# Patient Record
Sex: FEMALE | Race: WHITE | NOT HISPANIC OR LATINO | Employment: OTHER | URBAN - METROPOLITAN AREA
[De-identification: names, ages, dates, MRNs, and addresses within clinical notes are randomized per-mention and may not be internally consistent; named-entity substitution may affect disease eponyms.]

---

## 2017-03-22 ENCOUNTER — OFFICE VISIT (OUTPATIENT)
Dept: FAMILY MEDICINE | Facility: CLINIC | Age: 62
End: 2017-03-22
Payer: COMMERCIAL

## 2017-03-22 VITALS
WEIGHT: 180 LBS | OXYGEN SATURATION: 98 % | TEMPERATURE: 96.8 F | HEIGHT: 69 IN | BODY MASS INDEX: 26.66 KG/M2 | RESPIRATION RATE: 16 BRPM | DIASTOLIC BLOOD PRESSURE: 84 MMHG | SYSTOLIC BLOOD PRESSURE: 136 MMHG | HEART RATE: 64 BPM

## 2017-03-22 DIAGNOSIS — M77.11 LATERAL EPICONDYLITIS OF BOTH ELBOWS: Primary | ICD-10-CM

## 2017-03-22 DIAGNOSIS — R29.898 BILATERAL ARM WEAKNESS: ICD-10-CM

## 2017-03-22 DIAGNOSIS — M77.12 LATERAL EPICONDYLITIS OF BOTH ELBOWS: Primary | ICD-10-CM

## 2017-03-22 LAB
HGB BLD-MCNC: 15.1 G/DL (ref 11.7–15.7)
TSH SERPL DL<=0.005 MIU/L-ACNC: 1 MU/L (ref 0.4–4)
VIT B12 SERPL-MCNC: 932 PG/ML (ref 193–986)

## 2017-03-22 PROCEDURE — 82607 VITAMIN B-12: CPT | Performed by: FAMILY MEDICINE

## 2017-03-22 PROCEDURE — 99213 OFFICE O/P EST LOW 20 MIN: CPT | Performed by: FAMILY MEDICINE

## 2017-03-22 PROCEDURE — 84443 ASSAY THYROID STIM HORMONE: CPT | Performed by: FAMILY MEDICINE

## 2017-03-22 PROCEDURE — 36415 COLL VENOUS BLD VENIPUNCTURE: CPT | Performed by: FAMILY MEDICINE

## 2017-03-22 PROCEDURE — 85018 HEMOGLOBIN: CPT | Performed by: FAMILY MEDICINE

## 2017-03-22 NOTE — PROGRESS NOTES
SUBJECTIVE:                                                    Sofi Degroot is a 61 year old female who presents to clinic today for the following health issues:      Joint Pain     Onset: 10/2016    Description:   Location: left/right hands and arm   Character: weakness    Intensity: moderate    Progression of Symptoms: worse    Accompanying Signs & Symptoms:  Other symptoms: none   History:   Previous similar pain: no       Precipitating factors:   Trauma or overuse: no     Alleviating factors:  Improved by: nothing       Therapies Tried and outcome: nothing     Early October -   Notices that the coffee cup felt heavy.  Just lifting the coffee cup   Feels week over the distal arm -   Concerned about ALS  Right handed  No numb or tingly  Just weak - first in the lower arm but seems to be moving up to the top of the arm  Arm was very weak brushing snow off car -   Tried doing some little weights -     Very subtle symptoms starting in the left arm -     In November she broke her right finger - ring - scrubbing the floor after cat vomitted.    -------------------------------------    Problem list and histories reviewed & adjusted, as indicated.  Additional history: as documented    Patient Active Problem List   Diagnosis     Hypothyroidism     Hepatitis C virus infection without hepatic coma     CARDIOVASCULAR SCREENING; LDL GOAL LESS THAN 100     Hypertension goal BP (blood pressure) < 140/90     Advanced directives, counseling/discussion     Transient cerebral ischemia     PFO (patent foramen ovale)     Left-sided low back pain without sciatica     Cervical high risk HPV (human papillomavirus) test positive     Past Surgical History:   Procedure Laterality Date     C LIGATE FALLOPIAN TUBE  1986     SURGICAL HISTORY OF -  Right 2008    Right 1st MTPJ, bone spur resection       Social History   Substance Use Topics     Smoking status: Former Smoker     Smokeless tobacco: Never Used      Comment: quit 30 years  "ago     Alcohol use Yes      Comment: 4-5 Drinks Per Week     Family History   Problem Relation Age of Onset     HEART DISEASE Maternal Grandfather      Breast Cancer Maternal Grandmother      Dx'd age 70     Arthritis Father      Lipids Mother      On meds     CANCER Mother      BCC     HEART DISEASE Brother       of dilated of cardiomyopathy at age 54     Alcohol/Drug Sister      Alcohol/Drug Brother      Depression Sister            Reviewed and updated as needed this visit by clinical staff       Reviewed and updated as needed this visit by Provider         ROS:  Constitutional, HEENT, cardiovascular, pulmonary, gi and gu systems are negative, except as otherwise noted.    OBJECTIVE:                                                    /84  Pulse 64  Temp 96.8  F (36  C)  Resp 16  Ht 5' 9\" (1.753 m)  Wt 180 lb (81.6 kg)  LMP 10/27/2004  SpO2 98%  BMI 26.58 kg/m2  Body mass index is 26.58 kg/(m^2).  GENERAL: healthy, alert and no distress  MS: RUE exam shows normal strength and muscle mass, no deformities, no erythema, induration, or nodules, no evidence of joint effusion, ROM of all joints is normal and no evidence of joint instability and LUE exam shows normal strength and muscle mass, no deformities, no erythema, induration, or nodules, no evidence of joint effusion, ROM of all joints is normal and no evidence of joint instability    Diagnostic Test Results:  pending     ASSESSMENT/PLAN:                                                    1. Lateral epicondylitis of both elbows  Pt with right arm weakness and mild discomfort over the wrist extensors.  Discussed diff dx - she is concerned about ALS but feel like this is more likely MSK issue such as epicondylitis with some weakness.  Will get hand therapy started and see how she does.  Discussed if not improving or she is worsening would refer to neurology for evaulation and possible EMG  Will check labs to f/o b12 def, thyroid issue and " anemia  - OCCUPATIONAL THERAPY REFERRAL    2. Bilateral arm weakness     - OCCUPATIONAL THERAPY REFERRAL  - Vitamin B12  - TSH with free T4 reflex  - Hemoglobin    Pt will call or RTC if symptoms worsen or do not improve.     Noemy Olivera DO  [facility]

## 2017-03-22 NOTE — MR AVS SNAPSHOT
After Visit Summary   3/22/2017    Sofi Degroot    MRN: [id number]           Patient Information     Date Of Birth          1955        Visit Information        Provider Department      3/22/2017 9:30 AM Noemy Olivera, DO [facility]        Today's Diagnoses     Lateral epicondylitis of both elbows    -  1    Bilateral arm weakness           Follow-ups after your visit        Additional Services     OCCUPATIONAL THERAPY REFERRAL       *This therapy referral will be filtered to a centralized scheduling office at [facility] and the patient will receive a call to schedule an appointment at a [place] location most convenient for them. *     [facility] provides Occupational Therapy evaluation and treatment and many specialty services across the [place] system.  If requesting a specialty program, please choose from the list below.    If you have not heard from the scheduling office within 2 business days, please call [phone] for all locations, with the exception of [place], please call [phone].     Treatment: Evaluation & Treatment  Special Instructions/Modalities: b/l lower arm weakness  Special Programs: Occupational Rehabilitation -     Please be aware that coverage of these services is subject to the terms and limitations of your health insurance plan.  Call member services at your health plan with any benefit or coverage questions.      **Note to Provider:  If you are referring outside of [place] for the therapy appointment, please list the name of the location in the  special instructions  above, print the referral and give to the patient to schedule the appointment.                  Your next 10 appointments already scheduled     Mar 31, 2017 12:30 PM BROOKS DUNLAP Hand with Romana Toro, [facility] ( [facility])    [address]  [address] [address]  "  [phone]              Who to contact     If you have questions or need follow up information about today's clinic visit or your schedule please contact [facility] directly at [phone].  Normal or non-critical lab and imaging results will be communicated to you by MyChart, letter or phone within 4 business days after the clinic has received the results. If you do not hear from us within 7 days, please contact the clinic through MyChart or phone. If you have a critical or abnormal lab result, we will notify you by phone as soon as possible.  Submit refill requests through [employer] or call your pharmacy and they will forward the refill request to us. Please allow 3 business days for your refill to be completed.          Additional Information About Your Visit        [employer] Information     [employer] gives you secure access to your electronic health record. If you see a primary care provider, you can also send messages to your care team and make appointments. If you have questions, please call your primary care clinic.  If you do not have a primary care provider, please call [phone] and they will assist you.        Care EveryWhere ID     This is your Care EveryWhere ID. This could be used by other organizations to access your [place] medical records  [id number]        Your Vitals Were     Pulse Temperature Respirations Height Last Period Pulse Oximetry    64 96.8  F (36  C) 16 5' 9\" (1.753 m) 10/27/2004 98%    BMI (Body Mass Index)                   26.58 kg/m2            Blood Pressure from Last 3 Encounters:   03/22/17 136/84   11/25/16 128/78   11/22/16 128/78    Weight from Last 3 Encounters:   03/22/17 180 lb (81.6 kg)   11/25/16 180 lb (81.6 kg)   11/22/16 180 lb 12.8 oz (82 kg)              We Performed the Following     Hemoglobin     OCCUPATIONAL THERAPY REFERRAL     TSH with free T4 reflex     Vitamin B12        Primary Care Provider Office Phone # Fax #    Noemy Olivera, DO " [phone]       [facility] [address]  [address] [place] [address]        Thank you!     Thank you for choosing [facility]  for your care. Our goal is always to provide you with excellent care. Hearing back from our patients is one way we can continue to improve our services. Please take a few minutes to complete the written survey that you may receive in the mail after your visit with us. Thank you!             Your Updated Medication List - Protect others around you: Learn how to safely use, store and throw away your medicines at www.disposemymeds.org.          This list is accurate as of: 3/22/17 11:59 PM.  Always use your most recent med list.                   Brand Name Dispense Instructions for use    aspirin 325 MG tablet     180 tablet    Take 1 tablet (325 mg) by mouth daily       DAILY MULTIVITAMIN PO      1 tab daily       HYDROcodone-acetaminophen 5-325 MG per tablet    NORCO    20 tablet    Take 1-2 tablets by mouth every 8 hours as needed for moderate to severe pain       IBUPROFEN PO      Take 1 Dose by mouth as needed for moderate pain       levothyroxine 100 MCG tablet    SYNTHROID    90 tablet    Take 1 tablet (100 mcg) by mouth daily Needs appt. for further refills.       lisinopril 20 MG tablet    PRINIVIL/ZESTRIL    90 tablet    Take 1 tablet (20 mg) by mouth daily       metoprolol 50 MG 24 hr tablet    TOPROL-XL    135 tablet    Take 1.5 tablets (75 mg) by mouth daily       OMEGA-3 FISH OIL PO          sulfamethoxazole-trimethoprim 800-160 MG per tablet    BACTRIM DS/SEPTRA DS    20 tablet    Take 1 tablet by mouth 2 times daily       valACYclovir 500 MG tablet    VALTREX    12 tablet    TAKE ONE TABLET BY MOUTH TWICE A DAY FOR THREE DAYS. (REPEAT AS NEEDED)       VITAMIN D (CHOLECALCIFEROL) PO      Take by mouth daily

## 2017-03-24 NOTE — PROGRESS NOTES
Dear Sofi,   Your test results are all back -   -All of your labs are normal.  Let us know if you have any questions.  -Noemy Olivera, DO

## 2017-03-28 ENCOUNTER — VIRTUAL VISIT (OUTPATIENT)
Dept: FAMILY MEDICINE | Facility: OTHER | Age: 62
End: 2017-03-28

## 2017-03-28 NOTE — PROGRESS NOTES
Date:   Clinician: Lamont Degroot  Clinician NPI: [id number]  Patient: Sofi Degroot  Patient : 1955  Patient Address: [address] [place] [address]  Patient Phone: [phone]  Visit Protocol: UTI  Patient Summary:  Sofi is a 61 year old ( : 1955 ) female who initiated a Zip for a presumed bladder infection.     Her symptoms began 2 days ago and consist of urinary incontinence, urgency, dysuria, urinary frequency, and foul smelling urine.   Symptom Details   Urinary Frequency: Several times each hour    She denies nausea, loss of appetite, chills, fever, hesitation, vaginal discharge, abdominal pain, vomiting, recent antibiotic use, flank pain, and hematuria. Sofi has never had kidney stones. She has not been hospitalized, been a patient in a nursing home, or had a catheter in the past two weeks. She denies risk factors for sexually transmitted infections.   Sofi has had one (1) UTI in the past 12 months. Her most recent bladder infection was not within the last 4 weeks. Her current symptoms are similar to the previous UTI symptoms. She took an antibiotic for her last infection but does not remember which one.   Sofi does not get yeast infections when she takes antibiotics.   She states she is not pregnant and denies breastfeeding. She no longer menstruates.   She does NOT smoke or use smokeless tobacco.   MEDICATIONS:  Lisinopril, thyroid, metoprolol (Lopressor/Toprol), and aspirin  , ALLERGIES:  NKDA   Clinician Response:  Dear Sofi,  Based on the information you have provided, you likely have a bladder infection, also called an acute urinary tract infection (UTI).   To treat your infection, I am prescribing:   Nitrofurantoin (Macrobid). Swallow one (1) tablet twice a day for 5 days. Take the tablet with food. Continue taking the tablets even if you feel better before all the medication is gone. There is no refill with this prescription.   Some people develop  allergies to antibiotics. If you notice a new rash, significant swelling, or difficulty breathing, stop the medication immediately and go into a clinic for physical evaluation.   To help treat your current UTI and prevent future occurrences, remember to:     Drink 8-10, 8-ounce glasses of water daily.    Urinate after sexual intercourse.    Wipe front to back after using the bathroom.     Some women may develop a yeast infection as a side effect of taking antibiotics. If you notice symptoms of a yeast infection, Zipnosis can help treat that condition as well. Simply log in and complete another Zip, which will cover all of the necessary questions to determine the best treatment for you.   You should visit a clinic for a follow-up visit if your symptoms do not improve in 1-2 days or if you experience another urinary tract infection soon after completing this treatment.  If you become pregnant during this course of treatment, stop taking the medication and contact your primary care clinician.   Diagnosis: Acute Uncomplicated Bladder Infection  Diagnosis ICD: N39.0  Prescription: nitrofurantoin (Macrobid) 100mg oral tablet 10 tablets, 5 days supply. Take one tablet by mouth two times a day for 5 days. Refills: 0, Refill as needed: no, Allow substitutions: yes  Prescription Sent At: March 28 12:21:33, 2017  Pharmacy: [facility] - [phone] - [address] [place] [id number]

## 2017-03-31 ENCOUNTER — THERAPY VISIT (OUTPATIENT)
Dept: OCCUPATIONAL THERAPY | Facility: CLINIC | Age: 62
End: 2017-03-31
Payer: COMMERCIAL

## 2017-03-31 DIAGNOSIS — M77.12 LATERAL EPICONDYLITIS OF BOTH ELBOWS: ICD-10-CM

## 2017-03-31 DIAGNOSIS — M77.11 LATERAL EPICONDYLITIS OF BOTH ELBOWS: ICD-10-CM

## 2017-03-31 DIAGNOSIS — R29.898 WEAKNESS OF WRIST: ICD-10-CM

## 2017-03-31 DIAGNOSIS — R29.898 BILATERAL ARM WEAKNESS: Primary | ICD-10-CM

## 2017-03-31 PROCEDURE — 97140 MANUAL THERAPY 1/> REGIONS: CPT | Mod: GO | Performed by: OCCUPATIONAL THERAPIST

## 2017-03-31 PROCEDURE — 97166 OT EVAL MOD COMPLEX 45 MIN: CPT | Mod: GO | Performed by: OCCUPATIONAL THERAPIST

## 2017-03-31 NOTE — PROGRESS NOTES
Hand Therapy Initial Evaluation    Current Date:  3/31/2017    Diagnosis:  Bilateral lateral epicondylitis, bilateral arm weakness  DOO:  October 1, 2016    Referring MD:  Noemy Olivera,, DO    Next MD visit: PRN      Subjective:  Sofi Degroot is a 61 year old right hand dominant female.  Patient reports symptoms of weakness/loss of strength of the right elbow and wrist, forearm and hand  which occurred due to insidious onset around last October. Since onset symptoms are Gradually getting worse.  Special tests: none.  Previous treatment: some strength training ( it was really sore). General health as reported by patient is good.      Pertinent medical history includes: h/o fracture on L FA and ligament tears to L wrist, high blood pressure, thyroid problems, menopausal  Medical allergies:latex.  Surgical history: none.  Medication history: thyroid, high blood pressure.    Pt notes that she does have a tremor and it seems to be worse in the morning, not so much that she spills, but it is noticeable.    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 The does have a tremor in the R arm at times. Typing is fine. No issues with writing. Pt likes to read. She plays solitaire and bridge on her phone. She is involved in some water exercises. [facility] and has janie doing a class with some other women. She can pull the mitts and her hands through the water. Pt has 2 cats. She can scoop their litter okay. It is a but hard to lift the heavier cat with the R arm (10 lb). She does do some hand sewing and machine sewing now and then.  Occupational  Performance Deficits as reported by patient:care of pets, home establishment and management, meal preparation and cleanup, shopping and work        UEFI: see flowsheet    Objective:  Observation: Color/Temperature:     [x]    Normal  []    Abnormal    PAIN 3/31/2017     Location  right  elbow, wrist and hand     Description No pain, weakness is main complaint     Radiates no     Worse d/n      Frequency      Exacerbated by      Relieves      At rest 0-10/10 0/10     On use 0-10/10 0/10     At worst.0-10/10 0/10     Sleep disruption? no     Progression unchanged       Functional Activities: (Initial eval)  Pt demonstrates the following activities:   Tweezers use: pt reports mild problems with coordination  Reaching for mug: R wrist weakness noted (slight)  Lifting empty mug to mouth: WNL Lifting full mug to mouth: increased effort noted but pt is successful and no spillage, mild R wrist weakness noted.  Pt is able to open a small jar but notes she needs to modify a little.  Nail clipper use: Pt able to perform but notes some mild difficulty with this  Buttoning: Pt undoes 4 buttons then re buttons in 19 seconds (button board B hands)  Writing: patient able to write legibly with R hand with no enlargement or shrinking or wobbliness of letters  * Tremors:  Noted mildly to the R forearm and hand about three times, briefly    Functional Screen:  Comments on ability to do the following functional movements compared to the opposite side (Unable, 1/4 of the motion, 1/2 of the motion, 3/4 of the motion, WNL)  Date 4/1/2017 4/1/2017   Side R L   Reach behind head WNL WNL   Reach low back WNL WNL   Touch toes WNL WNL   Horizontal adduction to touch contralateral scapula WNL WNL   ER to touch homolateral scapula WNL WNL   IR to touch contralateral scapula WNL WNL       Cervical Screen:  Date: 4/1/2017    ROM: Active    Flexion WNL    Extension WNL    Lateral Flexion Right WNL    Lateral Flexion Left WNL    Rotation Right WNL   "  Rotation Left WNL    Compression :  NT    Spurling's Right: NT    Spurling's Left: NT    Traction:  NT    Cyriax Test: NT       Posture:  [x]       Normal   []      Forward Neck Posture  []      Rounded Forward Shoulders  []      Shoulder Height Difference  Comments: No findings of note     Strength:   (Measured in pounds)  Pain Report:  - none    + mild    ++ moderate    +++ severe    (handle at 2)  3/31/2017   Trials R L   1  2  3 56  50  52 64  52  60     Average: 53 57   Handle at 3 42 50   Handle at 1 44 45   Handle at 5 45 45     Lat Pinch  3/31/2017   Trials R L   1  2  3 16  16  16 19  15  17   Average: R 17     3 Pt Pinch  3/31/2017   Trials R L   1  2  3 13  14  12 14.5  13  13   Average: 13 13.5     Nine-Hole Peg Test   3/31/2017   Hand Time in seconds   Right 19 \"felt a little clumsy like I was spinning around the hole to get it in there\"   Left 19        Wrist and thumb ROM Special Test: + indicates mild pain, ++ indicates moderate pain, +++ indicates severe pain    DATE:  AROM AROM    R L   Wrist ext with RD WNL WNL   Wrist ext with UD WNL WNL   Wrist flexion with RD WNL WNL   Wrist flexion with RD WNL WNL   Radial deviation WNL WNL   Ulnar deviation WNL WNL   Pronation WNL WNL   Supination WNL WNL   Abductor pollicis longus  WNL WNL   Abductor Pollicis brevis  WNL WNL   Extensor pollicis longus  WNL WNL   Flexor pollicis longus  WNL WNL   Flexor pollicis brevis  WNL WNL     MMT Radial Nerve: (Scale of 0/5)   3/31/2017 3/31/2017    Right Left   Triceps 5 5   Brachialis 5 5   ECRL 4- 5   ECRB 4- 5   Supinator 4 5   EDC 4- 5   EDM 3 5   ECU 4- 5   APL 4 5   EPB 4 5   EIP 4- 5   EPL 4 5     Median Nerve MMT: (Scale 0/5)   3/31/2017 3/31/2017    Right Left   Pronator Teres 5 5   FCR 5 5   FDS 5 5   FDP I, II 5 5   FPL 5 5   APB 5 5   Opponens Pollicus 5 5   FPB 5 5   Lumricals I, II 5 5     Ulnar Nerve MMT: (Scale 0/5)   3/31/2017 3/31/2017    Right Left   FCU 5 5   FDP II, IV 5 5   ADM 5 5   ODM 5 5 "   FDM 5 5   Palmar Interossei 5 5   Dorsal Interossei 5 5   Lumbricals III, IV 5 5   Adductor Pollicis 5 5   FPB 5 5     Myotome Scan  + indicates pain or weakness present  Date: 4/1/2017 Right  Left     Weakness Pain Weakness Pain   Shoulder shrug (C2,3,4)       Shoulder abduction (C5)       Elbow flexion (C5-6)       Elbow Extension (C7)       Wrist Extension (C6 +      Wrist Flexion (C7)       Thumb Abduction (C8)       Finger Abd/Adduction (T1)         Scapula function: Pt demos 5/5 strength to serratus anterior bilaterally, further specific scapula function testing not completed    Edema:  none   Palpation:  []     Normal        [x]   Tender       [x]  Mild         []   Moderate []   Severe   Location: small trigger points deep in extensor wad on the R forearm     Sensation:  WNL throughout all nerve distributions; per patient report      Assessment:   Patient presents with symptoms of weakness to R wrist, thumb and finger extensors with onset of months ago. Pt has weakness in the muscles innervated by the radial nerve at the level of the supinator and question if there is some type of compression of the posterior interosseous nerve. Cervical screen negative and no parasthesias or pain noted. She does have mild coordination deficits noted that may be due to weakness, however pt notes some intension tremors as well at times.  Pt to benefit from hand therapy to increase strength and function to the R UE.    Patient's limitations or Problem List includes:  Weakness of the right wrist, hand and thumb which interferes with the patient's ability to perform Recreational Activities and Household Chores as compared to previous level of function.    Rehab Potential:  Excellent - Return to full activity, no limitations    Patient will benefit from skilled Occupational Therapy to increase overall strength,  strength, pinch strength, forearm strength, stability of wrist, stability of thumb and coordination to return to  previous activity level and resume normal daily tasks and to reach their rehab potential.    Barriers to Learning:  No barrier    Communication Issues:  Patient appears to be able to clearly communicate and understand verbal and written communication and follow directions correctly.    Assessment of Occupational Performance:  3-5 Performance Deficits  Identified Performance Deficits: care of pets, home establishment and management, meal preparation and cleanup, shopping and work      Clinical Decision Making (Complexity): Moderate complexity due to depth of evaluation for screening and ruling red flags in or out.  Chart Review, Brief history including review of medical and/or therapy records relating to the presenting problem and Detailed history review with patient      Treatment Explanation:  The following has been discussed with the patient:  RX ordered/plan of care  Anticipated outcomes  Possible risks and side effects    Treatment Plan:   Frequency:  1 X week, once daily  Duration:  for 2 months tapering to twice per month for 2 months     Therapeutic Exercise:  AROM, Reverse Blocking, Isotonics, Isometrics and Stabilization  Neuromuscular re-education:  Nerve Gliding, Kinesthetic Training, Proprioceptive Training, Posture and Kinesiotaping  Manual Techniques:  Coordination/Dexterity and Myofascial release  Self Care:  Self Care Tasks, Ergonomic Considerations and Work Tasks  Orthotic Fabrication: potential wrist and finger support orthosis for function or rest as needed    Discharge Plan:  Achieve all LTG.  Independent in home treatment program.  Reach maximal therapeutic benefit.    Home Exercise Program:  Try to mouse with L hand ( less tension to R lateral elbow)  Self massage  Epsom salt soaks / heat    Next Visit:  progress strenthening HEP, nerve glides possibly, release of radial nerve

## 2017-03-31 NOTE — MR AVS SNAPSHOT
After Visit Summary   3/31/2017    Sofi Degroot    MRN: [id number]           Patient Information     Date Of Birth          1955        Visit Information        Provider Department      3/31/2017 12:30 PM Romana Toro OT [facility] [place]        Today's Diagnoses     Bilateral arm weakness    -  1    Lateral epicondylitis of both elbows        Weakness of wrist           Follow-ups after your visit        Your next 10 appointments already scheduled     Apr 11, 2017  1:30 PM CDT   FABI Hand with Romana Toro OT   [facility] [place] ([facility])    [address] [place] [id number] [phone]            Apr 18, 2017  2:00 PM CDT   FABI Hand with Romana Toro OT   [facility] ([facility])    [address] [place] [id number] [phone]            Apr 25, 2017 12:30 PM CDT   FABI Hand with Romana Toro OT   [facility] ([facility])    [address] [place] [address] [id number] [phone]              Who to contact     If you have questions or need follow up information about today's clinic visit or your schedule please contact [facility] directly at [phone].  Normal or non-critical lab and imaging results will be communicated to you by MyChart, letter or phone within 4 business days after the clinic has received the results. If you do not hear from us within 7 days, please contact the clinic through MyChart or phone. If you have a critical or abnormal lab result, we will notify you by phone as soon as possible.  Submit refill requests through TriOviz or call your pharmacy and they will forward the refill request to us. Please allow 3 business days for your refill to be completed.          Additional Information About Your Visit        MyChart Information      [employer] Software gives you secure access to your electronic health record. If you see a primary care provider, you can also send messages to your care team and make appointments. If you have questions, please call your primary care clinic.  If you do not have a primary care provider, please call [phone] and they will assist you.        Care EveryWhere ID     This is your Care EveryWhere ID. This could be used by other organizations to access your [place] medical records  [id number]        Your Vitals Were     Last Period                   10/27/2004            Blood Pressure from Last 3 Encounters:   03/22/17 136/84   11/25/16 128/78   11/22/16 128/78    Weight from Last 3 Encounters:   03/22/17 81.6 kg (180 lb)   11/25/16 81.6 kg (180 lb)   11/22/16 82 kg (180 lb 12.8 oz)              We Performed the Following     HC OT EVAL, MODERATE COMPLEXITY     MANUAL THER TECH,1+REGIONS,EA 15 MIN        Primary Care Provider Office Phone # Fax #    Noemy TRENT Olivera [phone]       [facility] [address] [facility]  [address]  [facility] [address]        Thank you!     Thank you for choosing [facility]  for your care. Our goal is always to provide you with excellent care. Hearing back from our patients is one way we can continue to improve our services. Please take a few minutes to complete the written survey that you may receive in the mail after your visit with us. Thank you!             Your Updated Medication List - Protect others around you: Learn how to safely use, store and throw away your medicines at www.disposemymeds.org.          This list is accurate as of: 3/31/17 11:59 PM.  Always use your most recent med list.                   Brand Name Dispense Instructions for use    aspirin 325 MG tablet     180 tablet    Take 1 tablet (325 mg) by mouth daily       DAILY MULTIVITAMIN PO      1 tab daily       HYDROcodone-acetaminophen 5-325 MG per tablet    NORCO    20 tablet     Take 1-2 tablets by mouth every 8 hours as needed for moderate to severe pain       IBUPROFEN PO      Take 1 Dose by mouth as needed for moderate pain       levothyroxine 100 MCG tablet    SYNTHROID    90 tablet    Take 1 tablet (100 mcg) by mouth daily Needs appt. for further refills.       lisinopril 20 MG tablet    PRINIVIL/ZESTRIL    90 tablet    Take 1 tablet (20 mg) by mouth daily       metoprolol 50 MG 24 hr tablet    TOPROL-XL    135 tablet    Take 1.5 tablets (75 mg) by mouth daily       OMEGA-3 FISH OIL PO          sulfamethoxazole-trimethoprim 800-160 MG per tablet    BACTRIM DS/SEPTRA DS    20 tablet    Take 1 tablet by mouth 2 times daily       valACYclovir 500 MG tablet    VALTREX    12 tablet    TAKE ONE TABLET BY MOUTH TWICE A DAY FOR THREE DAYS. (REPEAT AS NEEDED)       VITAMIN D (CHOLECALCIFEROL) PO      Take by mouth daily

## 2017-04-01 PROBLEM — R29.898 WEAKNESS OF RIGHT UPPER EXTREMITY: Status: ACTIVE | Noted: 2017-04-01

## 2017-04-01 PROBLEM — R29.898 WRIST WEAKNESS: Status: ACTIVE | Noted: 2017-04-01

## 2017-04-01 PROBLEM — M77.11 LATERAL EPICONDYLITIS OF BOTH ELBOWS: Status: ACTIVE | Noted: 2017-04-01

## 2017-04-01 PROBLEM — R29.898 WEAKNESS OF WRIST: Status: ACTIVE | Noted: 2017-04-01

## 2017-04-01 PROBLEM — R29.898 BILATERAL ARM WEAKNESS: Status: ACTIVE | Noted: 2017-04-01

## 2017-04-01 PROBLEM — M77.12 LATERAL EPICONDYLITIS OF BOTH ELBOWS: Status: ACTIVE | Noted: 2017-04-01

## 2017-04-11 ENCOUNTER — THERAPY VISIT (OUTPATIENT)
Dept: OCCUPATIONAL THERAPY | Facility: CLINIC | Age: 62
End: 2017-04-11
Payer: COMMERCIAL

## 2017-04-11 DIAGNOSIS — R29.898 WEAKNESS OF WRIST: ICD-10-CM

## 2017-04-11 DIAGNOSIS — M77.12 LATERAL EPICONDYLITIS OF BOTH ELBOWS: ICD-10-CM

## 2017-04-11 DIAGNOSIS — R29.898 BILATERAL ARM WEAKNESS: ICD-10-CM

## 2017-04-11 DIAGNOSIS — M77.11 LATERAL EPICONDYLITIS OF BOTH ELBOWS: ICD-10-CM

## 2017-04-11 PROCEDURE — 97110 THERAPEUTIC EXERCISES: CPT | Mod: GO | Performed by: OCCUPATIONAL THERAPIST

## 2017-04-11 PROCEDURE — 97140 MANUAL THERAPY 1/> REGIONS: CPT | Mod: GO | Performed by: OCCUPATIONAL THERAPIST

## 2017-04-11 PROCEDURE — 97112 NEUROMUSCULAR REEDUCATION: CPT | Mod: GO | Performed by: OCCUPATIONAL THERAPIST

## 2017-04-11 NOTE — PROGRESS NOTES
Please refer to the daily flowsheet for treatment today, total treatment time and time spent performing 1:1 timed codes.

## 2017-04-11 NOTE — MR AVS SNAPSHOT
After Visit Summary   4/11/2017    Sofi Degroot    MRN: [id number]           Patient Information     Date Of Birth          1955        Visit Information        Provider Department      4/11/2017 1:30 PM Romana oTro OT [facility] [place]        Today's Diagnoses     Weakness of wrist        Bilateral arm weakness        Lateral epicondylitis of both elbows           Follow-ups after your visit        Your next 10 appointments already scheduled     Apr 18, 2017  2:00 PM CDT   FABI Hand with Romana Toro OT   [facility] ([facility])    [address] [place] [id number] [phone]            Apr 25, 2017 12:30 PM CDT   FABI Hand with Romana Toro OT   [facility] ([facility])    [address] [place] [phone]              Who to contact     If you have questions or need follow up information about today's clinic visit or your schedule please contact [facility] directly at [phone].  Normal or non-critical lab and imaging results will be communicated to you by Denatorhart, letter or phone within 4 business days after the clinic has received the results. If you do not hear from us within 7 days, please contact the clinic through Targazymet or phone. If you have a critical or abnormal lab result, we will notify you by phone as soon as possible.  Submit refill requests through CleanScapes or call your pharmacy and they will forward the refill request to us. Please allow 3 business days for your refill to be completed.          Additional Information About Your Visit        Denatorhart Information     CleanScapes gives you secure access to your electronic health record. If you see a primary care provider, you can also send messages to your care team and make appointments. If you have questions, please call your primary  [facility] clinic.  If you do not have a primary care provider, please call [phone] and they will assist you.        Care EveryWhere ID     This is your Care EveryWhere ID. This could be used by other organizations to access your [place] medical records  [id number]        Your Vitals Were     Last Period                   10/27/2004            Blood Pressure from Last 3 Encounters:   03/22/17 136/84   11/25/16 128/78   11/22/16 128/78    Weight from Last 3 Encounters:   03/22/17 81.6 kg (180 lb)   11/25/16 81.6 kg (180 lb)   11/22/16 82 kg (180 lb 12.8 oz)              We Performed the Following     MANUAL THER TECH,1+REGIONS,EA 15 MIN     NEUROMUSCULAR RE-EDUCATION     THERAPEUTIC EXERCISES        Primary Care Provider Office Phone # Fax #    Noemy Olivera [phone]       [facility] [address] [address]        Thank you!     Thank you for choosing [facility]  for your care. Our goal is always to provide you with excellent care. Hearing back from our patients is one way we can continue to improve our services. Please take a few minutes to complete the written survey that you may receive in the mail after your visit with us. Thank you!             Your Updated Medication List - Protect others around you: Learn how to safely use, store and throw away your medicines at www.disposemymeds.org.          This list is accurate as of: 4/11/17 11:18 PM.  Always use your most recent med list.                   Brand Name Dispense Instructions for use    aspirin 325 MG tablet     180 tablet    Take 1 tablet (325 mg) by mouth daily       DAILY MULTIVITAMIN PO      1 tab daily       HYDROcodone-acetaminophen 5-325 MG per tablet    NORCO    20 tablet    Take 1-2 tablets by mouth every 8 hours as needed for moderate to severe pain       IBUPROFEN PO      Take 1 Dose by mouth as needed for moderate pain       levothyroxine 100 MCG tablet     SYNTHROID    90 tablet    Take 1 tablet (100 mcg) by mouth daily Needs appt. for further refills.       lisinopril 20 MG tablet    PRINIVIL/ZESTRIL    90 tablet    Take 1 tablet (20 mg) by mouth daily       metoprolol 50 MG 24 hr tablet    TOPROL-XL    135 tablet    Take 1.5 tablets (75 mg) by mouth daily       OMEGA-3 FISH OIL PO          sulfamethoxazole-trimethoprim 800-160 MG per tablet    BACTRIM DS/SEPTRA DS    20 tablet    Take 1 tablet by mouth 2 times daily       valACYclovir 500 MG tablet    VALTREX    12 tablet    TAKE ONE TABLET BY MOUTH TWICE A DAY FOR THREE DAYS. (REPEAT AS NEEDED)       VITAMIN D (CHOLECALCIFEROL) PO      Take by mouth daily

## 2017-04-18 ENCOUNTER — THERAPY VISIT (OUTPATIENT)
Dept: OCCUPATIONAL THERAPY | Facility: CLINIC | Age: 62
End: 2017-04-18
Payer: COMMERCIAL

## 2017-04-18 DIAGNOSIS — R29.898 WEAKNESS OF WRIST: ICD-10-CM

## 2017-04-18 DIAGNOSIS — M77.12 LATERAL EPICONDYLITIS OF BOTH ELBOWS: ICD-10-CM

## 2017-04-18 DIAGNOSIS — R29.898 BILATERAL ARM WEAKNESS: ICD-10-CM

## 2017-04-18 DIAGNOSIS — M77.11 LATERAL EPICONDYLITIS OF BOTH ELBOWS: ICD-10-CM

## 2017-04-18 PROCEDURE — 97112 NEUROMUSCULAR REEDUCATION: CPT | Mod: GO | Performed by: OCCUPATIONAL THERAPIST

## 2017-04-18 PROCEDURE — 97535 SELF CARE MNGMENT TRAINING: CPT | Mod: GO | Performed by: OCCUPATIONAL THERAPIST

## 2017-04-18 NOTE — MR AVS SNAPSHOT
After Visit Summary   4/18/2017    Sofi Degroot    MRN: [id number]           Patient Information     Date Of Birth          1955        Visit Information        Provider Department      4/18/2017 2:00 PM Romana Toro OT [facility] [place]        Today's Diagnoses     Weakness of wrist        Bilateral arm weakness        Lateral epicondylitis of both elbows           Follow-ups after your visit        Your next 10 appointments already scheduled     May 02, 2017 12:30 PM CDT   FABI Hand with Romana Toro OT   [facility] [place] ( [facility]    [address] [place] [id number] [phone]              Who to contact     If you have questions or need follow up information about today's clinic visit or your schedule please contact [facility] directly at [phone].  Normal or non-critical lab and imaging results will be communicated to you by NitroSecurityhart, letter or phone within 4 business days after the clinic has received the results. If you do not hear from us within 7 days, please contact the clinic through NitroSecurityhart or phone. If you have a critical or abnormal lab result, we will notify you by phone as soon as possible.  Submit refill requests through [email or web address] or call your pharmacy and they will forward the refill request to us. Please allow 3 business days for your refill to be completed.          Additional Information About Your Visit        MyChart Information     [email or web address] gives you secure access to your electronic health record. If you see a primary care provider, you can also send messages to your care team and make appointments. If you have questions, please call your primary care clinic.  If you do not have a primary care provider, please call [phone] and they will assist you.        Care EveryWhere ID     This is your Care EveryWhere ID. This could be used by other organizations to  access your [place] medical records  [id number]        Your Vitals Were     Last Period                   10/27/2004            Blood Pressure from Last 3 Encounters:   03/22/17 136/84   11/25/16 128/78   11/22/16 128/78    Weight from Last 3 Encounters:   03/22/17 81.6 kg (180 lb)   11/25/16 81.6 kg (180 lb)   11/22/16 82 kg (180 lb 12.8 oz)              We Performed the Following     NEUROMUSCULAR RE-EDUCATION     SELF CARE MNGMENT TRAINING        Primary Care Provider Office Phone # Fax #    Noemy Olivera [phone]       [facility] [address] [facility]  [address]  [facility] [address]        Thank you!     Thank you for choosing [facility]  for your care. Our goal is always to provide you with excellent care. Hearing back from our patients is one way we can continue to improve our services. Please take a few minutes to complete the written survey that you may receive in the mail after your visit with us. Thank you!             Your Updated Medication List - Protect others around you: Learn how to safely use, store and throw away your medicines at www.disposemymeds.org.          This list is accurate as of: 4/18/17  8:01 PM.  Always use your most recent med list.                   Brand Name Dispense Instructions for use    aspirin 325 MG tablet     180 tablet    Take 1 tablet (325 mg) by mouth daily       DAILY MULTIVITAMIN PO      1 tab daily       HYDROcodone-acetaminophen 5-325 MG per tablet    NORCO    20 tablet    Take 1-2 tablets by mouth every 8 hours as needed for moderate to severe pain       IBUPROFEN PO      Take 1 Dose by mouth as needed for moderate pain       levothyroxine 100 MCG tablet    SYNTHROID    90 tablet    Take 1 tablet (100 mcg) by mouth daily Needs appt. for further refills.       lisinopril 20 MG tablet    PRINIVIL/ZESTRIL    90 tablet    Take 1 tablet (20 mg) by mouth daily       metoprolol 50 MG 24 hr tablet    TOPROL-XL    135  tablet    Take 1.5 tablets (75 mg) by mouth daily       OMEGA-3 FISH OIL PO          sulfamethoxazole-trimethoprim 800-160 MG per tablet    BACTRIM DS/SEPTRA DS    20 tablet    Take 1 tablet by mouth 2 times daily       valACYclovir 500 MG tablet    VALTREX    12 tablet    TAKE ONE TABLET BY MOUTH TWICE A DAY FOR THREE DAYS. (REPEAT AS NEEDED)       VITAMIN D (CHOLECALCIFEROL) PO      Take by mouth daily

## 2017-04-18 NOTE — PROGRESS NOTES
Hand Therapy SOAP note    Current Date:  4/18/2017    Diagnosis:  Bilateral lateral epicondylitis, bilateral arm weakness  DOO:  October 1, 2016    Referring MD:  Noemy Olivera,, DO    Next MD visit: PRSPIKE BARRYT: Pt has bruising noted to lateral upper arm this date, residual from last rx session. She notes she was very sore for a few days, and the bruise appeared. This is feeling better now.    Subjective:  Things are still the same since starting therapy and working on HEP thus far.    Objective:  Observation: Color/Temperature:     [x]    Normal  []    Abnormal    PAIN 3/31/2017 4/18/2017      Location  right  elbow, wrist and hand right  elbow, wrist and hand    Description No pain, weakness is main complaint Tremor, worse in the mornings    Radiates no     Worse d/n      Frequency      Exacerbated by      Relieves      At rest 0-10/10 0/10     On use 0-10/10 0/10     At worst.0-10/10 0/10     Sleep disruption? no     Progression unchanged       Functional Activities: (Initial eval)  Pt demonstrates the following activities:   Tweezers use: pt reports mild problems with coordination  Reaching for mug: R wrist weakness noted (slight)  Lifting empty mug to mouth: WNL Lifting full mug to mouth: increased effort noted but pt is successful and no spillage, mild R wrist weakness noted.  Pt is able to open a small jar but notes she needs to modify a little.  Nail clipper use: Pt able to perform but notes some mild difficulty with this  Buttoning: Pt undoes 4 buttons then re buttons in 19 seconds (button board B hands)  Writing: patient able to write legibly with R hand with no enlargement or shrinking or wobbliness of letters  * Tremors:  Noted mildly to the R forearm and hand about three times, briefly    Functional Activities: 4/18/2017  Pt demos lifting mug and very mild shaking noted to lateral forearm. This is eliminated with trial of simulated travel mug in an alternate motor pattern involving less of the  "UE.    Functional Screen:  Comments on ability to do the following functional movements compared to the opposite side (Unable, 1/4 of the motion, 1/2 of the motion, 3/4 of the motion, WNL)  Date 4/1/2017 4/1/2017   Side R L   Reach behind head WNL WNL   Reach low back WNL WNL   Touch toes WNL WNL   Horizontal adduction to touch contralateral scapula WNL WNL   ER to touch homolateral scapula WNL WNL   IR to touch contralateral scapula WNL WNL       Cervical Screen:  Date: 4/1/2017    ROM: Active    Flexion WNL    Extension WNL    Lateral Flexion Right WNL    Lateral Flexion Left WNL    Rotation Right WNL    Rotation Left WNL    Compression :  NT    Spurling's Right: NT    Spurling's Left: NT    Traction:  NT    Cyriax Test: NT       Posture:  [x]       Normal   []      Forward Neck Posture  []      Rounded Forward Shoulders  []      Shoulder Height Difference  Comments: No findings of note     Strength:   (Measured in pounds)  Pain Report:  - none    + mild    ++ moderate    +++ severe    (handle at 2)  3/31/2017   Trials R L   1  2  3 56  50  52 64  52  60     Average: 53 57   Handle at 3 42 50   Handle at 1 44 45   Handle at 5 45 45     Lat Pinch  3/31/2017   Trials R L   1  2  3 16  16  16 19  15  17   Average: R 17     3 Pt Pinch  3/31/2017   Trials R L   1  2  3 13  14  12 14.5  13  13   Average: 13 13.5     Nine-Hole Peg Test   3/31/2017   Hand Time in seconds   Right 19 \"felt a little clumsy like I was spinning around the hole to get it in there\"   Left 19        Wrist and thumb ROM Special Test: + indicates mild pain, ++ indicates moderate pain, +++ indicates severe pain    DATE:  AROM AROM    R L   Wrist ext with RD WNL WNL   Wrist ext with UD WNL WNL   Wrist flexion with RD WNL WNL   Wrist flexion with RD WNL WNL   Radial deviation WNL WNL   Ulnar deviation WNL WNL   Pronation WNL WNL   Supination WNL WNL   Abductor pollicis longus  WNL WNL   Abductor Pollicis brevis  WNL WNL   Extensor pollicis longus  " WNL WNL   Flexor pollicis longus  WNL WNL   Flexor pollicis brevis  WNL WNL     MMT Radial Nerve: (Scale of 0/5)   3/31/2017 3/31/2017    Right Left   Triceps 5 5   Brachialis 5 5   ECRL 4- 5   ECRB 4- 5   Supinator 4 5   EDC 4- 5   EDM 3 5   ECU 4- 5   APL 4 5   EPB 4 5   EIP 4- 5   EPL 4 5     Median Nerve MMT: (Scale 0/5)   3/31/2017 3/31/2017    Right Left   Pronator Teres 5 5   FCR 5 5   FDS 5 5   FDP I, II 5 5   FPL 5 5   APB 5 5   Opponens Pollicus 5 5   FPB 5 5   Lumricals I, II 5 5     Ulnar Nerve MMT: (Scale 0/5)   3/31/2017 3/31/2017    Right Left   FCU 5 5   FDP II, IV 5 5   ADM 5 5   ODM 5 5   FDM 5 5   Palmar Interossei 5 5   Dorsal Interossei 5 5   Lumbricals III, IV 5 5   Adductor Pollicis 5 5   FPB 5 5     Myotome Scan  + indicates pain or weakness present  Date: 4/1/2017 Right  Left     Weakness Pain Weakness Pain   Shoulder shrug (C2,3,4)       Shoulder abduction (C5)       Elbow flexion (C5-6)       Elbow Extension (C7)       Wrist Extension (C6 +      Wrist Flexion (C7)       Thumb Abduction (C8)       Finger Abd/Adduction (T1)         Scapula function: Pt demos 5/5 strength to serratus anterior bilaterally, further specific scapula function testing not completed    Wrist ROM Special Test: - indicates no pain + indicates mild pain, ++ indicates moderate pain, +++ indicates severe pain    DATE: 4/18/2017 Resisted strength   Wrist ext with RD - 5/5   Wrist ext with UD - 5/5   Wrist flexion with RD - 5/5   Wrist flexion with RD - 5/5   Radial deviation - 5/5   Ulnar deviation - 5/5   Pronation - 5/5   Supination - 5/5       Edema:  none   Palpation:  []     Normal        [x]   Tender       [x]  Mild         []   Moderate []   Severe   Location: small trigger points deep in extensor wad on the R forearm     Sensation:  WNL throughout all nerve distributions; per patient report      Assessment:   Patient presents with symptoms of weakness to R wrist, thumb and finger extensors with onset of months  ago. Pt has weakness in the muscles innervated by the radial nerve at the level of the supinator and question if there is some type of compression of the posterior interosseous nerve. However, functional limitations are limited to lifting coffee mug, which involves activation of multiple muscle groups against gravity, including the shoulder, as observed this date. Recommend holding on strengthening, as strength is quite good. Pt to alter motor pattern by drinking from a travel mug (mainly wrist and elbow motion) and determine effect on symptoms and function.       Treatment Plan:   Frequency:  1 X week, once daily  Duration:  for 2 months tapering to twice per month for 2 months     Therapeutic Exercise:  AROM, Reverse Blocking, Isotonics, Isometrics and Stabilization  Neuromuscular re-education:  Nerve Gliding, Kinesthetic Training, Proprioceptive Training, Posture and Kinesiotaping  Manual Techniques:  Coordination/Dexterity and Myofascial release  Self Care:  Self Care Tasks, Ergonomic Considerations and Work Tasks  Orthotic Fabrication: potential wrist and finger support orthosis for function or rest as needed    Discharge Plan:  Achieve all LTG.  Independent in home treatment program.  Reach maximal therapeutic benefit.    Home Exercise Program:  Try to mouse with L hand ( less tension to R lateral elbow)  Heat if needed / comfortable  Radial nerve glides     4/18/2017  Hold strengthening  Try a different type of mug for coffee - travel mug with no handle  (different motor pattern - avoid involving shoulder and FA pronation so much)  Radial nerve glides     Next Visit:  Follow up with symptoms to radial nerve  Follow up with activity modification  Return 2-3 weeks

## 2017-05-01 ENCOUNTER — MYC MEDICAL ADVICE (OUTPATIENT)
Dept: FAMILY MEDICINE | Facility: CLINIC | Age: 62
End: 2017-05-01

## 2017-05-01 DIAGNOSIS — R29.898 BILATERAL ARM WEAKNESS: Primary | ICD-10-CM

## 2017-05-01 NOTE — TELEPHONE ENCOUNTER
PN,  Please see below MyChart message  Pt saw you for this 3/22  Time for f/u OV?  Please advise.  Alisa KEITH RN

## 2017-05-30 ENCOUNTER — OFFICE VISIT (OUTPATIENT)
Dept: NEUROLOGY | Facility: CLINIC | Age: 62
End: 2017-05-30
Payer: COMMERCIAL

## 2017-05-30 VITALS
SYSTOLIC BLOOD PRESSURE: 124 MMHG | HEART RATE: 72 BPM | WEIGHT: 181.75 LBS | BODY MASS INDEX: 26.92 KG/M2 | HEIGHT: 69 IN | DIASTOLIC BLOOD PRESSURE: 82 MMHG | RESPIRATION RATE: 18 BRPM | OXYGEN SATURATION: 97 %

## 2017-05-30 DIAGNOSIS — R29.898 BILATERAL ARM WEAKNESS: Primary | ICD-10-CM

## 2017-05-30 PROCEDURE — 84165 PROTEIN E-PHORESIS SERUM: CPT | Performed by: PSYCHIATRY & NEUROLOGY

## 2017-05-30 PROCEDURE — 82175 ASSAY OF ARSENIC: CPT | Mod: 90 | Performed by: PSYCHIATRY & NEUROLOGY

## 2017-05-30 PROCEDURE — 00000402 ZZHCL STATISTIC TOTAL PROTEIN: Performed by: PSYCHIATRY & NEUROLOGY

## 2017-05-30 PROCEDURE — 86038 ANTINUCLEAR ANTIBODIES: CPT | Performed by: PSYCHIATRY & NEUROLOGY

## 2017-05-30 PROCEDURE — 86335 IMMUNFIX E-PHORSIS/URINE/CSF: CPT | Performed by: PSYCHIATRY & NEUROLOGY

## 2017-05-30 PROCEDURE — 86334 IMMUNOFIX E-PHORESIS SERUM: CPT | Performed by: PSYCHIATRY & NEUROLOGY

## 2017-05-30 PROCEDURE — 83516 IMMUNOASSAY NONANTIBODY: CPT | Mod: 90 | Performed by: PSYCHIATRY & NEUROLOGY

## 2017-05-30 PROCEDURE — 99000 SPECIMEN HANDLING OFFICE-LAB: CPT | Performed by: PSYCHIATRY & NEUROLOGY

## 2017-05-30 PROCEDURE — 36415 COLL VENOUS BLD VENIPUNCTURE: CPT | Performed by: PSYCHIATRY & NEUROLOGY

## 2017-05-30 PROCEDURE — 99205 OFFICE O/P NEW HI 60 MIN: CPT | Performed by: PSYCHIATRY & NEUROLOGY

## 2017-05-30 PROCEDURE — 82784 ASSAY IGA/IGD/IGG/IGM EACH: CPT | Performed by: PSYCHIATRY & NEUROLOGY

## 2017-05-30 PROCEDURE — 83655 ASSAY OF LEAD: CPT | Mod: 90 | Performed by: PSYCHIATRY & NEUROLOGY

## 2017-05-30 PROCEDURE — 83825 ASSAY OF MERCURY: CPT | Mod: 90 | Performed by: PSYCHIATRY & NEUROLOGY

## 2017-05-30 PROCEDURE — 84166 PROTEIN E-PHORESIS/URINE/CSF: CPT | Performed by: PSYCHIATRY & NEUROLOGY

## 2017-05-30 RX ORDER — LORAZEPAM 0.5 MG/1
0.5 TABLET ORAL
Qty: 1 TABLET | Refills: 0 | Status: SHIPPED | OUTPATIENT
Start: 2017-05-30 | End: 2017-10-11

## 2017-05-30 NOTE — NURSING NOTE
"COGNITIVE SCREEN  1) Repeat 3 items (Banana, Sunrise, Chair)    2) Clock draw: NORMAL  3) 3 item recall: Recalls 2 objects   Results: NORMAL clock, 1-2 items recalled: COGNITIVE IMPAIRMENT LESS LIKELY    Mini-CogTM Copyright S Deena. Licensed by the author for use in [facility]; reprinted with permission (ronak@[facility]). All rights reserved.        Chief Complaint   Patient presents with     Consult     bilateral arm weakness       Initial /82 (BP Location: Left arm, Patient Position: Chair, Cuff Size: Adult Regular)  Pulse 72  Resp 18  Ht 1.753 m (5' 9\")  Wt 82.4 kg (181 lb 12 oz)  LMP 10/27/2004  SpO2 97%  BMI 26.84 kg/m2 Estimated body mass index is 26.84 kg/(m^2) as calculated from the following:    Height as of this encounter: 1.753 m (5' 9\").    Weight as of this encounter: 82.4 kg (181 lb 12 oz).  Medication Reconciliation: complete     Teresa Schumacher CMA      "

## 2017-05-30 NOTE — MR AVS SNAPSHOT
After Visit Summary   5/30/2017    Sofi Degroot    MRN: [id number]           Patient Information     Date Of Birth          1955        Visit Information        Provider Department      5/30/2017 9:00 AM Juvenal Childs MD [facility]        Today's Diagnoses     Bilateral arm weakness    -  1      Care Instructions    AFTER VISIT SUMMARY (AVS):    At today's visit we discussed various diagnostic possibilities for your symptoms and the reasons for work-up, which includes:  Orders Placed This Encounter   Procedures     MR Cervical Spine w/o Contrast     Antinuclear antibody screen by EIA     GM1 antibody panel     Protein electrophoresis     Protein electrophoresis random urine     Blood metal panel     Protein Immunofixation Serum     Immunofix and Prot Elect R Ur ([facility])     EMG     No new medications were ordered.    Preventive Neurology: Encouraged to stay physically and mentally active with particular emphasis on daily mentally stimulating activities of your choice (such as crosswords, puzzles, sudoku, etc.), stretching exercises, walking, and healthy eating.    Additional recommendations after the work-up.    Next follow-up appointment is in next 4 weeks or earlier if needed.    Please do not hesitate to call me with any questions or concerns.    Thanks.            Follow-ups after your visit        Follow-up notes from your care team     Return in about 4 weeks (around 6/27/2017).      Future tests that were ordered for you today     Open Future Orders        Priority Expected Expires Ordered    MR Cervical Spine w/o Contrast Routine  5/30/2018 5/30/2017    EMG Routine  5/30/2018 5/30/2017            Who to contact     If you have questions or need follow up information about today's clinic visit or your schedule please contact [facility] directly at [phone].  Normal or non-critical lab and imaging results will be communicated to you  "by Bill.Forwardhart, letter or phone within 4 business days after the clinic has received the results. If you do not hear from us within 7 days, please contact the clinic through 123ContactForm or phone. If you have a critical or abnormal lab result, we will notify you by phone as soon as possible.  Submit refill requests through 123ContactForm or call your pharmacy and they will forward the refill request to us. Please allow 3 business days for your refill to be completed.          Additional Information About Your Visit        [facility] Information     123ContactForm gives you secure access to your electronic health record. If you see a primary care provider, you can also send messages to your care team and make appointments. If you have questions, please call your primary care clinic.  If you do not have a primary care provider, please call [phone] and they will assist you.        Care EveryWhere ID     This is your Care EveryWhere ID. This could be used by other organizations to access your [facility] medical records  [id number]        Your Vitals Were     Pulse Respirations Height Last Period Pulse Oximetry BMI (Body Mass Index)    72 18 1.753 m (5' 9\") 10/27/2004 97% 26.84 kg/m2       Blood Pressure from Last 3 Encounters:   05/30/17 124/82   03/22/17 136/84   11/25/16 128/78    Weight from Last 3 Encounters:   05/30/17 82.4 kg (181 lb 12 oz)   03/22/17 81.6 kg (180 lb)   11/25/16 81.6 kg (180 lb)              We Performed the Following     Antinuclear antibody screen by EIA     Blood metal panel     GM1 antibody panel     Immunofix and Prot Elect R Ur ([facility])     Protein electrophoresis random urine     Protein electrophoresis     Protein Immunofixation Serum          Today's Medication Changes          These changes are accurate as of: 5/30/17 10:06 AM.  If you have any questions, ask your nurse or doctor.               Start taking these medicines.        Dose/Directions    LORazepam 0.5 MG tablet   Commonly known as:  ATIVAN "   Used for:  Bilateral arm weakness   Started by:  Juvenal Childs MD        Dose:  0.5 mg   Take 1 tablet (0.5 mg) by mouth once as needed for other (when instructed by [facility] tech)   Quantity:  1 tablet   Refills:  0            Where to get your medicines      Some of these will need a paper prescription and others can be bought over the counter.  Ask your nurse if you have questions.     Bring a paper prescription for each of these medications     LORazepam 0.5 MG tablet                Primary Care Provider Office Phone # Fax #    Noemy Olivera [phone]       [facility] [address] [address]        Thank you!     Thank you for choosing [facility]  for your care. Our goal is always to provide you with excellent care. Hearing back from our patients is one way we can continue to improve our services. Please take a few minutes to complete the written survey that you may receive in the mail after your visit with us. Thank you!             Your Updated Medication List - Protect others around you: Learn how to safely use, store and throw away your medicines at www.disposemymeds.org.          This list is accurate as of: 5/30/17 10:06 AM.  Always use your most recent med list.                   Brand Name Dispense Instructions for use    aspirin 325 MG tablet     180 tablet    Take 1 tablet (325 mg) by mouth daily       DAILY MULTIVITAMIN PO      1 tab daily       HYDROcodone-acetaminophen 5-325 MG per tablet    NORCO    20 tablet    Take 1-2 tablets by mouth every 8 hours as needed for moderate to severe pain       IBUPROFEN PO      Take 1 Dose by mouth as needed for moderate pain       levothyroxine 100 MCG tablet    SYNTHROID    90 tablet    Take 1 tablet (100 mcg) by mouth daily Needs appt. for further refills.       lisinopril 20 MG tablet    PRINIVIL/ZESTRIL    90 tablet    Take 1 tablet (20 mg) by mouth daily       LORazepam  0.5 MG tablet    ATIVAN    1 tablet    Take 1 tablet (0.5 mg) by mouth once as needed for other (when instructed by MRI tech)       metoprolol 50 MG 24 hr tablet    TOPROL-XL    135 tablet    Take 1.5 tablets (75 mg) by mouth daily       OMEGA-3 FISH OIL PO      Take 2 g by mouth daily       valACYclovir 500 MG tablet    VALTREX    12 tablet    TAKE ONE TABLET BY MOUTH TWICE A DAY FOR THREE DAYS. (REPEAT AS NEEDED)       VITAMIN D (CHOLECALCIFEROL) PO      Take 4,000 Units by mouth daily

## 2017-05-30 NOTE — PATIENT INSTRUCTIONS
AFTER VISIT SUMMARY (AVS):    At today's visit we discussed various diagnostic possibilities for your symptoms and the reasons for work-up, which includes:  Orders Placed This Encounter   Procedures     MR Cervical Spine w/o Contrast     Antinuclear antibody screen by EIA     GM1 antibody panel     Protein electrophoresis     Protein electrophoresis random urine     Blood metal panel     Protein Immunofixation Serum     Immunofix and Prot Elect R Ur ([facility])     EMG     No new medications were ordered.    Preventive Neurology: Encouraged to stay physically and mentally active with particular emphasis on daily mentally stimulating activities of your choice (such as crosswords, puzzles, sudoku, etc.), stretching exercises, walking, and healthy eating.    Additional recommendations after the work-up.    Next follow-up appointment is in next 4 weeks or earlier if needed.    Please do not hesitate to call me with any questions or concerns.    Thanks.

## 2017-05-30 NOTE — PROGRESS NOTES
"INITIAL NEUROLOGY CONSULTATION    DATE OF VISIT: 5/30/2017  CLINIC LOCATION: Formerly named [facility]  MRN: [id number]  PATIENT NAME: Sofi Degroot  YOB: 1955    PRIMARY CARE PROVIDER: Noeym Olivera DO     REASON FOR VISIT:   Chief Complaint   Patient presents with     Consult     bilateral arm weakness     HISTORY OF PRESENT ILLNESS:                                                    Ms. Sofi Degroot is 62 year old right handed female patient with past medical history of transient ischemic attack, hepatitis C, hypertension, and hypothyroidism, who was seen in consultation today requested by Noemy Olivera DO, for bilateral arm weakness.    Per patient's report, she was in her usual state of health until October 2016, when she noticed very mild weakness of her right arm while holding a cup of coffee, that gradually progressed since that time. Eventually, she assisted with her left hand while holding objects with her right hand. She was not dropping things and did not have difficulty combing her hair or brushing her teeth. Several months later, she noted similar symptoms in her left arm. Hand therapy, ordered by her primary care provider, was not helpful. At the current time, she has difficulty lifting 20 pounds of her cat litter bags. She is concerned about possibility of ALS.    In addition, several times she noticed visible twitching of the muscles in her right forearm. She did not notice any twitching in any other areas of her body. At other times, she experiences twitching which is not \"visible\". Other symptoms include occasional choking while drinking coffee (though no overt dysphagia or dysarthria) for the last 2 months and periodic muscle spasms in her toes for the last year. She denies any sensory and other focal neurological symptoms. No difficulty walking, no recent falls.     Prior neurological history: negative for migraine headaches, stroke, brain neoplasms, seizure disorders, " multiple sclerosis, major head injuries, and CNS infections.    Neurologic Review of Systems - no amaurosis, diplopia, abnormal speech, unilateral numbness or weakness. She endorses fatigue, thyroid problems, and history of hepatitis C (was in a drug study in the past). Otherwise, she denies any other complaints on 14-point comprehensive review of systems.    PAST MEDICAL/SURGICAL HISTORY:                                                    I personally reviewed patient's past medical and surgical history with the patient at today's visit.  Past Medical History:   Diagnosis Date     Cervical high risk HPV (human papillomavirus) test positive 09/16/16    (not 16 or 18)     Low risk HPV infection 2009, 2010    NIL pap, + HPV 53     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 1998    Tx'd w/ Interferon x 1 year-virus free at 5 years, blood transfusion  related     Past Surgical History:   Procedure Laterality Date     C LIGATE FALLOPIAN TUBE  1986     SURGICAL HISTORY OF -  Right 2008    Right 1st MTPJ, bone spur resection     MEDICATIONS:                                                    I personally reviewed patient's medications and allergies with the patient at today's visit.  Current Outpatient Prescriptions on File Prior to Visit:  valACYclovir (VALTREX) 500 MG tablet TAKE ONE TABLET BY MOUTH TWICE A DAY FOR THREE DAYS. (REPEAT AS NEEDED)   Omega-3 Fatty Acids (OMEGA-3 FISH OIL PO) Take 2 g by mouth daily    lisinopril (PRINIVIL,ZESTRIL) 20 MG tablet Take 1 tablet (20 mg) by mouth daily   metoprolol (TOPROL-XL) 50 MG 24 hr tablet Take 1.5 tablets (75 mg) by mouth daily   levothyroxine (SYNTHROID) 100 MCG tablet Take 1 tablet (100 mcg) by mouth daily Needs appt. for further refills.   IBUPROFEN PO Take 1 Dose by mouth as needed for moderate pain   aspirin 325 MG tablet Take 1 tablet (325 mg) by mouth daily   DAILY MULTIVITAMIN PO 1 tab daily   HYDROcodone-acetaminophen  (NORCO) 5-325 MG per tablet Take 1-2 tablets by mouth every 8 hours as needed for moderate to severe pain (Patient not taking: Reported on 2017)   VITAMIN D, CHOLECALCIFEROL, PO Take 4,000 Units by mouth daily    [DISCONTINUED] lisinopril-hydrochlorothiazide (PRINZIDE,ZESTORETIC) 20-12.5 MG per tablet Take 1 tablet by mouth daily     ALLERGIES:                                                      Allergies   Allergen Reactions     Latex Swelling     Dental visit--burning sensation in lips and throat, lips felt puffy, eye watery from latex gloves     Pineapple Other (See Comments)     FAMILY/SOCIAL HISTORY:                                                    Family and social history was reviewed with the patient at today's visit. No family history of neurological disorders.   Problem (# of Occurrences) Relation (Name,Age of Onset)    Alcohol/Drug (2) Sister, Brother    Arthritis (1) Father    Breast Cancer (1) Maternal Grandmother: Dx'd age 70    CANCER (1) Mother: BCC    Depression (1) Sister    HEART DISEASE (2) Maternal Grandfather, Brother:  of dilated of cardiomyopathy at age 54    Lipids (1) Mother: On meds        Single, lives alone, denies tobacco and alcohol use. Smokes marijuana every evening for sleep. 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   Social History   Substance Use Topics     Smoking status: Former Smoker     Smokeless tobacco: Never Used      Comment: quit 30 years ago     Alcohol use Yes      Comment: 4-5 Drinks Per Week     REVIEW OF SYSTEMS:                                                    Patient has completed a [facility] Patient Health History, including a 14-system review which was personally reviewed, and pertinent positives are listed in HPI. She denies any additional problems on the further questioning.    EXAM:                                                    VITAL SIGNS:   /82 (BP Location: Left arm, Patient Position: Chair, Cuff Size: Adult Regular)  " Pulse 72  Resp 18  Ht 1.753 m (5' 9\")  Wt 82.4 kg (181 lb 12 oz)  LMP 10/27/2004  SpO2 97%  BMI 26.84 kg/m2    General: pt is in NAD, cooperative.  Skin: normal turgor, moist mucous membranes, no lesions/rashes noticed.  HEENT: ATNC, EOMI, PERRL, white sclera, normal conjunctiva, no nystagmus or ptosis. No carotid bruits bilaterally.  Respiratory: lung sounds clear to auscultation bilaterally, no crackles, wheezes, rhonchi. Symmetric lung excursion, no accessory respiratory muscle use.  Cardiovascular: normal S1/S2, no murmurs/rubs/gallops.   Abdomen: Not distended.   : deferred.    Neurological:  Mental: alert, follows commands, mini-cog is 4/5 with 2/3 on memory recall, no aphasia or dysarthria. Fund of knowledge is appropriate for age.  Cranial Nerves:  CN II: visual acuity - able to accurately count fingers with each eye. Visual fields intact bilaterally.  CN III, IV, VI: EOM intact, pupils equal and reactive  CN V: facial sensation nl  CN VII: face symmetric, no facial droop  CN VIII: hearing normal  CN IX: palate elevation symmetric, uvula at midline  CN XI SCM normal, shoulder shrug nl  CN XII: tongue midline, no fasciculations   Motor: Strength: 5/5 in all major groups of all extremities. Normal tone. No fasciculations. No muscle atrophies. No abnormal movements. No pronator drift b/l.  Reflexes: Triceps, biceps, brachioradialis, patellar, and achilles reflexes normal and symmetric. No clonus noted. Toes are down-going b/l.   Sensory: temperature, light touch, pinprick, and vibration intact. Romberg: negative.  Coordination: FNF and heel-shin tests intact b/l. No dysdiadochokinesia with rapid alternating movements.  Gait:  Normal, able tandem, toe, heel walk.    DATA:     LABS: I personally reviewed the following labs:  Office Visit on 03/22/2017   Component Date Value Ref Range Status     Vitamin B12 03/22/2017 932  193 - 986 pg/mL Final     TSH 03/22/2017 1.00  0.40 - 4.00 mU/L Final     " Hemoglobin 03/22/2017 15.1  11.7 - 15.7 g/dL Final   I also reviewed earlier labs: ESR and CRP were normal.    IMAGING: I also personally reviewed following brain/spine imaging and agree with the formal radiology report/s as follows:   MRI brain without and with contrast, MRA of the head without contrast, Neck MRA without and with contrast 02/15/2015:  1. No evidence of acute infarction or intracranial hemorrhage.  2. No abnormal enhancing lesions intracranially.  3. Head MRA demonstrates no definite aneurysm or stenosis of the major intracranial arteries.  4. Neck MRA demonstrates patent major cervical arteries. Aberrant right subclavian artery.   ASSESSMENT and PLAN:      ASSESSMENT: Sofi Degroot is a 62 year old female patient with past medical history of transient ischemic attack, hepatitis C, hypertension, and hypothyroidism, who presents with subjective bilateral arm weakness started in October 2016. Her neurological exam is non-focal, including normal strength, absence of fasciculations, atrophies, and pathological reflexes. Her reported symptoms are concerning for motor neuron disease, c/spine pathology, and various neuropathies; however, there is no objective evidence to support these concerns. I ordered additional labs, EMG, and cervical spine MRI to evaluate further.      DIAGNOSES:    ICD-10-CM    1. Bilateral arm weakness M62.81 MR Cervical Spine w/o Contrast     EMG     Antinuclear antibody screen by EIA     GM1 antibody panel     Protein electrophoresis     Protein electrophoresis random urine     Blood metal panel     Protein Immunofixation Serum     Immunofix and Prot Elect R Ur ([facility])     LORazepam (ATIVAN) 0.5 MG tablet     PLAN: At today's visit we thoroughly discussed various diagnostic possibilities for her symptoms and the reasons for work-up, which includes:  Orders Placed This Encounter   Procedures     MR Cervical Spine w/o Contrast     Antinuclear antibody screen by EIA     GM1  antibody panel     Protein electrophoresis     Protein electrophoresis random urine     Blood metal panel     Protein Immunofixation Serum     Immunofix and Prot Elect R Urine     EMG     No new medications were ordered.    Preventive Neurology: Encouraged to stay physically and mentally active with particular emphasis on daily mentally stimulating activities of her choice (such as crosswords, puzzles, sudoku, etc.), stretching exercises, walking, and healthy eating.    Additional recommendations after the work-up.    Next follow-up appointment is in next 4 weeks or earlier if needed.    I advised the patient to call me with any questions or concerns.    Total Time:  60 minutes with > 50% spent counseling the patient on stated above assessment and recommendations, including nature of the diagnosis, needed w/u, proposed plan of treatment, and prognosis.    Juvenal Childs MD  / Neurology  [facility]  (Chart documentation was completed in part with Dragon voice-recognition software. Even though reviewed, some grammatical, spelling, and word errors may remain.)

## 2017-05-31 LAB
ALBUMIN SERPL ELPH-MCNC: 4.1 G/DL (ref 3.7–5.1)
ALPHA1 GLOB SERPL ELPH-MCNC: 0.3 G/DL (ref 0.2–0.4)
ALPHA2 GLOB SERPL ELPH-MCNC: 0.9 G/DL (ref 0.5–0.9)
ANA SER QL IA: NORMAL
B-GLOBULIN SERPL ELPH-MCNC: 0.8 G/DL (ref 0.6–1)
ELP INTERPRETATION URINE: NORMAL
GAMMA GLOB SERPL ELPH-MCNC: 1.1 G/DL (ref 0.7–1.6)
IGA SERPL-MCNC: 252 MG/DL (ref 70–380)
IGG SERPL-MCNC: 1050 MG/DL (ref 695–1620)
IGM SERPL-MCNC: 153 MG/DL (ref 60–265)
M PROTEIN SERPL ELPH-MCNC: 0 G/DL
PROT ELPH PNL UR ELPH: NORMAL
PROT PATTERN SERPL ELPH-IMP: NORMAL
PROT PATTERN SERPL IFE-IMP: NORMAL

## 2017-06-01 ENCOUNTER — OFFICE VISIT (OUTPATIENT)
Dept: NEUROLOGY | Facility: CLINIC | Age: 62
End: 2017-06-01

## 2017-06-01 DIAGNOSIS — R29.898 BILATERAL ARM WEAKNESS: ICD-10-CM

## 2017-06-01 NOTE — LETTER
6/1/2017       RE: Sofi Degroot  [address]   [place] [address]     Dear Colleague,    Thank you for sending your patient, Sofi Degroot, to the [facility] EMG LAB for study. Please see a copy of my visit note below.    [facility]  Electrodiagnostic Laboratory    Nerve Conduction & EMG Report       Patient:           Sofi Degroot  Patient ID:      [id number]  Gender:          Female  YOB: 1955  Age:                 62 Years 0 Months  Req MD:         GAY Childs      History & Examination:  Woman reporting mild weakness left arm first noted October 2016 followed by right arm weakness sometime later.  Twitching of muscle in the right forearm noted as well.  Weakness fluctuates unrelated to activity or time of day.  Strength, sensation and reflexes are normal.  Up gaze for two minutes produces no ptosis or diplopia.  Repetitive hand  produces no weakness.  Patient concerned about ALS.    Techniques:   Motor conduction studies were done with surface recording electrodes. Sensory conduction studies were performed with surface electrodes. Upper extremities were maintained at a temperature of 32 degrees centigrade or higher.  EMG was done with a concentric needle electrode.      Results:  Sensory studies  1)  Right median digital antidromic nerve action potential voltage and segmental conduction velocity are normal.  2)  Right ulnar digital antidromic nerve action potential voltage and segmental conduction velocity are normal.    Motor studies  1)  Right median compound muscle action potential voltage, distal motor latency and segmental conduction velocity are normal.  2)  Right ulnar compound muscle action potential voltage, distal motor latency and segmental conduction velocity are normal.  3)  Right median and ulnar F wave latencies are normal.  4)  Repetitive stimulation of right ulnar nerve at baseline and post exercise produces no significant increment or  decrement of CMAP voltage.    Needle electrode exam of all myotomes of the right arm reveals no fibrillation or loss of motor unit action potentials.      Interpretation:  Normal study.  There is no evidence of lower motor unit dysfunction, i.e., myopathy or neuropathy or neuronopathy or defect of neuromuscular transmission.      EMG Physician:  Jm Villalpando MD, Staff Neurologist         Sensory NCS      Nerve / Sites Rec. Site Onset Peak NP Amp Ref. PP Amp Dist [place] Ref. Temp     ms ms  V  V  V cm m/s m/s  C   R MEDIAN - Dig II Anti      Wrist Dig II 2.60 3.44 24.3 10.0 40.2 14 53.8 48.0 32.1   R ULNAR - Dig V      Dig V Wrist 2.19 3.02 21.8 8.0 17.9 12.5 57.1 48.0 32.2   R ULNAR - Dig V Anti      Wrist Dig V 2.14 3.02 21.0 8.0 18.3 12.5 58.5 48.0 32.2       Motor NCS      Nerve / Sites Rec. Site Lat Ref. Amp Ref. Rel Amp Dist [place] Ref. Dur. Area Temp.     ms ms mV mV % cm m/s m/s ms %  C   R MEDIAN - APB      Wrist APB 3.70 4.40 12.3 5.0 100 8   5.52 100 32      Elbow APB 7.50  12.2  99.5 22 57.9 48.0 5.73 101 32.1   R ULNAR - ADM      Wrist ADM 2.60 3.50 12.1 5.0 100 8   7.19 100 32.2      B.Elbow ADM 5.42  9.8  81.4 18.5 65.8 48.0 7.19 91.9 32.2      A.Elbow ADM 7.86  9.6  79.1 10 40.9 48.0 7.55 92.1 32.4       F  Wave      Nerve Min F Lat Max F Lat Mean FLat Temp.    ms ms ms  C   R MEDIAN 26.72 36.98 29.77 32.1   R ULNAR 27.81 31.30 30.09 32.2       Rep Nerve Stim 10      Muscle / Train Time Rate Amp 4-1 10-1 Fac Ampl Area 4-1 10-1 Fac Area     pps mV % % % mVms % % %   R ABD DIG MIN (UL)   Baseline 0:00:00 3 9.8 -1.9 -1.2 100 31.8 -4.2 -5 100   Post Exercise : 1 0:01:48 3 11.5 -0.3 -1.6 117 33.0 -1.5 -0.3 104   2 0:03:03 3 11.3 -1.5 -2 114 35.1 -2.9 -4.5 111   3 0:04:04 3 11.5 -2.4 -2.7 117 36.5 -4.7 -6.5 115   4 0:05:04 3 11.3 -1.6 -1.8 115 35.6 -4.3 -6.6 112       Needle EMG      EMG Summary Table     Normal    IA Fib Fasc H.F. Amp Dur. PPP Pattern   R. FIRST D INTEROSS Normal None 1+ None N N None Normal    R. ABD POLL BREVIS Normal None 1+ None N N None Normal   R. PRON TERES Normal None None None N N None Normal   R. BICEPS Normal None None None N N None Normal   R. TRICEPS Normal None None None N N None Normal                              Again, thank you for allowing me to participate in the care of your patient.      Sincerely,    Jm Villalpando MD

## 2017-06-01 NOTE — MR AVS SNAPSHOT
After Visit Summary   6/1/2017    Sofi Degroot    MRN: [id number]           Patient Information     Date Of Birth          1955        Visit Information        Provider Department      6/1/2017 9:30 AM Jm Villalpando MD [facility]        Today's Diagnoses     Bilateral arm weakness           Follow-ups after your visit        Your next 10 appointments already scheduled     Jun 05, 2017  3:15 PM CDT   (Arrive by 3:00 PM)   MR CERVICAL SPINE W/O CONTRAST with UC1   [facility] ([facility])    [address] [place] [id number] [phone]           Take your medicines as usual, unless your doctor tells you not to. Bring a list of your current medicines to your exam (including vitamins, minerals and over-the-counter drugs). Also bring the results of similar scans you may have had.  Please remove any body piercings and hair extensions before you arrive.  Follow your doctor s orders. If you do not, we may have to postpone your exam.  You will not have contrast for this exam. You do not need to do anything special to prepare.  The MRI machine uses a strong magnet. Please wear clothes without metal (snaps, zippers). A sweatsuit works well, or we may give you a hospital gown.   **IMPORTANT** THE INSTRUCTIONS BELOW ARE ONLY FOR THOSE PATIENTS WHO HAVE BEEN TOLD THEY WILL RECEIVE SEDATION OR GENERAL ANESTHESIA DURING THEIR MRI PROCEDURE:  IF YOU WILL RECEIVE SEDATION (take medicine to help you relax during your exam):   You must get the medicine from your doctor before you arrive. Bring the medicine to the exam. Do not take it at home.   Arrive one hour early. Bring someone who can take you home after the test. Your medicine will make you sleepy. After the exam, you may not drive, take a bus or take a taxi by yourself.   No eating 8 hours before your exam. You may have clear liquids up until 4 hours before your exam. (Clear  liquids include water, clear tea, black coffee and fruit juice without pulp.)  IF YOU WILL RECEIVE ANESTHESIA (be asleep for your exam):   Arrive 1 1/2 hours early. Bring someone who can take you home after the test. You may not drive, take a bus or take a taxi by yourself.   No eating 8 hours before your exam. You may have clear liquids up until 4 hours before your exam. (Clear liquids include water, clear tea, black coffee and fruit juice without pulp.)   You will spend four to five hours in the recovery room.  Please call the Imaging Department at your exam site with any questions.              Who to contact     Please call your clinic at [phone] to:    Ask questions about your health    Make or cancel appointments    Discuss your medicines    Learn about your test results    Speak to your doctor   If you have compliments or concerns about an experience at your clinic, or if you wish to file a complaint, please contact [facility] Patient Relations at [phone] or email us at [email or web address]         Additional Information About Your Visit        [employer] Information     [employer] gives you secure access to your electronic health record. If you see a primary care provider, you can also send messages to your care team and make appointments. If you have questions, please call your primary care clinic.  If you do not have a primary care provider, please call [phone] and they will assist you.      AudioCatch is an electronic gateway that provides easy, online access to your medical records. With [employer], you can request a clinic appointment, read your test results, renew a prescription or communicate with your care team.     To access your existing account, please contact your [facility] or call [phone] for assistance.        Care EveryWhere ID     This is your Care EveryWhere ID. This could be used by other organizations to access  your [facility] medical records  [id number]        Your Vitals Were     Last Period                   10/27/2004            Blood Pressure from Last 3 Encounters:   05/30/17 124/82   03/22/17 136/84   11/25/16 128/78    Weight from Last 3 Encounters:   05/30/17 82.4 kg (181 lb 12 oz)   03/22/17 81.6 kg (180 lb)   11/25/16 81.6 kg (180 lb)              We Performed the Following     EMG        Primary Care Provider Office Phone # Fax #    Noemy Olivera [phone]       [facility] [address] [facility]  [address]  [facility] [address]        Thank you!     Thank you for choosing [facility]  for your care. Our goal is always to provide you with excellent care. Hearing back from our patients is one way we can continue to improve our services. Please take a few minutes to complete the written survey that you may receive in the mail after your visit with us. Thank you!             Your Updated Medication List - Protect others around you: Learn how to safely use, store and throw away your medicines at www.disposemymeds.org.          This list is accurate as of: 6/1/17 10:22 AM.  Always use your most recent med list.                   Brand Name Dispense Instructions for use    aspirin 325 MG tablet     180 tablet    Take 1 tablet (325 mg) by mouth daily       DAILY MULTIVITAMIN PO      1 tab daily       HYDROcodone-acetaminophen 5-325 MG per tablet    NORCO    20 tablet    Take 1-2 tablets by mouth every 8 hours as needed for moderate to severe pain       IBUPROFEN PO      Take 1 Dose by mouth as needed for moderate pain       levothyroxine 100 MCG tablet    SYNTHROID    90 tablet    Take 1 tablet (100 mcg) by mouth daily Needs appt. for further refills.       lisinopril 20 MG tablet    PRINIVIL/ZESTRIL    90 tablet    Take 1 tablet (20 mg) by mouth daily       LORazepam 0.5 MG tablet    ATIVAN    1 tablet    Take 1 tablet (0.5 mg) by mouth once as needed for other (when instructed by MRI tech)        metoprolol 50 MG 24 hr tablet    TOPROL-XL    135 tablet    Take 1.5 tablets (75 mg) by mouth daily       OMEGA-3 FISH OIL PO      Take 2 g by mouth daily       valACYclovir 500 MG tablet    VALTREX    12 tablet    TAKE ONE TABLET BY MOUTH TWICE A DAY FOR THREE DAYS. (REPEAT AS NEEDED)       VITAMIN D (CHOLECALCIFEROL) PO      Take 4,000 Units by mouth daily

## 2017-06-01 NOTE — PROGRESS NOTES
[facility]  Electrodiagnostic Laboratory    Nerve Conduction & EMG Report       Patient:           Sofi Degroot  Patient ID:      [id number]  Gender:          Female  YOB: 1955  Age:                 62 Years 0 Months  Req MD:         GAY Childs      History & Examination:  Woman reporting mild weakness left arm first noted October 2016 followed by right arm weakness sometime later.  Twitching of muscle in the right forearm noted as well.  Weakness fluctuates unrelated to activity or time of day.  Strength, sensation and reflexes are normal.  Up gaze for two minutes produces no ptosis or diplopia.  Repetitive hand  produces no weakness.  Patient concerned about ALS.    Techniques:   Motor conduction studies were done with surface recording electrodes. Sensory conduction studies were performed with surface electrodes. Upper extremities were maintained at a temperature of 32 degrees centigrade or higher.  EMG was done with a concentric needle electrode.      Results:  Sensory studies  1)  Right median digital antidromic nerve action potential voltage and segmental conduction velocity are normal.  2)  Right ulnar digital antidromic nerve action potential voltage and segmental conduction velocity are normal.    Motor studies  1)  Right median compound muscle action potential voltage, distal motor latency and segmental conduction velocity are normal.  2)  Right ulnar compound muscle action potential voltage, distal motor latency and segmental conduction velocity are normal.  3)  Right median and ulnar F wave latencies are normal.  4)  Repetitive stimulation of right ulnar nerve at baseline and post exercise produces no significant increment or decrement of CMAP voltage.    Needle electrode exam of all myotomes of the right arm reveals no fibrillation or loss of motor unit action potentials.      Interpretation:  Normal study.  There is no evidence of lower motor unit  dysfunction, i.e., myopathy or neuropathy or neuronopathy or defect of neuromuscular transmission.      EMG Physician:  Jm Villalpando MD, Staff Neurologist         Sensory NCS      Nerve / Sites Rec. Site Onset Peak NP Amp Ref. PP Amp Dist Kaushal Ref. Temp     ms ms  V  V  V cm m/s m/s  C   R MEDIAN - Dig II Anti      Wrist Dig II 2.60 3.44 24.3 10.0 40.2 14 53.8 48.0 32.1   R ULNAR - Dig V      Dig V Wrist 2.19 3.02 21.8 8.0 17.9 12.5 57.1 48.0 32.2   R ULNAR - Dig V Anti      Wrist Dig V 2.14 3.02 21.0 8.0 18.3 12.5 58.5 48.0 32.2       Motor NCS      Nerve / Sites Rec. Site Lat Ref. Amp Ref. Rel Amp Dist Kaushal Ref. Dur. Area Temp.     ms ms mV mV % cm m/s m/s ms %  C   R MEDIAN - APB      Wrist APB 3.70 4.40 12.3 5.0 100 8   5.52 100 32      Elbow APB 7.50  12.2  99.5 22 57.9 48.0 5.73 101 32.1   R ULNAR - ADM      Wrist ADM 2.60 3.50 12.1 5.0 100 8   7.19 100 32.2      B.Elbow ADM 5.42  9.8  81.4 18.5 65.8 48.0 7.19 91.9 32.2      A.Elbow ADM 7.86  9.6  79.1 10 40.9 48.0 7.55 92.1 32.4       F  Wave      Nerve Min F Lat Max F Lat Mean FLat Temp.    ms ms ms  C   R MEDIAN 26.72 36.98 29.77 32.1   R ULNAR 27.81 31.30 30.09 32.2       Rep Nerve Stim 10      Muscle / Train Time Rate Amp 4-1 10-1 Fac Ampl Area 4-1 10-1 Fac Area     pps mV % % % mVms % % %   R ABD DIG MIN (UL)   Baseline 0:00:00 3 9.8 -1.9 -1.2 100 31.8 -4.2 -5 100   Post Exercise : 1 0:01:48 3 11.5 -0.3 -1.6 117 33.0 -1.5 -0.3 104   2 0:03:03 3 11.3 -1.5 -2 114 35.1 -2.9 -4.5 111   3 0:04:04 3 11.5 -2.4 -2.7 117 36.5 -4.7 -6.5 115   4 0:05:04 3 11.3 -1.6 -1.8 115 35.6 -4.3 -6.6 112       Needle EMG      EMG Summary Table     Normal    IA Fib Fasc H.F. Amp Dur. PPP Pattern   R. FIRST D INTEROSS Normal None 1+ None N N None Normal   R. ABD POLL BREVIS Normal None 1+ None N N None Normal   R. PRON TERES Normal None None None N N None Normal   R. BICEPS Normal None None None N N None Normal   R. TRICEPS Normal None None None N N None Normal

## 2017-06-02 LAB
ARSENIC BLD-MCNC: NORMAL UG/L
GM1 GANGL IGG SER IA-ACNC: 1
GM1 GANGL IGM SER IA-ACNC: 3
LEAD BLDV-MCNC: NORMAL UG/DL
MERCURY BLD-MCNC: NORMAL NG/ML

## 2017-06-08 ENCOUNTER — MYC MEDICAL ADVICE (OUTPATIENT)
Dept: NEUROLOGY | Facility: CLINIC | Age: 62
End: 2017-06-08

## 2017-10-11 ENCOUNTER — OFFICE VISIT (OUTPATIENT)
Dept: FAMILY MEDICINE | Facility: CLINIC | Age: 62
End: 2017-10-11
Payer: COMMERCIAL

## 2017-10-11 VITALS
WEIGHT: 177.8 LBS | HEIGHT: 69 IN | BODY MASS INDEX: 26.33 KG/M2 | DIASTOLIC BLOOD PRESSURE: 72 MMHG | OXYGEN SATURATION: 95 % | HEART RATE: 75 BPM | TEMPERATURE: 98.1 F | SYSTOLIC BLOOD PRESSURE: 118 MMHG

## 2017-10-11 DIAGNOSIS — N76.0 BV (BACTERIAL VAGINOSIS): ICD-10-CM

## 2017-10-11 DIAGNOSIS — N89.8 VAGINAL ODOR: ICD-10-CM

## 2017-10-11 DIAGNOSIS — Z00.00 ENCOUNTER FOR ROUTINE ADULT HEALTH EXAMINATION WITHOUT ABNORMAL FINDINGS: Primary | ICD-10-CM

## 2017-10-11 DIAGNOSIS — I10 HYPERTENSION GOAL BP (BLOOD PRESSURE) < 140/90: ICD-10-CM

## 2017-10-11 DIAGNOSIS — E03.8 OTHER SPECIFIED HYPOTHYROIDISM: ICD-10-CM

## 2017-10-11 DIAGNOSIS — B96.89 BV (BACTERIAL VAGINOSIS): ICD-10-CM

## 2017-10-11 DIAGNOSIS — R87.810 CERVICAL HIGH RISK HPV (HUMAN PAPILLOMAVIRUS) TEST POSITIVE: ICD-10-CM

## 2017-10-11 LAB
SPECIMEN SOURCE: ABNORMAL
WET PREP SPEC: ABNORMAL

## 2017-10-11 PROCEDURE — 87624 HPV HI-RISK TYP POOLED RSLT: CPT | Performed by: FAMILY MEDICINE

## 2017-10-11 PROCEDURE — 99213 OFFICE O/P EST LOW 20 MIN: CPT | Mod: 25 | Performed by: FAMILY MEDICINE

## 2017-10-11 PROCEDURE — 99396 PREV VISIT EST AGE 40-64: CPT | Performed by: FAMILY MEDICINE

## 2017-10-11 PROCEDURE — 88175 CYTOPATH C/V AUTO FLUID REDO: CPT | Performed by: FAMILY MEDICINE

## 2017-10-11 PROCEDURE — 87210 SMEAR WET MOUNT SALINE/INK: CPT | Performed by: FAMILY MEDICINE

## 2017-10-11 PROCEDURE — 88141 CYTOPATH C/V INTERPRET: CPT | Performed by: INTERNAL MEDICINE

## 2017-10-11 RX ORDER — LEVOTHYROXINE SODIUM 100 UG/1
100 TABLET ORAL DAILY
Qty: 90 TABLET | Refills: 3 | Status: SHIPPED | OUTPATIENT
Start: 2017-10-11 | End: 2018-09-21

## 2017-10-11 RX ORDER — METRONIDAZOLE 500 MG/1
500 TABLET ORAL 2 TIMES DAILY
Qty: 14 TABLET | Refills: 0 | Status: SHIPPED | OUTPATIENT
Start: 2017-10-11 | End: 2018-05-01

## 2017-10-11 NOTE — PROGRESS NOTES
Dearashaun Farah,   Your test results are all back -   -Yeast vaginal infection test is normal - no signs of a yeast infection.  -Bacterial vaginal infection test is POSITIVE    ADVISE treatment with metronidazole 500mg twice daily orally for 7 days and a prescription has been sent to your pharmacy.  This medication should not be mixed with any alcohol as it can make you very sick.  Let us know if you have any questions.  -Noemy Olivera, DO

## 2017-10-11 NOTE — PROGRESS NOTES
"  SUBJECTIVE:   Sofi Degroot is a 62 year old female who presents to clinic today for the following health issues:    Hypertension Follow-up      Outpatient blood pressures are being checked at home.  Results are 120's 70's.    Low Salt Diet: low salt    Tolerating meds -   No side effects     Has had a cough since April -   Thought was related to allergies  Dry cough - irritation in throat -   Runny nose since April -         Hypothyroidism Follow-up      Since last visit, patient describes the following symptoms: Weight stable, no hair loss, no skin changes, no constipation, no loose stools      Amount of exercise or physical activity: Very little     Problems taking medications regularly: No    Medication side effects: none  Diet: low salt    Thyroid is stable -       March   Had neurologic symptoms -   Had weakness in the right arm - resolved  Has some twitching -       {additional problems for provider to add:279932}    Problem list and histories reviewed & adjusted, as indicated.  Additional history: {NONE - AS DOCUMENTED:102352::\"as documented\"}    {HIST REVIEW/ LINKS 2:933511}    Reviewed and updated as needed this visit by clinical staff     Reviewed and updated as needed this visit by Provider         {PROVIDER CHARTING PREFERENCE:133380}  "

## 2017-10-11 NOTE — PROGRESS NOTES
SUBJECTIVE:   CC: Sofi Degroot is an 62 year old woman who presents for preventive health visit.     Healthy Habits:    Do you get at least three servings of calcium containing foods daily (dairy, green leafy vegetables, etc.)? yes    Amount of exercise or daily activities, outside of work: low day(s) per week    Problems taking medications regularly No    Medication side effects: No    Have you had an eye exam in the past two years? yes    Do you see a dentist twice per year? yes    Do you have sleep apnea, excessive snoring or daytime drowsiness?no    Hypertension Follow-up    Outpatient blood pressures are being checked at home.  Results are 120's 70's.    Low Salt Diet: low salt    Tolerating meds -   No side effects     Has had a cough since April -   Thought was related to allergies  Dry cough - irritation in throat -   Runny nose since April -     Hypothyroidism Follow-up    Since last visit, patient describes the following symptoms: Weight stable, no hair loss, no skin changes, no constipation, no loose stools      Amount of exercise or physical activity: Very little     Problems taking medications regularly: No    Medication side effects: none  Diet: low salt    Thyroid is stable -     March   Had neurologic symptoms -   Had weakness in the right arm - resolved  Has some twitching -       Vaginal odor  Has notice the past few months -   Tried OTC vaginal suppository that seems to help with the odor    PROBLEMS TO ADD ON...    Today's PHQ-2 Score: PHQ-2 ( 1999 Pfizer) 11/22/2016 11/11/2016   Q1: Little interest or pleasure in doing things 0 0   Q2: Feeling down, depressed or hopeless 0 0   PHQ-2 Score 0 0         Abuse: Current or Past(Physical, Sexual or Emotional)- No  Do you feel safe in your environment - Yes  Social History   Substance Use Topics     Smoking status: Former Smoker     Smokeless tobacco: Never Used      Comment: quit 30 years ago     Alcohol use Yes      Comment: 4-5 Drinks Per  "Week     The patient does not drink >3 drinks per day nor >7 drinks per week.    Reviewed orders with patient.  Reviewed health maintenance and updated orders accordingly - Yes  Labs reviewed in EPIC    {Mammo Decision Support has appt 10/30/17  breast center    Pertinent mammograms are reviewed under the imaging tab.  History of abnormal Pap smear: NO - age 30- 65 PAP every 3 years recommended    Reviewed and updated as needed this visit by clinical staff  Tobacco  Allergies  Meds  Problems         Reviewed and updated as needed this visit by Provider  Allergies  Meds  Problems              ROS:  C: NEGATIVE for fever, chills, change in weight  I: NEGATIVE for worrisome rashes, moles or lesions  E: NEGATIVE for vision changes or irritation  ENT: NEGATIVE for ear, mouth and throat problems  R: NEGATIVE for significant cough or SOB  B: NEGATIVE for masses, tenderness or discharge  CV: NEGATIVE for chest pain, palpitations or peripheral edema  GI: NEGATIVE for nausea, abdominal pain, heartburn, or change in bowel habits  : NEGATIVE for unusual urinary or vaginal symptoms. No vaginal bleeding.  M: NEGATIVE for significant arthralgias or myalgia  N: NEGATIVE for weakness, dizziness or paresthesias  P: NEGATIVE for changes in mood or affect     OBJECTIVE:   /72  Pulse 75  Temp 98.1  F (36.7  C) (Oral)  Ht 5' 9\" (1.753 m)  Wt 177 lb 12.8 oz (80.6 kg)  LMP 10/27/2004  SpO2 95%  Breastfeeding? No  BMI 26.26 kg/m2  EXAM:  GENERAL APPEARANCE: healthy, alert and no distress  EYES: Eyes grossly normal to inspection, PERRL and conjunctivae and sclerae normal  HENT: ear canals and TM's normal, nose and mouth without ulcers or lesions, oropharynx clear and oral mucous membranes moist  NECK: no adenopathy, no asymmetry, masses, or scars and thyroid normal to palpation  RESP: lungs clear to auscultation - no rales, rhonchi or wheezes  BREAST: normal without masses, tenderness or nipple discharge and no " palpable axillary masses or adenopathy  CV: regular rate and rhythm, normal S1 S2, no S3 or S4, no murmur, click or rub, no peripheral edema and peripheral pulses strong  ABDOMEN: soft, nontender, no hepatosplenomegaly, no masses and bowel sounds normal   (female): normal female external genitalia, normal urethral meatus, vaginal mucosal atrophy noted, normal cervix, adnexae, and uterus without masses or abnormal discharge  MS: no musculoskeletal defects are noted and gait is age appropriate without ataxia  SKIN: 3mm irregular dark mole on the right breast  NEURO: Normal strength and tone, sensory exam grossly normal, mentation intact and speech normal  PSYCH: mentation appears normal and affect normal/bright    ASSESSMENT/PLAN:   1. Encounter for routine adult health examination without abnormal findings  Routine screening  - Fecal colorectal cancer screen (FIT); Future  - **Lipid panel reflex to direct LDL FUTURE anytime; Future    2. Other specified hypothyroidism  Stable - will refill as needed  - levothyroxine (SYNTHROID) 100 MCG tablet; Take 1 tablet (100 mcg) by mouth daily  Dispense: 90 tablet; Refill: 3  - **TSH with free T4 reflex FUTURE anytime; Future    3. Hypertension goal BP (blood pressure) < 140/90  Stable - will refill meds as needed  - **Comprehensive metabolic panel FUTURE anytime; Future    4. Cervical high risk HPV (human papillomavirus) test positive  Last pap showed HPV needs another todya  - Pap imaged thin layer diagnostic with HPV (select HPV order below)  - HPV High Risk Types DNA Cervical    5. Vaginal odor     - Wet prep    6. BV (bacterial vaginosis)  Wet prep positive for clue cells - this is BV and will treat with flagyl BID for 7 brannon.  - metroNIDAZOLE (FLAGYL) 500 MG tablet; Take 1 tablet (500 mg) by mouth 2 times daily  Dispense: 14 tablet; Refill: 0  - OFFICE/OUTPT VISIT,EST,LEVL II    Atypical mole on the right breast  Discussed RTC for removal  - discussed procedure and  "risks/benefits with patient      COUNSELING:   Reviewed preventive health counseling, as reflected in patient instructions         reports that she has quit smoking. She has never used smokeless tobacco.    Estimated body mass index is 26.26 kg/(m^2) as calculated from the following:    Height as of this encounter: 5' 9\" (1.753 m).    Weight as of this encounter: 177 lb 12.8 oz (80.6 kg).   Weight management plan: Discussed healthy diet and exercise guidelines and patient will follow up in 12 months in clinic to re-evaluate.    Counseling Resources:  ATP IV Guidelines  Pooled Cohorts Equation Calculator  Breast Cancer Risk Calculator  FRAX Risk Assessment  ICSI Preventive Guidelines  Dietary Guidelines for Americans, 2010  USDA's MyPlate  ASA Prophylaxis  Lung CA Screening    Noemy Olivera, DO  [facility]  "

## 2017-10-11 NOTE — MR AVS SNAPSHOT
After Visit Summary   10/11/2017    Sofi Degroot    MRN: [id number]           Patient Information     Date Of Birth          1955        Visit Information        Provider Department      10/11/2017 11:00 AM Noemy Olivera DO [facility]        Today's Diagnoses     Encounter for routine adult health examination without abnormal findings    -  1    Other specified hypothyroidism        Hypertension goal BP (blood pressure) < 140/90        Cervical high risk HPV (human papillomavirus) test positive        Vaginal odor        BV (bacterial vaginosis)          Care Instructions      Preventive Health Recommendations  Female Ages 50 - 64    Yearly exam: See your health care provider every year in order to  o Review health changes.   o Discuss preventive care.    o Review your medicines if your doctor has prescribed any.      Get a Pap test every three years (unless you have an abnormal result and your provider advises testing more often).    If you get Pap tests with HPV test, you only need to test every 5 years, unless you have an abnormal result.     You do not need a Pap test if your uterus was removed (hysterectomy) and you have not had cancer.    You should be tested each year for STDs (sexually transmitted diseases) if you're at risk.     Have a mammogram every 1 to 2 years.    Have a colonoscopy at age 50, or have a yearly FIT test (stool test). These exams screen for colon cancer.      Have a cholesterol test every 5 years, or more often if advised.    Have a diabetes test (fasting glucose) every three years. If you are at risk for diabetes, you should have this test more often.     If you are at risk for osteoporosis (brittle bone disease), think about having a bone density scan (DEXA).    Shots: Get a flu shot each year. Get a tetanus shot every 10 years.    Nutrition:     Eat at least 5 servings of fruits and vegetables each day.    Eat whole-grain bread, whole-wheat pasta  and brown rice instead of white grains and rice.    Talk to your provider about Calcium and Vitamin D.     Lifestyle    Exercise at least 150 minutes a week (30 minutes a day, 5 days a week). This will help you control your weight and prevent disease.    Limit alcohol to one drink per day.    No smoking.     Wear sunscreen to prevent skin cancer.     See your dentist every six months for an exam and cleaning.    See your eye doctor every 1 to 2 years.            Follow-ups after your visit        Your next 10 appointments already scheduled     Oct 17, 2017  9:15 AM CDT   LAB with UP LAB   [facility] ([facility])    [address] [id number] [phone]           Patient must bring picture ID. Patient should be prepared to give a urine specimen  Please do not eat 10-12 hours before your appointment if you are coming in fasting for labs on lipids, cholesterol, or glucose (sugar). Pregnant women should follow their Care Team instructions. Water with medications is okay. Do not drink coffee or other fluids. If you have concerns about taking  your medications, please ask at office or if scheduling via [employer], send a message by clicking on Secure Messaging, Message Your Care Team.            Oct 17, 2017  9:30 AM CDT   Office Visit with Noemy Olivera, , LACY PROC RM 1   [facility] ([facility])    [address] [address] [phone]           Bring a current list of meds and any records pertaining to this visit. For Physicals, please bring immunization records and any forms needing to be filled out. Please arrive 10 minutes early to complete paperwork.            Oct 30, 2017 10:30 AM CDT   MA SCREENING DIGITAL BILATERAL with URBCMA1   [facility] ([facility])    [address] [place] [address], [address]  [facility] [address] [phone]           Do not use any powder, lotion or deodorant under your  arms or on your breast. If you do, we will ask you to remove it before your exam.  Wear comfortable, two-piece clothing.  If you have any allergies, tell your care team.  Bring any previous mammograms from other facilities or have them mailed to the breast center.              Future tests that were ordered for you today     Open Future Orders        Priority Expected Expires Ordered    Fecal colorectal cancer screen (FIT) Routine 11/1/2017 1/3/2018 10/11/2017    **Lipid panel reflex to direct LDL FUTURE anytime Routine 10/11/2017 10/11/2018 10/11/2017    **TSH with free T4 reflex FUTURE anytime Routine 10/11/2017 10/11/2018 10/11/2017    **Comprehensive metabolic panel FUTURE anytime Routine 10/11/2017 10/11/2018 10/11/2017            Who to contact     If you have questions or need follow up information about today's clinic visit or your schedule please contact [facility] directly at [phone].  Normal or non-critical lab and imaging results will be communicated to you by [employer] [facility] [employer], letter or phone within 4 business days after the clinic has received the results. If you do not hear from us within 7 days, please contact the clinic through [employer] or phone. If you have a critical or abnormal lab result, we will notify you by phone as soon as possible.  Submit refill requests through [employer] or call your pharmacy and they will forward the refill request to us. Please allow 3 business days for your refill to be completed.          Additional Information About Your Visit        [employer] Information     [employer] gives you secure access to your electronic health record. If you see a primary care provider, you can also send messages to your care team and make appointments. If you have questions, please call your primary care clinic.  If you do not have a primary care provider, please call [phone] and they will assist you.        Care EveryWhere ID     This is your Care EveryWhere ID. This could be used by  "other organizations to access your [place] medical records  [id number]        Your Vitals Were     Pulse Temperature Height Last Period Pulse Oximetry Breastfeeding?    75 98.1  F (36.7  C) (Oral) 5' 9\" (1.753 m) 10/27/2004 95% No    BMI (Body Mass Index)                   26.26 kg/m2            Blood Pressure from Last 3 Encounters:   10/11/17 118/72   05/30/17 124/82   03/22/17 136/84    Weight from Last 3 Encounters:   10/11/17 177 lb 12.8 oz (80.6 kg)   05/30/17 181 lb 12 oz (82.4 kg)   03/22/17 180 lb (81.6 kg)              We Performed the Following     HPV High Risk Types DNA Cervical     OFFICE/OUTPT VISIT,EST,LEVL II     Pap imaged thin layer diagnostic with HPV (select HPV order below)     Wet prep          Today's Medication Changes          These changes are accurate as of: 10/11/17  1:44 PM.  If you have any questions, ask your nurse or doctor.               Start taking these medicines.        Dose/Directions    metroNIDAZOLE 500 MG tablet   Commonly known as:  FLAGYL   Used for:  BV (bacterial vaginosis)   Started by:  Noemy Olivera DO        Dose:  500 mg   Take 1 tablet (500 mg) by mouth 2 times daily   Quantity:  14 tablet   Refills:  0         These medicines have changed or have updated prescriptions.        Dose/Directions    levothyroxine 100 MCG tablet   Commonly known as:  SYNTHROID   This may have changed:  additional instructions   Used for:  Other specified hypothyroidism   Changed by:  Noemy Olivera DO        Dose:  100 mcg   Take 1 tablet (100 mcg) by mouth daily   Quantity:  90 tablet   Refills:  3            Where to get your medicines      These medications were sent to [facility] - [place], [place] - [address]   [address] , [facility] [address]    Hours:  Mon-Fri 8:30am-5:00pm Toll Free [phone] Phone:  [phone]     levothyroxine 100 MCG tablet    metroNIDAZOLE 500 MG tablet                Primary Care Provider Office " Phone # Fax #    Noemy Olivera [phone] [address] [address]        Equal Access to Services     YESENIA HINOJOSA : Hadwilton miky mariano hiltono Somarileeali, waaxda luqadaha, qaybta kaalmada adevasiliyda, deedee jimenez dcdaisy abdi laEttamiguelina ireland. So [facility] [phone].    ATENCIÓN: Si habla español, tiene a parsons disposición servicios gratuitos de asistencia lingüística. Llame al [phone].    We comply with applicable federal civil rights laws and [place] laws. We do not discriminate on the basis of race, color, national origin, age, disability, sex, sexual orientation, or gender identity.            Thank you!     Thank you for choosing [facility]  for your care. Our goal is always to provide you with excellent care. Hearing back from our patients is one way we can continue to improve our services. Please take a few minutes to complete the written survey that you may receive in the mail after your visit with us. Thank you!             Your Updated Medication List - Protect others around you: Learn how to safely use, store and throw away your medicines at www.disposemymeds.org.          This list is accurate as of: 10/11/17  1:44 PM.  Always use your most recent med list.                   Brand Name Dispense Instructions for use Diagnosis    aspirin 325 MG tablet     180 tablet    Take 1 tablet (325 mg) by mouth daily    TIA (transient ischemic attack)       DAILY MULTIVITAMIN PO      1 tab daily        IBUPROFEN PO      Take 1 Dose by mouth as needed for moderate pain        levothyroxine 100 MCG tablet    SYNTHROID    90 tablet    Take 1 tablet (100 mcg) by mouth daily    Other specified hypothyroidism       lisinopril 20 MG tablet    PRINIVIL/ZESTRIL    90 tablet    Take 1 tablet (20 mg) by mouth daily    Essential hypertension with goal blood pressure less than 140/90       metoprolol 50 MG 24 hr tablet    TOPROL-XL    135 tablet    Take 1.5 tablets (75 mg) by  mouth daily    Essential hypertension with goal blood pressure less than 140/90       metroNIDAZOLE 500 MG tablet    FLAGYL    14 tablet    Take 1 tablet (500 mg) by mouth 2 times daily    BV (bacterial vaginosis)       OMEGA-3 FISH OIL PO      Take 2 g by mouth daily        valACYclovir 500 MG tablet    VALTREX    12 tablet    TAKE ONE TABLET BY MOUTH TWICE A DAY FOR THREE DAYS. (REPEAT AS NEEDED)    Recurrent herpes simplex       VITAMIN D (CHOLECALCIFEROL) PO      Take 4,000 Units by mouth daily

## 2017-10-11 NOTE — NURSING NOTE
"Chief Complaint   Patient presents with     Recheck Medication     /72  Pulse 75  Temp 98.1  F (36.7  C) (Oral)  Ht 5' 9\" (1.753 m)  Wt 177 lb 12.8 oz (80.6 kg)  LMP 10/27/2004  SpO2 95%  Breastfeeding? No  BMI 26.26 kg/m2 Estimated body mass index is 26.26 kg/(m^2) as calculated from the following:    Height as of this encounter: 5' 9\" (1.753 m).    Weight as of this encounter: 177 lb 12.8 oz (80.6 kg).  BP completed using cuff size: regular       Health Maintenance due pending provider review:  Mammogram and Pap Smear    Mammogram pt has appt scheduled for 10/30/2017. Pap smear today.    Tatiana Lazaro MA  "

## 2017-10-13 DIAGNOSIS — Z00.00 ENCOUNTER FOR ROUTINE ADULT HEALTH EXAMINATION WITHOUT ABNORMAL FINDINGS: ICD-10-CM

## 2017-10-13 PROCEDURE — 82274 ASSAY TEST FOR BLOOD FECAL: CPT | Performed by: FAMILY MEDICINE

## 2017-10-15 LAB — HEMOCCULT STL QL IA: NEGATIVE

## 2017-10-17 ENCOUNTER — OFFICE VISIT (OUTPATIENT)
Dept: FAMILY MEDICINE | Facility: CLINIC | Age: 62
End: 2017-10-17
Payer: COMMERCIAL

## 2017-10-17 VITALS
WEIGHT: 178 LBS | HEART RATE: 80 BPM | BODY MASS INDEX: 26.36 KG/M2 | DIASTOLIC BLOOD PRESSURE: 82 MMHG | HEIGHT: 69 IN | TEMPERATURE: 99.5 F | SYSTOLIC BLOOD PRESSURE: 128 MMHG

## 2017-10-17 DIAGNOSIS — E03.8 OTHER SPECIFIED HYPOTHYROIDISM: ICD-10-CM

## 2017-10-17 DIAGNOSIS — Z00.00 ENCOUNTER FOR ROUTINE ADULT HEALTH EXAMINATION WITHOUT ABNORMAL FINDINGS: ICD-10-CM

## 2017-10-17 DIAGNOSIS — D22.9 ATYPICAL MOLE: Primary | ICD-10-CM

## 2017-10-17 DIAGNOSIS — I10 HYPERTENSION GOAL BP (BLOOD PRESSURE) < 140/90: ICD-10-CM

## 2017-10-17 LAB
ALBUMIN SERPL-MCNC: 3.6 G/DL (ref 3.4–5)
ALP SERPL-CCNC: 51 U/L (ref 40–150)
ALT SERPL W P-5'-P-CCNC: 26 U/L (ref 0–50)
ANION GAP SERPL CALCULATED.3IONS-SCNC: 10 MMOL/L (ref 3–14)
AST SERPL W P-5'-P-CCNC: 19 U/L (ref 0–45)
BILIRUB SERPL-MCNC: 0.5 MG/DL (ref 0.2–1.3)
BUN SERPL-MCNC: 9 MG/DL (ref 7–30)
CALCIUM SERPL-MCNC: 9.6 MG/DL (ref 8.5–10.1)
CHLORIDE SERPL-SCNC: 107 MMOL/L (ref 94–109)
CHOLEST SERPL-MCNC: 199 MG/DL
CO2 SERPL-SCNC: 23 MMOL/L (ref 20–32)
COPATH REPORT: ABNORMAL
CREAT SERPL-MCNC: 0.7 MG/DL (ref 0.52–1.04)
GFR SERPL CREATININE-BSD FRML MDRD: 84 ML/MIN/1.7M2
GLUCOSE SERPL-MCNC: 99 MG/DL (ref 70–99)
HDLC SERPL-MCNC: 91 MG/DL
LDLC SERPL CALC-MCNC: 98 MG/DL
NONHDLC SERPL-MCNC: 108 MG/DL
PAP: ABNORMAL
POTASSIUM SERPL-SCNC: 4.2 MMOL/L (ref 3.4–5.3)
PROT SERPL-MCNC: 7.2 G/DL (ref 6.8–8.8)
SODIUM SERPL-SCNC: 140 MMOL/L (ref 133–144)
TRIGL SERPL-MCNC: 52 MG/DL
TSH SERPL DL<=0.005 MIU/L-ACNC: 1.11 MU/L (ref 0.4–4)

## 2017-10-17 PROCEDURE — 11100 HC BIOPSY SKIN/SUBQ/MUC MEM, SINGLE LESION: CPT | Performed by: FAMILY MEDICINE

## 2017-10-17 PROCEDURE — 99207 ZZC NO CHARGE LOS: CPT | Performed by: FAMILY MEDICINE

## 2017-10-17 PROCEDURE — 84443 ASSAY THYROID STIM HORMONE: CPT | Performed by: FAMILY MEDICINE

## 2017-10-17 PROCEDURE — 80053 COMPREHEN METABOLIC PANEL: CPT | Performed by: FAMILY MEDICINE

## 2017-10-17 PROCEDURE — 36415 COLL VENOUS BLD VENIPUNCTURE: CPT | Performed by: FAMILY MEDICINE

## 2017-10-17 PROCEDURE — 88305 TISSUE EXAM BY PATHOLOGIST: CPT | Performed by: FAMILY MEDICINE

## 2017-10-17 PROCEDURE — 80061 LIPID PANEL: CPT | Performed by: FAMILY MEDICINE

## 2017-10-17 NOTE — PROGRESS NOTES
SUBJECTIVE:   Sofi Degroot is a 62 year old female who presents for lesion removal. We have already discussed this procedure, including option of not performing surgery, technique of surgery and potential for scarring at a recent visit.    OBJECTIVE:   Patient appears well. Vitals are normal.  Skin: right breast - 3 mm irregular dark mole at the 10:00 position    ASSESSMENT:   atypical nevus    PLAN:   After informed consent was obtained, using Betadine for cleansing and 1% Lidocaine with epinephrine for anesthetic, with sterile technique, punch biopsy size 3 mm was performed. Antibiotic dressing is applied, and wound care instructions provided.  Be alert for any signs of cutaneous infection. The procedure was well tolerated without complications. Follow up: The specimen is labelled and sent to pathology for evaluation., The patient may return prn..

## 2017-10-17 NOTE — PROGRESS NOTES
Dear Sofi,   Your test results are all back -   -Liver and gallbladder tests are normal. (ALT,AST, Alk phos, bilirubin), kidney function is normal (Cr, GFR), Sodium is normal, Potassium is normal, Calcium is normal, Glucose is normal (diabetes screening test).   -Cholesterol levels (LDL,HDL, Triglycerides) are normal.  ADVISE: rechecking in 1 year.  -TSH (thyroid stimulating hormone) level is normal which indicates normal thyroid function.  Let us know if you have any questions.  -Noemy Olivera, DO

## 2017-10-17 NOTE — NURSING NOTE
"Chief Complaint   Patient presents with     Procedure     skin biopsy on R breast     /82  Pulse 80  Temp 99.5  F (37.5  C) (Oral)  Ht 5' 9\" (1.753 m)  Wt 178 lb (80.7 kg)  LMP 10/27/2004  BMI 26.29 kg/m2 Estimated body mass index is 26.29 kg/(m^2) as calculated from the following:    Height as of this encounter: 5' 9\" (1.753 m).    Weight as of this encounter: 178 lb (80.7 kg).  Medication Reconciliation: complete      Health Maintenance due pending provider review:  Mammogram    appt scheduled for mammo on 10/30/2017    Ghada Hurtado CMA  "

## 2017-10-17 NOTE — MR AVS SNAPSHOT
After Visit Summary   10/17/2017    Sofi Degroot    MRN: [id number]           Patient Information     Date Of Birth          1955        Visit Information        Provider Department      10/17/2017 9:30 AM Noemy Olivera DO; UP PROC RM 1 [facility]        Today's Diagnoses     Atypical mole    -  1       Follow-ups after your visit        Your next 10 appointments already scheduled     Oct 30, 2017 10:30 AM CDT   MA SCREENING DIGITAL BILATERAL with URBCMA1   [facility] ([facility])    [address] [place] [address], [address]  [facility] [address] [phone]           Do not use any powder, lotion or deodorant under your arms or on your breast. If you do, we will ask you to remove it before your exam.  Wear comfortable, two-piece clothing.  If you have any allergies, tell your care team.  Bring any previous mammograms from other facilities or have them mailed to the breast center.              Who to contact     If you have questions or need follow up information about today's clinic visit or your schedule please contact [facility] directly at [phone].  Normal or non-critical lab and imaging results will be communicated to you by MyChart, letter or phone within 4 business days after the clinic has received the results. If you do not hear from us within 7 days, please contact the clinic through [employer] or phone. If you have a critical or abnormal lab result, we will notify you by phone as soon as possible.  Submit refill requests through [employer] or call your pharmacy and they will forward the refill request to us. Please allow 3 business days for your refill to be completed.          Additional Information About Your Visit        MyChart Information     [employer] gives you secure access to your electronic health record. If you see a primary care provider, you can also send messages to your care team and make appointments. If you have questions,  "please call your primary care clinic.  If you do not have a primary care provider, please call [phone] and they will assist you.        Care EveryWhere ID     This is your Care EveryWhere ID. This could be used by other organizations to access your [place] medical records  [id number]        Your Vitals Were     Pulse Temperature Height Last Period BMI (Body Mass Index)       80 99.5  F (37.5  C) (Oral) 5' 9\" (1.753 m) 10/27/2004 26.29 kg/m2        Blood Pressure from Last 3 Encounters:   10/17/17 128/82   10/11/17 118/72   05/30/17 124/82    Weight from Last 3 Encounters:   10/17/17 178 lb (80.7 kg)   10/11/17 177 lb 12.8 oz (80.6 kg)   05/30/17 181 lb 12 oz (82.4 kg)              We Performed the Following     BIOPSY SKIN/SUBQ/MUC MEM, SINGLE LESION     Surgical pathology exam        Primary Care Provider Office Phone # Fax #    Noemy Olivera [phone] [address] [address]        Equal Access to Services     [facility]: Hadii aad ku hadasho Soomaali, waaxda luqadaha, qaybta kaalmada adeegyada, deedee mederos haymiguelina [place] . So [facility] [phone].    ATENCIÓN: Si habla español, tiene a parsons disposición servicios gratuitos de asistencia lingüística. Llame al [phone].    We comply with applicable federal civil rights laws and [place] laws. We do not discriminate on the basis of race, color, national origin, age, disability, sex, sexual orientation, or gender identity.            Thank you!     Thank you for choosing [facility]  for your care. Our goal is always to provide you with excellent care. Hearing back from our patients is one way we can continue to improve our services. Please take a few minutes to complete the written survey that you may receive in the mail after your visit with us. Thank you!             Your Updated Medication List - Protect others around you: Learn how to safely use, store and throw away your " medicines at www.disposemymeds.org.          This list is accurate as of: 10/17/17 10:30 AM.  Always use your most recent med list.                   Brand Name Dispense Instructions for use Diagnosis    aspirin 325 MG tablet     180 tablet    Take 1 tablet (325 mg) by mouth daily    TIA (transient ischemic attack)       DAILY MULTIVITAMIN PO      1 tab daily        IBUPROFEN PO      Take 1 Dose by mouth as needed for moderate pain        levothyroxine 100 MCG tablet    SYNTHROID    90 tablet    Take 1 tablet (100 mcg) by mouth daily    Other specified hypothyroidism       lisinopril 20 MG tablet    PRINIVIL/ZESTRIL    90 tablet    Take 1 tablet (20 mg) by mouth daily    Essential hypertension with goal blood pressure less than 140/90       metoprolol 50 MG 24 hr tablet    TOPROL-XL    135 tablet    Take 1.5 tablets (75 mg) by mouth daily    Essential hypertension with goal blood pressure less than 140/90       metroNIDAZOLE 500 MG tablet    FLAGYL    14 tablet    Take 1 tablet (500 mg) by mouth 2 times daily    BV (bacterial vaginosis)       OMEGA-3 FISH OIL PO      Take 2 g by mouth daily        valACYclovir 500 MG tablet    VALTREX    12 tablet    TAKE ONE TABLET BY MOUTH TWICE A DAY FOR THREE DAYS. (REPEAT AS NEEDED)    Recurrent herpes simplex       VITAMIN D (CHOLECALCIFEROL) PO      Take 4,000 Units by mouth daily

## 2017-10-18 LAB
FINAL DIAGNOSIS: ABNORMAL
HPV HR 12 DNA CVX QL NAA+PROBE: POSITIVE
HPV16 DNA SPEC QL NAA+PROBE: NEGATIVE
HPV18 DNA SPEC QL NAA+PROBE: NEGATIVE
SPECIMEN DESCRIPTION: ABNORMAL

## 2017-10-19 LAB — COPATH REPORT: NORMAL

## 2017-10-20 NOTE — PROGRESS NOTES
Dear Sofi,   Your test results are all back -   Great news - your biopsy showed a benign seborrheic keratosis.  These are common and cause not problems.  Let us know if you have any questions.  -Noemy Olivera, DO

## 2017-10-23 ENCOUNTER — TELEPHONE (OUTPATIENT)
Dept: FAMILY MEDICINE | Facility: CLINIC | Age: 62
End: 2017-10-23

## 2017-10-23 NOTE — TELEPHONE ENCOUNTER
Reason for Call:  Other appointment    Detailed comments: patient wants to schedule a colposcopy    Phone Number Patient can be reached at: Other phone number:  *[phone] work     Best Time: Tuesday     Can we leave a detailed message on this number? YES   Confidential voice mail    Call taken on 10/23/2017 at 5:01 PM by Myriam Blue

## 2017-10-24 NOTE — TELEPHONE ENCOUNTER
Called patient and she is scheduled on 11/17/17.  Britta [facility] Unit Coordinator

## 2017-10-30 ENCOUNTER — RADIANT APPOINTMENT (OUTPATIENT)
Dept: MAMMOGRAPHY | Facility: CLINIC | Age: 62
End: 2017-10-30
Attending: FAMILY MEDICINE
Payer: COMMERCIAL

## 2017-10-30 DIAGNOSIS — I10 ESSENTIAL HYPERTENSION WITH GOAL BLOOD PRESSURE LESS THAN 140/90: ICD-10-CM

## 2017-10-30 DIAGNOSIS — Z12.39 SCREENING BREAST EXAMINATION: ICD-10-CM

## 2017-10-30 PROCEDURE — G0202 SCR MAMMO BI INCL CAD: HCPCS

## 2017-10-31 ENCOUNTER — MYC MEDICAL ADVICE (OUTPATIENT)
Dept: FAMILY MEDICINE | Facility: CLINIC | Age: 62
End: 2017-10-31

## 2017-10-31 DIAGNOSIS — I10 ESSENTIAL HYPERTENSION WITH GOAL BLOOD PRESSURE LESS THAN 140/90: ICD-10-CM

## 2017-10-31 RX ORDER — METOPROLOL SUCCINATE 50 MG/1
TABLET, EXTENDED RELEASE ORAL
Qty: 135 TABLET | Refills: 1 | Status: CANCELLED | OUTPATIENT
Start: 2017-10-31

## 2017-10-31 RX ORDER — METOPROLOL SUCCINATE 50 MG/1
TABLET, EXTENDED RELEASE ORAL
Qty: 135 TABLET | Refills: 1 | Status: SHIPPED | OUTPATIENT
Start: 2017-10-31 | End: 2018-05-22

## 2017-11-01 RX ORDER — LISINOPRIL 20 MG/1
20 TABLET ORAL DAILY
Qty: 90 TABLET | Refills: 1 | Status: SHIPPED | OUTPATIENT
Start: 2017-11-01 | End: 2018-05-22

## 2017-11-17 ENCOUNTER — OFFICE VISIT (OUTPATIENT)
Dept: FAMILY MEDICINE | Facility: CLINIC | Age: 62
End: 2017-11-17
Payer: COMMERCIAL

## 2017-11-17 VITALS
WEIGHT: 178 LBS | TEMPERATURE: 98.2 F | DIASTOLIC BLOOD PRESSURE: 96 MMHG | HEART RATE: 84 BPM | HEIGHT: 69 IN | SYSTOLIC BLOOD PRESSURE: 144 MMHG | BODY MASS INDEX: 26.36 KG/M2

## 2017-11-17 DIAGNOSIS — R87.612 PAPANICOLAOU SMEAR OF CERVIX WITH LOW GRADE SQUAMOUS INTRAEPITHELIAL LESION (LGSIL): ICD-10-CM

## 2017-11-17 DIAGNOSIS — R87.810 CERVICAL HIGH RISK HPV (HUMAN PAPILLOMAVIRUS) TEST POSITIVE: Primary | ICD-10-CM

## 2017-11-17 PROCEDURE — 88305 TISSUE EXAM BY PATHOLOGIST: CPT | Performed by: FAMILY MEDICINE

## 2017-11-17 PROCEDURE — 57454 BX/CURETT OF CERVIX W/SCOPE: CPT | Performed by: FAMILY MEDICINE

## 2017-11-17 NOTE — NURSING NOTE
"Chief Complaint   Patient presents with     Colposcopy     BP (!) 144/96  Pulse 84  Temp 98.2  F (36.8  C) (Oral)  Ht 5' 9\" (1.753 m)  Wt 178 lb (80.7 kg)  LMP 10/27/2004  BMI 26.29 kg/m2 Estimated body mass index is 26.29 kg/(m^2) as calculated from the following:    Height as of this encounter: 5' 9\" (1.753 m).    Weight as of this encounter: 178 lb (80.7 kg).  Medication Reconciliation: complete      Health Maintenance due pending provider review:  NONE    n/a    Ghada Hurtado CMA  "

## 2017-11-17 NOTE — PROGRESS NOTES
Sofi Degroot is a 62 year old female who presents for initial colposcopy. Pap smear 1 months ago showed: LSIL with high risk HPV. The prior pap showed normal with high risk HPV.     Past Medical History:   Diagnosis Date     Cervical high risk HPV (human papillomavirus) test positive 2016, 10/11/17    (not 16 or 18). 10/11/17: LSIL pap with + HR HPV (not 16 or 18) result.     Low risk HPV infection ,     NIL pap, + HPV 53     Papanicolaou smear of cervix with low grade squamous intraepithelial lesion (LGSIL) 10/11/2017    10/11/17: LSIL pap with + HR HPV (not 16 or 18) result.     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     Tx'd w/ Interferon x 1 year-virus free at 4 years, blood transfusion  related     Family History   Problem Relation Age of Onset     HEART DISEASE Maternal Grandfather      Breast Cancer Maternal Grandmother      Dx'd age 70     Arthritis Father      Lipids Mother      On meds     CANCER Mother      BCC     HEART DISEASE Brother       of dilated of cardiomyopathy at age 54     Alcohol/Drug Sister      Alcohol/Drug Brother      Depression Sister        Previous history of abnormal paps?: Yes but very remote - 40 years ago  History of cryotherapy (freezing)?: : No     Patient's last menstrual period was 10/27/2004.    Type of contraception: post menopausal status    History   Smoking Status     Former Smoker   Smokeless Tobacco     Never Used     Comment: quit 30 years ago       Allergies as of 2017 - Kam as Reviewed 2017   Allergen Reaction Noted     Latex Swelling 2007     Pineapple Other (See Comments) 02/15/2015       PROCEDURE:  Before the procedure, it was ensured that the patient was educated regarding the nature of her findings to date, the implications of them, and what was to be done. She has been made aware of the role of HPV, the natural history of infection, ways to minimize her future risk, the  effect of HPV on the cervix, and treatment options available should they be indicated. The pathophysiology of the cervix, including a discussion of squamous vs. endometrial cells, and squamous metaplasia have all been reviewed, using illustrations and sketches. The details of the colposcopic procedure were reviewed, as well as the risks of missed diagnoses, pain, infection and bleeding. All questions were answered before proceeding, and informed consent was therefore obtained.    Bimanual examination: was performed and was unremarkable.  Unenhanced examination of the cervix was normal without lesions.   Please refer to images section for details!  Pap repeated?:  No  SCJ seen?:  yes  Endocervical speculum needed?:  Yes but unable to see the superior transformation zone  ECC done?:  Yes    Lugol's solution used?:  Yes    Satisfactory examination?:  yes    Vaginal vault: normal to cursory inspection    Urethra normal?:  yes  Labia normal?:  yes  Perineum normal?:  yes  Rectum normal?:  yes    FINDINGS:  Please see image   Cervix: acetowhitening noted at 3:00 to 8:00  Procedure: biopsies taken (not including ECC): 2.    Procedure summary: Patient tolerated procedure well     Assessment:   (R87.810) Cervical high risk HPV (human papillomavirus) test positive  (primary encounter diagnosis)  Comment:    Plan: Surgical pathology exam              Plan: Specimens labelled and sent to pathology., Will base further treatment on pathology findings., treatment options discussed with patient and post biopsy instructions given to patient      Noemy Olivera, DO

## 2017-11-17 NOTE — MR AVS SNAPSHOT
After Visit Summary   11/17/2017    Sofi Degroot    MRN: [id number]           Patient Information     Date Of Birth          1955        Visit Information        Provider Department      11/17/2017 11:15 AM Noemy Olivera DO; UP PROC RM 1 [facility]        Care Instructions                  COLPOSCOPY CLINIC TAKE HOME INSTRUCTIONS    [facility]  [address], [address]  [place] [address]  [phone]    November 17, 2017                                                                           Sofi Degroot      You have just had a colposcopy procedure.  Your follow-up instructions are indicated below:      _____  You should not douche, have intercourse or use tampons for two weeks after the biopsy.      _____  You may expect:                     Spotting for several days                     Brownish discharge from any special dyes used                    Vaginal tenderness      _____  You may expect:                     Mild cramping and watery discharge for up to two weeks      _____  You should take the following medication:                       ________________________________________________    ______  Please call clinic in 2 weeks to discuss your results with a nurse      ______  Additional instructions:                                     WHAT TO WATCH FOR    Problems rarely occur after the exam.  It is important for you to be aware of the early signs of a possible complication.    Call immediately if you notice any of the following:                  Abdominal pain                  Fever over 100 F                  Foul-smelling vaginal discharge                  Heavy vaginal bleeding (soaking a pad an hour)    If you have any questions call the clinic number above.            Follow-ups after your visit        Who to contact     If you have questions or need follow up information about today's clinic visit or your schedule please contact [place]  [facility] directly at [phone].  Normal or non-critical lab and imaging results will be communicated to you by MyChart, letter or phone within 4 business days after the clinic has received the results. If you do not hear from us within 7 days, please contact the clinic through [employer] or phone. If you have a critical or abnormal lab result, we will notify you by phone as soon as possible.  Submit refill requests through [employer] or call your pharmacy and they will forward the refill request to us. Please allow 3 business days for your refill to be completed.          Additional Information About Your Visit        [employer] Information     [employer] gives you secure access to your electronic health record. If you see a primary care provider, you can also send messages to your care team and make appointments. If you have questions, please call your primary care clinic.  If you do not have a primary care provider, please call [phone] and they will assist you.        Care EveryWhere ID     This is your Care EveryWhere ID. This could be used by other organizations to access your [facility] medical records  [id number]        Your Vitals Were     Pulse Temperature Height Last Period BMI (Body Mass Index)       84 98.2  F (36.8  C) (Oral) 5' 9\" (1.753 m) 10/27/2004 26.29 kg/m2        Blood Pressure from Last 3 Encounters:   11/17/17 (!) 144/96   10/17/17 128/82   10/11/17 118/72    Weight from Last 3 Encounters:   11/17/17 178 lb (80.7 kg)   10/17/17 178 lb (80.7 kg)   10/11/17 177 lb 12.8 oz (80.6 kg)              Today, you had the following     No orders found for display       Primary Care Provider Office Phone # Fax #    Noemy Olivera [phone] [address] [address]        Equal Access to Services     YESENIA HINOJOSA AH: Hadii aad ku hadasho Soomaali, waaxda luqadaha, qaybta kaalmada adedaisyyadeedee mcknight . So [facility] " [phone].    ATENCIÓN: Si yas hanson, tiene a parsons disposición servicios gratuitos de asistencia lingüística. Meghan tony [phone].    We comply with applicable federal civil rights laws and [place] laws. We do not discriminate on the basis of race, color, national origin, age, disability, sex, sexual orientation, or gender identity.            Thank you!     Thank you for choosing [facility]  for your care. Our goal is always to provide you with excellent care. Hearing back from our patients is one way we can continue to improve our services. Please take a few minutes to complete the written survey that you may receive in the mail after your visit with us. Thank you!             Your Updated Medication List - Protect others around you: Learn how to safely use, store and throw away your medicines at www.disposemymeds.org.          This list is accurate as of: 11/17/17 11:56 AM.  Always use your most recent med list.                   Brand Name Dispense Instructions for use Diagnosis    aspirin 325 MG tablet     180 tablet    Take 1 tablet (325 mg) by mouth daily    TIA (transient ischemic attack)       DAILY MULTIVITAMIN PO      1 tab daily        IBUPROFEN PO      Take 1 Dose by mouth as needed for moderate pain        levothyroxine 100 MCG tablet    SYNTHROID    90 tablet    Take 1 tablet (100 mcg) by mouth daily    Other specified hypothyroidism       lisinopril 20 MG tablet    PRINIVIL/ZESTRIL    90 tablet    Take 1 tablet (20 mg) by mouth daily    Essential hypertension with goal blood pressure less than 140/90       metoprolol 50 MG 24 hr tablet    TOPROL-XL    135 tablet    TAKE ONE AND ONE-HALF TABLET BY MOUTH EVERY DAY    Essential hypertension with goal blood pressure less than 140/90       metroNIDAZOLE 500 MG tablet    FLAGYL    14 tablet    Take 1 tablet (500 mg) by mouth 2 times daily    BV (bacterial vaginosis)       OMEGA-3 FISH OIL PO      Take 2 g by mouth daily         valACYclovir 500 MG tablet    VALTREX    12 tablet    TAKE ONE TABLET BY MOUTH TWICE A DAY FOR THREE DAYS. (REPEAT AS NEEDED)    Recurrent herpes simplex       VITAMIN D (CHOLECALCIFEROL) PO      Take 4,000 Units by mouth daily

## 2017-11-17 NOTE — PATIENT INSTRUCTIONS
COLPOSCOPY CLINIC TAKE HOME INSTRUCTIONS    [facility]  [address], [address]  [place], [place] [address] [phone]    November 17, 2017                                                                           Sofi Degroot      You have just had a colposcopy procedure.  Your follow-up instructions are indicated below:      _____  You should not douche, have intercourse or use tampons for two weeks after the biopsy.      _____  You may expect:                     Spotting for several days                     Brownish discharge from any special dyes used                    Vaginal tenderness      _____  You may expect:                     Mild cramping and watery discharge for up to two weeks      _____  You should take the following medication:                       ________________________________________________    ______  Please call clinic in 2 weeks to discuss your results with a nurse      ______  Additional instructions:                                     WHAT TO WATCH FOR    Problems rarely occur after the exam.  It is important for you to be aware of the early signs of a possible complication.    Call immediately if you notice any of the following:                  Abdominal pain                  Fever over 100 F                  Foul-smelling vaginal discharge                  Heavy vaginal bleeding (soaking a pad an hour)    If you have any questions call the clinic number above.

## 2017-11-22 LAB — COPATH REPORT: NORMAL

## 2017-12-04 DIAGNOSIS — B00.9 RECURRENT HERPES SIMPLEX: ICD-10-CM

## 2017-12-04 RX ORDER — VALACYCLOVIR HYDROCHLORIDE 500 MG/1
TABLET, FILM COATED ORAL
Qty: 12 TABLET | Refills: 5 | Status: SHIPPED | OUTPATIENT
Start: 2017-12-04 | End: 2018-10-17

## 2017-12-04 NOTE — TELEPHONE ENCOUNTER
Prescription approved per [facility] Refill Protocol.  Irene Franco RN    Valtrex   Last Written Prescription Date: 12/5/2016  Last Fill Quantity: 12, # refills: 5  Last Office Visit with [facility], [facility] or [facility] prescribing provider: 10/11/2017       Creatinine   Date Value Ref Range Status   10/17/2017 0.70 0.52 - 1.04 mg/dL Final

## 2017-12-15 NOTE — PROGRESS NOTES
Dear Sofi,   Your test results are all back -   Your biopsy shows that you have a small amount of low grade changes inside the cervix.  This is similar to your pap smear.  Lets plan to repeat your pap and HPV test in one year.  Let us know if you have any questions.  -Noemy Olivera, DO

## 2018-04-25 ENCOUNTER — TELEPHONE (OUTPATIENT)
Dept: FAMILY MEDICINE | Facility: CLINIC | Age: 63
End: 2018-04-25

## 2018-04-25 NOTE — TELEPHONE ENCOUNTER
Reason for Call:  Same Day Appointment, Requested Provider:  Nba Villavicencio DPM and Noemy Olivera MD    PCP: Noemy Olivera    Reason for visit: patient was seen at eye doctor today.  Doctor told her she should be seen by PCP within the next week to 2 weeks.  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    The eye doctor told patient that he would send a note to Dr. Olivera also.    Duration of symptoms:     Have you been treated for this in the past? No    Additional comments: please call patient to let her know if she can be fit in soon.    Can we leave a detailed message on this number? YES    Phone number patient can be reached at: Home number on file [phone] (home)    Best Time: asap    Call taken on 4/25/2018 at 4:09 PM by Carmen Hess.

## 2018-04-26 NOTE — TELEPHONE ENCOUNTER
"PN,  FYI:  Patient saw eye doctor yesterday  Was told to see you for f/u d/t retinal infarct  States she had a TIA 3 years ago and they are concerned about \"infarcts\" elsewhere and want pt to see you  States she's on \"anticoagulation\" - 325mg ASA daily   Helped her schedule appt    Next 5 appointments (look out 90 days)     May 01, 2018  7:15 AM CDT   Office Visit with Noemy Olivera DO   [facility] ([facility])    [address]  [facility] [address] [phone]                Alisa KEITH RN    "

## 2018-05-01 ENCOUNTER — OFFICE VISIT (OUTPATIENT)
Dept: FAMILY MEDICINE | Facility: CLINIC | Age: 63
End: 2018-05-01
Payer: COMMERCIAL

## 2018-05-01 VITALS
TEMPERATURE: 97.9 F | DIASTOLIC BLOOD PRESSURE: 90 MMHG | WEIGHT: 176.8 LBS | BODY MASS INDEX: 26.19 KG/M2 | SYSTOLIC BLOOD PRESSURE: 148 MMHG | OXYGEN SATURATION: 96 % | HEART RATE: 90 BPM | HEIGHT: 69 IN

## 2018-05-01 DIAGNOSIS — R73.09 ELEVATED GLUCOSE: ICD-10-CM

## 2018-05-01 DIAGNOSIS — G45.9 TRANSIENT CEREBRAL ISCHEMIA, UNSPECIFIED TYPE: Primary | ICD-10-CM

## 2018-05-01 DIAGNOSIS — I10 HYPERTENSION GOAL BP (BLOOD PRESSURE) < 140/90: ICD-10-CM

## 2018-05-01 DIAGNOSIS — Q21.12 PFO (PATENT FORAMEN OVALE): Chronic | ICD-10-CM

## 2018-05-01 LAB
FERRITIN SERPL-MCNC: 115 NG/ML (ref 8–252)
HBA1C MFR BLD: 5.5 % (ref 0–5.6)

## 2018-05-01 PROCEDURE — 83036 HEMOGLOBIN GLYCOSYLATED A1C: CPT | Performed by: FAMILY MEDICINE

## 2018-05-01 PROCEDURE — 83090 ASSAY OF HOMOCYSTEINE: CPT | Performed by: FAMILY MEDICINE

## 2018-05-01 PROCEDURE — 99214 OFFICE O/P EST MOD 30 MIN: CPT | Performed by: FAMILY MEDICINE

## 2018-05-01 PROCEDURE — 36415 COLL VENOUS BLD VENIPUNCTURE: CPT | Performed by: FAMILY MEDICINE

## 2018-05-01 PROCEDURE — 82728 ASSAY OF FERRITIN: CPT | Performed by: FAMILY MEDICINE

## 2018-05-01 PROCEDURE — 86141 C-REACTIVE PROTEIN HS: CPT | Performed by: FAMILY MEDICINE

## 2018-05-01 RX ORDER — MULTIVITAMIN WITH IRON
250 TABLET ORAL DAILY
COMMUNITY

## 2018-05-01 NOTE — PROGRESS NOTES
SUBJECTIVE:   Sofi Degroot is a 62 year old female who presents to clinic today for the following health issues:      Chief Complaint   Patient presents with     Eye Problem     pt was seen last week and told she has retinal infarct and to f/u with PCP     Patient is here to follow-up after her eye exam last Wednesday.  During the exam her ophthalmologist noted a left retinal infarct during the exam.  She had a previous eye exam one year prior and it was not noted.  She denies any symptoms from the retinal infarct however does have a prior history of a TIA.  In 2015 patient had a TIA which was worked up in the hospital.  She had a negative Holter monitor with no episodes of atrial fibrillation.  She did have an echocardiogram that showed a small PFO.  She was put on aspirin daily and has been doing well until this was noted.  Patient does not smoke however did smoke for approximately 4 years as a teenager.  She has no family history of clots or other disorders.  She does have a prior history of hypertension.  Currently she takes metoprolol and lisinopril both in the morning.  She is checking her blood pressure and during the day 130/80 early morning.  By evening tonight it is in the 110s over 70s.    The 10-year ASCVD risk score (Erie ANNIE Jr, et al., 2013) is: 6.2%    Values used to calculate the score:      Age: 62 years      Sex: Female      Is Non- : No      Diabetic: No      Tobacco smoker: No      Systolic Blood Pressure: 148 mmHg      Is BP treated: Yes      HDL Cholesterol: 91 mg/dL      Total Cholesterol: 199 mg/dL     -------------------------------------    Problem list and histories reviewed & adjusted, as indicated.  Additional history: as documented    Patient Active Problem List   Diagnosis     Hypothyroidism     Hepatitis C virus infection without hepatic coma     CARDIOVASCULAR SCREENING; LDL GOAL LESS THAN 100     Hypertension goal BP (blood pressure) < 140/90      "Advanced directives, counseling/discussion     Transient cerebral ischemia     PFO (patent foramen ovale)     Left-sided low back pain without sciatica     Cervical high risk HPV (human papillomavirus) test positive     Weakness of right upper extremity     Weakness of wrist     Bilateral arm weakness     Lateral epicondylitis of both elbows     Wrist weakness     Retinal infarct     Past Surgical History:   Procedure Laterality Date     C LIGATE FALLOPIAN TUBE       SURGICAL HISTORY OF -  Right 2008    Right 1st MTPJ, bone spur resection       Social History   Substance Use Topics     Smoking status: Former Smoker     Smokeless tobacco: Never Used      Comment: quit 30 years ago     Alcohol use Yes      Comment: 4-5 Drinks Per Week     Family History   Problem Relation Age of Onset     HEART DISEASE Maternal Grandfather      Breast Cancer Maternal Grandmother      Dx'd age 70     Arthritis Father      Lipids Mother      On meds     CANCER Mother      BCC     HEART DISEASE Brother       of dilated of cardiomyopathy at age 54     Alcohol/Drug Sister      Alcohol/Drug Brother      Depression Sister            Reviewed and updated as needed this visit by clinical staff       Reviewed and updated as needed this visit by Provider         ROS:  Constitutional, HEENT, cardiovascular, pulmonary, GI, , musculoskeletal, neuro, skin, endocrine and psych systems are negative, except as otherwise noted.    OBJECTIVE:     /90  Pulse 90  Temp 97.9  F (36.6  C) (Oral)  Ht 5' 9\" (1.753 m)  Wt 176 lb 12.8 oz (80.2 kg)  LMP 10/27/2004  SpO2 96%  BMI 26.11 kg/m2  Body mass index is 26.11 kg/(m^2).  GENERAL: healthy, alert and no distress  RESP: lungs clear to auscultation - no rales, rhonchi or wheezes  CV: regular rate and rhythm, normal S1 S2, no S3 or S4, no murmur, click or rub, no peripheral edema and peripheral pulses strong  No carotid bruits    Diagnostic Test Results:  Pending     ASSESSMENT/PLAN:     1. " Transient cerebral ischemia, unspecified type  Patient has a history of a TIA  in 2015 and now has been found to have a retinal infarct from her ophthalmologist.  Plan to check some labs today.  Will refer her to cardiology for opinion on the PFO.  Also discussed starting a statin and possibly switching her to Plavix but she would like to wait until she gets cardiology's opinion.  We will also check a carotid ultrasound to rule out stenosis.  - Ferritin  - Homocysteine  - CRP cardiac risk  - CARDIO  ADULT REFERRAL  - US Carotid Bilateral; Future    2. Retinal infarct   as above  - Ferritin  - Homocysteine  - CRP cardiac risk  - US Carotid Bilateral; Future    3. PFO (patent foramen ovale)  As above  - CARDIO  ADULT REFERRAL    4. Hypertension goal BP (blood pressure) < 140/90  BP not well controlled but she is checking at home -   Seems higher in AM when she first takes meds   Would like to try to stagger time she takes her meds -   Metoprolol at night and the lisinopril in the AM -plan to recheck her blood pressure and if it stays in the 130/80 range or higher I would like to add Norvasc.    5. Elevated glucose  Patient has had a few elevated glucose in the past so we will just check an A1c today to rule out diabetes as a cause of her infarct.  - Hemoglobin A1c    Pt will call or RTC if symptoms worsen or do not improve.      Noemy Olivera,   [facility]

## 2018-05-01 NOTE — MR AVS SNAPSHOT
After Visit Summary   5/1/2018    Sofi Degroot    MRN: [id number]           Patient Information     Date Of Birth          1955        Visit Information        Provider Department      5/1/2018 7:15 AM Noemy Olivera DO [facility]        Today's Diagnoses     Transient cerebral ischemia, unspecified type    -  1    Retinal infarct        PFO (patent foramen ovale)        Hypertension goal BP (blood pressure) < 140/90        Elevated glucose          Care Instructions    .          Follow-ups after your visit        Additional Services     CARDIO  ADULT REFERRAL       [facility] is referring you to Cardiology Services.       The  Representative will assist you in the coordination of your Cardiology care as prescribed by your physician.    The  Representative will call you within 24 hours to help schedule your appointment, or you may contact the  Representative at: [phone].         Type of Referral: New Cardiology Referral            Timeframe requested: 3-5 days       Coverage of these services is subject to the terms and limitations of your health insurance plan.  Please call member services at your health plan with any benefit or coverage questions.      If X-rays, CT or MRI's have been performed, please contact the facility where they were done to arrange for , prior to your scheduled appointment.  Please bring this referral request to your appointment and present it to your specialist.                  Your next 10 appointments already scheduled     May 05, 2018 10:00 AM CDT   (Arrive by 9:45 AM)   New Patient Visit with Jose Ramon Andrade MD   [facility] ([facility])    [address] [place] [address]  [address] [place] [address] [phone]            May 14, 2018 10:00 AM CDT   US CAROTID BILATERAL with UCUSV2   [facility] ([facility])     [address] [address] [phone]           Please bring a list of your medicines (including vitamins, minerals and over-the-counter drugs). Also, tell your doctor about any allergies you may have. Wear comfortable clothes and leave your valuables at home.  You do not need to do anything special to prepare for your exam.  Please call the Imaging Department at your exam site with any questions.              Future tests that were ordered for you today     Open Future Orders        Priority Expected Expires Ordered    US Carotid Bilateral Routine  5/1/2019 5/1/2018            Who to contact     If you have questions or need follow up information about today's clinic visit or your schedule please contact [facility] directly at [phone].  Normal or non-critical lab and imaging results will be communicated to you by MyChart, letter or phone within 4 business days after the clinic has received the results. If you do not hear from us within 7 days, please contact the clinic through MyChart or phone. If you have a critical or abnormal lab result, we will notify you by phone as soon as possible.  Submit refill requests through [employer] or call your pharmacy and they will forward the refill request to us. Please allow 3 business days for your refill to be completed.          Additional Information About Your Visit        [employer] Information     [employer] gives you secure access to your electronic health record. If you see a primary care provider, you can also send messages to your care team and make appointments. If you have questions, please call your primary care clinic.  If you do not have a primary care provider, please call [phone] and they will assist you.        Care EveryWhere ID     This is your Care EveryWhere ID. This could be used by other organizations to access your [facility] medical records  [id number]        Your Vitals Were     Pulse Temperature  "Height Last Period Pulse Oximetry BMI (Body Mass Index)    90 97.9  F (36.6  C) (Oral) 5' 9\" (1.753 m) 10/27/2004 96% 26.11 kg/m2       Blood Pressure from Last 3 Encounters:   05/01/18 148/90   11/17/17 (!) 144/96   10/17/17 128/82    Weight from Last 3 Encounters:   05/01/18 176 lb 12.8 oz (80.2 kg)   11/17/17 178 lb (80.7 kg)   10/17/17 178 lb (80.7 kg)              We Performed the Following     CARDIO  ADULT REFERRAL     CRP cardiac risk     Ferritin     Hemoglobin A1c     Homocysteine        Primary Care Provider Office Phone # Fax #    Noemy TRENT Olivera [phone] [address] [address]        Equal Access to Services     [facility] : Hadii aad montserrat hadasho Soomaali, waaxda luqadaha, qaybta kaalmada adeegyada, deedee mederos haymiguelina norwood . [facility] [phone].    ATENCIÓN: Si habla español, tiene a parsons disposición servicios gratuitos de asistencia lingüística. Meghan al [phone].    We comply with applicable federal civil rights laws and [place] laws. We do not discriminate on the basis of race, color, national origin, age, disability, sex, sexual orientation, or gender identity.            Thank you!     Thank you for choosing [facility]  for your care. Our goal is always to provide you with excellent care. Hearing back from our patients is one way we can continue to improve our services. Please take a few minutes to complete the written survey that you may receive in the mail after your visit with us. Thank you!             Your Updated Medication List - Protect others around you: Learn how to safely use, store and throw away your medicines at www.disposemymeds.org.          This list is accurate as of 5/1/18  4:48 PM.  Always use your most recent med list.                   Brand Name Dispense Instructions for use Diagnosis    aspirin 325 MG tablet     180 tablet    Take 1 tablet (325 mg) by mouth daily    TIA (transient " ischemic attack)       DAILY MULTIVITAMIN PO      1 tab daily        IBUPROFEN PO      Take 1 Dose by mouth as needed for moderate pain        levothyroxine 100 MCG tablet    SYNTHROID    90 tablet    Take 1 tablet (100 mcg) by mouth daily    Other specified hypothyroidism       lisinopril 20 MG tablet    PRINIVIL/ZESTRIL    90 tablet    Take 1 tablet (20 mg) by mouth daily    Essential hypertension with goal blood pressure less than 140/90       magnesium 250 MG tablet      Take 1 tablet by mouth daily        metoprolol succinate 50 MG 24 hr tablet    TOPROL-XL    135 tablet    TAKE ONE AND ONE-HALF TABLET BY MOUTH EVERY DAY    Essential hypertension with goal blood pressure less than 140/90       OMEGA-3 FISH OIL PO      Take 2 g by mouth daily        valACYclovir 500 MG tablet    VALTREX    12 tablet    TAKE ONE TABLET BY MOUTH TWICE A DAY FOR THREE DAYS. (REPEAT AS NEEDED)    Recurrent herpes simplex       VITAMIN D (CHOLECALCIFEROL) PO      Take 4,000 Units by mouth daily

## 2018-05-01 NOTE — NURSING NOTE
"Chief Complaint   Patient presents with     Eye Problem     pt was seen last week and told she has retinal infarct and to f/u with PCP     /90  Pulse 90  Temp 97.9  F (36.6  C) (Oral)  Ht 5' 9\" (1.753 m)  Wt 176 lb 12.8 oz (80.2 kg)  LMP 10/27/2004  SpO2 96%  BMI 26.11 kg/m2 Estimated body mass index is 26.11 kg/(m^2) as calculated from the following:    Height as of this encounter: 5' 9\" (1.753 m).    Weight as of this encounter: 176 lb 12.8 oz (80.2 kg).  Medication Reconciliation: complete      Health Maintenance due pending provider review:  NONE    n/a    Ghada Hurtado CMA  "

## 2018-05-02 LAB — CRP SERPL HS-MCNC: 0.4 MG/L

## 2018-05-03 LAB — HCYS SERPL-SCNC: 8 UMOL/L (ref 4–12)

## 2018-05-04 NOTE — PROGRESS NOTES
Dear Sofi,   Your test results are all back -   Labs are all normal.  I still think we should consider the statin but can wait until you discuss with cardiology.  Let us know if you have any questions.  -Noemy Olivera, DO

## 2018-05-05 ENCOUNTER — OFFICE VISIT (OUTPATIENT)
Dept: CARDIOLOGY | Facility: CLINIC | Age: 63
End: 2018-05-05
Attending: INTERNAL MEDICINE
Payer: COMMERCIAL

## 2018-05-05 VITALS
HEART RATE: 74 BPM | WEIGHT: 177 LBS | BODY MASS INDEX: 26.22 KG/M2 | DIASTOLIC BLOOD PRESSURE: 78 MMHG | HEIGHT: 69 IN | SYSTOLIC BLOOD PRESSURE: 127 MMHG | OXYGEN SATURATION: 97 %

## 2018-05-05 DIAGNOSIS — G45.9 TRANSIENT CEREBRAL ISCHEMIA, UNSPECIFIED TYPE: Primary | ICD-10-CM

## 2018-05-05 DIAGNOSIS — E78.00 HYPERCHOLESTEREMIA: ICD-10-CM

## 2018-05-05 DIAGNOSIS — Q21.12 PFO (PATENT FORAMEN OVALE): ICD-10-CM

## 2018-05-05 PROCEDURE — G0463 HOSPITAL OUTPT CLINIC VISIT: HCPCS | Mod: 25,ZF

## 2018-05-05 PROCEDURE — 99204 OFFICE O/P NEW MOD 45 MIN: CPT | Mod: ZP | Performed by: INTERNAL MEDICINE

## 2018-05-05 PROCEDURE — 93010 ELECTROCARDIOGRAM REPORT: CPT | Mod: ZP | Performed by: INTERNAL MEDICINE

## 2018-05-05 PROCEDURE — 93005 ELECTROCARDIOGRAM TRACING: CPT | Mod: ZF

## 2018-05-05 RX ORDER — ATORVASTATIN CALCIUM 20 MG/1
20 TABLET, FILM COATED ORAL DAILY
Qty: 90 TABLET | Refills: 3 | Status: SHIPPED | OUTPATIENT
Start: 2018-05-05 | End: 2019-04-15

## 2018-05-05 ASSESSMENT — PAIN SCALES - GENERAL: PAINLEVEL: NO PAIN (0)

## 2018-05-05 NOTE — NURSING NOTE
Chief Complaint   Patient presents with     New Patient     referral from Dr. Olivera for PFO     Vitals were taken and medications were reconciled. EKG was performed    Katie JOHNSON  9:44 AM

## 2018-05-05 NOTE — LETTER
5/5/2018      RE: Sofi Degroot  [address]   [facility] [address]       Dear Colleague,    Thank you for the opportunity to participate in the care of your patient, Sofi Degroot, at the [facility] at [facility]. Please see a copy of my visit note below.    CARDIOLOGY CONSULTATION  Referring Provider:  Noemy Olivera  Primary Care Provider:   Noemy Olivera  Indication for Consultation:  PFO    HPI: Sofi Degroot is a 62 year old female being seen today for evaluation of PFO.   The patient's risk factor profile is: (+) HTN, (-) DM, (-) hypercholesterolemia, (-) tobacco use, (-) fam Hx premature CAD.  The patient has no history of cardiovascular disease (CAD, CHF, or valvular heart disease).  The patient has no Hx of PAD.  In 2014 she reported palpitations.  She had an ECHO at that time that showed no significant disease.  She had a holter showing rare PVC (<1%) and no significant supraventricular ectopy / AF.    The patient had a TIA in 2015 and presented with Left arm and Left face weakness.  The symptoms lasted only a few second.  She reports an MRI at that time was negative.  An ECHO showed a small PFO on bubble study.  She was started on ASA.  She had a Ziopatch that was unremarkable other than 2 very brief (4 beat, 6 beat) episodes of supraventricular tachycardia.  She underwent an annual eye exam several weeks ago.  She was found to have asymptomatic retinal artery occlusion.  This prompted the cardiology consultation.  She is scheduled for NICS.  She has not had any recurrent neurologic studies.    The patient denies a history of chest discomfort, dyspnea, PND, orthopnea.  She notes slight pedal edema and wars partial compression stockings.  She denies palpitations, lightheadedness, and syncope.  PAST MEDICAL HISTORY:  Past Medical History:   Diagnosis Date     Cervical high risk HPV (human papillomavirus) test positive 09/16/2016,  10/11/17    (not 16 or 18). 10/11/17: LSIL pap with + HR HPV (not 16 or 18) result.     Low risk HPV infection 2009, 2010    NIL pap, + HPV 53     Papanicolaou smear of cervix with low grade squamous intraepithelial lesion (LGSIL) 10/11/2017    10/11/17: LSIL pap with + HR HPV (not 16 or 18) result.     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 1998    Tx'd w/ Interferon x 1 year-virus free at 5 years, blood transfusion  related       CURRENT MEDICATIONS:  Current Outpatient Prescriptions   Medication Sig Dispense Refill     aspirin 325 MG tablet Take 1 tablet (325 mg) by mouth daily 180 tablet      DAILY MULTIVITAMIN PO 1 tab daily       IBUPROFEN PO Take 1 Dose by mouth as needed for moderate pain       levothyroxine (SYNTHROID) 100 MCG tablet Take 1 tablet (100 mcg) by mouth daily 90 tablet 3     lisinopril (PRINIVIL/ZESTRIL) 20 MG tablet Take 1 tablet (20 mg) by mouth daily 90 tablet 1     magnesium 250 MG tablet Take 1 tablet by mouth daily       metoprolol (TOPROL-XL) 50 MG 24 hr tablet TAKE ONE AND ONE-HALF TABLET BY MOUTH EVERY  tablet 1     Omega-3 Fatty Acids (OMEGA-3 FISH OIL PO) Take 2 g by mouth daily        valACYclovir (VALTREX) 500 MG tablet TAKE ONE TABLET BY MOUTH TWICE A DAY FOR THREE DAYS. (REPEAT AS NEEDED) 12 tablet 5     VITAMIN D, CHOLECALCIFEROL, PO Take 4,000 Units by mouth daily        [DISCONTINUED] lisinopril-hydrochlorothiazide (PRINZIDE,ZESTORETIC) 20-12.5 MG per tablet Take 1 tablet by mouth daily 90 tablet 3       PAST SURGICAL HISTORY:  Past Surgical History:   Procedure Laterality Date     C LIGATE FALLOPIAN TUBE  1986     SURGICAL HISTORY OF -  Right 2008    Right 1st MTPJ, bone spur resection     ALLERGIES  Latex and Pineapple    FAMILY HX:  Family History   Problem Relation Age of Onset     HEART DISEASE Maternal Grandfather      Breast Cancer Maternal Grandmother      Dx'd age 70     Arthritis Father      Lipids Mother      On  meds     CANCER Mother      BCC     HEART DISEASE Brother       of dilated of cardiomyopathy at age 54     Alcohol/Drug Sister      Alcohol/Drug Brother      Depression Sister      SOCIAL HX:  Social History     Social History     Marital status: Single     Spouse name: N/A     Number of children: 1     Years of education: BSN     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 No     Social History Narrative    Social Documentation:        Balanced Diet: YES    Calcium intake: 2-3 per day    Caffeine: 3 per day    Exercise:  type of activity work is physical and walking;  5 times per week    Sunscreen: Yes    Seatbelts:  Yes    Self Breast Exam:  Yes    Self Testicular Exam: na    Physical/Emotional/Sexual Abuse: No    Do you feel safe in your environment? Yes        Cholesterol screen up to date:     CHOL      199   2009    HDL       82   2009    LDL      100   2009    TRIG       83   2009    CHOLHDLRATIO      2.4   2009    Eye Exam up to date: Yes    Dental Exam up to date: Yes    Pap smear up to date: do today. PAP      NIL   2009    Mammogram up to date:  Due in September    Dexa Scan up to date: has not had one     Colonoscopy up to date: Yes,     Immunizations up to date: Yes    Glucose screen if over 40:  cna do today                                                               ROS:  Constitutional: No fever, chills, or sweats. No weight gain/loss.   HEENT: No visual disturbance, ear ache, epistaxis, sore throat.   Allergies/Immunologic: Negative.   Respiratory: No cough, hemoptysis.  "  Cardiovascular: As per HPI.   GI: No nausea, vomiting, hematemesis, melena, or hematochezia.   : No urinary frequency, dysuria, or hematuria.   Integument: No rash.   Psychiatric: No anxiety / depression.   Neuro: No speech disturbance, focal sensory or motor deficit.   Endocrinology: No polyuria / polyphagia.   Musculoskeletal: No myalgia.    VITAL SIGNS:  /78  Pulse 74  Ht 1.753 m (5' 9\")  Wt 80.3 kg (177 lb)  LMP 10/27/2004  SpO2 97%  BMI 26.14 kg/m2  Body mass index is 26.14 kg/(m^2).  Wt Readings from Last 2 Encounters:   18 80.3 kg (177 lb)   18 80.2 kg (176 lb 12.8 oz)     PHYSICAL EXAM  Sofi Degroot is a 62 year old female.in no acute distress.  HEENT: Eyes Nonicteric.  Neck: JVP normal.  Carotids +3/3 bilaterally without bruits.  Lungs: CTA.  Cor: RRR. Normal S1 and S2.  No murmur, rub, or gallop.  PMI in Lf 5th ICS.  Abd: Soft, nontender, nondistended.  NABS.  No pulsatile mass.  Extremities: No C/C/E.  Pulses +3/3 symmetric in upper and lower extremities.  Neuro: Grossly intact.  Psych: A&O x 3.  Skin: No rash.    LABS  Recent Labs   Lab Test  17   0959  16   1604  02/16/15   0805  02/15/15   1926   WBC   --   8.4  8.5  11.7*   HGB  15.1   --   14.8  14.6   HCT   --    --   44.3  43.4   PLT   --    --   161  175     Recent Labs   Lab Test  10/17/17   0919  16   1149   NA  140  136   POTASSIUM  4.2  4.4   CHLORIDE  107  101   CO2  23  27   GLC  99  87   BUN  9  15   CR  0.70  0.74   ALICIA  9.6  9.7     Recent Labs   Lab Test  10/17/17   0919  03/15/16   0918  02/16/15   0805  14   0832   CHOL  199  218*  203*  201*   HDL  91  82  106  108   LDL  98  119*  84  80   TRIG  52  86  67  65   CHOLHDLRATIO   --    --   1.9  1.9   NHDL  108  136*   --    --         EK18  NSR with normal intervals and axes.  No ST shift, TWI, or Q waves.  Nonpathologic q waves in III.  Delay in R wave progression.  No definitive evidence of prior MI.    ECHO: " 5/28/14  Global and regional left ventricular function is normal with an EF of 55-60%.   Global right ventricular function is normal.   Trace tricuspid insufficiency is  present. Right ventricular systolic pressure is 16mmHg above the right atrial pressure.   The inferior vena cava was normal in size with preserved respiratory variability.   No pericardial effusion is present.    ECHO: 2/15/15  1. Normal biventricular systolic function. LVEF estimate 60-65%.    2. No significant valvular abnormalities.    3. Small PFO demonstrated by agitated saline bubble study.   4. Normal IVC with preserved respiratory variability.    STRESS TEST:  None    CARDIAC CATH: None  ASSESSMENT AND PLAN:   1. Patent foramen ovale.  Patient has had neurologic events and has PFO.  Awaiting results of NICS to ensure no carotid source.  Patient had rare supraventricular ectopy on prior Ziopatch with 2 episodes of SVG (4 beats, 6 beats) but no prolonged arrhythmia or definitive AF.  Therefore, the PFO may be the source of the problem.  Recommend DANTE.  Consider closure of PFO by Dr. Xiao Lopez.  I have discussed the potential benefits / risks with the patient.    2. Cardiovascular Risk Reduction.  Initiate Lipitor 20 mg qd. Given history of nonspecific myalgias, start Lipitor 5 mg QOD, and then increase to 5 mg qd, then increase to full tab, 10 mg qd.    FOLLOW UP:  Dr. Xiao Lopez for potential PFO closure for reduction of secondary recurrent embolic event       Jose Ramon Andrade MD    Divisions of Cardiology  [place], [place]    CC  Patient Care Team:  Noemy Olivera DO as PCP - Emi Mckeon, RN as Nurse Coordinator (Neurology)  Mahamed Block MD as Resident (House Physician)

## 2018-05-05 NOTE — PROGRESS NOTES
CARDIOLOGY CONSULTATION    Referring Provider:  Noemy Olivera  Primary Care Provider:   Noemy Olivera  Indication for Consultation:  PFO    HPI: Sofi Degroot is a 62 year old female being seen today for evaluation of PFO.   The patient's risk factor profile is: (+) HTN, (-) DM, (-) hypercholesterolemia, (-) tobacco use, (-) fam Hx premature CAD.  The patient has no history of cardiovascular disease (CAD, CHF, or valvular heart disease).  The patient has no Hx of PAD.  In 2014 she reported palpitations.  She had an ECHO at that time that showed no significant disease.  She had a holter showing rare PVC (<1%) and no significant supraventricular ectopy / AF.    The patient had a TIA in 2015 and presented with Left arm and Left face weakness.  The symptoms lasted only a few second.  She reports an MRI at that time was negative.  An ECHO showed a small PFO on bubble study.  She was started on ASA.  She had a Ziopatch that was unremarkable other than 2 very brief (4 beat, 6 beat) episodes of supraventricular tachycardia.  She underwent an annual eye exam several weeks ago.  She was found to have asymptomatic retinal artery occlusion.  This prompted the cardiology consultation.  She is scheduled for NICS.  She has not had any recurrent neurologic studies.    The patient denies a history of chest discomfort, dyspnea, PND, orthopnea.  She notes slight pedal edema and wars partial compression stockings.  She denies palpitations, lightheadedness, and syncope.      PAST MEDICAL HISTORY:  Past Medical History:   Diagnosis Date     Cervical high risk HPV (human papillomavirus) test positive 09/16/2016, 10/11/17    (not 16 or 18). 10/11/17: LSIL pap with + HR HPV (not 16 or 18) result.     Low risk HPV infection 2009, 2010    NIL pap, + HPV 53     Papanicolaou smear of cervix with low grade squamous intraepithelial lesion (LGSIL) 10/11/2017    10/11/17: LSIL pap with + HR HPV (not 16 or 18) result.     Unspecified  hypothyroidism      Unspecified viral hepatitis C without hepatic coma     Tx'd w/ Interferon x 1 year-virus free at 5 years, blood transfusion  related       CURRENT MEDICATIONS:  Current Outpatient Prescriptions   Medication Sig Dispense Refill     aspirin 325 MG tablet Take 1 tablet (325 mg) by mouth daily 180 tablet      DAILY MULTIVITAMIN PO 1 tab daily       IBUPROFEN PO Take 1 Dose by mouth as needed for moderate pain       levothyroxine (SYNTHROID) 100 MCG tablet Take 1 tablet (100 mcg) by mouth daily 90 tablet 3     lisinopril (PRINIVIL/ZESTRIL) 20 MG tablet Take 1 tablet (20 mg) by mouth daily 90 tablet 1     magnesium 250 MG tablet Take 1 tablet by mouth daily       metoprolol (TOPROL-XL) 50 MG 24 hr tablet TAKE ONE AND ONE-HALF TABLET BY MOUTH EVERY  tablet 1     Omega-3 Fatty Acids (OMEGA-3 FISH OIL PO) Take 2 g by mouth daily        valACYclovir (VALTREX) 500 MG tablet TAKE ONE TABLET BY MOUTH TWICE A DAY FOR THREE DAYS. (REPEAT AS NEEDED) 12 tablet 5     VITAMIN D, CHOLECALCIFEROL, PO Take 4,000 Units by mouth daily        [DISCONTINUED] lisinopril-hydrochlorothiazide (PRINZIDE,ZESTORETIC) 20-12.5 MG per tablet Take 1 tablet by mouth daily 90 tablet 3       PAST SURGICAL HISTORY:  Past Surgical History:   Procedure Laterality Date     C LIGATE FALLOPIAN TUBE       SURGICAL HISTORY OF -  Right 2008    Right 1st MTPJ, bone spur resection       ALLERGIES  Latex and Pineapple    FAMILY HX:  Family History   Problem Relation Age of Onset     HEART DISEASE Maternal Grandfather      Breast Cancer Maternal Grandmother      Dx'd age 70     Arthritis Father      Lipids Mother      On meds     CANCER Mother      BCC     HEART DISEASE Brother       of dilated of cardiomyopathy at age 54     Alcohol/Drug Sister      Alcohol/Drug Brother      Depression Sister        SOCIAL HX:  Social History     Social History     Marital status: Single     Spouse name: N/A     Number of children: 1      Years of education: BSN     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 No     Social History Narrative    Social Documentation:        Balanced Diet: YES    Calcium intake: 2-3 per day    Caffeine: 3 per day    Exercise:  type of activity work is physical and walking;  5 times per week    Sunscreen: Yes    Seatbelts:  Yes    Self Breast Exam:  Yes    Self Testicular Exam: na    Physical/Emotional/Sexual Abuse: No    Do you feel safe in your environment? Yes        Cholesterol screen up to date:     CHOL      199   7/21/2009    HDL       82   7/21/2009    LDL      100   7/21/2009    TRIG       83   7/21/2009    CHOLHDLRATIO      2.4   7/21/2009    Eye Exam up to date: Yes    Dental Exam up to date: Yes    Pap smear up to date: do today. PAP      NIL   7/21/2009    Mammogram up to date:  Due in September    Dexa Scan up to date: has not had one     Colonoscopy up to date: Yes, 04/08    Immunizations up to date: Yes    Glucose screen if over 40:  cna do today                                                               ROS:  Constitutional: No fever, chills, or sweats. No weight gain/loss.   HEENT: No visual disturbance, ear ache, epistaxis, sore throat.   Allergies/Immunologic: Negative.   Respiratory: No cough, hemoptysis.   Cardiovascular: As per HPI.   GI: No nausea, vomiting, hematemesis, melena, or hematochezia.   : No urinary frequency, dysuria, or hematuria.   Integument: No rash.   Psychiatric: No anxiety / depression.   Neuro: No speech disturbance, focal sensory or motor deficit.   Endocrinology: No polyuria / polyphagia.  "  Musculoskeletal: No myalgia.    VITAL SIGNS:  /78  Pulse 74  Ht 1.753 m (5' 9\")  Wt 80.3 kg (177 lb)  LMP 10/27/2004  SpO2 97%  BMI 26.14 kg/m2  Body mass index is 26.14 kg/(m^2).  Wt Readings from Last 2 Encounters:   18 80.3 kg (177 lb)   18 80.2 kg (176 lb 12.8 oz)       PHYSICAL EXAM  Sofi Degroot is a 62 year old female.in no acute distress.  HEENT: Eyes Nonicteric.  Neck: JVP normal.  Carotids +3/3 bilaterally without bruits.  Lungs: CTA.  Cor: RRR. Normal S1 and S2.  No murmur, rub, or gallop.  PMI in Lf 5th ICS.  Abd: Soft, nontender, nondistended.  NABS.  No pulsatile mass.  Extremities: No C/C/E.  Pulses +3/3 symmetric in upper and lower extremities.  Neuro: Grossly intact.  Psych: A&O x 3.  Skin: No rash.    LABS  Recent Labs   Lab Test  17   0959  16   1604  02/16/15   0805  02/15/15   1926   WBC   --   8.4  8.5  11.7*   HGB  15.1   --   14.8  14.6   HCT   --    --   44.3  43.4   PLT   --    --   161  175     Recent Labs   Lab Test  10/17/17   0919  16   1149   NA  140  136   POTASSIUM  4.2  4.4   CHLORIDE  107  101   CO2  23  27   GLC  99  87   BUN  9  15   CR  0.70  0.74   ALICIA  9.6  9.7     Recent Labs   Lab Test  10/17/17   0919  03/15/16   0918  02/16/15   0805  14   0832   CHOL  199  218*  203*  201*   HDL  91  82  106  108   LDL  98  119*  84  80   TRIG  52  86  67  65   CHOLHDLRATIO   --    --   1.9  1.9   NHDL  108  136*   --    --         EK18  NSR with normal intervals and axes.  No ST shift, TWI, or Q waves.  Nonpathologic q waves in III.  Delay in R wave progression.  No definitive evidence of prior MI.    ECHO: 14  Global and regional left ventricular function is normal with an EF of 55-60%.   Global right ventricular function is normal.   Trace tricuspid insufficiency is  present. Right ventricular systolic pressure is 16mmHg above the right atrial pressure.   The inferior vena cava was normal in size with preserved " respiratory variability.   No pericardial effusion is present.    ECHO: 2/15/15  1. Normal biventricular systolic function. LVEF estimate 60-65%.    2. No significant valvular abnormalities.    3. Small PFO demonstrated by agitated saline bubble study.   4. Normal IVC with preserved respiratory variability.    STRESS TEST:  None    CARDIAC CATH: None    ASSESSMENT AND PLAN:   1. Patent foramen ovale.  Patient has had neurologic events and has PFO.  Awaiting results of NICS to ensure no carotid source.  Patient had rare supraventricular ectopy on prior Ziopatch with 2 episodes of SVG (4 beats, 6 beats) but no prolonged arrhythmia or definitive AF.  Therefore, the PFO may be the source of the problem.  Recommend DANTE.  Consider closure of PFO by Dr. Xiao Lopez.  I have discussed the potential benefits / risks with the patient.    2. Cardiovascular Risk Reduction.  Initiate Lipitor 20 mg qd. Given history of nonspecific myalgias, start Lipitor 5 mg QOD, and then increase to 5 mg qd, then increase to full tab, 10 mg qd.    FOLLOW UP:  Dr. Xiao Lopez for potential PFO closure for reduction of secondary recurrent embolic event         DANTE (5/14/18):  Normal left ventricular size.  The Ejection Fraction is estimated at 55-60%.  The right ventricle is normal size. Global right ventricular function is normal.  Left atrium, appendage, right atrium and appendage are free of SEC or thrombus.  Valve structures free of mass or vegetation.  Mild aortic insufficiency. Mild TR.  There is evidence of a small tractlike PFO with minimal right-left-shunt on inspiration demonstrated by agitated saline Bubble study (Grade 1). Unable to perform Valsalva maneuver in setting of sedation. Complex PFO visualized given long apparent tunnel and exuberant/aneurysmal septum. Normal secondary septal thickness.  No concomitant ASD demonstrated by color flow.  Normal Sinuses of Valsalva and Proximal Ascending aorta.   No significant atheromatous  disease.  No pericardial effusion.  Previous study not available for comparison.    ADDENDUM (5/15/18):  Awaiting NICS (scheduled).  Forward information to Xiao for consideration of PFO closure.    Jose Ramon Andrade MD    Divisions of Cardiology  [facility]  Patient Care Team:  Noemy Singh DO as PCP - Emi Mckeon RN as Nurse Coordinator (Neurology)  Noemy Singh DO as Referring Physician (Family Practice)  Mahamed Block MD as Resident (House Physician)  NOEMY SINGH

## 2018-05-05 NOTE — PATIENT INSTRUCTIONS
It was a pleasure to see you in the cardiology clinic today.    If you have any questions, you can reach my nurse, Addis Castellanos, at [phone].  Press Option #1 for the [facility], and then press Option #3 for nursing.    Note the new medications: Lipitor 10 mg tab.  Given history of nonspecific myalgias, start Lipitor 5 mg QOD, and then increase to 5 mg qd, then increase to full tab, 10 mg qd.      Stop the following medications: None    The results from today include: ECG that had subtle abnormalities but I do not believe there is evidence of prior heart attack.    Tests ordered today:     Transesophageal ECHO call [phone] to schedule test  I would like you to follow up with Dr. Xiao Lopez in 3 weeks.    Sincerely,      Jose Ramon Andrade MD     [facility]        Transesophageal Echocardiography (DANTE)      Transesophageal echocardiography (DANTE) is a test done to record images of your heart with a probe inside your esophagus. These images help your healthcare provider find and treat problems such as infection, disease, or defects in your heart s function, walls or valves. This test may be done when a chest echocardiogram (transthoracic) does not give your provider enough information.  Before your test    Tell your provider about all the medicines you take. Ask if it s OK to take them before the test.    Don t eat or drink for 6 to 8 hours before the test. This includes water.    Tell your healthcare provider if you have ulcers, a hiatal hernia, or problems swallowing. Also report a history of narrowing of the esophagus, or any other previous gastrointestinal problems.  Also, let him or her know of any allergies to medicines or sedatives.    Also let your provider know if you have dental implants or dentures that should be removed before the test.    Arrange to have someone drive you home after the exam.  During your DANTE    When you arrive for  your DANTE, you will change into a hospital gown, and then be taken to the testing room.    Your provider will spray your throat with a numbing medicine. You may be given a medicine through an IV (intravenous) in your arm to help you relax. You may also be given oxygen. Then you ll be asked to lie on your left side.    The healthcare provider gently inserts the small, lubricated probe into your mouth. As you swallow, he or she will slowly guide the tube into your esophagus.    You may feel the healthcare provider moving the probe, but it shouldn t hurt or interfere with your breathing. A nurse checks your heart rate, blood pressure, and breathing. The test usually takes 20 to 40 minutes.    The nurse or assistant will suction any saliva out of your mouth, similar to when you have a dental cleaning.  After the test    Tell your healthcare provider about any pain, or if you cough up or vomit blood, or have trouble swallowing.    You can eat and drink again when your throat is no longer numb.    Do not drive a car or run heavy machinery for at least 24 hours after getting sedation. After 24 hours you can return to normal activity unless your healthcare provider tells you otherwise.    Be sure to keep your follow-up appointment to go over the results with your healthcare provider.    Your next appointment is: ____________________  Date Last Reviewed: 12/1/2016 2000-2017 [employer]. [address] [place] [id number]. All rights reserved. This information is not intended as a substitute for professional medical care. Always follow your healthcare professional's instructions.

## 2018-05-05 NOTE — NURSING NOTE
Cardiac Testing: Patient given instructions regarding  transesophageal echocardiogram. Discussed purpose, preparation, procedure and when to expect results reported back to the patient. Patient demonstrated understanding of this information and agreed to call with further questions or concerns.  Med Reconcile: Reviewed and verified all current medications with the patient. The updated medication list was printed and given to the patient.  Return Appointment: Patient given instructions regarding scheduling next clinic visit. Patient demonstrated understanding of this information and agreed to call with further questions or concerns.  Patient stated she understood all health information given and agreed to call with further questions or concerns.

## 2018-05-05 NOTE — MR AVS SNAPSHOT
After Visit Summary   5/5/2018    Sofi Degroot    MRN: [id number]           Patient Information     Date Of Birth          1955        Visit Information        Provider Department      5/5/2018 10:00 AM Jose Ramon Andrade MD [facility]        Today's Diagnoses     Transient cerebral ischemia, unspecified type    -  1    PFO (patent foramen ovale)        Hypercholesteremia          Care Instructions      It was a pleasure to see you in the cardiology clinic today.    If you have any questions, you can reach my nurse, Addis Castellanos, at [phone].  Press Option #1 for the [facility], and then press Option #3 for nursing.    Note the new medications: Lipitor 10 mg tab.  Given history of nonspecific myalgias, start Lipitor 5 mg QOD, and then increase to 5 mg qd, then increase to full tab, 10 mg qd.      Stop the following medications: None    The results from today include: ECG that had subtle abnormalities but I do not believe there is evidence of prior heart attack.    Tests ordered today:     Transesophageal ECHO call [phone] to schedule test  I would like you to follow up with Dr. Xiao Lopez in 3 weeks.    Sincerely,      Jose Ramon Andrade MD     [facility]        Transesophageal Echocardiography (DANTE)      Transesophageal echocardiography (DANTE) is a test done to record images of your heart with a probe inside your esophagus. These images help your healthcare provider find and treat problems such as infection, disease, or defects in your heart s function, walls or valves. This test may be done when a chest echocardiogram (transthoracic) does not give your provider enough information.  Before your test    Tell your provider about all the medicines you take. Ask if it s OK to take them before the test.    Don t eat or drink for 6 to 8 hours before the test. This includes water.    Tell your healthcare provider if you  have ulcers, a hiatal hernia, or problems swallowing. Also report a history of narrowing of the esophagus, or any other previous gastrointestinal problems.  Also, let him or her know of any allergies to medicines or sedatives.    Also let your provider know if you have dental implants or dentures that should be removed before the test.    Arrange to have someone drive you home after the exam.  During your DANTE    When you arrive for your DANTE, you will change into a hospital gown, and then be taken to the testing room.    Your provider will spray your throat with a numbing medicine. You may be given a medicine through an IV (intravenous) in your arm to help you relax. You may also be given oxygen. Then you ll be asked to lie on your left side.    The healthcare provider gently inserts the small, lubricated probe into your mouth. As you swallow, he or she will slowly guide the tube into your esophagus.    You may feel the healthcare provider moving the probe, but it shouldn t hurt or interfere with your breathing. A nurse checks your heart rate, blood pressure, and breathing. The test usually takes 20 to 40 minutes.    The nurse or assistant will suction any saliva out of your mouth, similar to when you have a dental cleaning.  After the test    Tell your healthcare provider about any pain, or if you cough up or vomit blood, or have trouble swallowing.    You can eat and drink again when your throat is no longer numb.    Do not drive a car or run heavy machinery for at least 24 hours after getting sedation. After 24 hours you can return to normal activity unless your healthcare provider tells you otherwise.    Be sure to keep your follow-up appointment to go over the results with your healthcare provider.    Your next appointment is: ____________________  Date Last Reviewed: 12/1/2016 2000-2017 [employer]. [address] [place] [address], [place] [address]. All rights reserved. This information is not intended  as a substitute for professional medical care. Always follow your healthcare professional's instructions.                Follow-ups after your visit        Your next 10 appointments already scheduled     May 14, 2018 10:00 AM CDT   US CAROTID BILATERAL with [id number]   [facility] ([facility])    [address] [address] [phone]           Please bring a list of your medicines (including vitamins, minerals and over-the-counter drugs). Also, tell your doctor about any allergies you may have. Wear comfortable clothes and leave your valuables at home.  You do not need to do anything special to prepare for your exam.  Please call the Imaging Department at your exam site with any questions.              Future tests that were ordered for you today     Open Future Orders        Priority Expected Expires Ordered    Transesophageal Echocardiogram Routine  5/5/2019 5/5/2018            Who to contact     If you have questions or need follow up information about today's clinic visit or your schedule please contact [facility] directly at [phone].  Normal or non-critical lab and imaging results will be communicated to you by bLifehart, letter or phone within 4 business days after the clinic has received the results. If you do not hear from us within 7 days, please contact the clinic through bLifehart or phone. If you have a critical or abnormal lab result, we will notify you by phone as soon as possible.  Submit refill requests through [employer] or call your pharmacy and they will forward the refill request to us. Please allow 3 business days for your refill to be completed.          Additional Information About Your Visit        [employer] Information     [employer] gives you secure access to your electronic health record. If you see a primary care provider, you can also send messages to your care team and make appointments. If you have questions,  "please call your primary care clinic.  If you do not have a primary care provider, please call [phone] and they will assist you.        Care EveryWhere ID     This is your Care EveryWhere ID. This could be used by other organizations to access your [facility] medical records  [id number]        Your Vitals Were     Pulse Height Last Period Pulse Oximetry BMI (Body Mass Index)       74 1.753 m (5' 9\") 10/27/2004 97% 26.14 kg/m2        Blood Pressure from Last 3 Encounters:   05/05/18 127/78   05/01/18 148/90   11/17/17 (!) 144/96    Weight from Last 3 Encounters:   05/05/18 80.3 kg (177 lb)   05/01/18 80.2 kg (176 lb 12.8 oz)   11/17/17 80.7 kg (178 lb)              We Performed the Following     EKG 12-lead, tracing only (Same Day)          Today's Medication Changes          These changes are accurate as of 5/5/18 10:43 AM.  If you have any questions, ask your nurse or doctor.               Start taking these medicines.        Dose/Directions    atorvastatin 20 MG tablet   Commonly known as:  LIPITOR   Used for:  Hypercholesteremia   Started by:  Jose Ramon Andrade MD        Dose:  20 mg   Take 1 tablet (20 mg) by mouth daily   Quantity:  90 tablet   Refills:  3            Where to get your medicines      These medications were sent to [facility] - [place], [place] - [address] [place] [address]  [address] [facility] [address]    Hours:  Mon-Fri 8:30am-5:00pm Toll Free [phone] Phone:  [phone]     atorvastatin 20 MG tablet                Primary Care Provider Office Phone # Fax #    Noemy Olivera [phone] [address] [address]        Equal Access to Services     AMIRA HINOJOSA AH: Bossman barakat Soemelina, waaxda luqadaha, qaybta kaalmada adedaisyyajuan luis, [place]. So [facility] [phone].    ATENCIÓN: Si habla español, tiene a parsons disposición servicios gratuitos de asistencia lingüística. Llame " al [phone].    We comply with applicable federal civil rights laws and [place] laws. We do not discriminate on the basis of race, color, national origin, age, disability, sex, sexual orientation, or gender identity.            Thank you!     Thank you for choosing [facility]  for your care. Our goal is always to provide you with excellent care. Hearing back from our patients is one way we can continue to improve our services. Please take a few minutes to complete the written survey that you may receive in the mail after your visit with us. Thank you!             Your Updated Medication List - Protect others around you: Learn how to safely use, store and throw away your medicines at www.disposemymeds.org.          This list is accurate as of 5/5/18 10:43 AM.  Always use your most recent med list.                   Brand Name Dispense Instructions for use Diagnosis    aspirin 325 MG tablet     180 tablet    Take 1 tablet (325 mg) by mouth daily    TIA (transient ischemic attack)       atorvastatin 20 MG tablet    LIPITOR    90 tablet    Take 1 tablet (20 mg) by mouth daily    Hypercholesteremia       DAILY MULTIVITAMIN PO      1 tab daily        IBUPROFEN PO      Take 1 Dose by mouth as needed for moderate pain        levothyroxine 100 MCG tablet    SYNTHROID    90 tablet    Take 1 tablet (100 mcg) by mouth daily    Other specified hypothyroidism       lisinopril 20 MG tablet    PRINIVIL/ZESTRIL    90 tablet    Take 1 tablet (20 mg) by mouth daily    Essential hypertension with goal blood pressure less than 140/90       magnesium 250 MG tablet      Take 1 tablet by mouth daily        metoprolol succinate 50 MG 24 hr tablet    TOPROL-XL    135 tablet    TAKE ONE AND ONE-HALF TABLET BY MOUTH EVERY DAY    Essential hypertension with goal blood pressure less than 140/90       OMEGA-3 FISH OIL PO      Take 2 g by mouth daily        valACYclovir 500 MG tablet    VALTREX    12 tablet    TAKE ONE TABLET BY  MOUTH TWICE A DAY FOR THREE DAYS. (REPEAT AS NEEDED)    Recurrent herpes simplex       VITAMIN D (CHOLECALCIFEROL) PO      Take 4,000 Units by mouth daily

## 2018-05-07 LAB — INTERPRETATION ECG - MUSE: NORMAL

## 2018-05-14 ENCOUNTER — HOSPITAL ENCOUNTER (OUTPATIENT)
Dept: CARDIOLOGY | Facility: CLINIC | Age: 63
Discharge: HOME | End: 2018-05-14
Attending: INTERNAL MEDICINE | Admitting: INTERNAL MEDICINE
Payer: COMMERCIAL

## 2018-05-14 VITALS
SYSTOLIC BLOOD PRESSURE: 118 MMHG | DIASTOLIC BLOOD PRESSURE: 81 MMHG | OXYGEN SATURATION: 95 % | RESPIRATION RATE: 14 BRPM | HEART RATE: 70 BPM

## 2018-05-14 DIAGNOSIS — Q21.12 PFO (PATENT FORAMEN OVALE): ICD-10-CM

## 2018-05-14 PROCEDURE — 99152 MOD SED SAME PHYS/QHP 5/>YRS: CPT

## 2018-05-14 PROCEDURE — 93312 ECHO TRANSESOPHAGEAL: CPT | Mod: 26 | Performed by: INTERNAL MEDICINE

## 2018-05-14 PROCEDURE — 93325 DOPPLER ECHO COLOR FLOW MAPG: CPT | Mod: 26 | Performed by: INTERNAL MEDICINE

## 2018-05-14 PROCEDURE — 93312 ECHO TRANSESOPHAGEAL: CPT

## 2018-05-14 PROCEDURE — 99153 MOD SED SAME PHYS/QHP EA: CPT

## 2018-05-14 PROCEDURE — 25000125 ZZHC RX 250: Performed by: INTERNAL MEDICINE

## 2018-05-14 PROCEDURE — 93320 DOPPLER ECHO COMPLETE: CPT | Mod: 26 | Performed by: INTERNAL MEDICINE

## 2018-05-14 PROCEDURE — 25000128 H RX IP 250 OP 636: Performed by: INTERNAL MEDICINE

## 2018-05-14 RX ORDER — FLUMAZENIL 0.1 MG/ML
0.2 INJECTION, SOLUTION INTRAVENOUS
Status: DISCONTINUED | OUTPATIENT
Start: 2018-05-14 | End: 2018-05-15 | Stop reason: HOSPADM

## 2018-05-14 RX ORDER — FENTANYL CITRATE 50 UG/ML
50-100 INJECTION, SOLUTION INTRAMUSCULAR; INTRAVENOUS
Status: DISCONTINUED | OUTPATIENT
Start: 2018-05-14 | End: 2018-05-15 | Stop reason: HOSPADM

## 2018-05-14 RX ORDER — NALOXONE HYDROCHLORIDE 0.4 MG/ML
.1-.4 INJECTION, SOLUTION INTRAMUSCULAR; INTRAVENOUS; SUBCUTANEOUS
Status: DISCONTINUED | OUTPATIENT
Start: 2018-05-14 | End: 2018-05-15 | Stop reason: HOSPADM

## 2018-05-14 RX ORDER — FENTANYL CITRATE 50 UG/ML
25-50 INJECTION, SOLUTION INTRAMUSCULAR; INTRAVENOUS
Status: DISCONTINUED | OUTPATIENT
Start: 2018-05-14 | End: 2018-05-15 | Stop reason: HOSPADM

## 2018-05-14 RX ORDER — SODIUM CHLORIDE 9 MG/ML
INJECTION, SOLUTION INTRAVENOUS CONTINUOUS PRN
Status: DISCONTINUED | OUTPATIENT
Start: 2018-05-14 | End: 2018-05-15 | Stop reason: HOSPADM

## 2018-05-14 RX ORDER — LIDOCAINE 40 MG/G
CREAM TOPICAL
Status: DISCONTINUED | OUTPATIENT
Start: 2018-05-14 | End: 2018-05-15 | Stop reason: HOSPADM

## 2018-05-14 RX ADMIN — SODIUM CHLORIDE 300 ML: 9 INJECTION, SOLUTION INTRAVENOUS at 09:27

## 2018-05-14 RX ADMIN — MIDAZOLAM HYDROCHLORIDE 3 MG: 1 INJECTION, SOLUTION INTRAMUSCULAR; INTRAVENOUS at 08:56

## 2018-05-14 RX ADMIN — LIDOCAINE HYDROCHLORIDE 30 ML: 20 SOLUTION ORAL; TOPICAL at 08:34

## 2018-05-14 RX ADMIN — FENTANYL CITRATE 75 MCG: 50 INJECTION, SOLUTION INTRAMUSCULAR; INTRAVENOUS at 08:50

## 2018-05-14 RX ADMIN — TOPICAL ANESTHETIC 0.5 ML: 200 SPRAY DENTAL; PERIODONTAL at 08:33

## 2018-05-14 NOTE — PROGRESS NOTES
Pt arrived in ECHO department  for scheduled DANTE.   Procedure explained, questions answered and consent signed. Discharge instructions discussed with patient.  Pt's throat sprayed at 0833, therefore pt will not be able to eat or drink until 2 hours after at 10:30 am .  Informed pt of this time and encouraged to start with warm fluids and soft foods.    Pt tolerated procedure well, and was given a total of 75 mcg IV fentanyl and 3 mg IV versed for conscious sedation.  Pt denied throat or chest pain after DANTE complete.   DANTE probe 52 used for procedure.  Pt denied C.P or throat pain after procedure and was D/C home after awake and VSS.  Escorted out to front lobby by staff in w/c to meet pt's ride home.

## 2018-05-15 ENCOUNTER — RADIANT APPOINTMENT (OUTPATIENT)
Dept: ULTRASOUND IMAGING | Facility: CLINIC | Age: 63
End: 2018-05-15
Attending: FAMILY MEDICINE
Payer: COMMERCIAL

## 2018-05-15 DIAGNOSIS — G45.9 TRANSIENT CEREBRAL ISCHEMIA, UNSPECIFIED TYPE: ICD-10-CM

## 2018-05-16 ENCOUNTER — CARE COORDINATION (OUTPATIENT)
Dept: CARDIOLOGY | Facility: CLINIC | Age: 63
End: 2018-05-16

## 2018-05-16 DIAGNOSIS — Q21.12 PFO (PATENT FORAMEN OVALE): Primary | ICD-10-CM

## 2018-05-16 NOTE — PROGRESS NOTES
Dear Sofi,   Your test results are all back -   Both sides on the carotid ultrasound look normal.  Let us know if you have any questions.  -Noemy Olivera, DO

## 2018-05-22 DIAGNOSIS — I10 ESSENTIAL HYPERTENSION WITH GOAL BLOOD PRESSURE LESS THAN 140/90: ICD-10-CM

## 2018-05-22 RX ORDER — METOPROLOL SUCCINATE 50 MG/1
TABLET, EXTENDED RELEASE ORAL
Qty: 135 TABLET | Refills: 0 | Status: SHIPPED | OUTPATIENT
Start: 2018-05-22 | End: 2018-08-24

## 2018-05-22 RX ORDER — LISINOPRIL 20 MG/1
TABLET ORAL
Qty: 90 TABLET | Refills: 0 | Status: SHIPPED | OUTPATIENT
Start: 2018-05-22 | End: 2018-08-24

## 2018-05-22 NOTE — TELEPHONE ENCOUNTER
"Prescription approved per [facility] Refill Protocol.  Alisa KEITH RN    Requested Prescriptions   Pending Prescriptions Disp Refills     lisinopril (PRINIVIL/ZESTRIL) 20 MG tablet [Pharmacy Med Name: LISINOPRIL 20MG TABS] 90 tablet 1     Sig: TAKE ONE TABLET BY MOUTH EVERY DAY    ACE Inhibitors (Including Combos) Protocol Passed    5/22/2018  6:45 AM       Passed - Blood pressure under 140/90 in past 12 months    BP Readings from Last 3 Encounters:   05/14/18 118/81   05/05/18 127/78   05/01/18 148/90                Passed - Recent (12 mo) or future (30 days) visit within the authorizing provider's specialty    Patient had office visit in the last 12 months or has a visit in the next 30 days with authorizing provider or within the authorizing provider's specialty.  See \"Patient Info\" tab in inbasket, or \"Choose Columns\" in Meds & Orders section of the refill encounter.           Passed - Patient is age 18 or older       Passed - No active pregnancy on record       Passed - Normal serum creatinine on file in past 12 months    Recent Labs   Lab Test  10/17/17   0919   CR  0.70            Passed - Normal serum potassium on file in past 12 months    Recent Labs   Lab Test  10/17/17   0919   POTASSIUM  4.2            Passed - No positive pregnancy test in past 12 months        metoprolol succinate (TOPROL-XL) 50 MG 24 hr tablet [Pharmacy Med Name: METOPROLOL SUCCINATE ER 50MG TB24] 135 tablet 1     Sig: TAKE ONE AND ONE-HALF TABLET BY MOUTH EVERY DAY    Beta-Blockers Protocol Passed    5/22/2018  6:45 AM       Passed - Blood pressure under 140/90 in past 12 months    BP Readings from Last 3 Encounters:   05/14/18 118/81   05/05/18 127/78   05/01/18 148/90                Passed - Patient is age 6 or older       Passed - Recent (12 mo) or future (30 days) visit within the authorizing provider's specialty    Patient had office visit in the last 12 months or has a visit in the next 30 days with authorizing provider or within the " "authorizing provider's specialty.  See \"Patient Info\" tab in inbasket, or \"Choose Columns\" in Meds & Orders section of the refill encounter.                "

## 2018-07-16 PROBLEM — Q24.9 CONGENITAL ANOMALIES OF THE HEART: Status: ACTIVE | Noted: 2018-07-16

## 2018-07-16 PROBLEM — R00.2 PALPITATIONS: Status: ACTIVE | Noted: 2018-07-16

## 2018-07-20 ENCOUNTER — OFFICE VISIT (OUTPATIENT)
Dept: CARDIOLOGY | Facility: CLINIC | Age: 63
End: 2018-07-20
Attending: INTERNAL MEDICINE
Payer: COMMERCIAL

## 2018-07-20 VITALS
SYSTOLIC BLOOD PRESSURE: 128 MMHG | OXYGEN SATURATION: 95 % | DIASTOLIC BLOOD PRESSURE: 86 MMHG | BODY MASS INDEX: 27.12 KG/M2 | HEART RATE: 71 BPM | WEIGHT: 183.1 LBS | HEIGHT: 69 IN

## 2018-07-20 DIAGNOSIS — Q21.12 PFO (PATENT FORAMEN OVALE): ICD-10-CM

## 2018-07-20 PROCEDURE — G0463 HOSPITAL OUTPT CLINIC VISIT: HCPCS | Mod: 25,ZF

## 2018-07-20 PROCEDURE — 99213 OFFICE O/P EST LOW 20 MIN: CPT | Mod: GC | Performed by: INTERNAL MEDICINE

## 2018-07-20 ASSESSMENT — PAIN SCALES - GENERAL: PAINLEVEL: NO PAIN (0)

## 2018-07-20 NOTE — LETTER
7/20/2018      RE: Sofi Degroot  [address]  [facility] [address]       Dear Colleague,    Thank you for the opportunity to participate in the care of your patient, Sofi Degroot, at the [facility]. Please see a copy of my visit note below.           Cardiology Clinic Note:    HPI:    Dear referring physicians and associated providers,    We had the pleasure of seeing Jeffery Degroot in Cardiology today on 7/20/2018. As you know, Jeffery Degroot is a 63 year old female with several medical problems including HTN and prior TIA, asymptomatic retinal artery occlusion, and recently diagnosed PFO. She was last seen in Cardiology clinic on 5/5/2018 with Dr. Andrade regarding her PFO.    Since then, Jeffery Degroot has been doing well. She specifically denies any chest pain with exertion or at rest, abnormal or worsening SOB, abd distention, early satiety, or N/V/D. No presyncope, syncope, dizziness or lightheadedness. She denies any symptoms of orthopnea/PND, or abnormal lower extremity swelling.     Her story begins in 2015 when she developed left arm and left face weakness was ultimately diagnosed with a PFO.  Evaluation at that time included a Holter monitor which was negative for atrial fibrillation, as well as an echocardiogram that showed a small PFO.  She was prescribed aspirin daily which she has been taking.  She then was seen by ophthalmology in 2018 at which time she was diagnosed with an asymptomatic retinal artery occlusion.    She is nondiabetic, has never smoked, though does have hypertension.  She was seen by Dr. Andrade regarding her PFO (referred by PCP Dr. Olivera 5/1/18).    Past Medical History:   Past Medical History:   Diagnosis Date     Cervical high risk HPV (human papillomavirus) test positive 09/16/2016, 10/11/17    (not 16 or 18). 10/11/17: LSIL pap with + HR HPV (not 16 or 18) result.     Low risk HPV  infection 2009, 2010    NIL pap, + HPV 53     Papanicolaou smear of cervix with low grade squamous intraepithelial lesion (LGSIL) 10/11/2017    10/11/17: LSIL pap with + HR HPV (not 16 or 18) result.     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 1998    Tx'd w/ Interferon x 1 year-virus free at 5 years, blood transfusion  related       Past Surgical History:   Past Surgical History:   Procedure Laterality Date     C LIGATE FALLOPIAN TUBE  1986     SURGICAL HISTORY OF -  Right 2008    Right 1st MTPJ, bone spur resection       Medications (outpatient):  Current Outpatient Prescriptions   Medication Sig Dispense Refill     aspirin 325 MG tablet Take 1 tablet (325 mg) by mouth daily 180 tablet      atorvastatin (LIPITOR) 20 MG tablet Take 1 tablet (20 mg) by mouth daily 90 tablet 3     DAILY MULTIVITAMIN PO 1 tab daily       IBUPROFEN PO Take 1 Dose by mouth as needed for moderate pain       levothyroxine (SYNTHROID) 100 MCG tablet Take 1 tablet (100 mcg) by mouth daily 90 tablet 3     lisinopril (PRINIVIL/ZESTRIL) 20 MG tablet TAKE ONE TABLET BY MOUTH EVERY DAY 90 tablet 0     magnesium 250 MG tablet Take 1 tablet by mouth daily       metoprolol succinate (TOPROL-XL) 50 MG 24 hr tablet TAKE ONE AND ONE-HALF TABLET BY MOUTH EVERY  tablet 0     Omega-3 Fatty Acids (OMEGA-3 FISH OIL PO) Take 2 g by mouth daily        valACYclovir (VALTREX) 500 MG tablet TAKE ONE TABLET BY MOUTH TWICE A DAY FOR THREE DAYS. (REPEAT AS NEEDED) 12 tablet 5     VITAMIN D, CHOLECALCIFEROL, PO Take 4,000 Units by mouth daily        [DISCONTINUED] lisinopril-hydrochlorothiazide (PRINZIDE,ZESTORETIC) 20-12.5 MG per tablet Take 1 tablet by mouth daily 90 tablet 3       Allergy:  Allergies   Allergen Reactions     Latex Swelling     Dental visit--burning sensation in lips and throat, lips felt puffy, eye watery from latex gloves     Pineapple Other (See Comments)       Social History:   Social  History     Social History     Marital status: Single     Spouse name: N/A     Number of children: 1     Years of education: BSN     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 No     Social History Narrative    Social Documentation:        Balanced Diet: YES    Calcium intake: 2-3 per day    Caffeine: 3 per day    Exercise:  type of activity work is physical and walking;  5 times per week    Sunscreen: Yes    Seatbelts:  Yes    Self Breast Exam:  Yes    Self Testicular Exam: na    Physical/Emotional/Sexual Abuse: No    Do you feel safe in your environment? Yes        Cholesterol screen up to date:     CHOL      199   2009    HDL       82   2009    LDL      100   2009    TRIG       83   2009    CHOLHDLRATIO      2.4   2009    Eye Exam up to date: Yes    Dental Exam up to date: Yes    Pap smear up to date: do today. PAP      NIL   2009    Mammogram up to date:  Due in September    Dexa Scan up to date: has not had one     Colonoscopy up to date: Yes,     Immunizations up to date: Yes    Glucose screen if over 40:  cna do today                                                             History   Smoking Status     Former Smoker   Smokeless Tobacco     Never Used     Comment: quit 30 years ago       Family History:  Family History   Problem Relation Age of Onset     HEART DISEASE Maternal Grandfather      Breast Cancer Maternal Grandmother      Dx'd age 70     Arthritis Father      Lipids Mother      On meds     Cancer Mother      BCC     HEART DISEASE Brother       of dilated of cardiomyopathy at age  54     Alcohol/Drug Sister      Alcohol/Drug Brother      Depression Sister        ROS:  ROS:   Constitutional: No fever, chills, or sweats. Weight loss/gain as per HPI  ENT: No visual disturbance, ear ache, epistaxis, sore throat  Allergies/Immunologic: Negative.   Respiratory: No cough, hemoptysia  Cardiovascular: As per HPI  GI: No nausea, vomiting, hematemesis, melena, or hematochezia  : No urinary frequency, dysuria, or hematuria  Integument: Negative  Psychiatric: Negative  Neuro: Negative  Endocrinology: Negative   Musculoskeletal: Negative    OBJECTIVE:  LMP 10/27/2004  Wt Readings from Last 2 Encounters:   05/05/18 80.3 kg (177 lb)   05/01/18 80.2 kg (176 lb 12.8 oz)       Physical Exam:  Gen: WD WN in NAD  HEENT: PERRL, EOMI  Neck: JVP not elevated at 45' elevation  Resp: CTAB without wheezes or rales  Cardiac: RRR nl s1, s2 with physiologic split, no s3, s4, M/G/R  Abd: S, NT, ND  Extrem: no CCE    Labs:  CBC:   Lab Results   Component Value Date    WBC 8.4 11/22/2016    RBC 4.67 02/16/2015    HGB 15.1 03/22/2017    HCT 44.3 02/16/2015    MCV 95 02/16/2015    MCH 31.7 02/16/2015    MCHC 33.4 02/16/2015    RDW 13.5 02/16/2015     02/16/2015       BMP:   Lab Results   Component Value Date     10/17/2017    POTASSIUM 4.2 10/17/2017    CHLORIDE 107 10/17/2017    CO2 23 10/17/2017    ANIONGAP 10 10/17/2017    GLC 99 10/17/2017    BUN 9 10/17/2017    CR 0.70 10/17/2017    GFRESTIMATED 84 10/17/2017    GFRESTBLACK >90 10/17/2017    ALICIA 9.6 10/17/2017        INR RESULTS:  Lab Results   Component Value Date    INR 1.1 02/15/2015       Prior Cardiology studies:  Bilat Carotid US 5/15/2018  Impression:     No hemodynamically significant stenosis in either carotid artery.     DANTE 5/14/2018  Normal left ventricular size.  The Ejection Fraction is estimated at 55-60%.  The right ventricle is normal size. Global right ventricular function is normal.  Left atrium, appendage, right atrium and appendage are  free of SEC or thrombus.  Valve structures free of mass or vegetation.  Mild aortic insufficiency. Mild TR.  There is evidence of a small tractlike PFO with minimal right-left-shunt on inspiration demonstrated by agitated saline Bubble study (Grade 1). Unable to perform Valsalva maneuver in setting of sedation. Complex PFO visualized given long apparent tunnel and exuberant/aneurysmal septum. Normal secondary septal thickness.  No concomitant ASD demonstrated by color flow.  Normal Sinuses of Valsalva and Proximal Ascending aorta.   No significant atheromatous disease.  No pericardial effusion.  Previous study not available for comparison.      ASSESSMENT & PLAN :    Pt Sofi Degroot is a 63 year old female with several medical problems including HTN and prior TIA, asymptomatic retinal artery occlusion, and recently diagnosed PFO.  Her history of TIA (etiology unknown), as well as now recently diagnosed retinal artery occlusion, support a clinical picture of occult embolic events.  Her DANTE from 5/14/18 was reviewed, and certainly the PFO that was demonstrated is likely implicated in these prior embolic events.      We discussed at length with her the risks, benefits, alternatives of PFO closure in her clinical presentation.  She wishes to proceed with PFO closure at this time.      PFO closure showed an early signal of superiority over medical therapy in decreasing the risk of stroke, and this has since been substantiated by the Ayer REDUCE trial, the CLOSE trial, and long-term follow-up of the RESPECT trial.  The anatomy of her PFO is not straightforward, and so the decision of which PFO closure device is to be used will likely be made at the time of the procedure.    # will schedule for PFO closure  # continue current cardiac regimen of lisinopril 20 mg daily, metoprolol succinate 50 mg daily, atorvastatin 20 mg daily, aspirin 325 mg daily    Thank you for allowing us to participate in the care of this patient.  Please do not hesitate to call with any questions or concerns.     Sincerely,    RIMA Lopez MD [facility]  Adult Congenital and Interventional Cardiology   [facility]  [phone]     Author:  Milad Carcamo MD  PGY6 Cardiology  Pager: [phone]    Please do not hesitate to contact me if you have any questions/concerns.     Sincerely,     Xiomara Lopez MD

## 2018-07-20 NOTE — PATIENT INSTRUCTIONS
"You were seen today in the Adult Congenital and Cardiovascular Genetics Clinic at the [facility].    Cardiology Providers you saw during your visit:  Dr Xiao Lopez    Diagnosis:  Evaluation for PFO closure    Results:  No testing at this time    Recommendations:    1.  Continue to eat a heart healthy, low salt diet.  2.  Continue to get 20-30 minutes of aerobic activity, 4-5 days per week.  Examples of aerobic activity include walking, running, swimming, cycling, etc.  3.  Continue to observe good oral hygiene, with regular dental visits.  4.  We will call you to get scheduled for your PFO closure.       Vitals:    07/20/18 0859   BP: 128/86   BP Location: Left arm   Patient Position: Chair   Cuff Size: Adult Regular   Pulse: 71   SpO2: 95%   Weight: 83.1 kg (183 lb 1.6 oz)   Height: 1.753 m (5' 9\")       SBE prophylaxis:   Yes____  No__x__    Lifelong Bacterial Endocarditis Prophylaxis:  YES____  NO____    If YES is checked, follow the recommendations outlined below:   1. Take antibiotic(s) prior to recommended dental procedures and procedures on the respiratory tract or with infected skin, muscle or bones. SBE prophylaxis is not needed for routine GI and  procedures (ie. Colonoscopy or vaginal delivery)   2. Observe good oral hygiene daily, as advised by your dentist. Get regular professional dental care.   3. Keep cuts clean.   4. Infections should be treated promptly.   5. Symptoms of Infective Endocarditis could include: fever lasting more than 4-5 days or a recurrent fever that initially resolves but returns within 1-2 days)     Exercise restrictions:   Yes____  No_x___         If yes, list restrictions:  Must be allowed to rest if fatigued or SOB      Work restrictions:  Yes____  No__x__         If yes, list restrictions:    FASTING CHOLESTEROL was checked in the last 5 years YES_x__  NO___  2017  Continue to eat a heart healthy, low salt diet.         ____ Fasting lipid panel order today     "     ____ No changes in medications          ____ I recommend the following changes in your cholesterol medications.:          ____ Please follow up for cholesterol screening at your primary care physician      Follow-up:  Follow up to be determined based on PFO closure      For after hours urgent needs, call [phone] and ask to speak to the Adult Congenital Physician on call.  Mention Job Code 0401.    For emergencies call 041.    For any scheduling needs, please call [facility] , at [phone]  Thank you for your visit today!  If you have questions or concerns about today's visit, please call me.    Nasir Alvarez RN, BSN  Cardiology Care Coordinator  [facility]  [phone]    [address] [facility]  Mail Code [id number]  [place] [address]

## 2018-07-20 NOTE — NURSING NOTE
Med Reconcile: Reviewed and verified all current medications with the patient. The updated medication list was printed and given to the patient.    Return Appointment: Follow up to be determined based on PFO closure. Patient given instructions regarding scheduling next clinic visit. Patient demonstrated understanding of this information and agreed to call with further questions or concerns.    Patient stated she understood all health information given and agreed to call with further questions or concerns.    Nasir Alvarez RN  RN Care Coordinator  [facility]  [phone]

## 2018-07-20 NOTE — NURSING NOTE
Chief Complaint   Patient presents with     New Patient     63 year old female with history of PFO, hypertension, palpitations, and TIA presenting for evaluation     Vitals were taken and medications were reconciled.     Katie Cleary RMA  9:01 AM

## 2018-07-20 NOTE — PROGRESS NOTES
Cardiology Clinic Note:    HPI:    Dear referring physicians and associated providers,    We had the pleasure of seeing Jeffery Degroot in Cardiology today on 7/20/2018. As you know, Jeffery Degroot is a 63 year old female with several medical problems including HTN and prior TIA, asymptomatic retinal artery occlusion, and recently diagnosed PFO. She was last seen in Cardiology clinic on 5/5/2018 with Dr. Andrade regarding her PFO.    Since then, Jeffery Degroot has been doing well. She specifically denies any chest pain with exertion or at rest, abnormal or worsening SOB, abd distention, early satiety, or N/V/D. No presyncope, syncope, dizziness or lightheadedness. She denies any symptoms of orthopnea/PND, or abnormal lower extremity swelling.     Her story begins in 2015 when she developed left arm and left face weakness was ultimately diagnosed with a PFO.  Evaluation at that time included a Holter monitor which was negative for atrial fibrillation, as well as an echocardiogram that showed a small PFO.  She was prescribed aspirin daily which she has been taking.  She then was seen by ophthalmology in 2018 at which time she was diagnosed with an asymptomatic retinal artery occlusion.    She is nondiabetic, has never smoked, though does have hypertension.  She was seen by Dr. Andrade regarding her PFO (referred by PCP Dr. Olivera 5/1/18).    Past Medical History:   Past Medical History:   Diagnosis Date     Cervical high risk HPV (human papillomavirus) test positive 09/16/2016, 10/11/17    (not 16 or 18). 10/11/17: LSIL pap with + HR HPV (not 16 or 18) result.     Low risk HPV infection 2009, 2010    NIL pap, + HPV 53     Papanicolaou smear of cervix with low grade squamous intraepithelial lesion (LGSIL) 10/11/2017    10/11/17: LSIL pap with + HR HPV (not 16 or 18) result.     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 1998    Tx'd w/ Interferon x 1  year-virus free at 5 years, blood transfusion  related       Past Surgical History:   Past Surgical History:   Procedure Laterality Date     C LIGATE FALLOPIAN TUBE  1986     SURGICAL HISTORY OF -  Right 2008    Right 1st MTPJ, bone spur resection       Medications (outpatient):  Current Outpatient Prescriptions   Medication Sig Dispense Refill     aspirin 325 MG tablet Take 1 tablet (325 mg) by mouth daily 180 tablet      atorvastatin (LIPITOR) 20 MG tablet Take 1 tablet (20 mg) by mouth daily 90 tablet 3     DAILY MULTIVITAMIN PO 1 tab daily       IBUPROFEN PO Take 1 Dose by mouth as needed for moderate pain       levothyroxine (SYNTHROID) 100 MCG tablet Take 1 tablet (100 mcg) by mouth daily 90 tablet 3     lisinopril (PRINIVIL/ZESTRIL) 20 MG tablet TAKE ONE TABLET BY MOUTH EVERY DAY 90 tablet 0     magnesium 250 MG tablet Take 1 tablet by mouth daily       metoprolol succinate (TOPROL-XL) 50 MG 24 hr tablet TAKE ONE AND ONE-HALF TABLET BY MOUTH EVERY  tablet 0     Omega-3 Fatty Acids (OMEGA-3 FISH OIL PO) Take 2 g by mouth daily        valACYclovir (VALTREX) 500 MG tablet TAKE ONE TABLET BY MOUTH TWICE A DAY FOR THREE DAYS. (REPEAT AS NEEDED) 12 tablet 5     VITAMIN D, CHOLECALCIFEROL, PO Take 4,000 Units by mouth daily        [DISCONTINUED] lisinopril-hydrochlorothiazide (PRINZIDE,ZESTORETIC) 20-12.5 MG per tablet Take 1 tablet by mouth daily 90 tablet 3       Allergy:  Allergies   Allergen Reactions     Latex Swelling     Dental visit--burning sensation in lips and throat, lips felt puffy, eye watery from latex gloves     Pineapple Other (See Comments)       Social History:   Social History     Social History     Marital status: Single     Spouse name: N/A     Number of children: 1     Years of education: BSN     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 No     Social History Narrative    Social Documentation:        Balanced Diet: YES    Calcium intake: 2-3 per day    Caffeine: 3 per day    Exercise:  type of activity work is physical and walking;  5 times per week    Sunscreen: Yes    Seatbelts:  Yes    Self Breast Exam:  Yes    Self Testicular Exam: na    Physical/Emotional/Sexual Abuse: No    Do you feel safe in your environment? Yes        Cholesterol screen up to date:     CHOL      199   2009    HDL       82   2009    LDL      100   2009    TRIG       83   2009    CHOLHDLRATIO      2.4   2009    Eye Exam up to date: Yes    Dental Exam up to date: Yes    Pap smear up to date: do today. PAP      NIL   2009    Mammogram up to date:  Due in September    Dexa Scan up to date: has not had one     Colonoscopy up to date: Yes,     Immunizations up to date: Yes    Glucose screen if over 40:  cna do today                                                             History   Smoking Status     Former Smoker   Smokeless Tobacco     Never Used     Comment: quit 30 years ago       Family History:  Family History   Problem Relation Age of Onset     HEART DISEASE Maternal Grandfather      Breast Cancer Maternal Grandmother      Dx'd age 70     Arthritis Father      Lipids Mother      On meds     Cancer Mother      BCC     HEART DISEASE Brother       of dilated of cardiomyopathy at age 54     Alcohol/Drug Sister      Alcohol/Drug Brother      Depression Sister        ROS:  ROS:   Constitutional: No fever, chills, or sweats. Weight loss/gain as per HPI  ENT: No visual disturbance, ear ache, epistaxis, sore throat  Allergies/Immunologic: Negative.   Respiratory: No cough, hemoptysia  Cardiovascular: As per  HPI  GI: No nausea, vomiting, hematemesis, melena, or hematochezia  : No urinary frequency, dysuria, or hematuria  Integument: Negative  Psychiatric: Negative  Neuro: Negative  Endocrinology: Negative   Musculoskeletal: Negative    OBJECTIVE:  LMP 10/27/2004  Wt Readings from Last 2 Encounters:   05/05/18 80.3 kg (177 lb)   05/01/18 80.2 kg (176 lb 12.8 oz)       Physical Exam:  Gen: WD WN in NAD  HEENT: PERRL, EOMI  Neck: JVP not elevated at 45' elevation  Resp: CTAB without wheezes or rales  Cardiac: RRR nl s1, s2 with physiologic split, no s3, s4, M/G/R  Abd: S, NT, ND  Extrem: no CCE    Labs:  CBC:   Lab Results   Component Value Date    WBC 8.4 11/22/2016    RBC 4.67 02/16/2015    HGB 15.1 03/22/2017    HCT 44.3 02/16/2015    MCV 95 02/16/2015    MCH 31.7 02/16/2015    MCHC 33.4 02/16/2015    RDW 13.5 02/16/2015     02/16/2015       BMP:   Lab Results   Component Value Date     10/17/2017    POTASSIUM 4.2 10/17/2017    CHLORIDE 107 10/17/2017    CO2 23 10/17/2017    ANIONGAP 10 10/17/2017    GLC 99 10/17/2017    BUN 9 10/17/2017    CR 0.70 10/17/2017    GFRESTIMATED 84 10/17/2017    GFRESTBLACK >90 10/17/2017    ALICIA 9.6 10/17/2017        INR RESULTS:  Lab Results   Component Value Date    INR 1.1 02/15/2015       Prior Cardiology studies:  Bilat Carotid US 5/15/2018  Impression:     No hemodynamically significant stenosis in either carotid artery.     DANTE 5/14/2018  Normal left ventricular size.  The Ejection Fraction is estimated at 55-60%.  The right ventricle is normal size. Global right ventricular function is normal.  Left atrium, appendage, right atrium and appendage are free of SEC or thrombus.  Valve structures free of mass or vegetation.  Mild aortic insufficiency. Mild TR.  There is evidence of a small tractlike PFO with minimal right-left-shunt on inspiration demonstrated by agitated saline Bubble study (Grade 1). Unable to perform Valsalva maneuver in setting of sedation. Complex PFO  visualized given long apparent tunnel and exuberant/aneurysmal septum. Normal secondary septal thickness.  No concomitant ASD demonstrated by color flow.  Normal Sinuses of Valsalva and Proximal Ascending aorta.   No significant atheromatous disease.  No pericardial effusion.  Previous study not available for comparison.      ASSESSMENT & PLAN :    Pt Sofi Degroot is a 63 year old female with several medical problems including HTN and prior TIA, asymptomatic retinal artery occlusion, and recently diagnosed PFO.  Her history of TIA (etiology unknown), as well as now recently diagnosed retinal artery occlusion, support a clinical picture of occult embolic events.  Her DANTE from 5/14/18 was reviewed, and certainly the PFO that was demonstrated is likely implicated in these prior embolic events.      We discussed at length with her the risks, benefits, alternatives of PFO closure in her clinical presentation.  She wishes to proceed with PFO closure at this time.      PFO closure showed an early signal of superiority over medical therapy in decreasing the risk of stroke, and this has since been substantiated by the [place] REDUCE trial, the CLOSE trial, and long-term follow-up of the RESPECT trial.  The anatomy of her PFO is not straightforward, and so the decision of which PFO closure device is to be used will likely be made at the time of the procedure.    # will schedule for PFO closure  # continue current cardiac regimen of lisinopril 20 mg daily, metoprolol succinate 50 mg daily, atorvastatin 20 mg daily, aspirin 325 mg daily    Thank you for allowing us to participate in the care of this patient. Please do not hesitate to call with any questions or concerns.     Sincerely,    RIMA Lopez MD [facility]  Adult Congenital and Interventional Cardiology   [facility]  [phone]     Author:  Milad Carcamo MD  PGY6 Cardiology  Pager: [phone]

## 2018-07-20 NOTE — MR AVS SNAPSHOT
"              After Visit Summary   7/20/2018    Sofi Degroot    MRN: [id number]           Patient Information     Date Of Birth          1955        Visit Information        Provider Department      7/20/2018 9:00 AM Xiomara Lopez MD M [facility]        Today's Diagnoses     PFO (patent foramen ovale)          Care Instructions    You were seen today in the Adult Congenital and Cardiovascular Genetics Clinic at the [facility].    Cardiology Providers you saw during your visit:  Dr Xiao Lopez    Diagnosis:  Evaluation for PFO closure    Results:  No testing at this time    Recommendations:    1.  Continue to eat a heart healthy, low salt diet.  2.  Continue to get 20-30 minutes of aerobic activity, 4-5 days per week.  Examples of aerobic activity include walking, running, swimming, cycling, etc.  3.  Continue to observe good oral hygiene, with regular dental visits.  4.  We will call you to get scheduled for your PFO closure.       Vitals:    07/20/18 0859   BP: 128/86   BP Location: Left arm   Patient Position: Chair   Cuff Size: Adult Regular   Pulse: 71   SpO2: 95%   Weight: 83.1 kg (183 lb 1.6 oz)   Height: 1.753 m (5' 9\")       SBE prophylaxis:   Yes____  No__x__    Lifelong Bacterial Endocarditis Prophylaxis:  YES____  NO____    If YES is checked, follow the recommendations outlined below:   1. Take antibiotic(s) prior to recommended dental procedures and procedures on the respiratory tract or with infected skin, muscle or bones. SBE prophylaxis is not needed for routine GI and  procedures (ie. Colonoscopy or vaginal delivery)   2. Observe good oral hygiene daily, as advised by your dentist. Get regular professional dental care.   3. Keep cuts clean.   4. Infections should be treated promptly.   5. Symptoms of Infective Endocarditis could include: fever lasting more than 4-5 days or a recurrent fever that initially resolves but returns within 1-2 days)     Exercise " restrictions:   Yes____  No_x___         If yes, list restrictions:  Must be allowed to rest if fatigued or SOB      Work restrictions:  Yes____  No__x__         If yes, list restrictions:    FASTING CHOLESTEROL was checked in the last 5 years YES_x__  NO___  2017  Continue to eat a heart healthy, low salt diet.         ____ Fasting lipid panel order today         ____ No changes in medications          ____ I recommend the following changes in your cholesterol medications.:          ____ Please follow up for cholesterol screening at your primary care physician      Follow-up:  Follow up to be determined based on PFO closure      For after hours urgent needs, call [phone] and ask to speak to the Adult Congenital Physician on call.  Mention Job Code 0401.    For emergencies call 911.    For any scheduling needs, please call [facility] , at [phone]  Thank you for your visit today!  If you have questions or concerns about today's visit, please call me.    Nasir Alvarez RN, BSN  Cardiology Care Coordinator  [facility]  [phone]    [address] [place] [address]  Mail Code [id number]  [place] [address]            Follow-ups after your visit        Who to contact     If you have questions or need follow up information about today's clinic visit or your schedule please contact [facility] directly at [phone].  Normal or non-critical lab and imaging results will be communicated to you by MyChart, letter or phone within 4 business days after the clinic has received the results. If you do not hear from us within 7 days, please contact the clinic through MyChart or phone. If you have a critical or abnormal lab result, we will notify you by phone as soon as possible.  Submit refill requests through [facility]) or call your pharmacy and they will forward the refill request to us. Please allow 3 business days for your refill to be completed.          Additional  "Information About Your Visit        MyChart Information     [email or web address] gives you secure access to your electronic health record. If you see a primary care provider, you can also send messages to your care team and make appointments. If you have questions, please call your primary care clinic.  If you do not have a primary care provider, please call [phone] and they will assist you.        Care EveryWhere ID     This is your Care EveryWhere ID. This could be used by other organizations to access your [place] medical records  [id number]        Your Vitals Were     Pulse Height Last Period Pulse Oximetry BMI (Body Mass Index)       71 1.753 m (5' 9\") 10/27/2004 95% 27.04 kg/m2        Blood Pressure from Last 3 Encounters:   07/20/18 128/86   05/14/18 118/81   05/05/18 127/78    Weight from Last 3 Encounters:   07/20/18 83.1 kg (183 lb 1.6 oz)   05/05/18 80.3 kg (177 lb)   05/01/18 80.2 kg (176 lb 12.8 oz)              We Performed the Following     Follow-Up with Congenital Heart Clinic        Primary Care Provider Office Phone # Fax #    Noemy Olivera,  [phone] [address] [address]        Equal Access to Services     YESENIA HINOJOSA : Hadii aad ku hadasho Soomaali, waaxda luqadaha, qaybta kaalmada adeegyada, waxay idiin haymassieln jovon norwood . So [facility] [phone].    ATENCIÓN: Si habla español, tiene a parsons disposición servicios gratOsitoos de asistencia lingüística. Llame al [phone].    We comply with applicable federal civil rights laws and [place] laws. We do not discriminate on the basis of race, color, national origin, age, disability, sex, sexual orientation, or gender identity.            Thank you!     Thank you for choosing [facility]  for your care. Our goal is always to provide you with excellent care. Hearing back from our patients is one way we can continue to improve our services. Please take a few minutes to complete the written " survey that you may receive in the mail after your visit with us. Thank you!             Your Updated Medication List - Protect others around you: Learn how to safely use, store and throw away your medicines at www.disposemymeds.org.          This list is accurate as of 7/20/18  9:37 AM.  Always use your most recent med list.                   Brand Name Dispense Instructions for use Diagnosis    aspirin 325 MG tablet     180 tablet    Take 1 tablet (325 mg) by mouth daily    TIA (transient ischemic attack)       atorvastatin 20 MG tablet    LIPITOR    90 tablet    Take 1 tablet (20 mg) by mouth daily    Hypercholesteremia       DAILY MULTIVITAMIN PO      1 tab daily        IBUPROFEN PO      Take 1 Dose by mouth as needed for moderate pain        levothyroxine 100 MCG tablet    SYNTHROID    90 tablet    Take 1 tablet (100 mcg) by mouth daily    Other specified hypothyroidism       lisinopril 20 MG tablet    PRINIVIL/ZESTRIL    90 tablet    TAKE ONE TABLET BY MOUTH EVERY DAY    Essential hypertension with goal blood pressure less than 140/90       magnesium 250 MG tablet      Take 1 tablet by mouth daily        metoprolol succinate 50 MG 24 hr tablet    TOPROL-XL    135 tablet    TAKE ONE AND ONE-HALF TABLET BY MOUTH EVERY DAY    Essential hypertension with goal blood pressure less than 140/90       OMEGA-3 FISH OIL PO      Take 2 g by mouth daily        TYLENOL PO      Take 1,000 mg by mouth as needed for mild pain or fever        valACYclovir 500 MG tablet    VALTREX    12 tablet    TAKE ONE TABLET BY MOUTH TWICE A DAY FOR THREE DAYS. (REPEAT AS NEEDED)    Recurrent herpes simplex       VITAMIN D (CHOLECALCIFEROL) PO      Take 4,000 Units by mouth daily

## 2018-07-31 ENCOUNTER — TELEPHONE (OUTPATIENT)
Dept: CARDIOLOGY | Facility: CLINIC | Age: 63
End: 2018-07-31

## 2018-07-31 NOTE — TELEPHONE ENCOUNTER
Patient called to F/U on appointment with March on 07/20. Mentioned that she wanted to schedule the PFO closure procedure that March had recommended.

## 2018-07-31 NOTE — TELEPHONE ENCOUNTER
Patient called with date of October 12th for her PFO closure.  Sofi states that she will request this date off of work.  Informed patient that more detailed information will be provided once date approaches.  Sofi states that she understands information provided and will call with further questions or concerns.    Nasir Alvarez, RN  RN Care Coordinator  [facility]  [phone]

## 2018-08-24 DIAGNOSIS — I10 ESSENTIAL HYPERTENSION WITH GOAL BLOOD PRESSURE LESS THAN 140/90: ICD-10-CM

## 2018-08-24 RX ORDER — LISINOPRIL 20 MG/1
TABLET ORAL
Qty: 90 TABLET | Refills: 1 | Status: SHIPPED | OUTPATIENT
Start: 2018-08-24 | End: 2018-10-17

## 2018-08-24 RX ORDER — METOPROLOL SUCCINATE 50 MG/1
TABLET, EXTENDED RELEASE ORAL
Qty: 135 TABLET | Refills: 1 | Status: SHIPPED | OUTPATIENT
Start: 2018-08-24 | End: 2018-10-17

## 2018-08-24 NOTE — TELEPHONE ENCOUNTER
"PN,  Pt's BP not well controlled at 5/1/18 OV and has not f/u with us.  Did see cards 7/20/18 but BP not mentioned in assessment plan.  Not sure how many refills you would like.  Please authorize if appropriate.  Thanks,  Isabel Wing RN        OV notes 5/1/18:  . Hypertension goal BP (blood pressure) < 140/90  BP not well controlled but she is checking at home -   Seems higher in AM when she first takes meds   Would like to try to stagger time she takes her meds -   Metoprolol at night and the lisinopril in the AM -plan to recheck her blood pressure and if it stays in the 130/80 range or higher I would like to add Norvasc.      Requested Prescriptions   Pending Prescriptions Disp Refills     lisinopril (PRINIVIL/ZESTRIL) 20 MG tablet [Pharmacy Med Name: LISINOPRIL 20MG TABS] 90 tablet      Sig: TAKE ONE TABLET BY MOUTH EVERY DAY    ACE Inhibitors (Including Combos) Protocol Passed    8/24/2018  8:40 AM       Passed - Blood pressure under 140/90 in past 12 months    BP Readings from Last 3 Encounters:   07/20/18 128/86   05/14/18 118/81   05/05/18 127/78                Passed - Recent (12 mo) or future (30 days) visit within the authorizing provider's specialty    Patient had office visit in the last 12 months or has a visit in the next 30 days with authorizing provider or within the authorizing provider's specialty.  See \"Patient Info\" tab in inbasket, or \"Choose Columns\" in Meds & Orders section of the refill encounter.           Passed - Patient is age 18 or older       Passed - No active pregnancy on record       Passed - Normal serum creatinine on file in past 12 months    Recent Labs   Lab Test  10/17/17   0919   02/15/15   1929   CR  0.70   < >   --    CREAT   --    --   0.8    < > = values in this interval not displayed.            Passed - Normal serum potassium on file in past 12 months    Recent Labs   Lab Test  10/17/17   0919   POTASSIUM  4.2            Passed - No positive pregnancy test in past 12 " "months        metoprolol succinate (TOPROL-XL) 50 MG 24 hr tablet [Pharmacy Med Name: METOPROLOL SUCCINATE ER 50MG TB24] 135 tablet      Sig: TAKE ONE AND ONE-HALF TABLET BY MOUTH EVERY DAY    Beta-Blockers Protocol Passed    8/24/2018  8:40 AM       Passed - Blood pressure under 140/90 in past 12 months    BP Readings from Last 3 Encounters:   07/20/18 128/86   05/14/18 118/81   05/05/18 127/78                Passed - Patient is age 6 or older       Passed - Recent (12 mo) or future (30 days) visit within the authorizing provider's specialty    Patient had office visit in the last 12 months or has a visit in the next 30 days with authorizing provider or within the authorizing provider's specialty.  See \"Patient Info\" tab in inbasket, or \"Choose Columns\" in Meds & Orders section of the refill encounter.              "

## 2018-09-21 DIAGNOSIS — E03.8 OTHER SPECIFIED HYPOTHYROIDISM: ICD-10-CM

## 2018-09-21 RX ORDER — LEVOTHYROXINE SODIUM 100 UG/1
TABLET ORAL
Qty: 30 TABLET | Refills: 0 | Status: SHIPPED | OUTPATIENT
Start: 2018-09-21 | End: 2018-10-17

## 2018-09-21 NOTE — TELEPHONE ENCOUNTER
"Medication is being filled for 1 time refill only due to:  Patient needs to be seen because due for physical and labs Oct 2018.   Alisa KEITH RN    Requested Prescriptions   Pending Prescriptions Disp Refills     levothyroxine (SYNTHROID/LEVOTHROID) 100 MCG tablet [Pharmacy Med Name: LEVOTHYROXINE 100MCG TAB] 90 tablet 3     Sig: TAKE ONE TABLET BY MOUTH EVERY DAY    Thyroid Protocol Passed    9/21/2018  7:05 AM       Passed - Patient is 12 years or older       Passed - Recent (12 mo) or future (30 days) visit within the authorizing provider's specialty    Patient had office visit in the last 12 months or has a visit in the next 30 days with authorizing provider or within the authorizing provider's specialty.  See \"Patient Info\" tab in inbasket, or \"Choose Columns\" in Meds & Orders section of the refill encounter.           Passed - Normal TSH on file in past 12 months    Recent Labs   Lab Test  10/17/17   0919   TSH  1.11             Passed - No active pregnancy on record    If patient is pregnant or has had a positive pregnancy test, please check TSH.         Passed - No positive pregnancy test in past 12 months    If patient is pregnant or has had a positive pregnancy test, please check TSH.              "

## 2018-09-26 ENCOUNTER — TELEPHONE (OUTPATIENT)
Dept: CARDIOLOGY | Facility: CLINIC | Age: 63
End: 2018-09-26

## 2018-09-26 NOTE — TELEPHONE ENCOUNTER
Health Call Center    Phone Message    May a detailed message be left on voicemail: yes    Reason for Call: Patient states she has a procedure schedule for 10/12 and she has not received any information regarding her procedure and it doesn't show that she is scheduled for it. Please follow up with patient.     Action Taken: Message routed to:  [facility] ([facility] Cardiology

## 2018-09-27 ENCOUNTER — CARE COORDINATION (OUTPATIENT)
Dept: CARDIOLOGY | Facility: CLINIC | Age: 63
End: 2018-09-27

## 2018-09-27 DIAGNOSIS — Q21.12 PFO (PATENT FORAMEN OVALE): Primary | ICD-10-CM

## 2018-09-27 RX ORDER — SODIUM CHLORIDE 9 MG/ML
INJECTION, SOLUTION INTRAVENOUS CONTINUOUS
Status: CANCELLED | OUTPATIENT
Start: 2018-09-27

## 2018-09-27 RX ORDER — LIDOCAINE 40 MG/G
CREAM TOPICAL
Status: CANCELLED | OUTPATIENT
Start: 2018-09-27

## 2018-09-27 RX ORDER — ASPIRIN 81 MG/1
81 TABLET, CHEWABLE ORAL ONCE
Status: CANCELLED | OUTPATIENT
Start: 2018-09-27 | End: 2018-09-27

## 2018-09-27 NOTE — TELEPHONE ENCOUNTER
Called and left voicemail with call back number.  Sent patient My Chart message as well.    Nasir Alvarez, RN  RN Care Coordinator  [facility]  [phone]

## 2018-09-28 ENCOUNTER — TELEPHONE (OUTPATIENT)
Dept: CARDIOLOGY | Facility: CLINIC | Age: 63
End: 2018-09-28

## 2018-10-01 ENCOUNTER — TELEPHONE (OUTPATIENT)
Dept: CARDIOLOGY | Facility: CLINIC | Age: 63
End: 2018-10-01

## 2018-10-01 NOTE — TELEPHONE ENCOUNTER
Called patient back to discuss pre op H & P prior to PFO closure.  Left voicemail with detailed information and call back number to discuss.    Nasir Alvarez, RN  RN Care Coordinator  [facility]  [phone]

## 2018-10-01 NOTE — TELEPHONE ENCOUNTER
Patient called with questions surrounding pre-op physical for Nasir.    Would like to be called back at work phone ([phone]) will be there from 8:00 AM - 4:30 PM all week long.

## 2018-10-02 ENCOUNTER — OFFICE VISIT (OUTPATIENT)
Dept: FAMILY MEDICINE | Facility: CLINIC | Age: 63
End: 2018-10-02
Payer: COMMERCIAL

## 2018-10-02 VITALS
DIASTOLIC BLOOD PRESSURE: 79 MMHG | OXYGEN SATURATION: 97 % | HEART RATE: 77 BPM | HEIGHT: 69 IN | SYSTOLIC BLOOD PRESSURE: 136 MMHG | BODY MASS INDEX: 26.66 KG/M2 | WEIGHT: 180 LBS | TEMPERATURE: 97.6 F

## 2018-10-02 DIAGNOSIS — E03.8 OTHER SPECIFIED HYPOTHYROIDISM: ICD-10-CM

## 2018-10-02 DIAGNOSIS — I10 HYPERTENSION GOAL BP (BLOOD PRESSURE) < 140/90: ICD-10-CM

## 2018-10-02 DIAGNOSIS — Q21.12 PFO (PATENT FORAMEN OVALE): Chronic | ICD-10-CM

## 2018-10-02 DIAGNOSIS — Z01.818 PREOP GENERAL PHYSICAL EXAM: Primary | ICD-10-CM

## 2018-10-02 PROCEDURE — 99214 OFFICE O/P EST MOD 30 MIN: CPT | Performed by: PHYSICIAN ASSISTANT

## 2018-10-02 NOTE — MR AVS SNAPSHOT
After Visit Summary   10/2/2018    Sofi Degroot    MRN: [id number]           Patient Information     Date Of Birth          1955        Visit Information        Provider Department      10/2/2018 6:40 PM Dereck Guerra PA-C [facility]        Today's Diagnoses     Preop general physical exam    -  1    PFO (patent foramen ovale)        Hypertension goal BP (blood pressure) < 140/90        Other specified hypothyroidism          Care Instructions      Before Your Surgery      Call your surgeon if there is any change in your health. This includes signs of a cold or flu (such as a sore throat, runny nose, cough, rash or fever).    Do not smoke, drink alcohol or take over the counter medicine (unless your surgeon or primary care doctor tells you to) for the 24 hours before and after surgery.    If you take prescribed drugs: Follow your doctor s orders about which medicines to take and which to stop until after surgery.    Eating and drinking prior to surgery: follow the instructions from your surgeon    Take a shower or bath the night before surgery. Use the soap your surgeon gave you to gently clean your skin. If you do not have soap from your surgeon, use your regular soap. Do not shave or scrub the surgery site.  Wear clean pajamas and have clean sheets on your bed.           Follow-ups after your visit        Follow-up notes from your care team     Return if symptoms worsen or fail to improve.      Your next 10 appointments already scheduled     Oct 12, 2018  6:15 AM CDT   LAB with UU LAB GOLD WAITING   [facility], Lab ([facility])    [address] [address]              Please do not eat 10-12 hours before your appointment if you are coming in fasting for labs on lipids, cholesterol, or glucose (sugar). This does not apply to pregnant women. Water, hot tea and black coffee (with nothing added) are okay.  Do not drink other fluids, diet soda or chew gum.            Oct 12, 2018  6:30 AM CDT   Procedure 1.5 hr with U2A ROOM 2   Unit 2A [facility] ([facility])    [address] [place] [address]               Oct 12, 2018  8:00 AM CDT   Cath 90 Minute with [id number]   [facility] ([facility])    [address] [address] [phone]            Oct 12, 2018  8:15 AM CDT   Ech Moises with [id number]   [facility], [place],  Echocardiography ([facility])    [address] [address] [phone]           1.  Please bring or wear a comfortable two-piece outfit. 2.  Arrival time: -   [facility]:  arrive 75 minutes prior to examination time. -   [facility]:  arrive 90 minutes prior to examination time. -   [facility]:   arrive 15 minutes prior to examination time. 3.  Plan to have a responsible adult take you home after the test. After the exam you may not drive, take a bus or taxi by yourself. -   Someone should stay with you for 6 hours after your test. 4.  No food or drink: -   6 hours before the test 5.  If you take antacids or water pills (diuretics): Do not take them until after your test. You may take blood pressure medicine with a few sips of water. 6.  If you have diabetes: -   Morning slots preferred -   If you take insulin, call your diabetes care team. Ask if you should take a   dose the morning of your test. -   If you take diabetes medicine by mouth, don't take it on the morning of your test. Bring it with you to take after the test. (If you have questions, call your diabetes care team.) 7.  Bring a list of any medicines you are taking. 8.  Do not drive for 24 hours after the test. 9.  For any questions that cannot be answered, please contact the ordering physician 10. Please do not wear perfumes or scented lotions on the day of  "your exam.            Oct 17, 2018  8:30 AM CDT   [facility] Physical Adult with Noemy NEWMAN Joselin,    [facility])    [address]  [facility] [place] [address] [phone]              Who to contact     If you have questions or need follow up information about today's clinic visit or your schedule please contact [facility] directly at [phone].  Normal or non-critical lab and imaging results will be communicated to you by [employer] W[employer], letter or phone within 4 business days after the clinic has received the results. If you do not hear from us within 7 days, please contact the clinic through Limonetikt or phone. If you have a critical or abnormal lab result, we will notify you by phone as soon as possible.  Submit refill requests through [employer] or call your pharmacy and they will forward the refill request to us. Please allow 3 business days for your refill to be completed.          Additional Information About Your Visit        [employer] Your Car Information     [employer] gives you secure access to your electronic health record. If you see a primary care provider, you can also send messages to your care team and make appointments. If you have questions, please call your primary care clinic.  If you do not have a primary care provider, please call [phone] and they will assist you.        Care EveryWhere ID     This is your Care EveryWhere ID. This could be used by other organizations to access your [facility] medical records  [id number]        Your Vitals Were     Pulse Temperature Height Last Period Pulse Oximetry Breastfeeding?    77 97.6  F (36.4  C) (Oral) 5' 9\" (1.753 m) 10/27/2004 97% No    BMI (Body Mass Index)                   26.58 kg/m2            Blood Pressure from Last 3 Encounters:   10/02/18 136/79   07/20/18 128/86   05/14/18 118/81    Weight from Last 3 Encounters:   10/02/18 180 lb (81.6 kg)   07/20/18 183 lb 1.6 oz (83.1 kg)   05/05/18 177 lb (80.3 " kg)              Today, you had the following     No orders found for display       Primary Care Provider Office Phone # Fax #    Noemy Olivera [phone] [address] [address]        Equal Access to Services     YESENIA HINOJOSA : Hadii miky ku hadmooseo Soomaali, waaxda luqadaha, qaybta kaalmada adeegyada, waxay idiin haymassieln adedaisy abdi enma [place]. So [facility] [phone].    ATENCIÓN: Si habla español, tiene a parsons disposición servicios gratuitos de asistencia lingüística. Llame al [phone].    We comply with applicable federal civil rights laws and [place] laws. We do not discriminate on the basis of race, color, national origin, age, disability, sex, sexual orientation, or gender identity.            Thank you!     Thank you for choosing [facility]  for your care. Our goal is always to provide you with excellent care. Hearing back from our patients is one way we can continue to improve our services. Please take a few minutes to complete the written survey that you may receive in the mail after your visit with us. Thank you!             Your Updated Medication List - Protect others around you: Learn how to safely use, store and throw away your medicines at www.disposemymeds.org.          This list is accurate as of 10/2/18 11:59 PM.  Always use your most recent med list.                   Brand Name Dispense Instructions for use Diagnosis    aspirin 325 MG tablet     180 tablet    Take 1 tablet (325 mg) by mouth daily    TIA (transient ischemic attack)       atorvastatin 20 MG tablet    LIPITOR    90 tablet    Take 1 tablet (20 mg) by mouth daily    Hypercholesteremia       DAILY MULTIVITAMIN PO      1 tab daily        IBUPROFEN PO      Take 1 Dose by mouth as needed for moderate pain        levothyroxine 100 MCG tablet    SYNTHROID/LEVOTHROID    30 tablet    TAKE ONE TABLET BY MOUTH EVERY DAY    Other specified hypothyroidism       lisinopril 20 MG tablet     PRINIVIL/ZESTRIL    90 tablet    TAKE ONE TABLET BY MOUTH EVERY DAY    Essential hypertension with goal blood pressure less than 140/90       magnesium 250 MG tablet      Take 1 tablet by mouth daily        metoprolol succinate 50 MG 24 hr tablet    TOPROL-XL    135 tablet    TAKE ONE AND ONE-HALF TABLET BY MOUTH EVERY DAY    Essential hypertension with goal blood pressure less than 140/90       OMEGA-3 FISH OIL PO      Take 2 g by mouth daily        TYLENOL PO      Take 1,000 mg by mouth as needed for mild pain or fever        valACYclovir 500 MG tablet    VALTREX    12 tablet    TAKE ONE TABLET BY MOUTH TWICE A DAY FOR THREE DAYS. (REPEAT AS NEEDED)    Recurrent herpes simplex       VITAMIN D (CHOLECALCIFEROL) PO      Take 4,000 Units by mouth daily

## 2018-10-02 NOTE — PROGRESS NOTES
[facility]  [address] [place]  [facility] [id number] [phone]  Dept: [phone]    PRE-OP EVALUATION:  Today's date: 10/2/2018    Sofi Dgeroot (: 1955) presents for pre-operative evaluation assessment as requested by Dr. Gallardo.  She requires evaluation and anesthesia risk assessment prior to undergoing surgery/procedure for treatment of PFO Closer .    Fax number for surgical facility: [facility]  Primary Physician: Noemy Olivera  Type of Anesthesia Anticipated: General    Patient has a Health Care Directive or Living Will:  YES -on file    Preop Questions 10/2/2018   Who is doing your surgery? dr gallardo   What are you having done?  pfo closure   Date of Surgery/Procedure: 1012   1.  Do you have a history of Heart attack, stroke, stent, coronary bypass surgery, or other heart surgery? YES  - TRANSIENT ICHEMIA ATTACK   2.  Do you ever have any pain or discomfort in your chest? No   3.  Do you have a history of  Heart Failure? No   4.   Are you troubled by shortness of breath when:  walking on a level surface, or up a slight hill, or at night? No   5.  Do you currently have a cold, bronchitis or other respiratory infection? No   6.  Do you have a cough, shortness of breath, or wheezing? No   7.  Do you sometimes get pains in the calves of your legs when you walk? No   8. Do you or anyone in your family have previous history of blood clots? No   9.  Do you or does anyone in your family have a serious bleeding problem such as prolonged bleeding following surgeries or cuts? No   10. Have you ever had problems with anemia or been told to take iron pills? No   11. Have you had any abnormal blood loss such as black, tarry or bloody stools, or abnormal vaginal bleeding? No   12. Have you ever had a blood transfusion? No   13. Have you or any of your relatives ever had problems with anesthesia? No   14. Do you have sleep apnea, excessive snoring or daytime drowsiness? No   15. Do you  have any prosthetic heart valves? No   16. Do you have prosthetic joints? No   17. Is there any chance that you may be pregnant? No         HPI:     HPI related to upcoming procedure: 64 y/o female here for pre-op exam.  Patient has had hx of TRANSIENT ICHEMIA ATTACK in the 2015, and recently was found to have retinal artery occlusion.  She was worked up and diagnosed with PFO,  She has been working with cardiology clinic, and they have discussed closure.  She is currently on cardiac regimen of lisinopril 20 mg daily, metoprolol 50 mg daily, atorvastatin 20 mg daily and aspirin 325 mg daily.  She otherwise has been feeling well without concerns.      Patient has had surgery in past and has never had any issues with anaesthesia, bleeding or blood clots.       HYPERTENSION - Patient has longstanding history of HTN , currently denies any symptoms referable to elevated blood pressure. Specifically denies chest pain, palpitations, dyspnea, orthopnea, PND or peripheral edema. Blood pressure readings have been in normal range. Current medication regimen is as listed below. Patient denies any side effects of medication.                                                                                                                                                                                          .  HYPOTHYROIDISM - Patient has a longstanding history of chronic Hypothyroidism. Patient has been doing well, noting no tremor, insomnia, hair loss or changes in skin texture. Continues to take medications as directed, without adverse reactions or side effects. Last TSH   Lab Results   Component Value Date    TSH 1.11 10/17/2017   .                                                                                                                                                                                                                        .    MEDICAL HISTORY:     Patient Active Problem List    Diagnosis Date Noted      Palpitations 07/16/2018     Priority: Medium     [facility] 07/16/2018     Priority: Medium     For urgent after hours needs, please call [phone] and ask to speak with the Adult Congenital Heart Disease Physician on call - mention job code [id number].         Retinal infarct 05/01/2018     Priority: Medium     Weakness of right upper extremity 04/01/2017     Priority: Medium     Weakness of wrist 04/01/2017     Priority: Medium     Bilateral arm weakness 04/01/2017     Priority: Medium     Lateral epicondylitis of both elbows 04/01/2017     Priority: Medium     Wrist weakness 04/01/2017     Priority: Medium     Cervical high risk HPV (human papillomavirus) test positive 09/16/2016     Priority: Medium     09/16/16: NIL pap + HR HPV (not 16 or 18). Plan cotest in 1 year.  10/11/17: LSIL pap with + HR HPV (not 16 or 18) result. Plan [place].   11/17/17 [place] bx: ASCUS. ECC: ASCUS and LSIL. PLan: Plan: co-test in 1 year.       Left-sided low back pain without sciatica 07/07/2015     Priority: Medium     Transient cerebral ischemia 02/16/2015     Priority: Medium     2/2015  On ASA 325mg    Diagnosis updated by automated process. Provider to review and confirm.       PFO (patent foramen ovale) 02/16/2015     Priority: Medium     Had TIA 2/2015       Advanced directives, counseling/discussion 08/29/2012     Priority: Medium     Patient states has Advance Directive and will bring in a copy to clinic. 8/29/2012 Carol Alvarado M.A.           Hypertension goal BP (blood pressure) < 140/90 02/20/2011     Priority: Medium     Diagnosed 2/2011  Started on lisinopril/HCTZ first  Did not tolerate HCTZ part - dizziness and night leg cramps       CARDIOVASCULAR SCREENING; LDL GOAL LESS THAN 100 10/31/2010     Priority: Medium     Hypothyroidism 06/16/2005     Priority: Medium     Problem list name updated by automated process. Provider to review       Hepatitis C virus  infection without hepatic coma 06/16/2005     Priority: Medium     Tx'd w/ Interferon x 1 year-virus free at 5 years  Problem list name updated by automated process. Provider to review        Past Medical History:   Diagnosis Date     Cervical high risk HPV (human papillomavirus) test positive 09/16/2016, 10/11/17    (not 16 or 18). 10/11/17: LSIL pap with + HR HPV (not 16 or 18) result.     Low risk HPV infection 2009, 2010    NIL pap, + HPV 53     Papanicolaou smear of cervix with low grade squamous intraepithelial lesion (LGSIL) 10/11/2017    10/11/17: LSIL pap with + HR HPV (not 16 or 18) result.     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 1998    Tx'd w/ Interferon x 1 year-virus free at 5 years, blood transfusion  related     Past Surgical History:   Procedure Laterality Date     C LIGATE FALLOPIAN TUBE  1986     SURGICAL HISTORY OF -  Right 2008    Right 1st MTPJ, bone spur resection     Current Outpatient Prescriptions   Medication Sig Dispense Refill     Acetaminophen (TYLENOL PO) Take 1,000 mg by mouth as needed for mild pain or fever       aspirin 325 MG tablet Take 1 tablet (325 mg) by mouth daily 180 tablet      atorvastatin (LIPITOR) 20 MG tablet Take 1 tablet (20 mg) by mouth daily 90 tablet 3     DAILY MULTIVITAMIN PO 1 tab daily       IBUPROFEN PO Take 1 Dose by mouth as needed for moderate pain       levothyroxine (SYNTHROID/LEVOTHROID) 100 MCG tablet TAKE ONE TABLET BY MOUTH EVERY DAY 30 tablet 0     lisinopril (PRINIVIL/ZESTRIL) 20 MG tablet TAKE ONE TABLET BY MOUTH EVERY DAY 90 tablet 1     magnesium 250 MG tablet Take 1 tablet by mouth daily       metoprolol succinate (TOPROL-XL) 50 MG 24 hr tablet TAKE ONE AND ONE-HALF TABLET BY MOUTH EVERY  tablet 1     Omega-3 Fatty Acids (OMEGA-3 FISH OIL PO) Take 2 g by mouth daily        valACYclovir (VALTREX) 500 MG tablet TAKE ONE TABLET BY MOUTH TWICE A DAY FOR THREE DAYS. (REPEAT AS NEEDED) 12  "tablet 5     VITAMIN D, CHOLECALCIFEROL, PO Take 4,000 Units by mouth daily        [DISCONTINUED] lisinopril-hydrochlorothiazide (PRINZIDE,ZESTORETIC) 20-12.5 MG per tablet Take 1 tablet by mouth daily 90 tablet 3     OTC products: None, except as noted above    Allergies   Allergen Reactions     Latex Swelling     Dental visit--burning sensation in lips and throat, lips felt puffy, eye watery from latex gloves     Pineapple Other (See Comments)      Latex Allergy: YES: Precautions to take: avoidance    Social History   Substance Use Topics     Smoking status: Former Smoker     Smokeless tobacco: Never Used      Comment: quit 30 years ago     Alcohol use Yes      Comment: 4-5 Drinks Per Week     History   Drug Use No       REVIEW OF SYSTEMS:   CONSTITUTIONAL: NEGATIVE for fever, chills, change in weight  INTEGUMENTARY/SKIN: NEGATIVE for worrisome rashes, moles or lesions  EYES: NEGATIVE for vision changes or irritation  ENT/MOUTH: NEGATIVE for ear, mouth and throat problems  RESP: NEGATIVE for significant cough or SOB  BREAST: NEGATIVE for masses, tenderness or discharge  CV: NEGATIVE for chest pain, palpitations or peripheral edema  GI: NEGATIVE for nausea, abdominal pain, heartburn, or change in bowel habits  : NEGATIVE for frequency, dysuria, or hematuria  MUSCULOSKELETAL: NEGATIVE for significant arthralgias or myalgia  NEURO: NEGATIVE for weakness, dizziness or paresthesias  ENDOCRINE: NEGATIVE for temperature intolerance, skin/hair changes  HEME: NEGATIVE for bleeding problems  PSYCHIATRIC: NEGATIVE for changes in mood or affect    EXAM:   /79  Pulse 77  Temp 97.6  F (36.4  C) (Oral)  Ht 5' 9\" (1.753 m)  Wt 180 lb (81.6 kg)  LMP 10/27/2004  SpO2 97%  Breastfeeding? No  BMI 26.58 kg/m2    GENERAL APPEARANCE: alert and active     EYES: EOMI, PERRL     HENT: ear canals and TM's normal and nose and mouth without ulcers or lesions     NECK: no adenopathy, no asymmetry, masses, or scars and thyroid " normal to palpation     RESP: lungs clear to auscultation - no rales, rhonchi or wheezes     CV: regular rates and rhythm, normal S1 S2, no S3 or S4 and no murmur, click or rub     MS: extremities normal- no gross deformities noted, no evidence of inflammation in joints, FROM in all extremities.     SKIN: no suspicious lesions or rashes     NEURO: Normal strength and tone, sensory exam grossly normal, mentation intact and speech normal     PSYCH: mentation appears normal. and affect normal/bright    DIAGNOSTICS:   EKG: from 5/5/18- NSR with normal intervals and axes.  No ST shift, TWI, or Q waves.  Nonpathologic q waves in III.  Delay in R wave progression.  No definitive evidence of prior MI.    DANTE (5/14/18):  Normal left ventricular size.  The Ejection Fraction is estimated at 55-60%.  The right ventricle is normal size. Global right ventricular function is normal.  Left atrium, appendage, right atrium and appendage are free of SEC or thrombus.  Valve structures free of mass or vegetation.  Mild aortic insufficiency. Mild TR.  There is evidence of a small tractlike PFO with minimal right-left-shunt on inspiration demonstrated by agitated saline Bubble study (Grade 1). Unable to perform Valsalva maneuver in setting of sedation. Complex PFO visualized given long apparent tunnel and exuberant/aneurysmal septum. Normal secondary septal thickness.  No concomitant ASD demonstrated by color flow.  Normal Sinuses of Valsalva and Proximal Ascending aorta.   No significant atheromatous disease.  No pericardial effusion.  Previous study not available for comparison.    Recent Labs   Lab Test  05/01/18   0750  10/17/17   0919  03/22/17   0959  09/16/16   1149   02/16/15   0805  02/15/15   1932  02/15/15   1926   HGB   --    --   15.1   --    --   14.8   --   14.6   PLT   --    --    --    --    --   161   --   175   INR   --    --    --    --    --    --   1.1   --    NA   --   140   --   136   < >   --    --   135    POTASSIUM   --   4.2   --   4.4   < >   --    --   3.8   CR   --   0.70   --   0.74   < >   --    --   0.67   A1C  5.5   --    --    --    --   5.5   --    --     < > = values in this interval not displayed.        IMPRESSION:   Reason for surgery/procedure: PFO   Diagnosis/reason for consult: as above    The proposed surgical procedure is considered INTERMEDIATE risk.    REVISED CARDIAC RISK INDEX  The patient has the following serious cardiovascular risks for perioperative complications such as (MI, PE, VFib and 3  AV Block):  No serious cardiac risks  INTERPRETATION: 0 risks: Class I (very low risk - 0.4% complication rate)    The patient has the following additional risks for perioperative complications:  No identified additional risks      ICD-10-CM    1. Preop general physical exam Z01.818    2. PFO (patent foramen ovale) Q21.1    3. Hypertension goal BP (blood pressure) < 140/90 I10    4. Other specified hypothyroidism E03.8        RECOMMENDATIONS:     --Consult hospital rounder / IM to assist post-op medical management    Cardiovascular Risk  Patient is already on a Beta Blocker. Continue Betablocker therapy after surgery, using Beta blocker order set as necessary for NPO status.      --Patient is to take all scheduled medications on the day of surgery EXCEPT for modifications listed below.    Anticoagulant or Antiplatelet Medication Use  ASPIRIN: Discussed with PCP and will stop 3 days prior.        ACE Inhibitor or Angiotensin Receptor Blocker (ARB) Use  Ace inhibitor or Angiotensin Receptor Blocker (ARB) and should HOLD this medication for the 24 hours prior to surgery.      APPROVAL GIVEN to proceed with proposed procedure, without further diagnostic evaluation       Signed Electronically by: Dereck Guerra PA-C    Copy of this evaluation report is provided to requesting physician.    [place] Preop Guidelines    Revised Cardiac Risk Index

## 2018-10-11 DIAGNOSIS — Z01.812 PRE-PROCEDURAL LABORATORY EXAMINATION: Primary | ICD-10-CM

## 2018-10-12 ENCOUNTER — APPOINTMENT (OUTPATIENT)
Dept: MEDSURG UNIT | Facility: CLINIC | Age: 63
End: 2018-10-12
Payer: COMMERCIAL

## 2018-10-12 ENCOUNTER — APPOINTMENT (OUTPATIENT)
Dept: CARDIOLOGY | Facility: CLINIC | Age: 63
End: 2018-10-12
Attending: PHYSICIAN ASSISTANT
Payer: COMMERCIAL

## 2018-10-12 ENCOUNTER — HOSPITAL ENCOUNTER (OUTPATIENT)
Facility: CLINIC | Age: 63
Discharge: HOME | End: 2018-10-12
Attending: INTERNAL MEDICINE | Admitting: INTERNAL MEDICINE
Payer: COMMERCIAL

## 2018-10-12 ENCOUNTER — APPOINTMENT (OUTPATIENT)
Dept: LAB | Facility: CLINIC | Age: 63
End: 2018-10-12
Attending: INTERNAL MEDICINE
Payer: COMMERCIAL

## 2018-10-12 ENCOUNTER — HOSPITAL ENCOUNTER (OUTPATIENT)
Dept: CARDIOLOGY | Facility: CLINIC | Age: 63
End: 2018-10-12
Attending: INTERNAL MEDICINE
Payer: COMMERCIAL

## 2018-10-12 ENCOUNTER — TRANSFERRED RECORDS (OUTPATIENT)
Dept: HEALTH INFORMATION MANAGEMENT | Facility: CLINIC | Age: 63
End: 2018-10-12

## 2018-10-12 VITALS
TEMPERATURE: 97.5 F | OXYGEN SATURATION: 97 % | RESPIRATION RATE: 14 BRPM | HEART RATE: 68 BPM | SYSTOLIC BLOOD PRESSURE: 140 MMHG | DIASTOLIC BLOOD PRESSURE: 83 MMHG

## 2018-10-12 DIAGNOSIS — Z01.812 PRE-PROCEDURAL LABORATORY EXAMINATION: ICD-10-CM

## 2018-10-12 DIAGNOSIS — Q21.12 PFO (PATENT FORAMEN OVALE): ICD-10-CM

## 2018-10-12 PROBLEM — R29.898 WEAKNESS OF WRIST: Status: RESOLVED | Noted: 2017-04-01 | Resolved: 2018-10-12

## 2018-10-12 PROBLEM — Q24.9 CONGENITAL ANOMALIES OF THE HEART: Chronic | Status: ACTIVE | Noted: 2018-07-16

## 2018-10-12 PROBLEM — R00.2 PALPITATIONS: Status: RESOLVED | Noted: 2018-07-16 | Resolved: 2018-10-12

## 2018-10-12 PROBLEM — R29.898 WEAKNESS OF RIGHT UPPER EXTREMITY: Status: RESOLVED | Noted: 2017-04-01 | Resolved: 2018-10-12

## 2018-10-12 PROBLEM — R29.898 WRIST WEAKNESS: Status: RESOLVED | Noted: 2017-04-01 | Resolved: 2018-10-12

## 2018-10-12 LAB
ANION GAP SERPL CALCULATED.3IONS-SCNC: 5 MMOL/L (ref 3–14)
BUN SERPL-MCNC: 12 MG/DL (ref 7–30)
CALCIUM SERPL-MCNC: 9.6 MG/DL (ref 8.5–10.1)
CHLORIDE SERPL-SCNC: 108 MMOL/L (ref 94–109)
CO2 SERPL-SCNC: 28 MMOL/L (ref 20–32)
CREAT SERPL-MCNC: 0.78 MG/DL (ref 0.52–1.04)
ERYTHROCYTE [DISTWIDTH] IN BLOOD BY AUTOMATED COUNT: 13.4 % (ref 10–15)
GFR SERPL CREATININE-BSD FRML MDRD: 75 ML/MIN/1.7M2
GLUCOSE SERPL-MCNC: 88 MG/DL (ref 70–99)
HCG SERPL QL: NEGATIVE
HCT VFR BLD AUTO: 47.8 % (ref 35–47)
HGB BLD-MCNC: 15.6 G/DL (ref 11.7–15.7)
KCT BLD-ACNC: 127 SEC (ref 75–150)
KCT BLD-ACNC: 266 SEC (ref 75–150)
MCH RBC QN AUTO: 32.2 PG (ref 26.5–33)
MCHC RBC AUTO-ENTMCNC: 32.6 G/DL (ref 31.5–36.5)
MCV RBC AUTO: 99 FL (ref 78–100)
PLATELET # BLD AUTO: 160 10E9/L (ref 150–450)
POTASSIUM SERPL-SCNC: 4.2 MMOL/L (ref 3.4–5.3)
RBC # BLD AUTO: 4.84 10E12/L (ref 3.8–5.2)
SODIUM SERPL-SCNC: 140 MMOL/L (ref 133–144)
WBC # BLD AUTO: 8.5 10E9/L (ref 4–11)

## 2018-10-12 PROCEDURE — C1769 GUIDE WIRE: HCPCS

## 2018-10-12 PROCEDURE — 93321 DOPPLER ECHO F-UP/LMTD STD: CPT

## 2018-10-12 PROCEDURE — 25000125 ZZHC RX 250: Performed by: INTERNAL MEDICINE

## 2018-10-12 PROCEDURE — 93320 DOPPLER ECHO COMPLETE: CPT | Mod: 26 | Performed by: INTERNAL MEDICINE

## 2018-10-12 PROCEDURE — 93312 ECHO TRANSESOPHAGEAL: CPT | Mod: 26 | Performed by: INTERNAL MEDICINE

## 2018-10-12 PROCEDURE — 25000128 H RX IP 250 OP 636: Performed by: PHYSICIAN ASSISTANT

## 2018-10-12 PROCEDURE — 85027 COMPLETE CBC AUTOMATED: CPT | Performed by: INTERNAL MEDICINE

## 2018-10-12 PROCEDURE — 25000128 H RX IP 250 OP 636: Performed by: INTERNAL MEDICINE

## 2018-10-12 PROCEDURE — 40000065 ZZH STATISTIC EKG NON-CHARGEABLE

## 2018-10-12 PROCEDURE — 27210742 ZZH CATH CR1

## 2018-10-12 PROCEDURE — 93321 DOPPLER ECHO F-UP/LMTD STD: CPT | Mod: 26 | Performed by: INTERNAL MEDICINE

## 2018-10-12 PROCEDURE — 25000132 ZZH RX MED GY IP 250 OP 250 PS 637: Performed by: INTERNAL MEDICINE

## 2018-10-12 PROCEDURE — C1894 INTRO/SHEATH, NON-LASER: HCPCS

## 2018-10-12 PROCEDURE — C1817 SEPTAL DEFECT IMP SYS: HCPCS

## 2018-10-12 PROCEDURE — 93580 TRANSCATH CLOSURE OF ASD: CPT

## 2018-10-12 PROCEDURE — 93325 DOPPLER ECHO COLOR FLOW MAPG: CPT | Mod: 26 | Performed by: INTERNAL MEDICINE

## 2018-10-12 PROCEDURE — 99220 ZZC INITIAL OBSERVATION CARE,LEVL III: CPT | Performed by: INTERNAL MEDICINE

## 2018-10-12 PROCEDURE — 80048 BASIC METABOLIC PNL TOTAL CA: CPT | Performed by: INTERNAL MEDICINE

## 2018-10-12 PROCEDURE — 93005 ELECTROCARDIOGRAM TRACING: CPT

## 2018-10-12 PROCEDURE — 27210956 ZZH BALLOON TIP PRESSURE CR7

## 2018-10-12 PROCEDURE — 40000172 ZZH STATISTIC PROCEDURE PREP ONLY

## 2018-10-12 PROCEDURE — 93325 DOPPLER ECHO COLOR FLOW MAPG: CPT

## 2018-10-12 PROCEDURE — 36415 COLL VENOUS BLD VENIPUNCTURE: CPT | Performed by: INTERNAL MEDICINE

## 2018-10-12 PROCEDURE — 85347 COAGULATION TIME ACTIVATED: CPT | Mod: 91

## 2018-10-12 PROCEDURE — 93308 TTE F-UP OR LMTD: CPT | Mod: 26 | Performed by: INTERNAL MEDICINE

## 2018-10-12 PROCEDURE — 99152 MOD SED SAME PHYS/QHP 5/>YRS: CPT

## 2018-10-12 PROCEDURE — 99153 MOD SED SAME PHYS/QHP EA: CPT

## 2018-10-12 PROCEDURE — 84703 CHORIONIC GONADOTROPIN ASSAY: CPT | Performed by: INTERNAL MEDICINE

## 2018-10-12 PROCEDURE — 27210946 ZZH KIT HC TOTES DISP CR8

## 2018-10-12 PROCEDURE — 93010 ELECTROCARDIOGRAM REPORT: CPT | Performed by: INTERNAL MEDICINE

## 2018-10-12 PROCEDURE — 76376 3D RENDER W/INTRP POSTPROCES: CPT | Mod: 26 | Performed by: INTERNAL MEDICINE

## 2018-10-12 RX ORDER — PROTAMINE SULFATE 10 MG/ML
1-5 INJECTION, SOLUTION INTRAVENOUS
Status: DISCONTINUED | OUTPATIENT
Start: 2018-10-12 | End: 2018-10-12

## 2018-10-12 RX ORDER — FENTANYL CITRATE 50 UG/ML
25-50 INJECTION, SOLUTION INTRAMUSCULAR; INTRAVENOUS
Status: DISCONTINUED | OUTPATIENT
Start: 2018-10-12 | End: 2018-10-12 | Stop reason: HOSPADM

## 2018-10-12 RX ORDER — FLUMAZENIL 0.1 MG/ML
0.2 INJECTION, SOLUTION INTRAVENOUS
Status: DISCONTINUED | OUTPATIENT
Start: 2018-10-12 | End: 2018-10-12 | Stop reason: HOSPADM

## 2018-10-12 RX ORDER — PROCHLORPERAZINE MALEATE 5 MG
10 TABLET ORAL EVERY 6 HOURS PRN
Status: DISCONTINUED | OUTPATIENT
Start: 2018-10-12 | End: 2018-10-12 | Stop reason: HOSPADM

## 2018-10-12 RX ORDER — LIDOCAINE 40 MG/G
CREAM TOPICAL
Status: DISCONTINUED | OUTPATIENT
Start: 2018-10-12 | End: 2018-10-12 | Stop reason: CLARIF

## 2018-10-12 RX ORDER — NALOXONE HYDROCHLORIDE 0.4 MG/ML
.2-.4 INJECTION, SOLUTION INTRAMUSCULAR; INTRAVENOUS; SUBCUTANEOUS
Status: DISCONTINUED | OUTPATIENT
Start: 2018-10-12 | End: 2018-10-12 | Stop reason: HOSPADM

## 2018-10-12 RX ORDER — SODIUM CHLORIDE 9 MG/ML
INJECTION, SOLUTION INTRAVENOUS CONTINUOUS
Status: DISCONTINUED | OUTPATIENT
Start: 2018-10-12 | End: 2018-10-12 | Stop reason: HOSPADM

## 2018-10-12 RX ORDER — ATROPINE SULFATE 0.1 MG/ML
.5-1 INJECTION INTRAVENOUS
Status: DISCONTINUED | OUTPATIENT
Start: 2018-10-12 | End: 2018-10-12

## 2018-10-12 RX ORDER — CLOPIDOGREL BISULFATE 75 MG/1
75 TABLET ORAL
Status: DISCONTINUED | OUTPATIENT
Start: 2018-10-12 | End: 2018-10-12

## 2018-10-12 RX ORDER — CEFAZOLIN SODIUM 2 G/100ML
2 INJECTION, SOLUTION INTRAVENOUS
Status: COMPLETED | OUTPATIENT
Start: 2018-10-12 | End: 2018-10-12

## 2018-10-12 RX ORDER — ARGATROBAN 1 MG/ML
350 INJECTION, SOLUTION INTRAVENOUS
Status: DISCONTINUED | OUTPATIENT
Start: 2018-10-12 | End: 2018-10-12

## 2018-10-12 RX ORDER — CLOPIDOGREL BISULFATE 75 MG/1
75 TABLET ORAL DAILY
Status: DISCONTINUED | OUTPATIENT
Start: 2018-10-13 | End: 2018-10-12 | Stop reason: HOSPADM

## 2018-10-12 RX ORDER — POTASSIUM CHLORIDE 29.8 MG/ML
20 INJECTION INTRAVENOUS
Status: DISCONTINUED | OUTPATIENT
Start: 2018-10-12 | End: 2018-10-12

## 2018-10-12 RX ORDER — SODIUM NITROPRUSSIDE 25 MG/ML
100-200 INJECTION INTRAVENOUS
Status: DISCONTINUED | OUTPATIENT
Start: 2018-10-12 | End: 2018-10-12

## 2018-10-12 RX ORDER — NITROGLYCERIN 0.4 MG/1
0.4 TABLET SUBLINGUAL EVERY 5 MIN PRN
Status: DISCONTINUED | OUTPATIENT
Start: 2018-10-12 | End: 2018-10-12 | Stop reason: HOSPADM

## 2018-10-12 RX ORDER — FUROSEMIDE 10 MG/ML
20-100 INJECTION INTRAMUSCULAR; INTRAVENOUS
Status: DISCONTINUED | OUTPATIENT
Start: 2018-10-12 | End: 2018-10-12

## 2018-10-12 RX ORDER — ONDANSETRON 4 MG/1
4 TABLET, ORALLY DISINTEGRATING ORAL EVERY 6 HOURS PRN
Status: DISCONTINUED | OUTPATIENT
Start: 2018-10-12 | End: 2018-10-12 | Stop reason: HOSPADM

## 2018-10-12 RX ORDER — NALOXONE HYDROCHLORIDE 0.4 MG/ML
.1-.4 INJECTION, SOLUTION INTRAMUSCULAR; INTRAVENOUS; SUBCUTANEOUS
Status: DISCONTINUED | OUTPATIENT
Start: 2018-10-12 | End: 2018-10-12

## 2018-10-12 RX ORDER — ENALAPRILAT 1.25 MG/ML
1.25-2.5 INJECTION INTRAVENOUS
Status: DISCONTINUED | OUTPATIENT
Start: 2018-10-12 | End: 2018-10-12

## 2018-10-12 RX ORDER — FENTANYL CITRATE 50 UG/ML
25-50 INJECTION, SOLUTION INTRAMUSCULAR; INTRAVENOUS
Status: DISCONTINUED | OUTPATIENT
Start: 2018-10-12 | End: 2018-10-12

## 2018-10-12 RX ORDER — ATORVASTATIN CALCIUM 20 MG/1
20 TABLET, FILM COATED ORAL DAILY
Status: DISCONTINUED | OUTPATIENT
Start: 2018-10-13 | End: 2018-10-12 | Stop reason: HOSPADM

## 2018-10-12 RX ORDER — LIDOCAINE HYDROCHLORIDE 10 MG/ML
30 INJECTION, SOLUTION EPIDURAL; INFILTRATION; INTRACAUDAL; PERINEURAL
Status: DISCONTINUED | OUTPATIENT
Start: 2018-10-12 | End: 2018-10-12 | Stop reason: CLARIF

## 2018-10-12 RX ORDER — CLOPIDOGREL BISULFATE 75 MG/1
300-600 TABLET ORAL
Status: DISCONTINUED | OUTPATIENT
Start: 2018-10-12 | End: 2018-10-12

## 2018-10-12 RX ORDER — NALOXONE HYDROCHLORIDE 0.4 MG/ML
.1-.4 INJECTION, SOLUTION INTRAMUSCULAR; INTRAVENOUS; SUBCUTANEOUS
Status: DISCONTINUED | OUTPATIENT
Start: 2018-10-12 | End: 2018-10-12 | Stop reason: HOSPADM

## 2018-10-12 RX ORDER — NITROGLYCERIN 5 MG/ML
100-500 VIAL (ML) INTRAVENOUS
Status: DISCONTINUED | OUTPATIENT
Start: 2018-10-12 | End: 2018-10-12

## 2018-10-12 RX ORDER — ASPIRIN 81 MG/1
81-324 TABLET, CHEWABLE ORAL
Status: DISCONTINUED | OUTPATIENT
Start: 2018-10-12 | End: 2018-10-12 | Stop reason: CLARIF

## 2018-10-12 RX ORDER — PROTAMINE SULFATE 10 MG/ML
25-100 INJECTION, SOLUTION INTRAVENOUS EVERY 5 MIN PRN
Status: DISCONTINUED | OUTPATIENT
Start: 2018-10-12 | End: 2018-10-12

## 2018-10-12 RX ORDER — ADENOSINE 3 MG/ML
12-12000 INJECTION, SOLUTION INTRAVENOUS
Status: DISCONTINUED | OUTPATIENT
Start: 2018-10-12 | End: 2018-10-12

## 2018-10-12 RX ORDER — ACETAMINOPHEN 325 MG/1
650 TABLET ORAL EVERY 4 HOURS PRN
Status: DISCONTINUED | OUTPATIENT
Start: 2018-10-12 | End: 2018-10-12 | Stop reason: HOSPADM

## 2018-10-12 RX ORDER — CLOPIDOGREL BISULFATE 75 MG/1
75 TABLET ORAL DAILY
Qty: 90 TABLET | Refills: 3 | Status: SHIPPED | OUTPATIENT
Start: 2018-10-13 | End: 2019-02-26

## 2018-10-12 RX ORDER — HYDRALAZINE HYDROCHLORIDE 20 MG/ML
10-20 INJECTION INTRAMUSCULAR; INTRAVENOUS
Status: DISCONTINUED | OUTPATIENT
Start: 2018-10-12 | End: 2018-10-12

## 2018-10-12 RX ORDER — CLOPIDOGREL BISULFATE 75 MG/1
300 TABLET ORAL ONCE
Status: COMPLETED | OUTPATIENT
Start: 2018-10-12 | End: 2018-10-12

## 2018-10-12 RX ORDER — NITROGLYCERIN 20 MG/100ML
.07-2 INJECTION INTRAVENOUS CONTINUOUS PRN
Status: DISCONTINUED | OUTPATIENT
Start: 2018-10-12 | End: 2018-10-12

## 2018-10-12 RX ORDER — ARGATROBAN 1 MG/ML
150 INJECTION, SOLUTION INTRAVENOUS
Status: DISCONTINUED | OUTPATIENT
Start: 2018-10-12 | End: 2018-10-12

## 2018-10-12 RX ORDER — POTASSIUM CHLORIDE 7.45 MG/ML
10 INJECTION INTRAVENOUS
Status: DISCONTINUED | OUTPATIENT
Start: 2018-10-12 | End: 2018-10-12

## 2018-10-12 RX ORDER — DEXTROSE MONOHYDRATE 25 G/50ML
12.5-5 INJECTION, SOLUTION INTRAVENOUS EVERY 30 MIN PRN
Status: DISCONTINUED | OUTPATIENT
Start: 2018-10-12 | End: 2018-10-12

## 2018-10-12 RX ORDER — ASPIRIN 81 MG/1
81 TABLET ORAL DAILY
Status: DISCONTINUED | OUTPATIENT
Start: 2018-10-12 | End: 2018-10-12 | Stop reason: HOSPADM

## 2018-10-12 RX ORDER — SODIUM CHLORIDE 9 MG/ML
INJECTION, SOLUTION INTRAVENOUS CONTINUOUS
Status: DISCONTINUED | OUTPATIENT
Start: 2018-10-12 | End: 2018-10-12

## 2018-10-12 RX ORDER — ONDANSETRON 2 MG/ML
4 INJECTION INTRAMUSCULAR; INTRAVENOUS EVERY 4 HOURS PRN
Status: DISCONTINUED | OUTPATIENT
Start: 2018-10-12 | End: 2018-10-12

## 2018-10-12 RX ORDER — CLOPIDOGREL BISULFATE 75 MG/1
75 TABLET ORAL DAILY
Qty: 90 TABLET | Refills: 3 | Status: SHIPPED | OUTPATIENT
Start: 2018-10-13 | End: 2018-10-12

## 2018-10-12 RX ORDER — METHYLPREDNISOLONE SODIUM SUCCINATE 125 MG/2ML
125 INJECTION, POWDER, LYOPHILIZED, FOR SOLUTION INTRAMUSCULAR; INTRAVENOUS
Status: DISCONTINUED | OUTPATIENT
Start: 2018-10-12 | End: 2018-10-12

## 2018-10-12 RX ORDER — PRASUGREL 10 MG/1
10-60 TABLET, FILM COATED ORAL
Status: DISCONTINUED | OUTPATIENT
Start: 2018-10-12 | End: 2018-10-12

## 2018-10-12 RX ORDER — LIDOCAINE 40 MG/G
CREAM TOPICAL
Status: DISCONTINUED | OUTPATIENT
Start: 2018-10-12 | End: 2018-10-12 | Stop reason: HOSPADM

## 2018-10-12 RX ORDER — NITROGLYCERIN 0.4 MG/1
0.4 TABLET SUBLINGUAL EVERY 5 MIN PRN
Status: DISCONTINUED | OUTPATIENT
Start: 2018-10-12 | End: 2018-10-12

## 2018-10-12 RX ORDER — EPINEPHRINE 1 MG/ML
0.3 INJECTION, SOLUTION, CONCENTRATE INTRAVENOUS
Status: DISCONTINUED | OUTPATIENT
Start: 2018-10-12 | End: 2018-10-12

## 2018-10-12 RX ORDER — METOPROLOL TARTRATE 1 MG/ML
5 INJECTION, SOLUTION INTRAVENOUS EVERY 5 MIN PRN
Status: DISCONTINUED | OUTPATIENT
Start: 2018-10-12 | End: 2018-10-12

## 2018-10-12 RX ORDER — DOPAMINE HYDROCHLORIDE 160 MG/100ML
2-20 INJECTION, SOLUTION INTRAVENOUS CONTINUOUS PRN
Status: DISCONTINUED | OUTPATIENT
Start: 2018-10-12 | End: 2018-10-12 | Stop reason: CLARIF

## 2018-10-12 RX ORDER — METOPROLOL SUCCINATE 25 MG/1
25 TABLET, EXTENDED RELEASE ORAL DAILY
Status: DISCONTINUED | OUTPATIENT
Start: 2018-10-13 | End: 2018-10-12 | Stop reason: HOSPADM

## 2018-10-12 RX ORDER — ASPIRIN 81 MG/1
81 TABLET, CHEWABLE ORAL ONCE
Status: COMPLETED | OUTPATIENT
Start: 2018-10-12 | End: 2018-10-12

## 2018-10-12 RX ORDER — DOBUTAMINE HYDROCHLORIDE 200 MG/100ML
2-20 INJECTION INTRAVENOUS CONTINUOUS PRN
Status: DISCONTINUED | OUTPATIENT
Start: 2018-10-12 | End: 2018-10-12

## 2018-10-12 RX ORDER — ONDANSETRON 2 MG/ML
4 INJECTION INTRAMUSCULAR; INTRAVENOUS EVERY 6 HOURS PRN
Status: DISCONTINUED | OUTPATIENT
Start: 2018-10-12 | End: 2018-10-12 | Stop reason: HOSPADM

## 2018-10-12 RX ORDER — OXYCODONE AND ACETAMINOPHEN 5; 325 MG/1; MG/1
1 TABLET ORAL EVERY 4 HOURS PRN
Status: DISCONTINUED | OUTPATIENT
Start: 2018-10-12 | End: 2018-10-12 | Stop reason: HOSPADM

## 2018-10-12 RX ORDER — NIFEDIPINE 10 MG/1
10 CAPSULE ORAL
Status: DISCONTINUED | OUTPATIENT
Start: 2018-10-12 | End: 2018-10-12

## 2018-10-12 RX ORDER — LISINOPRIL 20 MG/1
20 TABLET ORAL DAILY
Status: DISCONTINUED | OUTPATIENT
Start: 2018-10-13 | End: 2018-10-12 | Stop reason: HOSPADM

## 2018-10-12 RX ORDER — PHENYLEPHRINE HCL IN 0.9% NACL 1 MG/10 ML
20-100 SYRINGE (ML) INTRAVENOUS
Status: DISCONTINUED | OUTPATIENT
Start: 2018-10-12 | End: 2018-10-12

## 2018-10-12 RX ORDER — ASPIRIN 325 MG
325 TABLET ORAL
Status: DISCONTINUED | OUTPATIENT
Start: 2018-10-12 | End: 2018-10-12

## 2018-10-12 RX ORDER — PROCHLORPERAZINE 25 MG
25 SUPPOSITORY, RECTAL RECTAL EVERY 12 HOURS PRN
Status: DISCONTINUED | OUTPATIENT
Start: 2018-10-12 | End: 2018-10-12 | Stop reason: HOSPADM

## 2018-10-12 RX ORDER — ATROPINE SULFATE 0.1 MG/ML
0.5 INJECTION INTRAVENOUS EVERY 5 MIN PRN
Status: DISCONTINUED | OUTPATIENT
Start: 2018-10-12 | End: 2018-10-12 | Stop reason: HOSPADM

## 2018-10-12 RX ORDER — MAGNESIUM HYDROXIDE 1200 MG/15ML
1000 LIQUID ORAL CONTINUOUS PRN
Status: DISCONTINUED | OUTPATIENT
Start: 2018-10-12 | End: 2018-10-12 | Stop reason: CLARIF

## 2018-10-12 RX ORDER — NITROGLYCERIN 5 MG/ML
100-200 VIAL (ML) INTRAVENOUS
Status: DISCONTINUED | OUTPATIENT
Start: 2018-10-12 | End: 2018-10-12

## 2018-10-12 RX ORDER — FLUMAZENIL 0.1 MG/ML
0.2 INJECTION, SOLUTION INTRAVENOUS
Status: DISCONTINUED | OUTPATIENT
Start: 2018-10-12 | End: 2018-10-12

## 2018-10-12 RX ORDER — LEVOTHYROXINE SODIUM 25 UG/1
100 TABLET ORAL DAILY
Status: DISCONTINUED | OUTPATIENT
Start: 2018-10-13 | End: 2018-10-12 | Stop reason: HOSPADM

## 2018-10-12 RX ORDER — NICARDIPINE HYDROCHLORIDE 2.5 MG/ML
100 INJECTION INTRAVENOUS
Status: DISCONTINUED | OUTPATIENT
Start: 2018-10-12 | End: 2018-10-12

## 2018-10-12 RX ORDER — LORAZEPAM 2 MG/ML
.5-2 INJECTION INTRAMUSCULAR EVERY 4 HOURS PRN
Status: DISCONTINUED | OUTPATIENT
Start: 2018-10-12 | End: 2018-10-12

## 2018-10-12 RX ORDER — VERAPAMIL HYDROCHLORIDE 2.5 MG/ML
1-5 INJECTION, SOLUTION INTRAVENOUS
Status: DISCONTINUED | OUTPATIENT
Start: 2018-10-12 | End: 2018-10-12

## 2018-10-12 RX ORDER — DIPHENHYDRAMINE HYDROCHLORIDE 50 MG/ML
25-50 INJECTION INTRAMUSCULAR; INTRAVENOUS
Status: DISCONTINUED | OUTPATIENT
Start: 2018-10-12 | End: 2018-10-12

## 2018-10-12 RX ORDER — ASPIRIN 81 MG/1
81 TABLET ORAL DAILY
Status: DISCONTINUED | OUTPATIENT
Start: 2018-10-12 | End: 2018-10-12

## 2018-10-12 RX ORDER — NALOXONE HYDROCHLORIDE 0.4 MG/ML
0.4 INJECTION, SOLUTION INTRAMUSCULAR; INTRAVENOUS; SUBCUTANEOUS EVERY 5 MIN PRN
Status: DISCONTINUED | OUTPATIENT
Start: 2018-10-12 | End: 2018-10-12

## 2018-10-12 RX ORDER — HEPARIN SODIUM 1000 [USP'U]/ML
1000-10000 INJECTION, SOLUTION INTRAVENOUS; SUBCUTANEOUS EVERY 5 MIN PRN
Status: DISCONTINUED | OUTPATIENT
Start: 2018-10-12 | End: 2018-10-12

## 2018-10-12 RX ADMIN — FENTANYL CITRATE 50 MCG: 50 INJECTION, SOLUTION INTRAMUSCULAR; INTRAVENOUS at 09:44

## 2018-10-12 RX ADMIN — FENTANYL CITRATE 25 MCG: 50 INJECTION, SOLUTION INTRAMUSCULAR; INTRAVENOUS at 09:19

## 2018-10-12 RX ADMIN — FENTANYL CITRATE 50 MCG: 50 INJECTION, SOLUTION INTRAMUSCULAR; INTRAVENOUS at 09:03

## 2018-10-12 RX ADMIN — PROCHLORPERAZINE EDISYLATE 10 MG: 5 INJECTION INTRAMUSCULAR; INTRAVENOUS at 11:58

## 2018-10-12 RX ADMIN — HEPARIN SODIUM 8000 UNITS: 1000 INJECTION, SOLUTION INTRAVENOUS; SUBCUTANEOUS at 09:45

## 2018-10-12 RX ADMIN — Medication 500 UNITS: at 09:05

## 2018-10-12 RX ADMIN — ONDANSETRON 4 MG: 2 INJECTION INTRAMUSCULAR; INTRAVENOUS at 10:06

## 2018-10-12 RX ADMIN — MIDAZOLAM 2 MG: 1 INJECTION INTRAMUSCULAR; INTRAVENOUS at 09:03

## 2018-10-12 RX ADMIN — MIDAZOLAM 1 MG: 1 INJECTION INTRAMUSCULAR; INTRAVENOUS at 09:08

## 2018-10-12 RX ADMIN — MIDAZOLAM 1 MG: 1 INJECTION INTRAMUSCULAR; INTRAVENOUS at 09:19

## 2018-10-12 RX ADMIN — MIDAZOLAM 1 MG: 1 INJECTION INTRAMUSCULAR; INTRAVENOUS at 09:55

## 2018-10-12 RX ADMIN — ASPIRIN 81 MG 81 MG: 81 TABLET ORAL at 07:37

## 2018-10-12 RX ADMIN — CLOPIDOGREL 300 MG: 75 TABLET, FILM COATED ORAL at 12:30

## 2018-10-12 RX ADMIN — FENTANYL CITRATE 50 MCG: 50 INJECTION, SOLUTION INTRAMUSCULAR; INTRAVENOUS at 09:13

## 2018-10-12 RX ADMIN — HEPARIN SODIUM 1500 UNITS: 1000 INJECTION, SOLUTION INTRAVENOUS; SUBCUTANEOUS at 09:05

## 2018-10-12 RX ADMIN — CEFAZOLIN SODIUM 2 G: 2 INJECTION, SOLUTION INTRAVENOUS at 09:02

## 2018-10-12 RX ADMIN — LIDOCAINE HYDROCHLORIDE 30 ML: 10 INJECTION, SOLUTION EPIDURAL; INFILTRATION; INTRACAUDAL; PERINEURAL at 09:04

## 2018-10-12 RX ADMIN — MIDAZOLAM 1 MG: 1 INJECTION INTRAMUSCULAR; INTRAVENOUS at 09:13

## 2018-10-12 RX ADMIN — FENTANYL CITRATE 25 MCG: 50 INJECTION, SOLUTION INTRAMUSCULAR; INTRAVENOUS at 09:21

## 2018-10-12 RX ADMIN — SODIUM CHLORIDE: 9 INJECTION, SOLUTION INTRAVENOUS at 07:38

## 2018-10-12 NOTE — DISCHARGE INSTRUCTIONS
1.  Please start taking 75mg oral Plavix once daily.  This is an antiplatelet medication that will help prevent any clots from forming on your closure device. Do not stop taking this medication until instructed by Dr. Lopez.  2.  Please continue taking 81mg oral aspirin once daily.  3.  Follow up with Dr. Lopez as instructed.

## 2018-10-12 NOTE — PROGRESS NOTES
Received from [facility] with RN. No c/o pain. C/O nausea despite Zofran IVP. Order for comapzine pending. As pt. Settles, nausea improving. R groin site WDL, R Pedal pulse = 2+. Continue to monitor

## 2018-10-12 NOTE — H&P
[facility]   History and Physical  Cardiology - [facility]       Date of Admission:  10/12/2018  Date of Service: 10/12/2018     Primary Care Physician   Noemy Olivera [phone]    Assessment & Plan   Sofi Degroot is a 63 year old female with PMH significant for HTN, HLD, hx of TIA, known PFO, asymptomatic retinal artery occlusion who now presents today for planned PFO closure    S/p PFO Closure    PFO (patent foramen ovale)  Procedure was without complications.  Followed by Dr. Lopez in Congenital clinic whom preformed the procedure today.  VSS post operatively.  - ECHO at 15:00; assess for shunt, pericardial effusion  - 81mg ASA, 75mg PO Plavix x1 month  - follow up with cardiology as instructed  - continue PTA 20mg PO atorvastatin daily  - bedrest per venous femoral sheath protocol      Hypothyroidism  - continue PTA levothyroxine in AM should she remain overnight      Hx Hepatitis C virus infection without hepatic coma  Patient was treated with interferon x1 year and has been virus free since that point in time.      Hypertension goal BP (blood pressure) < 140/90  BP reviewed, controlled  - continue PTA lisinopril, metoprolol in AM should she remain overnight    F: not indicated  E: BMP in AM should she remain overnight  N: advance as tolerated  DVT Prophylaxis: not indicated    Code Status: Full Code    Disposition: Anticipate discharge later today. Appropriate for outpatient care.    Case discussed with Dr. Jose Ramon Andrade.  Assessment and plan as written above.    Maria Luisa Schuler PA-C  [facility]    History is obtained from the patient and review of the EMR.    Past Medical History    Past Medical History:   Diagnosis Date     Cervical high risk HPV (human papillomavirus) test positive 09/16/2016, 10/11/17    (not 16 or 18). 10/11/17: LSIL pap with + HR HPV (not 16 or 18) result.     Low risk HPV infection 2009, 2010    NIL pap, + HPV 53     Papanicolaou smear  of cervix with low grade squamous intraepithelial lesion (LGSIL) 10/11/2017    10/11/17: LSIL pap with + HR HPV (not 16 or 18) result.     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     Tx'd w/ Interferon x 1 year-virus free at 5 years, blood transfusion  related       Past Surgical History   Past Surgical History:   Procedure Laterality Date     C LIGATE FALLOPIAN TUBE       SURGICAL HISTORY OF -  Right 2008    Right 1st MTPJ, bone spur resection       Family History    Family History   Problem Relation Age of Onset     HEART DISEASE Maternal Grandfather      Breast Cancer Maternal Grandmother      Dx'd age 70     Arthritis Father      Lipids Mother      On meds     Cancer Mother      BCC     HEART DISEASE Brother       of dilated of cardiomyopathy at age 54     Alcohol/Drug Sister      Alcohol/Drug Brother      Depression Sister        History of Present Illness   Sofi Degroot is a 63 year old female with PMH significant for HTN, HLD, hx of TIA, known PFO, asymptomatic retinal artery occlusion who now presents today for planned PFO closure    Procedure was without complication. In the last two weeks, the patient denies fever, chills, lightheadedness, dizziness, new changes in vision, CP, SOB, cough, abdominal pain, dysuria, LE pain/swelling.     Post operatively, she has some mild nausea.      Prior to Admission Medications   Prior to Admission Medications   Prescriptions Last Dose Informant Patient Reported? Taking?   Acetaminophen (TYLENOL PO) 10/11/2018 at Unknown time  Yes Yes   Sig: Take 1,000 mg by mouth as needed for mild pain or fever   DAILY MULTIVITAMIN PO 10/12/2018 at 0800  Yes Yes   Si tab daily   IBUPROFEN PO Unknown at Unknown time  Yes No   Sig: Take 1 Dose by mouth as needed for moderate pain   Omega-3 Fatty Acids (OMEGA-3 FISH OIL PO) 10/12/2018 at 0800  Yes Yes   Sig: Take 2 g by mouth daily    VITAMIN D, CHOLECALCIFEROL, PO  10/12/2018 at 0800  Yes Yes   Sig: Take 4,000 Units by mouth daily    aspirin 325 MG tablet 10/10/2018 at 0800  No No   Sig: Take 1 tablet (325 mg) by mouth daily   atorvastatin (LIPITOR) 20 MG tablet 10/11/2018 at 2100  No Yes   Sig: Take 1 tablet (20 mg) by mouth daily   levothyroxine (SYNTHROID/LEVOTHROID) 100 MCG tablet 10/12/2018 at 0700  No Yes   Sig: TAKE ONE TABLET BY MOUTH EVERY DAY   lisinopril (PRINIVIL/ZESTRIL) 20 MG tablet 10/12/2018 at 0800  No Yes   Sig: TAKE ONE TABLET BY MOUTH EVERY DAY   magnesium 250 MG tablet Past Week at Unknown time  Yes Yes   Sig: Take 1 tablet by mouth daily   metoprolol succinate (TOPROL-XL) 50 MG 24 hr tablet 10/11/2018 at 2100  No Yes   Sig: TAKE ONE AND ONE-HALF TABLET BY MOUTH EVERY DAY   valACYclovir (VALTREX) 500 MG tablet Unknown at Unknown time  No No   Sig: TAKE ONE TABLET BY MOUTH TWICE A DAY FOR THREE DAYS. 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 No     Social History Narrative    Social Documentation:        Balanced Diet: YES    Calcium intake: 2-3 per day    Caffeine: 3 per day    Exercise:  type of activity work is physical and walking;  5 times per week     Sunscreen: Yes    Seatbelts:  Yes    Self Breast Exam:  Yes    Self Testicular Exam: na    Physical/Emotional/Sexual Abuse: No    Do you feel safe in your environment? Yes        Cholesterol screen up to date:     CHOL      199   7/21/2009    HDL       82   7/21/2009    LDL      100   7/21/2009    TRIG       83   7/21/2009    CHOLHDLRATIO      2.4   7/21/2009    Eye Exam up to date: Yes    Dental Exam up to date: Yes    Pap smear up to date: do today. PAP      NIL   7/21/2009    Mammogram up to date:  Due in September    Dexa Scan up to date: has not had one     Colonoscopy up to date: Yes, 04/08    Immunizations up to date: Yes    Glucose screen if over 40:  cna do today                                                               Physical Exam   /79 (BP Location: Left arm)  Pulse 68  Temp 97.7  F (36.5  C) (Oral)  LMP 10/27/2004  SpO2 96%   Weight: 0 lbs 0 oz There is no height or weight on file to calculate BMI.       Constitutional: Alert and oriented x4.  Cooperative.  Appears stated age.  Appears in no acute distress.   HEENT: Oropharynx is clear and moist. No evidence of cranial trauma.  Cardiovascular: Regular rate/ rhythm.  S1 and S2 grossly normal.  No appreciable murmur, rub, gallop.  JVP is not elevated.  No lower extremity edema.  Respiratory: Clear to auscultation bilaterally. Equal chest expansion.  GI: Soft, non-tender, normal bowel sounds, no hepatosplenomegaly.  Genitourinary: Deferred  Musculoskeletal: Normal muscle bulk and tone.  Skin: Warm and dry, no rashes.   Neurologic: Neck supple. Cranial nerves 3-12 are grossly intact.  is symmetric.     Data   Data reviewed today:     Recent Labs  Lab 10/12/18  0658   WBC 8.5   HGB 15.6   MCV 99         POTASSIUM 4.2   CHLORIDE 108   CO2 28   BUN 12   CR 0.78   ANIONGAP 5   ALICIA 9.6   GLC 88       I personally reviewed no images or EKG's today.    Maria Luisa Schuler PA-C  Cardiology - [facility]     Cardiology Attestation  Statement: This patient was managed by Dr. Xiao Lopez and Maria Luisa Schuler.    Jose Ramon Andrade MD

## 2018-10-12 NOTE — IP AVS SNAPSHOT
MRN:[id number]                      After Visit Summary   10/12/2018    Sofi Degroot    MRN: [id number]           Thank you!     Thank you for choosing [facility] for your care. Our goal is always to provide you with excellent care. Hearing back from our patients is one way we can continue to improve our services. Please take a few minutes to complete the written survey that you may receive in the mail after you visit with us. Thank you!        Patient Information     Date Of Birth          1955        About your hospital stay     You were admitted on:  October 12, 2018 You last received care in the:  Unit 6D Observation [facility]    You were discharged on:  October 12, 2018        Reason for your hospital stay       PFO closure                  Who to Call     For medical emergencies, please call 911.  For non-urgent questions about your medical care, please call your primary care provider or clinic, [phone]          Attending Provider     Provider Specialty    March, Xiomara Hagen MD Cardiology       Primary Care Provider Office Phone # Fax #    Noemy Olivera [phone]      After Care Instructions     Activity       Your activity upon discharge: activity as tolerated            Diet       Follow this diet upon discharge: Orders Placed This Encounter      Advance Diet as Tolerated: Regular Diet Adult            Discharge Instructions - IF on Metformin (Glucophage or Glucovance) or Metformin containing medications       IF on Metformin (Glucophage or Glucovance) or Metformin containing medications , schedule a Basic Metabolic Panel at [facility] or Primary Clinic in 48 - 72 hours post procedure and PRIOR TO resuming the Metformin or Metformin containing medications.  Hold Metformin (Glucophage or Glucovance) or Metformin containing medications until after the Basic Metabolic Panel on the 2nd or 3rd day following the procedure.  May resume after blood draw is  complete.                  Your next 10 appointments already scheduled     Oct 17, 2018  8:30 AM SUSANAT   Tu Physical Adult with Noemy Olivera DO   [facility]    [address] [facility] [address] [phone]              Further instructions from your care team       1.  Please start taking 75mg oral Plavix once daily.  This is an antiplatelet medication that will help prevent any clots from forming on your closure device. Do not stop taking this medication until instructed by Dr. Lopez.  2.  Please continue taking 81mg oral aspirin once daily.  3.  Follow up with Dr. Lopez as instructed.     Pending Results     Date and Time Order Name Status Description    10/12/2018 1047 Echocardiogram Limited In process     10/12/2018 1047 EKG 12-lead, tracing only  Preliminary     10/12/2018 0706 EKG 12-lead, tracing only Preliminary             Statement of Approval     Ordered          10/12/18 1903  I have reviewed and agree with all the recommendations and orders detailed in this document.  EFFECTIVE NOW     Approved and electronically signed by:  Panchito Yan MD             Admission Information     Date & Time Provider Department Dept. Phone    10/12/2018 March, Xiomara Hagen MD Unit 6D Observation [facility] [phone]      Your Vitals Were     Blood Pressure Pulse Temperature Respirations Last Period Pulse Oximetry    140/83 (BP Location: Left arm) 68 97.5  F (36.4  C) (Oral) 14 10/27/2004 97%      MyChart Information     [employer] gives you secure access to your electronic health record. If you see a primary care provider, you can also send messages to your care team and make appointments. If you have questions, please call your primary care clinic.  If you do not have a primary care provider, please call [phone] and they will assist you.        Care EveryWhere ID     This is your Care EveryWhere ID. This could be used by other  organizations to access your [facility] medical records  [id number]        Equal Access to Services     YESENIA HINOJOSA : Hadii miky Coy, frederic cui, deedee wolfe. So [facility] [phone].    ATENCIÓN: Si habla español, tiene a parsons disposición servicios gratuitos de asistencia lingüística. Llame al [phone].    We comply with applicable federal civil rights laws and [place] laws. We do not discriminate on the basis of race, color, national origin, age, disability, sex, sexual orientation, or gender identity.               Review of your medicines      START taking        Dose / Directions    aspirin 81 MG EC tablet   Used for:  PFO (patent foramen ovale)   Replaces:  aspirin 325 MG tablet        Dose:  81 mg   Start taking on:  10/13/2018   Take 1 tablet (81 mg) by mouth daily   Quantity:  30 tablet   Refills:  3       clopidogrel 75 MG tablet   Commonly known as:  PLAVIX   Used for:  PFO (patent foramen ovale)        Dose:  75 mg   Start taking on:  10/13/2018   Take 1 tablet (75 mg) by mouth daily   Quantity:  90 tablet   Refills:  3         CONTINUE these medicines which have NOT CHANGED        Dose / Directions    atorvastatin 20 MG tablet   Commonly known as:  LIPITOR   Used for:  Hypercholesteremia        Dose:  20 mg   Take 1 tablet (20 mg) by mouth daily   Quantity:  90 tablet   Refills:  3       DAILY MULTIVITAMIN PO        1 tab daily   Refills:  0       levothyroxine 100 MCG tablet   Commonly known as:  SYNTHROID/LEVOTHROID   Used for:  Other specified hypothyroidism        TAKE ONE TABLET BY MOUTH EVERY DAY   Quantity:  30 tablet   Refills:  0       lisinopril 20 MG tablet   Commonly known as:  PRINIVIL/ZESTRIL   Used for:  Essential hypertension with goal blood pressure less than 140/90        TAKE ONE TABLET BY MOUTH EVERY DAY   Quantity:  90 tablet   Refills:  1       magnesium 250 MG tablet        Dose:  1 tablet   Take 1  tablet by mouth daily   Refills:  0       metoprolol succinate 50 MG 24 hr tablet   Commonly known as:  TOPROL-XL   Used for:  Essential hypertension with goal blood pressure less than 140/90        TAKE ONE AND ONE-HALF TABLET BY MOUTH EVERY DAY   Quantity:  135 tablet   Refills:  1       OMEGA-3 FISH OIL PO        Dose:  2 g   Take 2 g by mouth daily   Refills:  0       TYLENOL PO        Dose:  1000 mg   Take 1,000 mg by mouth as needed for mild pain or fever   Refills:  0       valACYclovir 500 MG tablet   Commonly known as:  VALTREX   Used for:  Recurrent herpes simplex        TAKE ONE TABLET BY MOUTH TWICE A DAY FOR THREE DAYS. (REPEAT AS NEEDED)   Quantity:  12 tablet   Refills:  5       VITAMIN D (CHOLECALCIFEROL) PO        Dose:  4000 Units   Take 4,000 Units by mouth daily   Refills:  0         STOP taking     aspirin 325 MG tablet   Replaced by:  aspirin 81 MG EC tablet                Where to get your medicines      These medications were sent to [facility] [place], [place] - [address] [place] [address]     Phone:  [phone]     aspirin 81 MG EC tablet    clopidogrel 75 MG tablet                Protect others around you: Learn how to safely use, store and throw away your medicines at www.disposemymeds.org.             Medication List: This is a list of all your medications and when to take them. Check marks below indicate your daily home schedule. Keep this list as a reference.      Medications           Morning Afternoon Evening Bedtime As Needed    aspirin 81 MG EC tablet   Take 1 tablet (81 mg) by mouth daily   Start taking on:  10/13/2018                                atorvastatin 20 MG tablet   Commonly known as:  LIPITOR   Take 1 tablet (20 mg) by mouth daily                                clopidogrel 75 MG tablet   Commonly known as:  PLAVIX   Take 1 tablet (75 mg) by mouth daily   Start taking on:  10/13/2018   Last time this was given:   300 mg on 10/12/2018 12:30 PM                                DAILY MULTIVITAMIN PO   1 tab daily                                levothyroxine 100 MCG tablet   Commonly known as:  SYNTHROID/LEVOTHROID   TAKE ONE TABLET BY MOUTH EVERY DAY                                lisinopril 20 MG tablet   Commonly known as:  PRINIVIL/ZESTRIL   TAKE ONE TABLET BY MOUTH EVERY DAY                                magnesium 250 MG tablet   Take 1 tablet by mouth daily                                metoprolol succinate 50 MG 24 hr tablet   Commonly known as:  TOPROL-XL   TAKE ONE AND ONE-HALF TABLET BY MOUTH EVERY DAY                                OMEGA-3 FISH OIL PO   Take 2 g by mouth daily                                TYLENOL PO   Take 1,000 mg by mouth as needed for mild pain or fever                                valACYclovir 500 MG tablet   Commonly known as:  VALTREX   TAKE ONE TABLET BY MOUTH TWICE A DAY FOR THREE DAYS. (REPEAT AS NEEDED)                                VITAMIN D (CHOLECALCIFEROL) PO   Take 4,000 Units by mouth daily

## 2018-10-12 NOTE — IP AVS SNAPSHOT
Unit 6D Observation [address] [address]    Phone:  [phone]    Fax:  [phone]                                       After Visit Summary   10/12/2018    Sofi Degroot    MRN: [id number]           After Visit Summary Signature Page     I have received my discharge instructions, and my questions have been answered. I have discussed any challenges I see with this plan with the nurse or doctor.    ..........................................................................................................................................  Patient/Patient Representative Signature      ..........................................................................................................................................  Patient Representative Print Name and Relationship to Patient    ..................................................               ................................................  Date                                   Time    ..........................................................................................................................................  Reviewed by Signature/Title    ...................................................              ..............................................  Date                                               Time          22EPIC Rev 08/18

## 2018-10-12 NOTE — DISCHARGE SUMMARY
[facility]   Cardiology Discharge Summary      Date of Admission:  10/12/2018  Date of Discharge:  10/12/2018   Discharging Provider: Maria Luisa Schuler PA-C  Date of Service: 10/12/2018     Discharge Instructions:  1.  Please start taking 75mg oral Plavix once daily.  This is an antiplatelet medication that will help prevent any clots from forming on your closure device. Do not stop taking this medication until instructed by Dr. Lopez.  2.  Please continue taking 81mg oral aspirin once daily.  3.  Follow up with Dr. Lopez as instructed.      Primary Care     [facility]  [address] [address]      Identification and Chief Compaint: Sofi Degroot is a 63 year old female who presented on 10/12/2018 for planned PFO closure.    Discharge Diagnoses     Hypothyroidism    Hepatitis C virus infection without hepatic coma    Hypertension goal BP (blood pressure) < 140/90    PFO (patent foramen ovale)    [facility]    Discharge Disposition   Discharged to home    Discharge Orders     Discharge Instructions - IF on Metformin (Glucophage or Glucovance) or Metformin containing medications   IF on Metformin (Glucophage or Glucovance) or Metformin containing medications , schedule a Basic Metabolic Panel at [facility] or Primary Clinic in 48 - 72 hours post procedure and PRIOR TO resuming the Metformin or Metformin containing medications.  Hold Metformin (Glucophage or Glucovance) or Metformin containing medications until after the Basic Metabolic Panel on the 2nd or 3rd day following the procedure.  May resume after blood draw is complete.     Reason for your hospital stay   PFO closure     Activity   Your activity upon discharge: activity as tolerated     Full Code     Diet   Follow this diet upon discharge: Orders Placed This Encounter     Advance Diet as Tolerated: Regular Diet Adult        Discharge Medications   Current Discharge Medication List      START taking these medications    Details   aspirin 81 MG EC tablet Take 1 tablet (81 mg) by mouth daily  Qty: 30 tablet, Refills: 3    Associated Diagnoses: PFO (patent foramen ovale)      clopidogrel (PLAVIX) 75 MG tablet Take 1 tablet (75 mg) by mouth daily  Qty: 90 tablet, Refills: 3    Associated Diagnoses: PFO (patent foramen ovale)         CONTINUE these medications which have NOT CHANGED    Details   Acetaminophen (TYLENOL PO) Take 1,000 mg by mouth as needed for mild pain or fever      atorvastatin (LIPITOR) 20 MG tablet Take 1 tablet (20 mg) by mouth daily  Qty: 90 tablet, Refills: 3    Associated Diagnoses: Hypercholesteremia      DAILY MULTIVITAMIN PO 1 tab daily      levothyroxine (SYNTHROID/LEVOTHROID) 100 MCG tablet TAKE ONE TABLET BY MOUTH EVERY DAY  Qty: 30 tablet, Refills: 0    Comments: 1 month refill only; patient due for appointment (physical October 2018)  Associated Diagnoses: Other specified hypothyroidism      lisinopril (PRINIVIL/ZESTRIL) 20 MG tablet TAKE ONE TABLET BY MOUTH EVERY DAY  Qty: 90 tablet, Refills: 1    Associated Diagnoses: Essential hypertension with goal blood pressure less than 140/90      magnesium 250 MG tablet Take 1 tablet by mouth daily      metoprolol succinate (TOPROL-XL) 50 MG 24 hr tablet TAKE ONE AND ONE-HALF TABLET BY MOUTH EVERY DAY  Qty: 135 tablet, Refills: 1    Associated Diagnoses: Essential hypertension with goal blood pressure less than 140/90      Omega-3 Fatty Acids (OMEGA-3 FISH OIL PO) Take 2 g by mouth daily       VITAMIN D, CHOLECALCIFEROL, PO Take 4,000 Units by mouth daily       valACYclovir (VALTREX) 500 MG tablet TAKE ONE TABLET BY MOUTH TWICE A DAY FOR THREE DAYS. (REPEAT AS NEEDED)  Qty: 12 tablet, Refills: 5    Associated Diagnoses: Recurrent herpes simplex         STOP taking these medications       aspirin 325 MG tablet Comments:   Reason for Stopping:             Allergies    Allergies   Allergen Reactions     Latex Swelling     Dental visit--burning sensation in lips and throat, lips felt puffy, eye watery from latex gloves     Pineapple Other (See Comments)       Consultations This Hospital Stay  None    Significant Results and Procedures   - PFO closure with Dr. Lopez    ECHO 10/12/2018 (4 hours post procedure):  - Formal read not available, however, globally LV and RV function are normal. Device well seated, no pericardial effusion. No gross valvular anomalies.    History of Present Illness   (From H&P) See H and P.  Admission and discharge are same day.    Hospital Course   Sofi Degroot was admitted on 10/12/2018.  The following problems were addressed during her hospitalization:    S/p PFO Closure    PFO (patent foramen ovale)  Procedure was without complications.  Followed by Dr. Lopez in [facility] clinic whom preformed the procedure today.  VSS post operatively. ECHO at 15:00 showed no procedurally related complications.  The patient will discharge to home on 81mg PO aspirin, 75mg PO Plavix once daily.  The patient will discharge on her PTA 20mg PO atorvastatin daily.  She will follow up with Dr. Lopez/cardiology as previously discussed.      Hypothyroidism  Continue PTA levothyroxine on discharge.      Hx Hepatitis C virus infection without hepatic coma  Patient was treated with interferon x1 year and has been virus free since that point in time.      Hypertension goal BP (blood pressure) < 140/90  BP reviewed, controlled. Continue PTA lisinopril, metoprolol on discharge.    Physical Exam   Temp:  [97.1  F (36.2  C)-97.7  F (36.5  C)] 97.1  F (36.2  C)  Pulse:  [68] 68  Heart Rate:  [47-70] 70  Resp:  [8-13] 12  BP: (105-140)/(62-79) 110/70  SpO2:  [96 %-99 %] 96 %  There were no vitals filed for this visit.    Physical Exam: see H and P.  Admission and discharge are same day.    The discharge plan was discussed with the patient and patient agrees to plan    Total time on  this discharge was greater than 30 minutes.    Panchito Yan MD PhD  Cardiology Fellow  P: [phone]      ATTENDING NOTE:  Patient has been seen and evaluated by me.  I have reviewed today's vital signs, medications, labs, and imaging results.  I have reviewed and edited, as necessary, the history, review of systems, physical examination, and assessment and plan.  I have discussed my assessment and plan with the cardiology fellow.      Jose Ramon Andrade MD     Cardiovascular Division

## 2018-10-13 NOTE — PROGRESS NOTES
Discharge instruction reviewed.  Patient verbalized understanding. PIV removed, patient to the main lobby with family via wheelchair. Patient discharged.

## 2018-10-15 LAB
INTERPRETATION ECG - MUSE: NORMAL
INTERPRETATION ECG - MUSE: NORMAL

## 2018-10-17 ENCOUNTER — OFFICE VISIT (OUTPATIENT)
Dept: FAMILY MEDICINE | Facility: CLINIC | Age: 63
End: 2018-10-17
Payer: COMMERCIAL

## 2018-10-17 ENCOUNTER — CARE COORDINATION (OUTPATIENT)
Dept: CARDIOLOGY | Facility: CLINIC | Age: 63
End: 2018-10-17

## 2018-10-17 ENCOUNTER — TELEPHONE (OUTPATIENT)
Dept: CARDIOLOGY | Facility: CLINIC | Age: 63
End: 2018-10-17

## 2018-10-17 VITALS
HEIGHT: 69 IN | DIASTOLIC BLOOD PRESSURE: 83 MMHG | OXYGEN SATURATION: 96 % | BODY MASS INDEX: 26.2 KG/M2 | SYSTOLIC BLOOD PRESSURE: 118 MMHG | WEIGHT: 176.9 LBS | HEART RATE: 89 BPM | TEMPERATURE: 98.1 F | RESPIRATION RATE: 16 BRPM

## 2018-10-17 DIAGNOSIS — I10 ESSENTIAL HYPERTENSION WITH GOAL BLOOD PRESSURE LESS THAN 140/90: ICD-10-CM

## 2018-10-17 DIAGNOSIS — I10 HYPERTENSION GOAL BP (BLOOD PRESSURE) < 140/90: Chronic | ICD-10-CM

## 2018-10-17 DIAGNOSIS — E03.8 OTHER SPECIFIED HYPOTHYROIDISM: Chronic | ICD-10-CM

## 2018-10-17 DIAGNOSIS — Q21.12 PFO (PATENT FORAMEN OVALE): Chronic | ICD-10-CM

## 2018-10-17 DIAGNOSIS — Z00.00 ENCOUNTER FOR ROUTINE ADULT HEALTH EXAMINATION WITHOUT ABNORMAL FINDINGS: Primary | ICD-10-CM

## 2018-10-17 DIAGNOSIS — B00.9 RECURRENT HERPES SIMPLEX: ICD-10-CM

## 2018-10-17 DIAGNOSIS — R87.810 CERVICAL HIGH RISK HPV (HUMAN PAPILLOMAVIRUS) TEST POSITIVE: ICD-10-CM

## 2018-10-17 DIAGNOSIS — Z23 VACCINE FOR DIPHTHERIA-TETANUS-PERTUSSIS, COMBINED: ICD-10-CM

## 2018-10-17 DIAGNOSIS — M67.40 GANGLION CYST: ICD-10-CM

## 2018-10-17 DIAGNOSIS — L84 CORN OR CALLUS: ICD-10-CM

## 2018-10-17 DIAGNOSIS — Z87.74 S/P PATENT FORAMEN OVALE CLOSURE: Primary | ICD-10-CM

## 2018-10-17 DIAGNOSIS — H90.8 MIXED CONDUCTIVE AND SENSORINEURAL HEARING LOSS, UNSPECIFIED LATERALITY: ICD-10-CM

## 2018-10-17 LAB
ALBUMIN SERPL-MCNC: 3.6 G/DL (ref 3.4–5)
ALP SERPL-CCNC: 57 U/L (ref 40–150)
ALT SERPL W P-5'-P-CCNC: 26 U/L (ref 0–50)
AST SERPL W P-5'-P-CCNC: 22 U/L (ref 0–45)
BILIRUB DIRECT SERPL-MCNC: 0.2 MG/DL (ref 0–0.2)
BILIRUB SERPL-MCNC: 0.8 MG/DL (ref 0.2–1.3)
CHOLEST SERPL-MCNC: 144 MG/DL
HDLC SERPL-MCNC: 83 MG/DL
HIV 1+2 AB+HIV1 P24 AG SERPL QL IA: NONREACTIVE
LDLC SERPL CALC-MCNC: 49 MG/DL
NONHDLC SERPL-MCNC: 61 MG/DL
PROT SERPL-MCNC: 7.7 G/DL (ref 6.8–8.8)
TRIGL SERPL-MCNC: 61 MG/DL
TSH SERPL DL<=0.005 MIU/L-ACNC: 1.28 MU/L (ref 0.4–4)

## 2018-10-17 PROCEDURE — 99396 PREV VISIT EST AGE 40-64: CPT | Mod: 25 | Performed by: FAMILY MEDICINE

## 2018-10-17 PROCEDURE — 87624 HPV HI-RISK TYP POOLED RSLT: CPT | Performed by: FAMILY MEDICINE

## 2018-10-17 PROCEDURE — 87389 HIV-1 AG W/HIV-1&-2 AB AG IA: CPT | Performed by: FAMILY MEDICINE

## 2018-10-17 PROCEDURE — 90715 TDAP VACCINE 7 YRS/> IM: CPT | Performed by: FAMILY MEDICINE

## 2018-10-17 PROCEDURE — 84443 ASSAY THYROID STIM HORMONE: CPT | Performed by: FAMILY MEDICINE

## 2018-10-17 PROCEDURE — 90471 IMMUNIZATION ADMIN: CPT | Performed by: FAMILY MEDICINE

## 2018-10-17 PROCEDURE — 36415 COLL VENOUS BLD VENIPUNCTURE: CPT | Performed by: FAMILY MEDICINE

## 2018-10-17 PROCEDURE — 88175 CYTOPATH C/V AUTO FLUID REDO: CPT | Performed by: FAMILY MEDICINE

## 2018-10-17 PROCEDURE — 80076 HEPATIC FUNCTION PANEL: CPT | Performed by: FAMILY MEDICINE

## 2018-10-17 PROCEDURE — 80061 LIPID PANEL: CPT | Performed by: FAMILY MEDICINE

## 2018-10-17 RX ORDER — METOPROLOL SUCCINATE 50 MG/1
TABLET, EXTENDED RELEASE ORAL
Qty: 135 TABLET | Refills: 1 | Status: SHIPPED | OUTPATIENT
Start: 2018-10-17 | End: 2018-10-22

## 2018-10-17 RX ORDER — VALACYCLOVIR HYDROCHLORIDE 500 MG/1
TABLET, FILM COATED ORAL
Qty: 12 TABLET | Refills: 5 | Status: SHIPPED | OUTPATIENT
Start: 2018-10-17 | End: 2019-10-30

## 2018-10-17 RX ORDER — LEVOTHYROXINE SODIUM 100 UG/1
100 TABLET ORAL DAILY
Qty: 90 TABLET | Refills: 3 | Status: SHIPPED | OUTPATIENT
Start: 2018-10-17 | End: 2019-10-30

## 2018-10-17 RX ORDER — LISINOPRIL 20 MG/1
20 TABLET ORAL DAILY
Qty: 90 TABLET | Refills: 1 | Status: SHIPPED | OUTPATIENT
Start: 2018-10-17 | End: 2019-05-27

## 2018-10-17 NOTE — PROGRESS NOTES
Date: 10/17/2018    Time of Call: 3:29 PM     Diagnosis:  S/p PFO closure     [ TORB ] Ordering provider: Dr Hagen March  Order: Follow up in one month with an echo     Order received by: Nasir Alvarez RN     Follow-up/additional notes: none.

## 2018-10-17 NOTE — LETTER
November 6, 2018      Sofi Degroot  [address]  [place] [address]    Dear ,      We are contacting you in writing because we have been unable to reach you by phone.    This letter is in regards to the pap smear and HPV (Human Papillomavirus) test you had done recently. Your PAP test result is normal, but your HPV (Human Papillomavirus) test was positive for a high risk type of the HPV. HPV is a common virus found in sexually active men and women. Some high risk strains of the HPV virus can be risk factors for later development of cervical cancer.    About 80 percent of women have been exposed to HPV virus throughout their lifetime. There is no medication for the treatment of HPV. Typically your own immune system gets rid of the virus before it does harm. HPV is spread by direct skin-to-skin contact, including sexual intercourse, oral sex, anal sex, or any other contact involving the genital area (example: hand to genital contact). It is not possible to become infected with HPV by touching an object, such as a toilet seat. Most people who are infected with HPV have no signs or symptoms.    Your doctor has recommended that you have specific test called colposcopy. This test allows the provider to closely examine your cervix. If biopsies are taken, the results will be complete within two weeks and will be used to determine the plan for future follow-up.      Please call [facility] at [phone] to schedule this procedure with Dr. Olivera.  You can take an over the counter pain reliever 1 hour before your colposcopy. Nothing in the vagina for 24-48 hours before your colposcopy (no sex, douches, vaginal medications or lubricants).     If you have additional questions regarding this result, please call our registered nurse, Holly at [phone].    Sincerely,      Noemy Olivera DO/  Holly Prado RN-Pap Tracking

## 2018-10-17 NOTE — MR AVS SNAPSHOT
After Visit Summary   10/17/2018    Sofi Degroot    MRN: [id number]           Patient Information     Date Of Birth          1955        Visit Information        Provider Department      10/17/2018 8:30 AM Noemy Olivera DO [facility]        Today's Diagnoses     Encounter for routine adult health examination without abnormal findings    -  1    Cervical high risk HPV (human papillomavirus) test positive        Other specified hypothyroidism        PFO (patent foramen ovale)        Hypertension goal BP (blood pressure) < 140/90        Vaccine for diphtheria-tetanus-pertussis, combined        Essential hypertension with goal blood pressure less than 140/90        Recurrent herpes simplex        Mixed conductive and sensorineural hearing loss, unspecified laterality        Corn or callus        Ganglion cyst          Care Instructions    PLEASE CALL TO SCHEDULE YOUR MAMMOGRAM  [facility] [phone]  [facility] [phone]  [facility]   [phone]    Preventive Health Recommendations  Female Ages 50 - 64    Yearly exam: See your health care provider every year in order to  o Review health changes.   o Discuss preventive care.    o Review your medicines if your doctor has prescribed any.      Get a Pap test every three years (unless you have an abnormal result and your provider advises testing more often).    If you get Pap tests with HPV test, you only need to test every 5 years, unless you have an abnormal result.     You do not need a Pap test if your uterus was removed (hysterectomy) and you have not had cancer.    You should be tested each year for STDs (sexually transmitted diseases) if you're at risk.     Have a mammogram every 1 to 2 years.    Have a colonoscopy at age 50, or have a yearly FIT test (stool test). These exams screen for colon cancer.      Have a cholesterol test every 5 years, or more  often if advised.    Have a diabetes test (fasting glucose) every three years. If you are at risk for diabetes, you should have this test more often.     If you are at risk for osteoporosis (brittle bone disease), think about having a bone density scan (DEXA).    Shots: Get a flu shot each year. Get a tetanus shot every 10 years.    Nutrition:     Eat at least 5 servings of fruits and vegetables each day.    Eat whole-grain bread, whole-wheat pasta and brown rice instead of white grains and rice.    Get adequate Calcium and Vitamin D.     Lifestyle    Exercise at least 150 minutes a week (30 minutes a day, 5 days a week). This will help you control your weight and prevent disease.    Limit alcohol to one drink per day.    No smoking.     Wear sunscreen to prevent skin cancer.     See your dentist every six months for an exam and cleaning.    See your eye doctor every 1 to 2 years.            Follow-ups after your visit        Additional Services     AUDIOLOGY ADULT REFERRAL       [facility] [phone]   [email or web address]    Specialty Testing:  Audiogram w/Tymps and Reflexes (Comprehensive Audiology Evaluation)            ORTHO  REFERRAL       [facility] is referring you to the Orthopedic  Services at [facility].       The  Representative will assist you in the coordination of your Orthopedic and Musculoskeletal Care as prescribed by your physician.    The  Representative will call you within 1 business day to help schedule your appointment, or you may contact the  Representative at:    All areas ~ [phone]     Type of Referral : Podiatry / Foot & Ankle Surgery       Timeframe requested: Routine    Coverage of these services is subject to the terms and limitations of your health  insurance plan.  Please call member services at your health plan with any benefit or coverage questions.      If X-rays, CT or MRI's have been performed, please contact the facility where they were done to arrange for , prior to your scheduled appointment.  Please bring this referral request to your appointment and present it to your specialist.                  Follow-up notes from your care team     Return in about 6 months (around 4/17/2019) for BP Recheck.      Future tests that were ordered for you today     Open Future Orders        Priority Expected Expires Ordered    Fecal colorectal cancer screen (FIT) Routine 11/7/2018 1/9/2019 10/17/2018            Who to contact     If you have questions or need follow up information about today's clinic visit or your schedule please contact [facility] directly at [phone].  Normal or non-critical lab and imaging results will be communicated to you by Svpplyhart, letter or phone within 4 business days after the clinic has received the results. If you do not hear from us within 7 days, please contact the clinic through Svpplyhart or phone. If you have a critical or abnormal lab result, we will notify you by phone as soon as possible.  Submit refill requests through [employer] or call your pharmacy and they will forward the refill request to us. Please allow 3 business days for your refill to be completed.          Additional Information About Your Visit        [employer] Information     [employer] gives you secure access to your electronic health record. If you see a primary care provider, you can also send messages to your care team and make appointments. If you have questions, please call your primary care clinic.  If you do not have a primary care provider, please call [phone] and they will assist you.        Care EveryWhere ID     This is your Care EveryWhere ID. This could be used by other organizations to access your [facility]  "records  [id number]        Your Vitals Were     Pulse Temperature Respirations Height Last Period Pulse Oximetry    89 98.1  F (36.7  C) (Oral) 16 5' 9\" (1.753 m) 10/27/2004 96%    Breastfeeding? BMI (Body Mass Index)                No 26.12 kg/m2           Blood Pressure from Last 3 Encounters:   10/17/18 118/83   10/12/18 140/83   10/02/18 136/79    Weight from Last 3 Encounters:   10/17/18 176 lb 14.4 oz (80.2 kg)   10/02/18 180 lb (81.6 kg)   07/20/18 183 lb 1.6 oz (83.1 kg)              We Performed the Following     AUDIOLOGY ADULT REFERRAL     Hepatic panel (Albumin, ALT, AST, Bili, Alk Phos, TP)     HIV Antigen Antibody Combo     HPV High Risk Types DNA Cervical     Lipid panel reflex to direct LDL Fasting     ORTHO  REFERRAL     Pap imaged thin layer diagnostic with HPV (select HPV order below)     TDAP, IM (10 - 64 YRS) - Adacel     TSH with free T4 reflex          Today's Medication Changes          These changes are accurate as of 10/17/18  9:01 AM.  If you have any questions, ask your nurse or doctor.               These medicines have changed or have updated prescriptions.        Dose/Directions    levothyroxine 100 MCG tablet   Commonly known as:  SYNTHROID/LEVOTHROID   This may have changed:  See the new instructions.   Used for:  Other specified hypothyroidism   Changed by:  Noemy Olivera DO        Dose:  100 mcg   Take 1 tablet (100 mcg) by mouth daily   Quantity:  90 tablet   Refills:  3       lisinopril 20 MG tablet   Commonly known as:  PRINIVIL/ZESTRIL   This may have changed:  See the new instructions.   Used for:  Essential hypertension with goal blood pressure less than 140/90   Changed by:  Noemy Olivera DO        Dose:  20 mg   Take 1 tablet (20 mg) by mouth daily   Quantity:  90 tablet   Refills:  1            Where to get your medicines      These medications were sent to [facility] - [place], [place] - [address]  [address], " [facility] [address]    Hours:  Mon-Fri 8:30am-5:00pm Toll Free [phone] Phone:  [phone]     levothyroxine 100 MCG tablet    lisinopril 20 MG tablet    metoprolol succinate 50 MG 24 hr tablet    valACYclovir 500 MG tablet                Primary Care Provider Office Phone # Fax #    Noemy Olivera [phone] [address] [facility]  [address]  [facility] [address]        Equal Access to Services     YESENIA HINOJOSA : Hadii aad ku hadasho Soomaali, waaxda luqadaha, qaybta kaalmada adeegyada, waxay idiin hayaan adeeg kharakiera norwood . So [facility] [phone].    ATENCIÓN: Si habla español, tiene a parsons disposición servicios gratuitos de asistencia lingüística. [facility] [phone].    We comply with applicable federal civil rights laws and [place] laws. We do not discriminate on the basis of race, color, national origin, age, disability, sex, sexual orientation, or gender identity.            Thank you!     Thank you for choosing [facility]  for your care. Our goal is always to provide you with excellent care. Hearing back from our patients is one way we can continue to improve our services. Please take a few minutes to complete the written survey that you may receive in the mail after your visit with us. Thank you!             Your Updated Medication List - Protect others around you: Learn how to safely use, store and throw away your medicines at www.disposemymeds.org.          This list is accurate as of 10/17/18  9:01 AM.  Always use your most recent med list.                   Brand Name Dispense Instructions for use Diagnosis    aspirin 81 MG EC tablet     30 tablet    Take 1 tablet (81 mg) by mouth daily    PFO (patent foramen ovale)       atorvastatin 20 MG tablet    LIPITOR    90 tablet    Take 1 tablet (20 mg) by mouth daily    Hypercholesteremia       clopidogrel 75 MG tablet    PLAVIX    90 tablet    Take 1 tablet (75 mg) by mouth daily    PFO (patent foramen ovale)        DAILY MULTIVITAMIN PO      1 tab daily        levothyroxine 100 MCG tablet    SYNTHROID/LEVOTHROID    90 tablet    Take 1 tablet (100 mcg) by mouth daily    Other specified hypothyroidism       lisinopril 20 MG tablet    PRINIVIL/ZESTRIL    90 tablet    Take 1 tablet (20 mg) by mouth daily    Essential hypertension with goal blood pressure less than 140/90       magnesium 250 MG tablet      Take 1 tablet by mouth daily        metoprolol succinate 50 MG 24 hr tablet    TOPROL-XL    135 tablet    TAKE ONE AND ONE-HALF TABLET BY MOUTH EVERY DAY    Essential hypertension with goal blood pressure less than 140/90       OMEGA-3 FISH OIL PO      Take 2 g by mouth daily        TYLENOL PO      Take 1,000 mg by mouth as needed for mild pain or fever        valACYclovir 500 MG tablet    VALTREX    12 tablet    TAKE ONE TABLET BY MOUTH TWICE A DAY FOR THREE DAYS. (REPEAT AS NEEDED)    Recurrent herpes simplex       VITAMIN D (CHOLECALCIFEROL) PO      Take 4,000 Units by mouth daily

## 2018-10-17 NOTE — TELEPHONE ENCOUNTER
[facility] Call Center    Phone Message    May a detailed message be left on voicemail: yes    Reason for Call: Other: Per call from PT is requesting an APPT to see Dr Lopez in the Congenital clinic.  PT can be reached at the above #     Action Taken: Message routed to:  [facility] ([facility] Cardiology

## 2018-10-17 NOTE — PROGRESS NOTES
SUBJECTIVE:   CC: Sofi Degroot is an 63 year old woman who presents for preventive health visit.     Physical   Annual:     Getting at least 3 servings of Calcium per day:  Yes    Bi-annual eye exam:  Yes    Dental care twice a year:  Yes    Sleep apnea or symptoms of sleep apnea:  None    Diet:  Low salt    Frequency of exercise:  2-3 days/week    Duration of exercise:  15-30 minutes    Taking medications regularly:  Yes    Medication side effects:  None    Additional concerns today:  No        -------------------------------------    Today's PHQ-2 Score:   PHQ-2 ( 1999 [employer]) 10/17/2018   Q1: Little interest or pleasure in doing things 0   Q2: Feeling down, depressed or hopeless 0   PHQ-2 Score 0   Q1: Little interest or pleasure in doing things Not at all   Q2: Feeling down, depressed or hopeless Not at all   PHQ-2 Score 0       Abuse: Current or Past(Physical, Sexual or Emotional)- No  Do you feel safe in your environment - Yes    Social History   Substance Use Topics     Smoking status: Former Smoker     Smokeless tobacco: Never Used      Comment: quit 30 years ago     Alcohol use Yes      Comment: 4-5 Drinks Per Week     Alcohol Use 10/17/2018   If you drink alcohol do you typically have greater than 3 drinks per day OR greater than 7 drinks per week? No   No flowsheet data found.    Reviewed orders with patient.  Reviewed health maintenance and updated orders accordingly - Yes  Patient Active Problem List   Diagnosis     Hypothyroidism     Hepatitis C virus infection without hepatic coma     CARDIOVASCULAR SCREENING; LDL GOAL LESS THAN 100     Hypertension goal BP (blood pressure) < 140/90     Advanced directives, counseling/discussion     Transient cerebral ischemia     PFO (patent foramen ovale)     Cervical high risk HPV (human papillomavirus) test positive     Bilateral arm weakness     Lateral epicondylitis of both elbows     Retinal infarct     [facility]     Past Surgical History:   Procedure Laterality Date     C LIGATE FALLOPIAN TUBE       SURGICAL HISTORY OF -  Right 2008    Right 1st MTPJ, bone spur resection       Social History   Substance Use Topics     Smoking status: Former Smoker     Smokeless tobacco: Never Used      Comment: quit 30 years ago     Alcohol use Yes      Comment: 4-5 Drinks Per Week     Family History   Problem Relation Age of Onset     HEART DISEASE Maternal Grandfather      Breast Cancer Maternal Grandmother      Dx'd age 70     Arthritis Father      Lipids Mother      On meds     Cancer Mother      BCC     HEART DISEASE Brother       of dilated of cardiomyopathy at age 54     Alcohol/Drug Sister      Alcohol/Drug Brother      Depression Sister            Patient over age 50, mutual decision to screen reflected in health maintenance.    Pertinent mammograms are reviewed under the imaging tab.  History of abnormal Pap smear: YES - updated in Problem List and Health Maintenance accordingly  PAP / HPV Latest Ref Rng & Units 10/11/2017 2016 2013   PAP - LSIL(A) NIL NIL   HPV 16 DNA NEG:Negative Negative Negative -   HPV 18 DNA NEG:Negative Negative Negative -   OTHER HR HPV NEG:Negative Positive(A) Positive(A) -     Reviewed and updated as needed this visit by clinical staff  Tobacco  Med Hx  Surg Hx  Fam Hx  Soc Hx        Reviewed and updated as needed this visit by Provider            Review of Systems  CONSTITUTIONAL: NEGATIVE for fever, chills, change in weight  INTEGUMENTARY/SKIN: NEGATIVE for worrisome rashes, moles or lesions  EYES: NEGATIVE for vision changes or irritation  ENT: hearing loss  RESP: NEGATIVE for significant cough or SOB  BREAST: NEGATIVE for masses, tenderness or discharge  CV: NEGATIVE for chest pain, palpitations or peripheral edema  GI: NEGATIVE for nausea, abdominal pain, heartburn, or change in bowel habits  : NEGATIVE for unusual urinary or vaginal symptoms.  No vaginal bleeding.  MUSCULOSKELETAL:POSITIVE  For foot pain  NEURO: NEGATIVE for weakness, dizziness or paresthesias  PSYCHIATRIC: NEGATIVE for changes in mood or affect      OBJECTIVE:   LMP 10/27/2004  Physical Exam  GENERAL APPEARANCE: healthy, alert and no distress  EYES: Eyes grossly normal to inspection, PERRL and conjunctivae and sclerae normal  HENT: ear canals and TM's normal, nose and mouth without ulcers or lesions, oropharynx clear and oral mucous membranes moist  NECK: no adenopathy, no asymmetry, masses, or scars and thyroid normal to palpation  RESP: lungs clear to auscultation - no rales, rhonchi or wheezes  BREAST: normal without masses, tenderness or nipple discharge and no palpable axillary masses or adenopathy  CV: regular rate and rhythm, normal S1 S2, no S3 or S4, no murmur, click or rub, no peripheral edema and peripheral pulses strong  ABDOMEN: soft, nontender, no hepatosplenomegaly, no masses and bowel sounds normal   (female): normal female external genitalia, normal urethral meatus, vaginal mucosal atrophy noted, normal cervix, adnexae, and uterus without masses or abnormal discharge  MS: no musculoskeletal defects are noted and gait is age appropriate without ataxia  SKIN: no suspicious lesions or rashes  NEURO: Normal strength and tone, sensory exam grossly normal, mentation intact and speech normal  PSYCH: mentation appears normal and affect normal/bright    Diagnostic Test Results:  pending    ASSESSMENT/PLAN:   1. Encounter for routine adult health examination without abnormal findings  Routine screening  Number for mammogram given  Fit to go home with patient  - Lipid panel reflex to direct LDL Fasting  - Fecal colorectal cancer screen (FIT); Future  - HIV Antigen Antibody Combo    2. Cervical high risk HPV (human papillomavirus) test positive  Pending -   - Pap imaged thin layer diagnostic with HPV (select HPV order below)  - HPV High Risk Types DNA Cervical    3. Other  "specified hypothyroidism  Pending TSH   Did refill but if off will need to adjust  - TSH with free T4 reflex  - levothyroxine (SYNTHROID/LEVOTHROID) 100 MCG tablet; Take 1 tablet (100 mcg) by mouth daily  Dispense: 90 tablet; Refill: 3    4. PFO (patent foramen ovale)  Repaired   On plavix and ASA     5. Hypertension goal BP (blood pressure) < 140/90  At goal  - Hepatic panel (Albumin, ALT, AST, Bili, Alk Phos, TP)    6. Vaccine for diphtheria-tetanus-pertussis, combined  given  - TDAP, IM (10 - 64 YRS) - Adacel    7. Essential hypertension with goal blood pressure less than 140/90     - lisinopril (PRINIVIL/ZESTRIL) 20 MG tablet; Take 1 tablet (20 mg) by mouth daily  Dispense: 90 tablet; Refill: 1  - metoprolol succinate (TOPROL-XL) 50 MG 24 hr tablet; TAKE ONE AND ONE-HALF TABLET BY MOUTH EVERY DAY  Dispense: 135 tablet; Refill: 1    8. Recurrent herpes simplex  refilled  - valACYclovir (VALTREX) 500 MG tablet; TAKE ONE TABLET BY MOUTH TWICE A DAY FOR THREE DAYS. (REPEAT AS NEEDED)  Dispense: 12 tablet; Refill: 5    9. Mixed conductive and sensorineural hearing loss, unspecified laterality     - AUDIOLOGY ADULT REFERRAL    10. Corn or callus     - ORTHO  REFERRAL    11. Ganglion cyst     - ORTHO  REFERRAL    COUNSELING:  Reviewed preventive health counseling, as reflected in patient instructions    BP Readings from Last 1 Encounters:   10/12/18 140/83     Estimated body mass index is 26.58 kg/(m^2) as calculated from the following:    Height as of 10/2/18: 5' 9\" (1.753 m).    Weight as of 10/2/18: 180 lb (81.6 kg).      Weight management plan: Discussed healthy diet and exercise guidelines and patient will follow up in 12 months in clinic to re-evaluate.     reports that she has quit smoking. She has never used smokeless tobacco.      Counseling Resources:  ATP IV Guidelines  Pooled Cohorts Equation Calculator  Breast Cancer Risk Calculator  FRAX Risk Assessment  ICSI Preventive " Guidelines  Dietary Guidelines for Americans, 2010  USDA's MyPlate  ASA Prophylaxis  Lung CA Screening    Noemy Olivera DO  [facility]  Answers for HPI/ROS submitted by the patient on 10/17/2018   PHQ-2 Score: 0

## 2018-10-17 NOTE — NURSING NOTE
"Chief Complaint   Patient presents with     Physical     /83  Pulse 89  Temp 98.1  F (36.7  C) (Oral)  Resp 16  Ht 5' 9\" (1.753 m)  Wt 176 lb 14.4 oz (80.2 kg)  LMP 10/27/2004  SpO2 96%  Breastfeeding? No  BMI 26.12 kg/m2 Estimated body mass index is 26.12 kg/(m^2) as calculated from the following:    Height as of this encounter: 5' 9\" (1.753 m).    Weight as of this encounter: 176 lb 14.4 oz (80.2 kg).  Medication Reconciliation: complete        Health Maintenance Due Pending Provider Review:  NONE    n/a    Tegan Wilkinson MA Lead  [facility]  [phone]  "

## 2018-10-17 NOTE — PATIENT INSTRUCTIONS
PLEASE CALL TO SCHEDULE YOUR MAMMOGRAM  [facility] [phone]  [facility] [phone]  [facility]   [phone]    Preventive Health Recommendations  Female Ages 50 - 64    Yearly exam: See your health care provider every year in order to  o Review health changes.   o Discuss preventive care.    o Review your medicines if your doctor has prescribed any.      Get a Pap test every three years (unless you have an abnormal result and your provider advises testing more often).    If you get Pap tests with HPV test, you only need to test every 5 years, unless you have an abnormal result.     You do not need a Pap test if your uterus was removed (hysterectomy) and you have not had cancer.    You should be tested each year for STDs (sexually transmitted diseases) if you're at risk.     Have a mammogram every 1 to 2 years.    Have a colonoscopy at age 50, or have a yearly FIT test (stool test). These exams screen for colon cancer.      Have a cholesterol test every 5 years, or more often if advised.    Have a diabetes test (fasting glucose) every three years. If you are at risk for diabetes, you should have this test more often.     If you are at risk for osteoporosis (brittle bone disease), think about having a bone density scan (DEXA).    Shots: Get a flu shot each year. Get a tetanus shot every 10 years.    Nutrition:     Eat at least 5 servings of fruits and vegetables each day.    Eat whole-grain bread, whole-wheat pasta and brown rice instead of white grains and rice.    Get adequate Calcium and Vitamin D.     Lifestyle    Exercise at least 150 minutes a week (30 minutes a day, 5 days a week). This will help you control your weight and prevent disease.    Limit alcohol to one drink per day.    No smoking.     Wear sunscreen to prevent skin cancer.     See your dentist every six months for an exam and cleaning.    See your eye doctor every 1 to 2  years.

## 2018-10-18 PROCEDURE — 82274 ASSAY TEST FOR BLOOD FECAL: CPT | Performed by: FAMILY MEDICINE

## 2018-10-19 DIAGNOSIS — Z00.00 ENCOUNTER FOR ROUTINE ADULT HEALTH EXAMINATION WITHOUT ABNORMAL FINDINGS: ICD-10-CM

## 2018-10-19 LAB
COPATH REPORT: NORMAL
HEMOCCULT STL QL IA: NEGATIVE
PAP: NORMAL

## 2018-10-22 ENCOUNTER — APPOINTMENT (OUTPATIENT)
Dept: GENERAL RADIOLOGY | Facility: CLINIC | Age: 63
End: 2018-10-22
Attending: EMERGENCY MEDICINE
Payer: COMMERCIAL

## 2018-10-22 ENCOUNTER — HOSPITAL ENCOUNTER (EMERGENCY)
Facility: CLINIC | Age: 63
Discharge: HOME | End: 2018-10-22
Attending: EMERGENCY MEDICINE | Admitting: EMERGENCY MEDICINE
Payer: COMMERCIAL

## 2018-10-22 VITALS
HEART RATE: 156 BPM | WEIGHT: 190.04 LBS | BODY MASS INDEX: 28.06 KG/M2 | OXYGEN SATURATION: 100 % | SYSTOLIC BLOOD PRESSURE: 127 MMHG | TEMPERATURE: 97.9 F | RESPIRATION RATE: 21 BRPM | DIASTOLIC BLOOD PRESSURE: 87 MMHG

## 2018-10-22 DIAGNOSIS — I10 ESSENTIAL HYPERTENSION WITH GOAL BLOOD PRESSURE LESS THAN 140/90: ICD-10-CM

## 2018-10-22 DIAGNOSIS — I48.0 PAROXYSMAL ATRIAL FIBRILLATION (H): ICD-10-CM

## 2018-10-22 DIAGNOSIS — I48.0 PAF (PAROXYSMAL ATRIAL FIBRILLATION) (H): Primary | ICD-10-CM

## 2018-10-22 LAB
ANION GAP SERPL CALCULATED.3IONS-SCNC: 10 MMOL/L (ref 3–14)
BASOPHILS # BLD AUTO: 0 10E9/L (ref 0–0.2)
BASOPHILS NFR BLD AUTO: 0.4 %
BUN SERPL-MCNC: 17 MG/DL (ref 7–30)
CA-I BLD-SCNC: 5.2 MG/DL (ref 4.4–5.2)
CALCIUM SERPL-MCNC: 9.5 MG/DL (ref 8.5–10.1)
CHLORIDE SERPL-SCNC: 109 MMOL/L (ref 94–109)
CO2 BLDCOV-SCNC: 25 MMOL/L (ref 21–28)
CO2 SERPL-SCNC: 25 MMOL/L (ref 20–32)
CREAT SERPL-MCNC: 1.01 MG/DL (ref 0.52–1.04)
DIFFERENTIAL METHOD BLD: ABNORMAL
EOSINOPHIL # BLD AUTO: 0.3 10E9/L (ref 0–0.7)
EOSINOPHIL NFR BLD AUTO: 2.8 %
ERYTHROCYTE [DISTWIDTH] IN BLOOD BY AUTOMATED COUNT: 13.1 % (ref 10–15)
FINAL DIAGNOSIS: ABNORMAL
GFR SERPL CREATININE-BSD FRML MDRD: 55 ML/MIN/1.7M2
GLUCOSE BLD-MCNC: 81 MG/DL (ref 70–99)
GLUCOSE SERPL-MCNC: 100 MG/DL (ref 70–99)
HCT VFR BLD AUTO: 48.5 % (ref 35–47)
HCT VFR BLD CALC: 45 %PCV (ref 35–47)
HGB BLD CALC-MCNC: 15.3 G/DL (ref 11.7–15.7)
HGB BLD-MCNC: 15.8 G/DL (ref 11.7–15.7)
HPV HR 12 DNA CVX QL NAA+PROBE: POSITIVE
HPV16 DNA SPEC QL NAA+PROBE: NEGATIVE
HPV18 DNA SPEC QL NAA+PROBE: NEGATIVE
IMM GRANULOCYTES # BLD: 0.1 10E9/L (ref 0–0.4)
IMM GRANULOCYTES NFR BLD: 0.6 %
INTERPRETATION ECG - MUSE: NORMAL
LYMPHOCYTES # BLD AUTO: 3.4 10E9/L (ref 0.8–5.3)
LYMPHOCYTES NFR BLD AUTO: 32.6 %
MCH RBC QN AUTO: 32.6 PG (ref 26.5–33)
MCHC RBC AUTO-ENTMCNC: 32.6 G/DL (ref 31.5–36.5)
MCV RBC AUTO: 100 FL (ref 78–100)
MONOCYTES # BLD AUTO: 0.7 10E9/L (ref 0–1.3)
MONOCYTES NFR BLD AUTO: 6.6 %
NEUTROPHILS # BLD AUTO: 5.9 10E9/L (ref 1.6–8.3)
NEUTROPHILS NFR BLD AUTO: 57 %
NRBC # BLD AUTO: 0 10*3/UL
NRBC BLD AUTO-RTO: 0 /100
PCO2 BLDV: 38 MM HG (ref 40–50)
PH BLDV: 7.44 PH (ref 7.32–7.43)
PLATELET # BLD AUTO: 172 10E9/L (ref 150–450)
PO2 BLDV: 22 MM HG (ref 25–47)
POTASSIUM BLD-SCNC: 3.9 MMOL/L (ref 3.4–5.3)
POTASSIUM SERPL-SCNC: 4.2 MMOL/L (ref 3.4–5.3)
RBC # BLD AUTO: 4.84 10E12/L (ref 3.8–5.2)
SAO2 % BLDV FROM PO2: 40 %
SODIUM BLD-SCNC: 144 MMOL/L (ref 133–144)
SODIUM SERPL-SCNC: 144 MMOL/L (ref 133–144)
SPECIMEN DESCRIPTION: ABNORMAL
SPECIMEN SOURCE CVX/VAG CYTO: ABNORMAL
TROPONIN I BLD-MCNC: 0.08 UG/L (ref 0–0.08)
TROPONIN I SERPL-MCNC: 0.12 UG/L (ref 0–0.04)
TSH SERPL DL<=0.005 MIU/L-ACNC: 2.07 MU/L (ref 0.4–4)
WBC # BLD AUTO: 10.4 10E9/L (ref 4–11)

## 2018-10-22 PROCEDURE — 93010 ELECTROCARDIOGRAM REPORT: CPT | Mod: 76 | Performed by: EMERGENCY MEDICINE

## 2018-10-22 PROCEDURE — 40000502 ZZHCL STATISTIC GLUCOSE ED POCT

## 2018-10-22 PROCEDURE — 82330 ASSAY OF CALCIUM: CPT

## 2018-10-22 PROCEDURE — 85025 COMPLETE CBC W/AUTO DIFF WBC: CPT | Performed by: EMERGENCY MEDICINE

## 2018-10-22 PROCEDURE — 82803 BLOOD GASES ANY COMBINATION: CPT

## 2018-10-22 PROCEDURE — 99291 CRITICAL CARE FIRST HOUR: CPT | Mod: 25 | Performed by: EMERGENCY MEDICINE

## 2018-10-22 PROCEDURE — 84443 ASSAY THYROID STIM HORMONE: CPT | Performed by: EMERGENCY MEDICINE

## 2018-10-22 PROCEDURE — 93005 ELECTROCARDIOGRAM TRACING: CPT | Mod: 76 | Performed by: EMERGENCY MEDICINE

## 2018-10-22 PROCEDURE — 40000498 ZZHCL STATISTIC POTASSIUM ED POCT

## 2018-10-22 PROCEDURE — 93010 ELECTROCARDIOGRAM REPORT: CPT | Mod: Z6 | Performed by: EMERGENCY MEDICINE

## 2018-10-22 PROCEDURE — 96374 THER/PROPH/DIAG INJ IV PUSH: CPT | Performed by: EMERGENCY MEDICINE

## 2018-10-22 PROCEDURE — 40000497 ZZHCL STATISTIC SODIUM ED POCT

## 2018-10-22 PROCEDURE — 25000125 ZZHC RX 250: Performed by: EMERGENCY MEDICINE

## 2018-10-22 PROCEDURE — 71045 X-RAY EXAM CHEST 1 VIEW: CPT

## 2018-10-22 PROCEDURE — 84484 ASSAY OF TROPONIN QUANT: CPT

## 2018-10-22 PROCEDURE — 40000501 ZZHCL STATISTIC HEMATOCRIT ED POCT

## 2018-10-22 PROCEDURE — 93005 ELECTROCARDIOGRAM TRACING: CPT | Performed by: EMERGENCY MEDICINE

## 2018-10-22 PROCEDURE — 99213 OFFICE O/P EST LOW 20 MIN: CPT | Performed by: INTERNAL MEDICINE

## 2018-10-22 PROCEDURE — 84484 ASSAY OF TROPONIN QUANT: CPT | Performed by: EMERGENCY MEDICINE

## 2018-10-22 PROCEDURE — 80048 BASIC METABOLIC PNL TOTAL CA: CPT | Performed by: EMERGENCY MEDICINE

## 2018-10-22 PROCEDURE — 99285 EMERGENCY DEPT VISIT HI MDM: CPT | Mod: 25 | Performed by: EMERGENCY MEDICINE

## 2018-10-22 RX ORDER — DILTIAZEM HYDROCHLORIDE 5 MG/ML
10 INJECTION INTRAVENOUS ONCE
Status: COMPLETED | OUTPATIENT
Start: 2018-10-22 | End: 2018-10-22

## 2018-10-22 RX ORDER — METOPROLOL SUCCINATE 100 MG/1
100 TABLET, EXTENDED RELEASE ORAL DAILY
Qty: 90 TABLET | Refills: 3 | Status: SHIPPED | OUTPATIENT
Start: 2018-10-22 | End: 2018-11-09

## 2018-10-22 RX ADMIN — DILTIAZEM HYDROCHLORIDE 10 MG: 5 INJECTION INTRAVENOUS at 08:38

## 2018-10-22 ASSESSMENT — ENCOUNTER SYMPTOMS
LIGHT-HEADEDNESS: 1
DIAPHORESIS: 1
NERVOUS/ANXIOUS: 1
PALPITATIONS: 1

## 2018-10-22 NOTE — CONSULTS
Electrophysiology Consultation Note   EP Attending: .   Reason for consultation: AF.   Provider requesting consultation: SHILPI Houston MD.  Date of Service: 10/22/2018      HPI:   Ms. Degroot is a 63 year old female who has a past medical history hypothyroidism, HepC, HTN, TIA, retinal artery occlusion, PFO s/p percutaneous closure 10/12/18. She presents to [facility] today with palpitations and generally feeling unwell.   She first had left arm numbness and was found to have TIA in 2015. Work up at that time showed small PFO. A cardiac monitor was negative for that time. More recently she had a retinal artery occlusion and decision was made to pursue PFO closure. She had percutaneous PFO closure with Amplatz device on 10/12/18. She has since had two episodes of tachycardia. The first about a week ago where she counted her pulse up to 148 bpm and felt racing heart and palpitations. She performed vagal maneuvers and then her heart rate went down to around 100 bpm. She went to bed and when she woke up she felt back to her baseline. The second episode started 10/21/18 in the afternoon. She reports she did not feel well and have very rapid heart beat. She felt her pulse rate again was around 140-150 bpm. She tried vagal maneuvers again but these were not successful. When her symptoms continued she cam into the [facility]. She was found to be in AF with RVR. She reported feeling generally unwell and had some palpitations. Upon exam in the [facility], she converted spontaneously to sinus. She reported feeling much better in sinus. She denies any prior history of arryhtmias besides her recent episodes. Recent echo showed LVEF 55-60%. She denies any chest pain/pressures, worsening shortness of breath, leg/ankle swelling, PND, orthopnea, or syncopal symptoms. Current cardiac medications include: ASA, Toprol XL, Lisinopril, and Plavix.     Past Medical History:   Past Medical History:   Diagnosis Date     Cervical high risk HPV (human  papillomavirus) test positive 09/16/2016, 10/11/17    (not 16 or 18). 10/11/17: LSIL pap with + HR HPV (not 16 or 18) result.     Low risk HPV infection 2009, 2010    NIL pap, + HPV 53     Papanicolaou smear of cervix with low grade squamous intraepithelial lesion (LGSIL) 10/11/2017    10/11/17: LSIL pap with + HR HPV (not 16 or 18) result.     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 1998    Tx'd w/ Interferon x 1 year-virus free at 5 years, blood transfusion  related     Past Surgical History:   Past Surgical History:   Procedure Laterality Date     C LIGATE FALLOPIAN TUBE  1986     SURGICAL HISTORY OF -  Right 2008    Right 1st MTPJ, bone spur resection     Allergies: Per MAR     Allergies   Allergen Reactions     Latex Swelling     Dental visit--burning sensation in lips and throat, lips felt puffy, eye watery from latex gloves     Pineapple Other (See Comments)     Medications:   Per MAR current outpatient cardiovascular medications include:   (Not in a hospital admission)  Current Outpatient Prescriptions   Medication Sig Dispense Refill     Acetaminophen (TYLENOL PO) Take 1,000 mg by mouth as needed for mild pain or fever       aspirin 81 MG EC tablet Take 1 tablet (81 mg) by mouth daily 30 tablet 3     atorvastatin (LIPITOR) 20 MG tablet Take 1 tablet (20 mg) by mouth daily 90 tablet 3     clopidogrel (PLAVIX) 75 MG tablet Take 1 tablet (75 mg) by mouth daily 90 tablet 3     DAILY MULTIVITAMIN PO 1 tab daily       levothyroxine (SYNTHROID/LEVOTHROID) 100 MCG tablet Take 1 tablet (100 mcg) by mouth daily 90 tablet 3     lisinopril (PRINIVIL/ZESTRIL) 20 MG tablet Take 1 tablet (20 mg) by mouth daily 90 tablet 1     magnesium 250 MG tablet Take 1 tablet by mouth daily       metoprolol succinate (TOPROL-XL) 50 MG 24 hr tablet TAKE ONE AND ONE-HALF TABLET BY MOUTH EVERY  tablet 1     Omega-3 Fatty Acids (OMEGA-3 FISH OIL PO) Take 2 g by mouth daily         valACYclovir (VALTREX) 500 MG tablet TAKE ONE TABLET BY MOUTH TWICE A DAY FOR THREE DAYS. (REPEAT AS NEEDED) 12 tablet 5     VITAMIN D, CHOLECALCIFEROL, PO Take 4,000 Units by mouth daily        [DISCONTINUED] lisinopril-hydrochlorothiazide (PRINZIDE,ZESTORETIC) 20-12.5 MG per tablet Take 1 tablet by mouth daily 90 tablet 3       Family History:   Family History   Problem Relation Age of Onset     HEART DISEASE Maternal Grandfather      Breast Cancer Maternal Grandmother      Dx'd age 70     Arthritis Father      Lipids Mother      On meds     Cancer Mother      BCC     HEART DISEASE Brother       of dilated of cardiomyopathy at age 54     Alcohol/Drug Sister      Alcohol/Drug Brother      Depression Sister      Social History:   Social History   Substance Use Topics     Smoking status: Former Smoker     Smokeless tobacco: Never Used      Comment: quit 30 years ago     Alcohol use Yes      Comment: 4-5 Drinks Per Week       ROS:   A comprehensive 10 point ROS was negative other than as mentioned in HPI.    Physical Examination:   VITALS: /86  Pulse 156  Temp 97.9  F (36.6  C) (Oral)  Resp 12  Wt 86.2 kg (190 lb 0.6 oz)  LMP 10/27/2004  SpO2 96%  BMI 28.06 kg/m2  GENERAL APPEARANCE: AxO, NAD   HEENT: NCAT, EOMI, MMM. PERRLA.   NECK: Supple. No JVD or bruit. Good carotid upstroke.   CHEST: CTAB   CARDIOVASCULAR: S1S2, Reg, No m/r/g.   ABDOMEN: BS+, soft, No pulsatile masses or bruits.   EXTREMITIES: No pedal edema. Distal pulses intact.   NEURO: Grossly nonfocal.   PSYCH: Normal affect.  SKIN: Warm and dry.   Data:   Labs:  BMP  Recent Labs  Lab 10/22/18  0836 10/22/18  0821    144   POTASSIUM 3.9 4.2   CHLORIDE  --  109   ALICIA  --  9.5   CO2  --  25   BUN  --  17   CR  --  1.01   GLC 81 100*     CBC  Recent Labs  Lab 10/22/18  0836 10/22/18  0821   WBC  --  10.4   RBC  --  4.84   HGB 15.3 15.8*   HCT  --  48.5*   MCV  --  100   MCH  --  32.6   MCHC  --  32.6   RDW  --  13.1   PLT  --  172      INRNo lab results found in last 7 days.  No results found for: CKTOTAL, CKMB, TROPN  Cholesterol (mg/dL)   Date Value   10/17/2018 144   10/17/2017 199   03/15/2016 218 (H)   2015 203 (H)     Cholesterol/HDL Ratio (no units)   Date Value   2015 1.9   2014 1.9   2013 2.6   2012 2.1     HDL Cholesterol (mg/dL)   Date Value   10/17/2018 83   10/17/2017 91   03/15/2016 82   2015 106     LDL Cholesterol Calculated (mg/dL)   Date Value   10/17/2018 49   10/17/2017 98   03/15/2016 119 (H)   2015 84     EKG:      ECHO:   Recent Results (from the past 4320 hour(s))   Echocardiogram Limited    Narrative    [id number]  [id number]  [id number]  [id number]^GEORGE^MICHELLE^KIMBERLY           [facility]  Echocardiography Laboratory  [address] [place] [id number]     Name: YARON BRAXTON  MRN: [id number]  : 1955  Study Date: 10/12/2018 02:49 PM  Age: 63 yrs  Gender: Female  Patient Location: [facility]  Reason For Study: Limited for effusion S/P PFO closure  Ordering Physician: MICHELLE VAZQUEZ  Referring Physician: MILY HERNANDEZ  Performed By: Stuart Nguyen RDCS     BSA: 2.0 m2  Height: 69 in  Weight: 179 lb  HR: 64  BP: 140/79 mmHg  _____________________________________________________________________________  __        Procedure  Limited Portable Echo Adult.  _____________________________________________________________________________  __        Interpretation Summary  Limited study.  No significant pericardial effusion.  PFO closure device in place.  _____________________________________________________________________________  __     _____________________________________________________________________________  __                          Report approved by: Negrita Hunter 10 07:58 PM              _____________________________________________________________________________  __          Assessment:   Ms. Mikayla ya  63 year old female who has a past medical history hypothyroidism, HepC, HTN, TIA, retinal artery occlusion, PFO s/p percutaneous closure 10/12/18. She presents to [facility] today with palpitations and generally feeling unwell.   She first had left arm numbness and was found to have TIA in . Work up at that time showed small PFO. A cardiac monitor was negative for that time. More recently she had a retinal artery occlusion and decision was made to pursue PFO closure. She had percutaneous PFO closure with Amplatz device on 10/12/18. She has since had two episodes of tachycardia. The first about a week ago where she counted her pulse up to 148 bpm and felt racing heart and palpitations. She performed vagal maneuvers and then her heart rate went down to around 100 bpm. She went to bed and when she woke up she felt back to her baseline. The second episode started 10/21/18 in the afternoon. She reports she did not feel well and have very rapid heart beat. She felt her pulse rate again was around 140-150 bpm. She tried vagal maneuvers again but these were not successful. When her symptoms continued she cam into the [facility]. She was found to be in AF with RVR. She reported feeling generally unwell and had some palpitations. Upon exam in the [facility], she converted spontaneously to sinus. She reported feeling much better in sinus. She denies any prior history of arryhtmias besides her recent episodes. Recent echo showed LVEF 55-60%. She denies any chest pain/pressures, worsening shortness of breath, leg/ankle swelling, PND, orthopnea, or syncopal symptoms. Current cardiac medications include: ASA, Toprol XL, Lisinopril, and Plavix.     EP Recommendations:  We discussed in detail with the patient management/treatment options for A.fib includin. Stroke Prophylaxis:  CHADSVASC=+gender, +HTN, ++TIA  4, corresponding to a 4.0% annual stroke / systemic emolism event rate. indicating need for long term oral anticoagulation.     Anticoagulants include warfarin (Coumadin) and the noval oral anticoagulant agents: dabigatran (Pradaxa), rivaroxaban (Xarelto), and apixaban (Eliquis). The benefits of warfarin include: low cost, established track record in reduction of stroke and systemic embolism, the ability to reverse the agents, the ability to titrate the agent, and the ability to provide additional protection in subsets of patients who require anticoagulation for an independent process (i.e. prosthetic valve). The disadvantages of warfarin include: frequent phlebotomy for INRs, difficulty in regulation of INR [typically  60-65% of INRs within therapeutic range], and dietary constraints secondary to bioavailability. The benefits of the novel oral anticoagulants, as a class, include:  no phlebotomy, similar or significantly lower risk of stroke or systemic embolism depending on the agent, and similar or significantly lower risk of bleeding, particularly intracranial bleeding. The disadvantages of the novel agents, as a class, include: higher cost, the inability to reverse the agents (except Pradaxa), and  the need to be cautious in patients with CKD.   After detailed discussion, she would favor a DOAC and favored Xarelto given once a day dosing. We discussed with her primary Cardiologist, Dr. Lopez, who agreed we could stop ASA and continue with Plavix and Xarelto.   2. Rate Control: Increase Metoprolol to 100 mg daily .   3. Rhythm Control: Cardioversion, Antiarrhythmics and/or ablation are options for rhythm control. Given she just recently had PFO closure, this may just be due to procedural irritation. We would not favor starting any AAT at this time, but could consider if she develops frequent recurrences. Ablation would be reserved for refractory AF and if needed would need to be at least 3-6 months after fresh PFO closure device placed.   4. Risk Factor Management: maintain tight BP control, and ASHLEY evaluation as indicated.      Follow  up in 3 weeks with Dr. Lopez with an echo prior as scheduled and can see EP at that time as well.     The patient states understanding and is agreeable with plan.   Thank you for allowing us to participate in the care of this patient.     The patient was discussed w/ Dr. Baldwin.  The above note reflects our joint plan.    JAKE Arias CNP  Electrophysiology Consult Service  Pager: [phone]    EP STAFF NOTE  I have seen and examined the patient as part of a shared visit with ANDRE Arias NP.  I agree with the note above. I reviewed today's vital signs and medications. I have reviewed and discussed with the advanced practice provider their physical examination, assessment, and plan   Briefly, /p ASD closure 10/12/18, now with PAF  My key history/exam findings are: converted spontaneously.   The key management decisions made by me: NOAC, f/u with EP as outpatient.    Alonzo Baldwin MD [facility]  Cardiology - Electrophysiology

## 2018-10-22 NOTE — ED AVS SNAPSHOT
[facility], [place], Emergency Department    [address] [place] [id number]    Phone:  [phone]                                       Sofi Degroot   MRN: [id number]    Department:  [facility], Emergency Department   Date of Visit:  10/22/2018           After Visit Summary Signature Page     I have received my discharge instructions, and my questions have been answered. I have discussed any challenges I see with this plan with the nurse or doctor.    ..........................................................................................................................................  Patient/Patient Representative Signature      ..........................................................................................................................................  Patient Representative Print Name and Relationship to Patient    ..................................................               ................................................  Date                                   Time    ..........................................................................................................................................  Reviewed by Signature/Title    ...................................................              ..............................................  Date                                               Time          22EPIC Rev 08/18

## 2018-10-22 NOTE — ED AVS SNAPSHOT
[facility], Emergency Department    [address] [facility] [address]    Phone:  [phone]                                       Sofi Degroot   MRN: [id number]    Department:  [facility], Emergency Department   Date of Visit:  10/22/2018           Patient Information     Date Of Birth          1955        Your diagnoses for this visit were:     PAF (paroxysmal atrial fibrillation) (H)     Essential hypertension with goal blood pressure less than 140/90     Paroxysmal atrial fibrillation (H)        You were seen by Sue Land MD.        Discharge Instructions       Thank you for coming to the [facility] Emergency Department.     Please return to the ER at any time with symptoms of chest pain, shortness of breath, unusual nausea or sweating.           Plan:     Start Xarelto 20 mg daily (blood thinner to prevent stroke risk with A.fib) (your first month is filled here and the remainder has been sent to your mail order pharmacy please call them with the copay card information to get reduced prices)    Stop Aspirin    Increase Metoprolol to 100 mg daily (new prescription was sent to your mail order pharmacy)    Continue Plavix and other home medications    We will call you to scheduled follow up with Dr. Lopez and Dr. Baldwin with an Echo  Cardiovascular Clinic:   [address] [place] [address]. [place] [address]  Your Care Team:  EP Cardiology   Telephone Number     Smita Baldwin [phone]   Nasir Miranda RN [phone]   For scheduling appts or procedures:    Esme Lovell   [phone]     As always, Thank you for trusting us with your health care needs!      Your next 10 appointments already scheduled     Oct 29, 2018 11:15 AM CDT   New Visit with Cleve Pastrana DPM   [facility] ([facility])    [address] [place] [address] [phone]             "Nov 08, 2018 11:30 AM CST   MA SCREENING DIGITAL BILATERAL with [id number]   [facility] ([facility])    [address] [place] [address], [address]  [facility] [address] [phone]           How do I prepare for my exam? (Food and drink instructions) No Food and Drink Restrictions.  How do I prepare for my exam? (Other instructions) Do not use any powder, lotion or deodorant under your arms or on your breast. If you do, we will ask you to remove it before your exam.  What should I wear: Wear comfortable, two-piece clothing.  How long does the exam take: Most scans will take 15 minutes.  What should I bring: Bring any previous mammograms from other facilities or have them mailed to the breast center.  Do I need a :  No  is needed.  What do I need to tell my doctor: If you have any allergies, tell your care team.  What should I do after the exam: No restrictions, You may resume normal activities.  What is this test: This test is an x-ray of the breast to look for breast disease. The breast is pressed between two plates to flatten and spread the tissue. An X-ray is taken of the breast from different angles.  Who should I call with questions: If you have any questions, please call the Imaging Department where you will have your exam. Directions, parking instructions, and other information is available on our website, [facility] to [email or web address].  Other information about my exam Three-dimensional (3D) mammograms are available at [facility] locations in [facility], [place], [place], [facility], [place], [facility] and [place]. [facility] locations include [place] and the Clinics and Surgery Center in [place].  Benefits of 3D mammograms include * Improved rate of cancer detection * Decreases your chance of having to go back for more tests, which means fewer: * \"False-positive\" results (This means that there is an abnormal area but it isn't cancer.) * Invasive testing procedures, such " as a biopsy or surgery * Can provide clearer images of the breast if you have dense breast tissue.  *3D mammography is an optional exam that anyone can have with a 2D mammogram. It doesn't replace or take the place of a 2D mammogram. 2D mammograms remain an effective screening test for all women.  Not all insurance companies cover the cost of a 3D mammogram. Check with your insurance.              24 Hour Appointment Hotline       To make an appointment at any [facility], call [phone] ([phone]). If you don't have a family doctor or clinic, we will help you find one. [facility] clinics are conveniently located to serve the needs of you and your family.             Review of your medicines      START taking        Dose / Directions Last dose taken    metoprolol succinate 100 MG 24 hr tablet   Commonly known as:  TOPROL-XL   Dose:  100 mg   Quantity:  90 tablet        Take 1 tablet (100 mg) by mouth daily   Refills:  3        * rivaroxaban ANTICOAGULANT 20 MG Tabs tablet   Commonly known as:  XARELTO   Dose:  20 mg   Quantity:  30 tablet        Take 1 tablet (20 mg) by mouth daily (with dinner)   Refills:  0        * rivaroxaban ANTICOAGULANT 20 MG Tabs tablet   Commonly known as:  XARELTO   Dose:  20 mg   Quantity:  30 tablet   Start taking on:  11/22/2018        Take 1 tablet (20 mg) by mouth daily (with dinner)   Refills:  11        * Notice:  This list has 2 medication(s) that are the same as other medications prescribed for you. Read the directions carefully, and ask your doctor or other care provider to review them with you.      Our records show that you are taking the medicines listed below. If these are incorrect, please call your family doctor or clinic.        Dose / Directions Last dose taken    atorvastatin 20 MG tablet   Commonly known as:  LIPITOR   Dose:  20 mg   Quantity:  90 tablet        Take 1 tablet (20 mg) by mouth daily   Refills:  3        clopidogrel 75 MG tablet   Commonly  known as:  PLAVIX   Dose:  75 mg   Quantity:  90 tablet        Take 1 tablet (75 mg) by mouth daily   Refills:  3        DAILY MULTIVITAMIN PO        1 tab daily   Refills:  0        levothyroxine 100 MCG tablet   Commonly known as:  SYNTHROID/LEVOTHROID   Dose:  100 mcg   Quantity:  90 tablet        Take 1 tablet (100 mcg) by mouth daily   Refills:  3        lisinopril 20 MG tablet   Commonly known as:  PRINIVIL/ZESTRIL   Dose:  20 mg   Quantity:  90 tablet        Take 1 tablet (20 mg) by mouth daily   Refills:  1        magnesium 250 MG tablet   Dose:  1 tablet        Take 1 tablet by mouth daily   Refills:  0        OMEGA-3 FISH OIL PO   Dose:  2 g        Take 2 g by mouth daily   Refills:  0        TYLENOL PO   Dose:  1000 mg        Take 1,000 mg by mouth as needed for mild pain or fever   Refills:  0        valACYclovir 500 MG tablet   Commonly known as:  VALTREX   Quantity:  12 tablet        TAKE ONE TABLET BY MOUTH TWICE A DAY FOR THREE DAYS. (REPEAT AS NEEDED)   Refills:  5        VITAMIN D (CHOLECALCIFEROL) PO   Dose:  4000 Units        Take 4,000 Units by mouth daily   Refills:  0          STOP taking        Dose Reason for stopping Comments    aspirin 81 MG EC tablet                      Prescriptions were sent or printed at these locations (1 Prescription)                   [facility] [place], [place] - [address] [place] [address]    Telephone:  [phone]   Fax:  [phone]   Hours:                  E-Prescribed (1 of 1)         rivaroxaban ANTICOAGULANT (XARELTO) 20 MG TABS tablet                Procedures and tests performed during your visit     Procedure/Test Number of Times Performed    Basic metabolic panel 1    CBC with platelets differential 1    Cardiac Continuous Monitoring 1    Chest  XR, 1 view portable 1    EKG 12 lead 3    ISTAT CG8+ POCT (VBG, Glucose, Hct, Hb) 1    ISTAT gases elec ica gluc ashlyn POCT 1    ISTAT troponin nursing  POCT 1    IV access: insert 2nd peripheral IV 1    Peripheral IV catheter 1    TSH with free T4 reflex 1    Troponin I 1    Troponin POCT 1      Orders Needing Specimen Collection     None      Pending Results     Date and Time Order Name Status Description    10/22/2018 0923 EKG 12 lead Preliminary     10/22/2018 0819 Chest  XR, 1 view portable Preliminary             Pending Culture Results     No orders found from 10/20/2018 to 10/23/2018.            Pending Results Instructions     If you had any lab results that were not finalized at the time of your Discharge, you can call the ED Lab Result RN at [phone]. You will be contacted by this team for any positive Lab results or changes in treatment. The nurses are available 7 days a week from 10A to 6:30P.  You can leave a message 24 hours per day and they will return your call.        Thank you for choosing [facility]       Thank you for choosing [facility] for your care. Our goal is always to provide you with excellent care. Hearing back from our patients is one way we can continue to improve our services. Please take a few minutes to complete the written survey that you may receive in the mail after you visit with us. Thank you!        [employer] Information     [employer] gives you secure access to your electronic health record. If you see a primary care provider, you can also send messages to your care team and make appointments. If you have questions, please call your primary care clinic.  If you do not have a primary care provider, please call [phone] and they will assist you.        Care EveryWhere ID     This is your Care EveryWhere ID. This could be used by other organizations to access your [facility] medical records  [id number]        Equal Access to Services     [facility] : Hadii miky canelao Soem[place]ina, waaxda luqadaha, qaybta kaalmada renata, [place]. So [facility] [phone].    ATENCIÓN: kenneth White  parsons disposición servicios gratuitos de asistencia lingüística. Llsulma al [phone].    We comply with applicable federal civil rights laws and [place] laws. We do not discriminate on the basis of race, color, national origin, age, disability, sex, sexual orientation, or gender identity.            After Visit Summary       This is your record. Keep this with you and show to your community pharmacist(s) and doctor(s) at your next visit.

## 2018-10-22 NOTE — ED PROVIDER NOTES
"  History     Chief Complaint   Patient presents with     Tachycardia     The history is provided by the patient and medical records.     Sofi Degroot is a 63 year old female with a history of TIA (2015), PFO, hypertension and hypothyroidism who presents to the Emergency Department due to palpitations. Patient reports that she woke up this morning and did not \"feel well.\" She states that she felt anxious, lightheaded, sweaty and as if her heart was \"fluttering.\" She took her blood pressure which was baseline at 110-120s/80s. She had a cup of yogurt but still felt unwell and called EMS due to concern for a possible heart attack. En route, patient's heart rate ranged from 130-180, her blood pressure was 130/80 and she had a blood sugar of 158.     Patient notes that she felt lightheaded and weak throughout the afternoon yesterday (10/21) and into the night. She notes that she felt fine immediately prior to bed and was able to sleep through the night. She monitored her blood pressure and heart rate and noted that she was tachycardic around 150. She used the vagal maneuver and was able to get her heart rate down to the 120s. Patient denies chest pain.     Patient reports that she took her morning medications consisting of lisinopril, synthroid, Plavix and aspirin. Her evening medications include metoprolol and Lipitor; she denies any recently missed doses. She recently had a PFO closure on 10/12 and was started on Plavix immediately following this. She does not have a history of heart attacks.     No current facility-administered medications for this encounter.      Current Outpatient Prescriptions   Medication     rivaroxaban ANTICOAGULANT (XARELTO) 20 MG TABS tablet     Acetaminophen (TYLENOL PO)     atorvastatin (LIPITOR) 20 MG tablet     clopidogrel (PLAVIX) 75 MG tablet     DAILY MULTIVITAMIN PO     levothyroxine (SYNTHROID/LEVOTHROID) 100 MCG tablet     lisinopril (PRINIVIL/ZESTRIL) 20 MG tablet     magnesium " 250 MG tablet     metoprolol succinate (TOPROL-XL) 100 MG 24 hr tablet     Omega-3 Fatty Acids (OMEGA-3 FISH OIL PO)     [START ON 2018] rivaroxaban ANTICOAGULANT (XARELTO) 20 MG TABS tablet     valACYclovir (VALTREX) 500 MG tablet     VITAMIN D, CHOLECALCIFEROL, PO     [DISCONTINUED] lisinopril-hydrochlorothiazide (PRINZIDE,ZESTORETIC) 20-12.5 MG per tablet     [DISCONTINUED] metoprolol succinate (TOPROL-XL) 50 MG 24 hr tablet     Past Medical History:   Diagnosis Date     Cervical high risk HPV (human papillomavirus) test positive 2016, 10/11/17    (not 16 or 18). 10/11/17: LSIL pap with + HR HPV (not 16 or 18) result.     Low risk HPV infection ,     NIL pap, + HPV 53     Papanicolaou smear of cervix with low grade squamous intraepithelial lesion (LGSIL) 10/11/2017    10/11/17: LSIL pap with + HR HPV (not 16 or 18) result.     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     Tx'd w/ Interferon x 1 year-virus free at 5 years, blood transfusion  related       Past Surgical History:   Procedure Laterality Date     C LIGATE FALLOPIAN TUBE  1986     SURGICAL HISTORY OF -  Right 2008    Right 1st MTPJ, bone spur resection       Family History   Problem Relation Age of Onset     HEART DISEASE Maternal Grandfather      Breast Cancer Maternal Grandmother      Dx'd age 70     Arthritis Father      Lipids Mother      On meds     Cancer Mother      BCC     HEART DISEASE Brother       of dilated of cardiomyopathy at age 54     Alcohol/Drug Sister      Alcohol/Drug Brother      Depression Sister        Social History   Substance Use Topics     Smoking status: Former Smoker     Smokeless tobacco: Never Used      Comment: quit 30 years ago     Alcohol use Yes      Comment: 4-5 Drinks Per Week     Allergies   Allergen Reactions     Latex Swelling     Dental visit--burning sensation in lips and throat, lips felt puffy, eye watery from latex gloves     Pineapple  Other (See Comments)     I have reviewed the Medications, Allergies, Past Medical and Surgical History, and Social History in the Epic system.    Review of Systems   Constitutional: Positive for diaphoresis.   Cardiovascular: Positive for palpitations. Negative for chest pain.   Neurological: Positive for light-headedness.   Psychiatric/Behavioral: The patient is nervous/anxious.    All other systems reviewed and are negative.      Physical Exam   BP: (!) 159/112  Pulse: 156  Heart Rate: 131  Temp: 97.9  F (36.6  C)  Resp: 16  Weight: 86.2 kg (190 lb 0.6 oz)  SpO2: 98 %      Physical Exam   Gen:A&Ox3, no acute distress  HEENT:PERRL, no facial tenderness or wounds, head atraumatic, oropharynx clear, mucous membranes moist, TMs clear bilaterally  Neck:no bony tenderness or step offs, no JVD, trachea midline  Back: no CVA tenderness, no midline bony tenderness  CV:RRR without murmurs  PULM:Clear to auscultation bilaterally  Abd: soft, non-tender and non-distended  UE:No traumatic injuries, skin normal  LE:no traumatic injuries, skin normal, no LE edema.   Neuro:CN II-XII intact, strength 5/5 throughout, gait stable.   Skin: no rashes or ecchymoses      ED Course   8:15 AM  The patient was seen and examined by Sue Land MD in Room 2.   ED Course     Procedures             EKG Interpretation:      Interpreted by Sue Land  Time reviewed: 8:28AM  Symptoms at time of EKG: plapitations   Rhythm: atrial fibrillation  Rate: 157  Axis: normal  Ectopy: none  Conduction: irregular  ST Segments/ T Waves: tall T waves V2 and V3  Q Waves: none  Comparison to prior: new atrial fibrillation  Clinical Impression: atrial fibrillation with RVR               EKG Interpretation:      Interpreted by Sue Land  Time reviewed: 9:40AM  Symptoms at time of EKG: palpitations   Rhythm: normal sinus   Rate: 83  Axis: NORMAL  Ectopy: none  Conduction: normal  ST Segments/ T Waves: No ST-T wave changes  Q Waves:  none  Comparison to prior: converted out of a fib, unchanged from Oct. 12, 2018    Clinical Impression: normal EKG        Critical Care time:  was 45 minutes for this patient excluding procedures.    Labs Ordered and Resulted from Time of ED Arrival Up to the Time of Departure from the ED   CBC WITH PLATELETS DIFFERENTIAL - Abnormal; Notable for the following:        Result Value    Hemoglobin 15.8 (*)     Hematocrit 48.5 (*)     All other components within normal limits   BASIC METABOLIC PANEL - Abnormal; Notable for the following:     Glucose 100 (*)     GFR Estimate 55 (*)     All other components within normal limits   TROPONIN I - Abnormal; Notable for the following:     Troponin I ES 0.119 (*)     All other components within normal limits   ISTAT GASES ELEC ICA GLUC ELISEO POCT - Abnormal; Notable for the following:     Ph Venous 7.44 (*)     PCO2 Venous 38 (*)     PO2 Venous 22 (*)     All other components within normal limits   TSH WITH FREE T4 REFLEX   PERIPHERAL IV CATHETER   IV ACCESS   CARDIAC CONTINUOUS MONITORING   ISTAT GAS OR ELECTROLYTE NURSING POCT   ISTAT TROPONIN NURSING POCT   TROPONIN POCT            Assessments & Plan (with Medical Decision Making)   Sofi Degroot is a 63 year old female presenting with palpitations associated with anxiety, lightheadedness, sweating, symptoms of present since middle of the day yesterday, but she reports at least one previous episode of a possible atrial flutter or fibrillation that terminated with Valsalva.  No known past atrial fib.  Does have a history of recent closure of a PFO as well as a TIA in 2005 and a retinal artery occlusion raising concern for another past stroke.  Arrived to the ED afebrile.  Hypertensive with a blood pressure 159/112 and tachycardic to 156.  Irregular rhythm.  She is placed on a cardiac monitor and noted to be in A. fib with RVR.  EKG confirmed A. fib.  She did have tall T waves in V2 V3 so IV access and troponin lab was sent  with some slightly increased at 0.119.  CBC unremarkable.  Base metabolic panel within normal limits, TSH normal.  Chest x-ray without signs of acute heart failure.  Normal mediastinum. Initiated brain rhythm control with IV diltiazem, she received 10mg x1 which resulted in slowing of her rhythm to atrial fibrillation with a rate in the 90s.  She then spontaneously converted to normal sinus rhythm with rate in the 80s.  Repeat EKG is unchanged from her previous, not suggestive of acute ischemic abnormalities further suggesting that the increase troponin is stress reaction to her day of tachycardia.  She was seen by electrophysiology, they plan to start her on Xarelto and increase her metoprolol from 50 mg daily to 100 mg daily.  She is in agreement with this plan and will have her follow-up in the cardiology clinic.    I have reviewed the nursing notes.    I have reviewed the findings, diagnosis, plan and need for follow up with the patient.    Discharge Medication List as of 10/22/2018 11:33 AM      START taking these medications    Details   !! rivaroxaban ANTICOAGULANT (XARELTO) 20 MG TABS tablet Take 1 tablet (20 mg) by mouth daily (with dinner), Disp-30 tablet, R-0, E-Prescribe       !! - Potential duplicate medications found. Please discuss with provider.          Final diagnoses:   Paroxysmal atrial fibrillation (H)   I, Sandie Holley, am serving as a trained medical scribe to document services personally performed by Sue Land MD, based on the provider's statements to me.   Sue MUNOZ MD, was physically present and have reviewed and verified the accuracy of this note documented by Sandie Holley.    10/22/2018   [facility], [place], EMERGENCY DEPARTMENT    MD ALEKS Koch Katrina Anne, MD  10/23/18 0466

## 2018-10-22 NOTE — ED TRIAGE NOTES
BIBA from home. This am, felt like she was having an anxiety attack - sweaty, anxious. Hyperventilating on EMS arrival. -180's on monitor. On 10/12 surgery, has been on plavix since 10/13.

## 2018-10-22 NOTE — DISCHARGE INSTRUCTIONS
Thank you for coming to the [facility] Emergency Department.     Please return to the ER at any time with symptoms of chest pain, shortness of breath, unusual nausea or sweating.           Plan:     Start Xarelto 20 mg daily (blood thinner to prevent stroke risk with A.fib) (your first month is filled here and the remainder has been sent to your mail order pharmacy please call them with the copay card information to get reduced prices)    Stop Aspirin    Increase Metoprolol to 100 mg daily (new prescription was sent to your mail order pharmacy)    Continue Plavix and other home medications    We will call you to scheduled follow up with Dr. Lopez and Dr. Baldwin with an Echo  Cardiovascular Clinic:   [address] [place] [address]. [place] [address]  Your Care Team:  EP Cardiology   Telephone Number     Smita Baldwin [phone]   Nasir Miranda RN [phone]   For scheduling appts or procedures:    Esme Lovell   [phone]     As always, Thank you for trusting us with your health care needs!

## 2018-10-25 ENCOUNTER — HOSPITAL ENCOUNTER (OUTPATIENT)
Facility: CLINIC | Age: 63
Setting detail: OBSERVATION
Discharge: HOME | End: 2018-10-26
Attending: EMERGENCY MEDICINE | Admitting: INTERNAL MEDICINE
Payer: COMMERCIAL

## 2018-10-25 DIAGNOSIS — R00.2 PALPITATIONS: ICD-10-CM

## 2018-10-25 DIAGNOSIS — I48.91 ATRIAL FIBRILLATION WITH RVR (H): ICD-10-CM

## 2018-10-25 DIAGNOSIS — I95.9 HYPOTENSION, UNSPECIFIED HYPOTENSION TYPE: ICD-10-CM

## 2018-10-25 LAB
ANION GAP SERPL CALCULATED.3IONS-SCNC: 11 MMOL/L (ref 3–14)
BASOPHILS # BLD AUTO: 0 10E9/L (ref 0–0.2)
BASOPHILS NFR BLD AUTO: 0.3 %
BUN SERPL-MCNC: 14 MG/DL (ref 7–30)
CALCIUM SERPL-MCNC: 9.7 MG/DL (ref 8.5–10.1)
CHLORIDE SERPL-SCNC: 109 MMOL/L (ref 94–109)
CO2 SERPL-SCNC: 22 MMOL/L (ref 20–32)
CREAT SERPL-MCNC: 0.91 MG/DL (ref 0.52–1.04)
DIFFERENTIAL METHOD BLD: ABNORMAL
EOSINOPHIL # BLD AUTO: 0.3 10E9/L (ref 0–0.7)
EOSINOPHIL NFR BLD AUTO: 2.6 %
ERYTHROCYTE [DISTWIDTH] IN BLOOD BY AUTOMATED COUNT: 12.9 % (ref 10–15)
GFR SERPL CREATININE-BSD FRML MDRD: 62 ML/MIN/1.7M2
GLUCOSE SERPL-MCNC: 139 MG/DL (ref 70–99)
HCT VFR BLD AUTO: 48.1 % (ref 35–47)
HGB BLD-MCNC: 15.7 G/DL (ref 11.7–15.7)
IMM GRANULOCYTES # BLD: 0.1 10E9/L (ref 0–0.4)
IMM GRANULOCYTES NFR BLD: 0.9 %
LYMPHOCYTES # BLD AUTO: 2.8 10E9/L (ref 0.8–5.3)
LYMPHOCYTES NFR BLD AUTO: 26.2 %
MCH RBC QN AUTO: 32.4 PG (ref 26.5–33)
MCHC RBC AUTO-ENTMCNC: 32.6 G/DL (ref 31.5–36.5)
MCV RBC AUTO: 99 FL (ref 78–100)
MONOCYTES # BLD AUTO: 0.6 10E9/L (ref 0–1.3)
MONOCYTES NFR BLD AUTO: 5.8 %
NEUTROPHILS # BLD AUTO: 6.8 10E9/L (ref 1.6–8.3)
NEUTROPHILS NFR BLD AUTO: 64.2 %
NRBC # BLD AUTO: 0 10*3/UL
NRBC BLD AUTO-RTO: 0 /100
PLATELET # BLD AUTO: 198 10E9/L (ref 150–450)
POTASSIUM SERPL-SCNC: 3.7 MMOL/L (ref 3.4–5.3)
RBC # BLD AUTO: 4.84 10E12/L (ref 3.8–5.2)
SODIUM SERPL-SCNC: 142 MMOL/L (ref 133–144)
TROPONIN I SERPL-MCNC: 0.05 UG/L (ref 0–0.04)
TSH SERPL DL<=0.005 MIU/L-ACNC: 1.12 MU/L (ref 0.4–4)
WBC # BLD AUTO: 10.5 10E9/L (ref 4–11)

## 2018-10-25 PROCEDURE — 99285 EMERGENCY DEPT VISIT HI MDM: CPT | Mod: 25 | Performed by: EMERGENCY MEDICINE

## 2018-10-25 PROCEDURE — 25000132 ZZH RX MED GY IP 250 OP 250 PS 637: Performed by: INTERNAL MEDICINE

## 2018-10-25 PROCEDURE — 84484 ASSAY OF TROPONIN QUANT: CPT | Performed by: EMERGENCY MEDICINE

## 2018-10-25 PROCEDURE — 80048 BASIC METABOLIC PNL TOTAL CA: CPT | Performed by: EMERGENCY MEDICINE

## 2018-10-25 PROCEDURE — 93010 ELECTROCARDIOGRAM REPORT: CPT | Mod: Z6 | Performed by: EMERGENCY MEDICINE

## 2018-10-25 PROCEDURE — 93005 ELECTROCARDIOGRAM TRACING: CPT | Performed by: EMERGENCY MEDICINE

## 2018-10-25 PROCEDURE — 85025 COMPLETE CBC W/AUTO DIFF WBC: CPT | Performed by: EMERGENCY MEDICINE

## 2018-10-25 PROCEDURE — 25000128 H RX IP 250 OP 636: Performed by: EMERGENCY MEDICINE

## 2018-10-25 PROCEDURE — 96360 HYDRATION IV INFUSION INIT: CPT | Performed by: EMERGENCY MEDICINE

## 2018-10-25 PROCEDURE — 99219 ZZC INITIAL OBSERVATION CARE,LEVL II: CPT | Mod: GC | Performed by: INTERNAL MEDICINE

## 2018-10-25 PROCEDURE — 84443 ASSAY THYROID STIM HORMONE: CPT | Performed by: EMERGENCY MEDICINE

## 2018-10-25 PROCEDURE — 96361 HYDRATE IV INFUSION ADD-ON: CPT | Performed by: EMERGENCY MEDICINE

## 2018-10-25 PROCEDURE — 21400006 ZZH R&B CCU INTERMEDIATE UMMC

## 2018-10-25 RX ORDER — CLOPIDOGREL BISULFATE 75 MG/1
75 TABLET ORAL DAILY
Status: DISCONTINUED | OUTPATIENT
Start: 2018-10-26 | End: 2018-10-26 | Stop reason: HOSPADM

## 2018-10-25 RX ORDER — AMOXICILLIN 250 MG
1 CAPSULE ORAL 2 TIMES DAILY
Status: DISCONTINUED | OUTPATIENT
Start: 2018-10-25 | End: 2018-10-26 | Stop reason: HOSPADM

## 2018-10-25 RX ORDER — ALUMINA, MAGNESIA, AND SIMETHICONE 2400; 2400; 240 MG/30ML; MG/30ML; MG/30ML
30 SUSPENSION ORAL EVERY 4 HOURS PRN
Status: DISCONTINUED | OUTPATIENT
Start: 2018-10-25 | End: 2018-10-26 | Stop reason: HOSPADM

## 2018-10-25 RX ORDER — CHLORAL HYDRATE 500 MG
1 CAPSULE ORAL 2 TIMES DAILY
COMMUNITY

## 2018-10-25 RX ORDER — ACETAMINOPHEN 500 MG
1000 TABLET ORAL EVERY 6 HOURS PRN
Status: DISCONTINUED | OUTPATIENT
Start: 2018-10-25 | End: 2018-10-25

## 2018-10-25 RX ORDER — LEVOTHYROXINE SODIUM 100 UG/1
100 TABLET ORAL DAILY
Status: DISCONTINUED | OUTPATIENT
Start: 2018-10-26 | End: 2018-10-26 | Stop reason: HOSPADM

## 2018-10-25 RX ORDER — ACETAMINOPHEN 325 MG/1
650 TABLET ORAL EVERY 4 HOURS PRN
Status: DISCONTINUED | OUTPATIENT
Start: 2018-10-25 | End: 2018-10-26 | Stop reason: HOSPADM

## 2018-10-25 RX ORDER — LIDOCAINE 40 MG/G
CREAM TOPICAL
Status: DISCONTINUED | OUTPATIENT
Start: 2018-10-25 | End: 2018-10-26 | Stop reason: HOSPADM

## 2018-10-25 RX ORDER — SODIUM CHLORIDE 9 MG/ML
1000 INJECTION, SOLUTION INTRAVENOUS CONTINUOUS
Status: DISCONTINUED | OUTPATIENT
Start: 2018-10-25 | End: 2018-10-26 | Stop reason: HOSPADM

## 2018-10-25 RX ORDER — ATORVASTATIN CALCIUM 20 MG/1
20 TABLET, FILM COATED ORAL DAILY
Status: DISCONTINUED | OUTPATIENT
Start: 2018-10-26 | End: 2018-10-26 | Stop reason: HOSPADM

## 2018-10-25 RX ORDER — DILTIAZEM HYDROCHLORIDE 5 MG/ML
5 INJECTION INTRAVENOUS ONCE
Status: DISCONTINUED | OUTPATIENT
Start: 2018-10-25 | End: 2018-10-26

## 2018-10-25 RX ORDER — ACETAMINOPHEN 650 MG/1
650 SUPPOSITORY RECTAL EVERY 4 HOURS PRN
Status: DISCONTINUED | OUTPATIENT
Start: 2018-10-25 | End: 2018-10-26 | Stop reason: HOSPADM

## 2018-10-25 RX ORDER — AMOXICILLIN 250 MG
2 CAPSULE ORAL 2 TIMES DAILY
Status: DISCONTINUED | OUTPATIENT
Start: 2018-10-25 | End: 2018-10-26 | Stop reason: HOSPADM

## 2018-10-25 RX ADMIN — SODIUM CHLORIDE 1000 ML: 9 INJECTION, SOLUTION INTRAVENOUS at 18:37

## 2018-10-25 RX ADMIN — SODIUM CHLORIDE 1000 ML: 9 INJECTION, SOLUTION INTRAVENOUS at 19:55

## 2018-10-25 RX ADMIN — SODIUM CHLORIDE 1000 ML: 9 INJECTION, SOLUTION INTRAVENOUS at 19:12

## 2018-10-25 RX ADMIN — ACETAMINOPHEN 650 MG: 325 TABLET, FILM COATED ORAL at 22:43

## 2018-10-25 ASSESSMENT — ENCOUNTER SYMPTOMS
NUMBNESS: 0
VOMITING: 0
CONFUSION: 0
DIARRHEA: 0
ABDOMINAL PAIN: 0
FEVER: 0
WEAKNESS: 1
EYE REDNESS: 0
PALPITATIONS: 1
ARTHRALGIAS: 0
NAUSEA: 0
SHORTNESS OF BREATH: 0
COLOR CHANGE: 0
NECK STIFFNESS: 0
DIFFICULTY URINATING: 0
HEADACHES: 0

## 2018-10-25 ASSESSMENT — PAIN DESCRIPTION - DESCRIPTORS: DESCRIPTORS: HEADACHE

## 2018-10-25 NOTE — ED PROVIDER NOTES
History     Chief Complaint   Patient presents with     Irregular Heart Beat     HPI  Sofi Degroot is a 63 year old female with a history of HTN, hypothyroidism, TIA (2015), PFO s/p closure (10/12/18), and hepatitis C who presents for evaluation of palpitations.     Per chart review, patient was seen and evaluated here in the Emergency Department on Monday, three days ago, for palpitations in the setting of new onset atrial fibrillation. The patient reports during that visit she was treated with 10 mg of IV diltiazem which converted her rhythm from atrial fibrillation back to normal sinus. She was seen by the EP service while in the ED who recommended the patient be started on Xarelto as well as increase her prescribed metoprolol from 75 mg to 100 mg once daily in the evening. She was discharged with instructions to follow with Cardiology in Clinic.     Today, patient returns with complaint of sudden onset palpitations consistent with her episode of atrial fibrillation on Monday. She states her palpitations have been constant since 1 PM this afternoon. The palpitations began while she was sitting at her desk at work. She reports associated generalized weakness. She denies chest pain or shortness of breath. No nausea, vomiting, or diarrhea. No numbness or tingling in the extremities. No increase caffeine intake, no stress, or changes in activity. She has been compliant with prescribed Xarelto, as well as metoprolol, lisinopril, and Plavix. She took all of her evening medications prior to arrival today. Her baseline blood pressures are typically 110s/70s in the evenings and 120s/80s in the mornings.     I have reviewed the Medications, Allergies, Past Medical and Surgical History, and Social History in the [employer] system.  Past Medical History:   Diagnosis Date     Cervical high risk HPV (human papillomavirus) test positive 09/16/2016, 10/11/17, 10/17/18    (not 16 or 18). 10/11/17: LSIL pap with + HR HPV (not 16  or 18) result.10/17/18: See problem list.      Low risk HPV infection ,     NIL pap, + HPV 53     Papanicolaou smear of cervix with low grade squamous intraepithelial lesion (LGSIL) 10/11/2017    10/11/17: LSIL pap with + HR HPV (not 16 or 18) result.     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     Tx'd w/ Interferon x 1 year-virus free at 5 years, blood transfusion  related       Past Surgical History:   Procedure Laterality Date     C LIGATE FALLOPIAN TUBE       SURGICAL HISTORY OF -  Right 2008    Right 1st MTPJ, bone spur resection       Family History   Problem Relation Age of Onset     HEART DISEASE Maternal Grandfather      Breast Cancer Maternal Grandmother      Dx'd age 70     Arthritis Father      Lipids Mother      On meds     Cancer Mother      BCC     HEART DISEASE Brother       of dilated of cardiomyopathy at age 54     Alcohol/Drug Sister      Alcohol/Drug Brother      Depression Sister        Social History   Substance Use Topics     Smoking status: Former Smoker     Smokeless tobacco: Never Used      Comment: quit 30 years ago     Alcohol use Yes      Comment: 4-5 Drinks Per Week       Current Facility-Administered Medications   Medication     sodium chloride 0.9% infusion     Current Outpatient Prescriptions   Medication     atorvastatin (LIPITOR) 20 MG tablet     cholecalciferol 2000 units tablet     clopidogrel (PLAVIX) 75 MG tablet     fish oil-omega-3 fatty acids 1000 MG capsule     levothyroxine (SYNTHROID/LEVOTHROID) 100 MCG tablet     lisinopril (PRINIVIL/ZESTRIL) 20 MG tablet     magnesium 250 MG tablet     metoprolol succinate (TOPROL-XL) 100 MG 24 hr tablet     Multiple Vitamins-Minerals (MULTIVITAMIN ADULTS) TABS     [START ON 2018] rivaroxaban ANTICOAGULANT (XARELTO) 20 MG TABS tablet     Acetaminophen (TYLENOL PO)     valACYclovir (VALTREX) 500 MG tablet     [DISCONTINUED] lisinopril-hydrochlorothiazide  "(PRINZIDE,ZESTORETIC) 20-12.5 MG per tablet     [DISCONTINUED] rivaroxaban ANTICOAGULANT (XARELTO) 20 MG TABS tablet        Allergies   Allergen Reactions     Latex Swelling     Dental visit--burning sensation in lips and throat, lips felt puffy, eye watery from latex gloves     Pineapple Other (See Comments)       Review of Systems   Constitutional: Negative for fever.   HENT: Negative for congestion.    Eyes: Negative for redness.   Respiratory: Negative for shortness of breath.    Cardiovascular: Positive for palpitations. Negative for chest pain.   Gastrointestinal: Negative for abdominal pain, diarrhea, nausea and vomiting.   Genitourinary: Negative for difficulty urinating.   Musculoskeletal: Negative for arthralgias and neck stiffness.   Skin: Negative for color change.   Neurological: Positive for weakness (generalized). Negative for numbness and headaches.   Psychiatric/Behavioral: Negative for confusion.   All other systems reviewed and are negative.      Physical Exam   BP: 125/77  Pulse: 150  Temp: 98.5  F (36.9  C)  Resp: 16  Height: 175.3 cm (5' 9\")  Weight: 82.3 kg (181 lb 7 oz)  SpO2: 95 %      Physical Exam  Physical Exam   Constitutional:   well nourished, well developed, resting comfortably   HENT:   Head: Normocephalic and atraumatic.   Eyes: Conjunctivae are normal. Pupils are equal, round, and reactive to light.   TMS are clear, pharynx has no erythema or exudate, mucous membranes are moist  Neck:   no adenopathy, no bony tenderness  Cardiovascular: Irregular regular rate and rhythm, positive murmur  Pulmonary/Chest: Clear to auscultation bilaterally, with no wheezes or retractions. No respiratory distress.  GI: Soft with good bowel sounds.  Non-tender, non-distended, with no guarding, no rebound, no peritoneal signs.   Back:  No bony or CVA tenderness   Musculoskeletal:  no edema or clubbing   Skin: Skin is warm and dry. No rash noted.   Neurological: alert and oriented to person, place, and " time. Nonfocal exam  Psychiatric:  normal mood and affect.    ED Course     ED Course     Procedures       6:26 PM  The patient was seen and examined by Dr. Byers in Room 15.          EKG Interpretation:      Interpreted by Alina Byers  Time reviewed: 1815  Symptoms at time of EKG: Palpitations   Rhythm: atrial fibrillation - rapid  Rate: Tachycardia  Axis: Normal  Ectopy: none  Conduction: normal  ST Segments/ T Waves: No acute ischemic changes  Q Waves: none  Comparison to prior:   Afib with RVR when compared to prior    Clinical Impression: Atrial fibrillation with RVR                Critical Care time: . CRITICAL CARE: 45 minutes exclusive of procedures but including obtaining history, bedside examination, supervision of care, record review and collateral history from other parties, documentation, review/interpretation of imaging and lab results, discussion with patient, family, nursing, ancillary staff, consultants, and admitting service provider.   This patient presented with tacky arrhythmia, hypotension requiring immediate bedside evaluation and intervention to prevent sudden, clinically significant, or life threatening deterioration in the patient's condition.         .  Results for orders placed or performed during the hospital encounter of 10/25/18   CBC with platelets differential   Result Value Ref Range    WBC 10.5 4.0 - 11.0 10e9/L    RBC Count 4.84 3.8 - 5.2 10e12/L    Hemoglobin 15.7 11.7 - 15.7 g/dL    Hematocrit 48.1 (H) 35.0 - 47.0 %    MCV 99 78 - 100 fl    MCH 32.4 26.5 - 33.0 pg    MCHC 32.6 31.5 - 36.5 g/dL    RDW 12.9 10.0 - 15.0 %    Platelet Count 198 150 - 450 10e9/L    Diff Method Automated Method     % Neutrophils 64.2 %    % Lymphocytes 26.2 %    % Monocytes 5.8 %    % Eosinophils 2.6 %    % Basophils 0.3 %    % Immature Granulocytes 0.9 %    Nucleated RBCs 0 0 /100    Absolute Neutrophil 6.8 1.6 - 8.3 10e9/L    Absolute Lymphocytes 2.8 0.8 - 5.3 10e9/L    Absolute Monocytes  0.6 0.0 - 1.3 10e9/L    Absolute Eosinophils 0.3 0.0 - 0.7 10e9/L    Absolute Basophils 0.0 0.0 - 0.2 10e9/L    Abs Immature Granulocytes 0.1 0 - 0.4 10e9/L    Absolute Nucleated RBC 0.0    Basic metabolic panel   Result Value Ref Range    Sodium 142 133 - 144 mmol/L    Potassium 3.7 3.4 - 5.3 mmol/L    Chloride 109 94 - 109 mmol/L    Carbon Dioxide 22 20 - 32 mmol/L    Anion Gap 11 3 - 14 mmol/L    Glucose 139 (H) 70 - 99 mg/dL    Urea Nitrogen 14 7 - 30 mg/dL    Creatinine 0.91 0.52 - 1.04 mg/dL    GFR Estimate 62 >60 mL/min/1.7m2    GFR Estimate If Black 75 >60 mL/min/1.7m2    Calcium 9.7 8.5 - 10.1 mg/dL   Troponin I   Result Value Ref Range    Troponin I ES 0.047 (H) 0.000 - 0.045 ug/L   TSH   Result Value Ref Range    TSH 1.12 0.40 - 4.00 mU/L   EKG 12 lead   Result Value Ref Range    Interpretation ECG Click View Image link to view waveform and result             Assessments & Plan (with Medical Decision Making)   Sofi Degroot is a 63 year old female with a history of HTN, hypothyroidism, TIA (2015), PFO s/p closure (10/12/18), and hepatitis C who presents for evaluation of palpitations.  Patient with recent diagnosis of atrial fibrillation with an episode of A. fib with RVR 4 days ago on 10/22/2018.  At this time patient was given 10 mg of IV diltiazem and spontaneously converted and rate was controlled.  Patient was started on Xarelto at this time and also increased her metoprolol to 100 mg daily.  Patient took her metoprolol just prior to arrival.  Upon arrival patient is well-appearing, afebrile, no respiratory distress.  Patient with palpitation but otherwise is nontoxic-appearing.  I reviewed EKG which demonstrates atrial fibrillation with RVR with a ventricular rate between 140-170 bpm.  She was recently diagnosed with A. fib, otherwise denies any chest pain, shortness of breath, weakness.  She is currently on Plavix, Xarelto.    At this time will check labs, will give 2 L normal saline IV fluid  bolus, patient's blood pressure 87/67 with a map of 73, patient denies any weakness, chest pain, shortness of breath.  Likely due to taking her metoprolol and having an increased dose 3 days ago.  I reviewed competence of labs which remarkable for white blood cell count of 10.5, hemoglobin of 15.7, normal TSH, no acute metabolic or electrolyte abnormality, troponin mildly elevated at 0 0.047 however this is improved from 22 October when it was 0.119.  I discussed the case with cardiology due to patient's atrial fibrillation with RVR, hypotension likely secondary to metoprolol, recently started on xarelto, we will continue IV fluids and close monitoring.  We will not give diltiazem unless improvement of blood pressures, we will plan on admission for DANTE cardioversion in the morning.  Patient understands and agrees with the plan.    I have reviewed the nursing notes.    I have reviewed the findings, diagnosis, plan and need for follow up with the patient.    New Prescriptions    No medications on file       Final diagnoses:   Atrial fibrillation with RVR (H)   Palpitations   Hypotension, unspecified hypotension type   I, Vivian Lopez, am serving as a trained medical scribe to document services personally performed by Alina Byers MD, based on the provider's statements to me.   I, Alina Byers MD, was physically present and have reviewed and verified the accuracy of this note documented by Vivian Lopez.      10/25/2018   [facility], [place], EMERGENCY DEPARTMENT     Alina Byers MD  10/25/18 2017

## 2018-10-25 NOTE — ED TRIAGE NOTES
"Triage Assessment & Note:    /77  Pulse 150  Temp 98.5  F (36.9  C) (Oral)  Resp 16  Ht 1.753 m (5' 9\")  Wt 82.3 kg (181 lb 7 oz)  LMP 10/27/2004  SpO2 95%  BMI 26.79 kg/m2    Patient presents with: C/O heart pounding in her chest. Initial  irregular. PT has a recent dx of a-fib taking blood thinners.     Home Treatments/Remedies: None    Febrile / Afebrile? afebrile    Duration of C/o:  1300     Gabriel Koo  October 25, 2018      "

## 2018-10-25 NOTE — IP AVS SNAPSHOT
[address] [address]    Phone:  [phone]                                       After Visit Summary   10/25/2018    Sofi Degroot    MRN: [id number]           After Visit Summary Signature Page     I have received my discharge instructions, and my questions have been answered. I have discussed any challenges I see with this plan with the nurse or doctor.    ..........................................................................................................................................  Patient/Patient Representative Signature      ..........................................................................................................................................  Patient Representative Print Name and Relationship to Patient    ..................................................               ................................................  Date                                   Time    ..........................................................................................................................................  Reviewed by Signature/Title    ...................................................              ..............................................  Date                                               Time          22EPIC Rev 08/18

## 2018-10-25 NOTE — IP AVS SNAPSHOT
MRN:[id number]                      After Visit Summary   10/25/2018    Sofi Degroot    MRN: [id number]           Thank you!     Thank you for choosing [facility] for your care. Our goal is always to provide you with excellent care. Hearing back from our patients is one way we can continue to improve our services. Please take a few minutes to complete the written survey that you may receive in the mail after you visit with us. Thank you!        Patient Information     Date Of Birth          1955        Designated Caregiver       Most Recent Value    Caregiver    Will someone help with your care after discharge? no      About your hospital stay     You were admitted on:  October 25, 2018 You last received care in the:  [address] [facility]    You were discharged on:  October 26, 2018        Reason for your hospital stay       You were admitted to the hospital with chest palpitations found to be in afib with rvr.  We attempted to get you back into sinus with a bolus of amio, but ultimately decided to do a DANTE/Cardioversion.  We would like you to take Amiodarone 400 mg BID x 2 weeks, followed by 200 mg daily x 6 weeks and then follow up with EP as an outpatient.                  Who to Call     For medical emergencies, please call 911.  For non-urgent questions about your medical care, please call your primary care provider or clinic, [phone]  For questions related to your surgery, please call your surgery clinic        Attending Provider     Provider Specialty    Alina Byers MD Emergency Medicine    SAGAR Lange MD Cardiology       Primary Care Provider Office Phone # Fax #    Noemy TRENT Olivera [phone]      After Care Instructions     Activity       Your activity upon discharge: Activity as tolerated, no driving today            Diet       Follow this diet upon discharge: Regular                  Follow-up Appointments     [facility]ult [facility] Follow-up and  recommended labs and tests       Follow up with EP Cardiology, in 2 months for Atrial Fibrillation follow up .    Appointments on [place] and/or [facility] (with [facility] or [facility] provider or service). Call [phone] if you haven't heard regarding these appointments within 7 days of discharge.                  Your next 10 appointments already scheduled     Oct 29, 2018 11:15 AM CDT   [facility] Cleve Pastrana DPM   [facility]    [address] [id number] [phone]            Nov 08, 2018 11:30 AM CST   MA SCREENING DIGITAL BILATERAL with [id number]   [facility] ([facility])    [address], [address]  [facility] [address] [phone]           How do I prepare for my exam? (Food and drink instructions) No Food and Drink Restrictions.  How do I prepare for my exam? (Other instructions) Do not use any powder, lotion or deodorant under your arms or on your breast. If you do, we will ask you to remove it before your exam.  What should I wear: Wear comfortable, two-piece clothing.  How long does the exam take: Most scans will take 15 minutes.  What should I bring: Bring any previous mammograms from other facilities or have them mailed to the breast center.  Do I need a :  No  is needed.  What do I need to tell my doctor: If you have any allergies, tell your care team.  What should I do after the exam: No restrictions, You may resume normal activities.  What is this test: This test is an x-ray of the breast to look for breast disease. The breast is pressed between two plates to flatten and spread the tissue. An X-ray is taken of the breast from different angles.  Who should I call with questions: If you have any questions, please call the Imaging Department where you will have your exam. Directions, parking instructions, and other information is available on our website,  [email or web address].  Other information about my exam Three-dimensional (3D) mammograms are available at [facility] locations in [facility], [place], [place], [facility], [place], [facility] and [place]. [facility] locations include [place] and the [facility] in [place].  Benefits of 3D mammograms include * Improved rate of cancer detection * Decreases your chance of having to go back for more tests, which means fewer: * \"False-positive\" results (This means that there is an abnormal area but it isn't cancer.) * Invasive testing procedures, such as a biopsy or surgery * Can provide clearer images of the breast if you have dense breast tissue.  *3D mammography is an optional exam that anyone can have with a 2D mammogram. It doesn't replace or take the place of a 2D mammogram. 2D mammograms remain an effective screening test for all women.  Not all insurance companies cover the cost of a 3D mammogram. Check with your insurance.            Nov 09, 2018  9:00 AM CST   (Arrive by 8:45 AM)   New Electrophysiology Genetics with Alonzo Baldwin MD   [facility])    [address] [place] [address]  [address] [place] [address] [phone]            Nov 09, 2018 10:30 AM CST   Ech Complete with [address] ([facility])    [address] [place] [address]  3rd Floor  [facility] [address] [phone]           1.  Please bring or wear a comfortable two-piece outfit. 2.  You may eat, drink and take your normal medicines. 3.  For any questions that cannot be answered, please contact the ordering physician 4.  Please do not wear perfumes or scented lotions on the day of your exam.            Nov 09, 2018 11:30 AM CST   (Arrive by 11:15 AM)   RETURN CONGENITAL HEART with Xiomara Lopez MD   [facility])    [address] [place] [address]  [address] [place] [address] " "  [phone]              Pending Results     Date and Time Order Name Status Description    10/26/2018 1600 EKG 12-lead, complete Preliminary     10/26/2018 1115 EKG 12-lead, tracing only Preliminary     10/26/2018 1114 Cardioversion In process             Statement of Approval     Ordered          10/26/18 0311  I have reviewed and agree with all the recommendations and orders detailed in this document.  EFFECTIVE NOW     Approved and electronically signed by:  Shanda Reilly CNP             Admission Information     Date & Time Provider Department Dept. Phone    10/25/2018 SAGAR Lange MD [address] [facility] [phone]      Your Vitals Were     Blood Pressure Pulse Temperature Respirations Height Weight    118/82 (BP Location: Left arm) 79 97.7  F (36.5  C) (Axillary) 18 1.753 m (5' 9\") 83.9 kg (185 lb)    Last Period Pulse Oximetry BMI (Body Mass Index)             10/27/2004 96% 27.32 kg/m2         Virtual Paper Information     [employer] Paper gives you secure access to your electronic health record. If you see a primary care provider, you can also send messages to your care team and make appointments. If you have questions, please call your primary care clinic.  If you do not have a primary care provider, please call [phone] and they will assist you.        Care EveryWhere ID     This is your Care EveryWhere ID. This could be used by other organizations to access your [facility] medical records  [id number]        Equal Access to Services     YESENIA HINOJOSA AH: Hadii aad ku hadasho Somarileeali, waaxda luqadaha, qaybta kaalmada adeegyada, [place]. So [facility] [phone].    ATENCIÓN: Si habla español, tiene a parsons disposición servicios gratuitos de asistencia lingüística. Llame al [phone].    We comply with applicable federal civil rights laws and [place] laws. We do not discriminate on the basis of race, color, national origin, age, disability, sex, sexual " orientation, or gender identity.               Review of your medicines      START taking        Dose / Directions    * amiodarone HCl 400 MG Tabs   Used for:  Atrial fibrillation with RVR (H)        Dose:  400 mg   Take 400 mg by mouth 2 times daily for 14 days   Quantity:  14 tablet   Refills:  0       * amiodarone 200 MG tablet   Commonly known as:  PACERONE/CODARONE   Used for:  Atrial fibrillation with RVR (H)        Dose:  200 mg   Start taking on:  11/10/2018   Take 1 tablet (200 mg) by mouth daily for 42 doses   Quantity:  42 tablet   Refills:  0       * Notice:  This list has 2 medication(s) that are the same as other medications prescribed for you. Read the directions carefully, and ask your doctor or other care provider to review them with you.      CONTINUE these medicines which have NOT CHANGED        Dose / Directions    atorvastatin 20 MG tablet   Commonly known as:  LIPITOR   Used for:  Hypercholesteremia        Dose:  20 mg   Take 1 tablet (20 mg) by mouth daily   Quantity:  90 tablet   Refills:  3       cholecalciferol 2000 units tablet        Dose:  2000 Units   Take 2,000 Units by mouth 2 times daily   Refills:  0       clopidogrel 75 MG tablet   Commonly known as:  PLAVIX   Used for:  PFO (patent foramen ovale)        Dose:  75 mg   Take 1 tablet (75 mg) by mouth daily   Quantity:  90 tablet   Refills:  3       fish oil-omega-3 fatty acids 1000 MG capsule        Dose:  1 g   Take 1 g by mouth 2 times daily   Refills:  0       levothyroxine 100 MCG tablet   Commonly known as:  SYNTHROID/LEVOTHROID   Used for:  Other specified hypothyroidism        Dose:  100 mcg   Take 1 tablet (100 mcg) by mouth daily   Quantity:  90 tablet   Refills:  3       lisinopril 20 MG tablet   Commonly known as:  PRINIVIL/ZESTRIL   Used for:  Essential hypertension with goal blood pressure less than 140/90        Dose:  20 mg   Take 1 tablet (20 mg) by mouth daily   Quantity:  90 tablet   Refills:  1       magnesium 250  MG tablet        Dose:  250 mg   Take 250 mg by mouth daily   Refills:  0       metoprolol succinate 100 MG 24 hr tablet   Commonly known as:  TOPROL-XL   Used for:  PAF (paroxysmal atrial fibrillation) (H), Essential hypertension with goal blood pressure less than 140/90        Dose:  100 mg   Take 1 tablet (100 mg) by mouth daily   Quantity:  90 tablet   Refills:  3       MULTIVITAMIN ADULTS Tabs        Dose:  1 tablet   Take 1 tablet by mouth daily   Refills:  0       rivaroxaban ANTICOAGULANT 20 MG Tabs tablet   Commonly known as:  XARELTO   Used for:  PAF (paroxysmal atrial fibrillation) (H)        Dose:  20 mg   Start taking on:  11/22/2018   Take 1 tablet (20 mg) by mouth daily (with dinner)   Quantity:  30 tablet   Refills:  11       TYLENOL PO        Dose:  1000 mg   Take 1,000 mg by mouth as needed for mild pain or fever   Refills:  0       valACYclovir 500 MG tablet   Commonly known as:  VALTREX   Used for:  Recurrent herpes simplex        TAKE ONE TABLET BY MOUTH TWICE A DAY FOR THREE DAYS. (REPEAT AS NEEDED)   Quantity:  12 tablet   Refills:  5            Where to get your medicines      These medications were sent to [facility] [address] [address]     Phone:  [phone]     amiodarone 200 MG tablet    amiodarone HCl 400 MG Tabs                Protect others around you: Learn how to safely use, store and throw away your medicines at www.disposemymeds.org.             Medication List: This is a list of all your medications and when to take them. Check marks below indicate your daily home schedule. Keep this list as a reference.      Medications           Morning Afternoon Evening Bedtime As Needed    * amiodarone HCl 400 MG Tabs   Take 400 mg by mouth 2 times daily for 14 days                                * amiodarone 200 MG tablet   Commonly known as:  PACERONE/CODARONE   Take 1 tablet (200 mg) by mouth daily for 42  doses   Start taking on:  11/10/2018                                atorvastatin 20 MG tablet   Commonly known as:  LIPITOR   Take 1 tablet (20 mg) by mouth daily                                cholecalciferol 2000 units tablet   Take 2,000 Units by mouth 2 times daily   Last time this was given:  2,000 Units on 10/26/2018  8:11 AM                                clopidogrel 75 MG tablet   Commonly known as:  PLAVIX   Take 1 tablet (75 mg) by mouth daily   Last time this was given:  75 mg on 10/26/2018  7:42 AM                                fish oil-omega-3 fatty acids 1000 MG capsule   Take 1 g by mouth 2 times daily                                levothyroxine 100 MCG tablet   Commonly known as:  SYNTHROID/LEVOTHROID   Take 1 tablet (100 mcg) by mouth daily   Last time this was given:  100 mcg on 10/26/2018  7:42 AM                                lisinopril 20 MG tablet   Commonly known as:  PRINIVIL/ZESTRIL   Take 1 tablet (20 mg) by mouth daily                                magnesium 250 MG tablet   Take 250 mg by mouth daily                                metoprolol succinate 100 MG 24 hr tablet   Commonly known as:  TOPROL-XL   Take 1 tablet (100 mg) by mouth daily                                MULTIVITAMIN ADULTS Tabs   Take 1 tablet by mouth daily                                rivaroxaban ANTICOAGULANT 20 MG Tabs tablet   Commonly known as:  XARELTO   Take 1 tablet (20 mg) by mouth daily (with dinner)   Start taking on:  11/22/2018   Last time this was given:  20 mg on 10/26/2018  4:59 PM                                TYLENOL PO   Take 1,000 mg by mouth as needed for mild pain or fever   Last time this was given:  650 mg on 10/25/2018 10:43 PM                                valACYclovir 500 MG tablet   Commonly known as:  VALTREX   TAKE ONE TABLET BY MOUTH TWICE A DAY FOR THREE DAYS. (REPEAT AS NEEDED)                                * Notice:  This list has 2 medication(s) that are the same as other  medications prescribed for you. Read the directions carefully, and ask your doctor or other care provider to review them with you.              More Information      Understanding Atrial Fibrillation  What is atrial fibrillation?  Atrial fibrillation is an irregular heartbeat. It is fairly common, but it can be serious.   The atria are the two upper chambers of the heart. Normally, they have a steady, constant beat.   If the beat is rapid and uneven, we call this atrial fibrillation. It may feel as if your heart is racing out of control. This can cause discomfort, but the real danger is that blood can pool and form clots in the heart.   If a piece of a blood clot breaks loose, it may travel through the arteries until it gets stuck. This could block blood flow to an organ or other body part. If it blocks a heart artery, you could have a heart attack. If it blocks a brain artery, you could have a stroke.   What causes it?  Atrial fibrillation can be caused by:    Heart disease, such as coronary artery disease, myocarditis (inflammation of the heart muscle), rheumatic heart disease or heart valve disorder    Heart defects you were born with    A long history of high blood pressure    Swelling caused by an injury near the heart.  Sometimes it seems to happen for no reason.  What are the symptoms?  Not everyone feels symptoms, but if they do, symptoms may include:    Fast, fluttering or irregular heartbeat    Chest pain    Trouble breathing, or a sense that you can't catch your breath    Feeling weak or dizzy    Feeling far more tired than normal    Cold sweats.  How is it diagnosed?  Your doctor will do an electrocardiogram (EKG). Electrodes will be place on your wrists, ankles and chest. Wires connect the electrodes to an EKG machine, which will record electrical signals from your heart.  How is it treated?  Treatment is important to reduce the risk of stroke, heart attack or other tissue damage.    A number of  medicines are used to treat atrial fibrillation. Some slow your heart rate. Some help change the heartbeat back to normal. Some help keep blood clots from forming.    An electric charge may be used to get your heart back to its regular beat.    In some cases, a pacemaker or another implanted device can be used to control your heartbeat.  For informational purposes only. Not to replace the advice of your health care provider.   Copyright   2006 [employer] [facility]. All rights reserved. [employer] 533408 - REV 07/17.            Discharge Instructions for Cardioversion  Your healthcare provider performed a procedure called cardioversion. Your healthcare provider used a controlled electric shock or a medicine to briefly stop all electrical activity in your heart. This helped restore your heart s normal rhythm. Here are some instructions to follow while you recover.  Home care    Because cardioversion typically requires sedation, you won't be able to drive home. You will need a ride. Wait at least 24 hours before driving a car or operating heavy machinery after receiving sedating medicines.    Don t be alarmed if the skin on your chest is irritated or feels like it is sunburned. Your healthcare provider may prescribe a soothing lotion to relieve this discomfort. These minor symptoms will go away in a few days.    Ask your healthcare provider about medicines to keep your heart rhythm steady.    If you were prescribed medicine, take it as instructed by your healthcare provider. Don t skip doses or take double doses. Cardioversion requires blood thinners for at least 4 weeks to prevent a delayed risk of stroke when treating atrial fibrillation or atrial flutter. Be sure you discuss which medicine you are taking to prevent stroke. Ask when you need to have your medicine levels checked, and whether you may be able to stop taking it in the future or whether it is recommended that you take it for life. Some of these  blood-thinning medicines will have the dose adjusted, and interact with other medicines or foods. Your healthcare team will give you full instructions on what to watch out for. Report bleeding or symptoms of stroke immediately to your healthcare team and seek emergency medical attention.    Learn to take your own pulse. Keep a record of your results. Ask your healthcare provider when you should seek emergency medical attention. He or she will tell you which pulse rate reading is dangerous.     Keep in mind this procedure may need to be repeated if the abnormal heart rhythm returns. After the procedure, your healthcare provider will tell you if the treatment worked or if you will need further treatments or medication.  Follow-up care  Make a follow-up appointment, or as directed.  Call 911  Call 911 right away if you have:    Chest pain    Shortness of breath    Loss of vision, speech, or strength or coordination in any body part  When to call your healthcare provider  Call your healthcare provider right away if you:    Feel faint, dizzy, or lightheaded    Have chest pain with increased activity    Have irregular heartbeat or fast pulse    Have bleeding issues from blood-thinning medicines   Date Last Reviewed: 3/1/2017    4880-0665 [employer]. [address] [place] [address]. All rights reserved. This information is not intended as a substitute for professional medical care. Always follow your healthcare professional's instructions.

## 2018-10-26 ENCOUNTER — ANESTHESIA EVENT (OUTPATIENT)
Dept: SURGERY | Facility: CLINIC | Age: 63
End: 2018-10-26
Payer: COMMERCIAL

## 2018-10-26 ENCOUNTER — APPOINTMENT (OUTPATIENT)
Dept: CARDIOLOGY | Facility: CLINIC | Age: 63
End: 2018-10-26
Attending: NURSE PRACTITIONER
Payer: COMMERCIAL

## 2018-10-26 ENCOUNTER — SURGERY (OUTPATIENT)
Age: 63
End: 2018-10-26

## 2018-10-26 ENCOUNTER — PATIENT OUTREACH (OUTPATIENT)
Dept: CARE COORDINATION | Facility: CLINIC | Age: 63
End: 2018-10-26

## 2018-10-26 ENCOUNTER — ANESTHESIA (OUTPATIENT)
Dept: SURGERY | Facility: CLINIC | Age: 63
End: 2018-10-26
Payer: COMMERCIAL

## 2018-10-26 VITALS
HEIGHT: 69 IN | RESPIRATION RATE: 18 BRPM | SYSTOLIC BLOOD PRESSURE: 118 MMHG | TEMPERATURE: 97.7 F | WEIGHT: 185 LBS | OXYGEN SATURATION: 96 % | HEART RATE: 79 BPM | BODY MASS INDEX: 27.4 KG/M2 | DIASTOLIC BLOOD PRESSURE: 82 MMHG

## 2018-10-26 PROBLEM — I48.0 PAROXYSMAL A-FIB (H): Status: ACTIVE | Noted: 2018-10-26

## 2018-10-26 LAB
INR PPP: 1.18 (ref 0.86–1.14)
INTERPRETATION ECG - MUSE: NORMAL
MAGNESIUM SERPL-MCNC: 2 MG/DL (ref 1.6–2.3)

## 2018-10-26 PROCEDURE — 96374 THER/PROPH/DIAG INJ IV PUSH: CPT | Mod: 59

## 2018-10-26 PROCEDURE — 85610 PROTHROMBIN TIME: CPT | Performed by: NURSE PRACTITIONER

## 2018-10-26 PROCEDURE — 25000132 ZZH RX MED GY IP 250 OP 250 PS 637: Performed by: NURSE PRACTITIONER

## 2018-10-26 PROCEDURE — 92960 CARDIOVERSION ELECTRIC EXT: CPT | Performed by: INTERNAL MEDICINE

## 2018-10-26 PROCEDURE — 83735 ASSAY OF MAGNESIUM: CPT | Performed by: NURSE PRACTITIONER

## 2018-10-26 PROCEDURE — 93010 ELECTROCARDIOGRAM REPORT: CPT | Performed by: INTERNAL MEDICINE

## 2018-10-26 PROCEDURE — 99152 MOD SED SAME PHYS/QHP 5/>YRS: CPT

## 2018-10-26 PROCEDURE — 25000128 H RX IP 250 OP 636: Performed by: EMERGENCY MEDICINE

## 2018-10-26 PROCEDURE — 93325 DOPPLER ECHO COLOR FLOW MAPG: CPT | Mod: 26 | Performed by: INTERNAL MEDICINE

## 2018-10-26 PROCEDURE — 25000125 ZZHC RX 250: Performed by: INTERNAL MEDICINE

## 2018-10-26 PROCEDURE — 36415 COLL VENOUS BLD VENIPUNCTURE: CPT | Performed by: NURSE PRACTITIONER

## 2018-10-26 PROCEDURE — 25000132 ZZH RX MED GY IP 250 OP 250 PS 637: Performed by: INTERNAL MEDICINE

## 2018-10-26 PROCEDURE — G0378 HOSPITAL OBSERVATION PER HR: HCPCS

## 2018-10-26 PROCEDURE — 92960 CARDIOVERSION ELECTRIC EXT: CPT

## 2018-10-26 PROCEDURE — 40000065 ZZH STATISTIC EKG NON-CHARGEABLE

## 2018-10-26 PROCEDURE — 93005 ELECTROCARDIOGRAM TRACING: CPT | Mod: 59

## 2018-10-26 PROCEDURE — 93312 ECHO TRANSESOPHAGEAL: CPT | Mod: 26 | Performed by: INTERNAL MEDICINE

## 2018-10-26 PROCEDURE — 25000128 H RX IP 250 OP 636: Performed by: NURSE PRACTITIONER

## 2018-10-26 PROCEDURE — 99153 MOD SED SAME PHYS/QHP EA: CPT

## 2018-10-26 PROCEDURE — 25000125 ZZHC RX 250: Performed by: ANESTHESIOLOGY

## 2018-10-26 PROCEDURE — 93320 DOPPLER ECHO COMPLETE: CPT | Mod: 26 | Performed by: INTERNAL MEDICINE

## 2018-10-26 PROCEDURE — 93312 ECHO TRANSESOPHAGEAL: CPT

## 2018-10-26 PROCEDURE — 99217 ZZC OBSERVATION CARE DISCHARGE: CPT | Mod: GC | Performed by: INTERNAL MEDICINE

## 2018-10-26 PROCEDURE — 37000008 ZZH ANESTHESIA TECHNICAL FEE, 1ST 30 MIN

## 2018-10-26 PROCEDURE — 99214 OFFICE O/P EST MOD 30 MIN: CPT | Mod: 25 | Performed by: INTERNAL MEDICINE

## 2018-10-26 PROCEDURE — 25000125 ZZHC RX 250: Performed by: NURSE PRACTITIONER

## 2018-10-26 PROCEDURE — 25000128 H RX IP 250 OP 636: Performed by: INTERNAL MEDICINE

## 2018-10-26 PROCEDURE — 25000128 H RX IP 250 OP 636: Performed by: ANESTHESIOLOGY

## 2018-10-26 RX ORDER — FLUMAZENIL 0.1 MG/ML
0.2 INJECTION, SOLUTION INTRAVENOUS
Status: DISCONTINUED | OUTPATIENT
Start: 2018-10-26 | End: 2018-10-26 | Stop reason: HOSPADM

## 2018-10-26 RX ORDER — POTASSIUM CHLORIDE 750 MG/1
20 TABLET, EXTENDED RELEASE ORAL
Status: COMPLETED | OUTPATIENT
Start: 2018-10-26 | End: 2018-10-26

## 2018-10-26 RX ORDER — METOPROLOL SUCCINATE 100 MG/1
100 TABLET, EXTENDED RELEASE ORAL DAILY
Status: DISCONTINUED | OUTPATIENT
Start: 2018-10-26 | End: 2018-10-26 | Stop reason: HOSPADM

## 2018-10-26 RX ORDER — LIDOCAINE 40 MG/G
CREAM TOPICAL
Status: DISCONTINUED | OUTPATIENT
Start: 2018-10-26 | End: 2018-10-26 | Stop reason: HOSPADM

## 2018-10-26 RX ORDER — POTASSIUM CHLORIDE 1500 MG/1
20 TABLET, EXTENDED RELEASE ORAL
Status: DISCONTINUED | OUTPATIENT
Start: 2018-10-26 | End: 2018-10-26 | Stop reason: HOSPADM

## 2018-10-26 RX ORDER — FENTANYL CITRATE 50 UG/ML
25-50 INJECTION, SOLUTION INTRAMUSCULAR; INTRAVENOUS
Status: DISCONTINUED | OUTPATIENT
Start: 2018-10-26 | End: 2018-10-26 | Stop reason: HOSPADM

## 2018-10-26 RX ORDER — AMIODARONE HYDROCHLORIDE 400 MG/1
400 TABLET ORAL 2 TIMES DAILY
Qty: 14 TABLET | Refills: 0 | Status: SHIPPED | OUTPATIENT
Start: 2018-10-26 | End: 2019-02-26

## 2018-10-26 RX ORDER — AMIODARONE HYDROCHLORIDE 200 MG/1
400 TABLET ORAL 2 TIMES DAILY
Status: DISCONTINUED | OUTPATIENT
Start: 2018-10-26 | End: 2018-10-26 | Stop reason: HOSPADM

## 2018-10-26 RX ORDER — POTASSIUM CHLORIDE 750 MG/1
40 TABLET, EXTENDED RELEASE ORAL
Status: DISCONTINUED | OUTPATIENT
Start: 2018-10-26 | End: 2018-10-26 | Stop reason: HOSPADM

## 2018-10-26 RX ORDER — ATROPINE SULFATE 0.1 MG/ML
.5-1 INJECTION INTRAVENOUS
Status: DISCONTINUED | OUTPATIENT
Start: 2018-10-26 | End: 2018-10-26 | Stop reason: HOSPADM

## 2018-10-26 RX ORDER — FENTANYL CITRATE 50 UG/ML
50-100 INJECTION, SOLUTION INTRAMUSCULAR; INTRAVENOUS
Status: COMPLETED | OUTPATIENT
Start: 2018-10-26 | End: 2018-10-26

## 2018-10-26 RX ORDER — AMIODARONE HYDROCHLORIDE 200 MG/1
400 TABLET ORAL 2 TIMES DAILY
Qty: 30 TABLET | Refills: 1 | Status: CANCELLED | OUTPATIENT
Start: 2018-10-26

## 2018-10-26 RX ORDER — POTASSIUM CHLORIDE 1500 MG/1
40 TABLET, EXTENDED RELEASE ORAL
Status: DISCONTINUED | OUTPATIENT
Start: 2018-10-26 | End: 2018-10-26 | Stop reason: HOSPADM

## 2018-10-26 RX ORDER — AMIODARONE HYDROCHLORIDE 200 MG/1
200 TABLET ORAL DAILY
Qty: 42 TABLET | Refills: 0 | Status: SHIPPED | OUTPATIENT
Start: 2018-11-10 | End: 2018-11-09

## 2018-10-26 RX ORDER — NALOXONE HYDROCHLORIDE 0.4 MG/ML
.1-.4 INJECTION, SOLUTION INTRAMUSCULAR; INTRAVENOUS; SUBCUTANEOUS
Status: DISCONTINUED | OUTPATIENT
Start: 2018-10-26 | End: 2018-10-26 | Stop reason: HOSPADM

## 2018-10-26 RX ORDER — SODIUM CHLORIDE, SODIUM LACTATE, POTASSIUM CHLORIDE, CALCIUM CHLORIDE 600; 310; 30; 20 MG/100ML; MG/100ML; MG/100ML; MG/100ML
INJECTION, SOLUTION INTRAVENOUS CONTINUOUS PRN
Status: DISCONTINUED | OUTPATIENT
Start: 2018-10-26 | End: 2018-10-26

## 2018-10-26 RX ORDER — SODIUM CHLORIDE 9 MG/ML
1000 INJECTION, SOLUTION INTRAVENOUS CONTINUOUS
Status: DISCONTINUED | OUTPATIENT
Start: 2018-10-26 | End: 2018-10-26 | Stop reason: HOSPADM

## 2018-10-26 RX ADMIN — FENTANYL CITRATE 100 MCG: 50 INJECTION INTRAMUSCULAR; INTRAVENOUS at 15:06

## 2018-10-26 RX ADMIN — VITAMIN D, TAB 1000IU (100/BT) 2000 UNITS: 25 TAB at 08:11

## 2018-10-26 RX ADMIN — CLOPIDOGREL BISULFATE 75 MG: 75 TABLET ORAL at 07:42

## 2018-10-26 RX ADMIN — Medication 2 G: at 12:51

## 2018-10-26 RX ADMIN — TOPICAL ANESTHETIC 0.5 ML: 200 SPRAY DENTAL; PERIODONTAL at 14:41

## 2018-10-26 RX ADMIN — MIDAZOLAM 5.5 MG: 1 INJECTION INTRAMUSCULAR; INTRAVENOUS at 15:25

## 2018-10-26 RX ADMIN — RIVAROXABAN 20 MG: 20 TABLET, FILM COATED ORAL at 16:59

## 2018-10-26 RX ADMIN — LEVOTHYROXINE SODIUM 100 MCG: 100 TABLET ORAL at 07:42

## 2018-10-26 RX ADMIN — LIDOCAINE HYDROCHLORIDE 30 ML: 20 SOLUTION ORAL; TOPICAL at 14:40

## 2018-10-26 RX ADMIN — POTASSIUM CHLORIDE 20 MEQ: 750 TABLET, EXTENDED RELEASE ORAL at 11:48

## 2018-10-26 RX ADMIN — SODIUM CHLORIDE 1000 ML: 9 INJECTION, SOLUTION INTRAVENOUS at 04:02

## 2018-10-26 RX ADMIN — METHOHEXITAL SODIUM 50 MG: 500 INJECTION, POWDER, LYOPHILIZED, FOR SOLUTION INTRAMUSCULAR; INTRAVENOUS; RECTAL at 15:49

## 2018-10-26 RX ADMIN — SODIUM CHLORIDE, POTASSIUM CHLORIDE, SODIUM LACTATE AND CALCIUM CHLORIDE: 600; 310; 30; 20 INJECTION, SOLUTION INTRAVENOUS at 15:42

## 2018-10-26 RX ADMIN — AMIODARONE HYDROCHLORIDE 150 MG: 1.5 INJECTION, SOLUTION INTRAVENOUS at 09:06

## 2018-10-26 ASSESSMENT — ACTIVITIES OF DAILY LIVING (ADL)
AMBULATION: 0-->INDEPENDENT
DRESS: 0 - INDEPENDENT
SWALLOWING: 0 - SWALLOWS FOODS/LIQUIDS WITHOUT DIFFICULTY
TOILETING: 0-->INDEPENDENT
TRANSFERRING: 0 - INDEPENDENT
DRESS: 0-->INDEPENDENT
COGNITION: 0 - NO COGNITION ISSUES REPORTED
SWALLOWING: 0-->SWALLOWS FOODS/LIQUIDS WITHOUT DIFFICULTY
EATING: 0 - INDEPENDENT
BATHING: 0-->INDEPENDENT
RETIRED_COMMUNICATION: 0-->UNDERSTANDS/COMMUNICATES WITHOUT DIFFICULTY
FALL_HISTORY_WITHIN_LAST_SIX_MONTHS: NO
COMMUNICATION: 0 - UNDERSTANDS/COMMUNICATES WITHOUT DIFFICULTY
CHANGE_IN_FUNCTIONAL_STATUS_SINCE_ONSET_OF_CURRENT_ILLNESS/INJURY: NO
ADLS_ACUITY_SCORE: 9
ADLS_ACUITY_SCORE: 9
RETIRED_EATING: 0-->INDEPENDENT
TRANSFERRING: 0-->INDEPENDENT
AMBULATION: 0 - INDEPENDENT
ADLS_ACUITY_SCORE: 9
TOILETING: 0 - INDEPENDENT
BATHING: 0 - INDEPENDENT
ADLS_ACUITY_SCORE: 9

## 2018-10-26 ASSESSMENT — ENCOUNTER SYMPTOMS: DYSRHYTHMIAS: 1

## 2018-10-26 NOTE — PLAN OF CARE
Observation goals:  Patient will maintain HR <100, complete DANTE/DCCV    Pt awaiting DANTE/cardioversion, HR remains >100

## 2018-10-26 NOTE — PLAN OF CARE
Problem: Patient Care Overview  Goal: Plan of Care/Patient Progress Review  Outcome: No Change  D: Admitted last night ~2200 from ED for a-fib with RVR. Had been seen recently in the ED for the same issue where she converted after two doses of diltiazem and was discharged. Hx includes HTN, hypothyroidism, PFO s/p percutaneous closure (10/12/18), TIA (2/15), retinal artery occlusion, Hep C. Has not had this issue prior to PFO closure.      I: Held scheduled dose of diltiazem 5mg per cross-cover due to soft pressures and pt had taken PTA evening metoprolol. Normal saline running to PIV at 125ml/hr.      A: AOx4. BPs 90s systolically. Rhythm a-fib w/ RVR rates 90s-140s, otherwise VSS on RA. Pt reports feeling palpitations and while ambulating is slightly light-headed.      P: Possible DCCV this AM. Continue to monitor and update cards 1 with changes/concerns.

## 2018-10-26 NOTE — UTILIZATION REVIEW
"  Admission Status; Secondary Review Determination         Under the authority of the Utilization Management Committee, the utilization review process indicated a secondary review on the above patient.  The review outcome is based on review of the medical records, discussions with staff, and applying clinical experience noted on the date of the review.        ()      Inpatient Status Appropriate - This patient's medical care is consistent with medical management for inpatient care and reasonable inpatient medical practice.      (X) Observation Status Appropriate - This patient does not meet hospital inpatient criteria and is placed in observation status. If this patient's primary payer is Medicare and was admitted as an inpatient, Condition Code 44 should be used and patient status changed to \"observation\".   () Admission Status NOT Appropriate - This patient's medical care is not consistent with medical management for Inpatient or Observation Status.          RATIONALE FOR DETERMINATION     Patient is a 64yo woman with a PMH notable for HTN, hypothyroidism, PFO s/p percutaneous closure (10/12/2018), TIA (2/2015),  Retinal artery occlusion, and Hepatitis C who presented to the ED with chief complaint of palpitations and was found to be in atrial fibrillation with a rapid ventricular response. She was initially mildly hypotensive in the ED, and improved with IV fluid administration.  She had a similar episode 3 days ago and converted to NSR after receiving a dose of Diltiazem in the emergency department.  Her heart rate is in the 130-150 range and she has had some dizziness.  Her troponin is slightly elevated.  She is currently taking Xarelto for anticoagulation.  She has been seen by cardiology and has plans for DANTE/ cardioversion today if she does not spontaneously convert.  I would recommend placement in Observation status.  I discussed this case with JAKE Alfonso, CNP    The severity of illness, intensity " of service provided, expected LOS and risk for adverse outcome make the care complex, high risk and appropriate for hospital admission.        The information on this document is developed by the utilization review team in order for the business office to ensure compliance.  This only denotes the appropriateness of proper admission status and does not reflect the quality of care rendered.         The definitions of Inpatient Status and Observation Status used in making the determination above are those provided in the CMS Coverage Manual, Chapter 1 and Chapter 6, section 70.4.      Sincerely,     Daniel Collins MD  Physician Advisor  Utilization Review/ Case Management  [facility].

## 2018-10-26 NOTE — PHARMACY-ADMISSION MEDICATION HISTORY
Admission medication history interview status for the 10/25/2018 admission is complete. See Epic admission navigator for allergy information, pharmacy, prior to admission medications and immunization status.     Medication history interview sources:  Patient, Surescripts      Changes made to PTA medication list (reason)    Added: n/a    Deleted: n/a    Changed:   -fish oil dosing changed from 2 g daily to 1 g twice daily  -vitamin D3 dosing changed from 4000 units daily to 2000 units twice daily      Additional medication history information (including reliability of information, actions taken by pharmacist):  -Patient knew her medications well and was a reliable historian.   -Patient started Xarelto on Monday, and was previously on ASA 81 mg daily.  -Patient has taken all of her scheduled doses of her medications today.         Prior to Admission medications    Medication Sig Last Dose Taking? Auth Provider   atorvastatin (LIPITOR) 20 MG tablet Take 1 tablet (20 mg) by mouth daily 10/25/2018 at PM Yes Jose Ramon Andrade MD   cholecalciferol 2000 units tablet Take 2,000 unit marking on an U-100 insulin syringe by mouth 2 times daily 10/25/2018 at PM Yes Unknown, Entered By History   clopidogrel (PLAVIX) 75 MG tablet Take 1 tablet (75 mg) by mouth daily 10/25/2018 at Unknown time Yes Maria Luisa Schuler PA-C   fish oil-omega-3 fatty acids 1000 MG capsule Take 1 g by mouth 2 times daily 10/25/2018 at PM Yes Unknown, Entered By History   levothyroxine (SYNTHROID/LEVOTHROID) 100 MCG tablet Take 1 tablet (100 mcg) by mouth daily 10/25/2018 at AM Yes Noemy Olivera DO   lisinopril (PRINIVIL/ZESTRIL) 20 MG tablet Take 1 tablet (20 mg) by mouth daily 10/25/2018 at AM Yes Noemy Olivera DO   magnesium 250 MG tablet Take 250 mg by mouth daily  10/25/2018 at PM Yes Reported, Patient   metoprolol succinate (TOPROL-XL) 100 MG 24 hr tablet Take 1 tablet (100 mg) by mouth daily 10/25/2018 at 1730 Yes Smita Peoples,  APRN CNP   Multiple Vitamins-Minerals (MULTIVITAMIN ADULTS) TABS Take 1 tablet by mouth daily 10/25/2018 at Unknown time Yes Unknown, Entered By History   rivaroxaban ANTICOAGULANT (XARELTO) 20 MG TABS tablet Take 1 tablet (20 mg) by mouth daily (with dinner) 10/25/2018 at 1730 Yes Smita Peoples, JAKE CNP   Acetaminophen (TYLENOL PO) Take 1,000 mg by mouth as needed for mild pain or fever More than a month at Unknown time  Reported, Patient   valACYclovir (VALTREX) 500 MG tablet TAKE ONE TABLET BY MOUTH TWICE A DAY FOR THREE DAYS. (REPEAT AS NEEDED) More than a month at Unknown time  Noemy Olivera DO         Medication history completed by: Seda York, Pharmacy student

## 2018-10-26 NOTE — PROGRESS NOTES
Clinic Care Coordination Contact  Care Team Conversations    Patient is hospitalized   Clinic Care Coordinator RN will review chart on the following business day.

## 2018-10-26 NOTE — PROGRESS NOTES
Pt arrived in ECHO department  for scheduled DANTE.   Procedure explained, questions answered and consent signed. Discharge instructions discussed with patient.  Pt's throat sprayed at 1440, therefore pt will not be able to eat or drink until 2 hours after at 1640.  Informed pt of this time and encouraged to start with warm fluids and soft foods.    Pt tolerated procedure well, and was given a total of 100 mcg IV fentanyl and 5.5 mg IV versed for conscious sedation.  Pt denied throat or chest pain after DANTE complete.   DANTE probe 51 used for procedure.  No clots visualized on DANTE so proceeded with DCCV.  Anesthesia gave pt 50 mg IV brevitol for sedation and pt was DCCV at 200 Joules to a SR.  Pt denied C.P or throat pain after procedure and was transferred back to inpatient unit after awake and VSS.  Report given to inpatient RN.

## 2018-10-26 NOTE — CONSULTS
Electrophysiology Consultation Note   EP Attending: Dr. Baldwin  Reason for consultation: AF.   Provider requesting consultation: , Cardiology I Service.  Date of Service: 10/26/2018      HPI:   Ms. Degroot is a 63 year old female who has a past medical history hypothyroidism, HepC, HTN, TIA, retinal artery occlusion, PFO s/p percutaneous closure 10/12/18, and PAF (CHADSVASC 4 on Xarelto).   She first had left arm numbness and was found to have TIA in 2015. Work up at that time showed small PFO. A cardiac monitor was negative for that time. More recently she had a retinal artery occlusion and decision was made to pursue PFO closure. She had percutaneous PFO closure with Amplatz device on 10/12/18. She has since had two episodes of tachycardia. The first about a week ago where she counted her pulse up to 148 bpm and felt racing heart and palpitations. She performed vagal maneuvers and then her heart rate went down to around 100 bpm. She went to bed and when she woke up she felt back to her baseline. The second episode started 10/21/18 in the afternoon. She reports she did not feel well and have very rapid heart beat. She felt her pulse rate again was around 140-150 bpm. She tried vagal maneuvers again but these were not successful. When her symptoms continued she cam into the U[facility]. She was found to be in AF with RVR. She reported feeling generally unwell and had some palpitations. Upon exam in the UER, she converted spontaneously to sinus. She reported feeling much better in sinus.     She is now readmitted with AF with RVR. She reports onset of palpitations at 1pm on 10/25/18. Started when she was at rest, sitting at her desk. No associated shortness of breath, chest pain, nausea, vomiting, numbness/tingling in hands/arms. She has had some light headedness when standing up since being in the ED as well. She denies any prior history of arryhtmias besides her recent episodes. Recent echo showed LVEF 55-60%.  She denies any chest pain/pressures, worsening shortness of breath, leg/ankle swelling, PND, orthopnea, or syncopal symptoms. Current cardiac medications include: Xarelto, Toprol XL, Lisinopril, and Plavix.     Past Medical History:   Past Medical History:   Diagnosis Date     Cervical high risk HPV (human papillomavirus) test positive 09/16/2016, 10/11/17, 10/17/18    (not 16 or 18). 10/11/17: LSIL pap with + HR HPV (not 16 or 18) result.10/17/18: See problem list.      Low risk HPV infection 2009, 2010    NIL pap, + HPV 53     Papanicolaou smear of cervix with low grade squamous intraepithelial lesion (LGSIL) 10/11/2017    10/11/17: LSIL pap with + HR HPV (not 16 or 18) result.     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 1998    Tx'd w/ Interferon x 1 year-virus free at 5 years, blood transfusion  related     Past Surgical History:   Past Surgical History:   Procedure Laterality Date     C LIGATE FALLOPIAN TUBE  1986     SURGICAL HISTORY OF -  Right 2008    Right 1st MTPJ, bone spur resection     Allergies: Per MAR     Allergies   Allergen Reactions     Latex Swelling     Dental visit--burning sensation in lips and throat, lips felt puffy, eye watery from latex gloves     Pineapple Other (See Comments)     Medications:   Per MAR current outpatient cardiovascular medications include: Prescriptions Prior to Admission   Medication Sig Dispense Refill Last Dose     atorvastatin (LIPITOR) 20 MG tablet Take 1 tablet (20 mg) by mouth daily 90 tablet 3 10/25/2018 at PM     cholecalciferol 2000 units tablet Take 2,000 unit marking on an U-100 insulin syringe by mouth 2 times daily   10/25/2018 at PM     clopidogrel (PLAVIX) 75 MG tablet Take 1 tablet (75 mg) by mouth daily 90 tablet 3 10/25/2018 at Unknown time     fish oil-omega-3 fatty acids 1000 MG capsule Take 1 g by mouth 2 times daily   10/25/2018 at PM     levothyroxine (SYNTHROID/LEVOTHROID) 100 MCG tablet Take 1  tablet (100 mcg) by mouth daily 90 tablet 3 10/25/2018 at AM     lisinopril (PRINIVIL/ZESTRIL) 20 MG tablet Take 1 tablet (20 mg) by mouth daily 90 tablet 1 10/25/2018 at AM     magnesium 250 MG tablet Take 250 mg by mouth daily    10/25/2018 at PM     metoprolol succinate (TOPROL-XL) 100 MG 24 hr tablet Take 1 tablet (100 mg) by mouth daily 90 tablet 3 10/25/2018 at 1730     Multiple Vitamins-Minerals (MULTIVITAMIN ADULTS) TABS Take 1 tablet by mouth daily   10/25/2018 at Unknown time     [START ON 2018] rivaroxaban ANTICOAGULANT (XARELTO) 20 MG TABS tablet Take 1 tablet (20 mg) by mouth daily (with dinner) 30 tablet 11 10/25/2018 at 1730     Acetaminophen (TYLENOL PO) Take 1,000 mg by mouth as needed for mild pain or fever   More than a month at Unknown time     valACYclovir (VALTREX) 500 MG tablet TAKE ONE TABLET BY MOUTH TWICE A DAY FOR THREE DAYS. (REPEAT AS NEEDED) 12 tablet 5 More than a month at Unknown time     Current Outpatient Prescriptions   Medication Sig Dispense Refill     [DISCONTINUED] lisinopril-hydrochlorothiazide (PRINZIDE,ZESTORETIC) 20-12.5 MG per tablet Take 1 tablet by mouth daily 90 tablet 3     Current Facility-Administered Medications   Medication Dose Route Frequency     atorvastatin  20 mg Oral Daily     cholecalciferol  2,000 Units Oral BID     clopidogrel  75 mg Oral Daily     levothyroxine  100 mcg Oral Daily     metoprolol succinate  100 mg Oral Daily     rivaroxaban ANTICOAGULANT  20 mg Oral Daily with supper     senna-docusate  1 tablet Oral BID    Or     senna-docusate  2 tablet Oral BID     sodium chloride (PF)  3 mL Intracatheter Q8H     Family History:   Family History   Problem Relation Age of Onset     HEART DISEASE Maternal Grandfather      Breast Cancer Maternal Grandmother      Dx'd age 70     Arthritis Father      Lipids Mother      On meds     Cancer Mother      BCC     HEART DISEASE Brother       of dilated of cardiomyopathy at age 54     Alcohol/Drug Sister   "    Alcohol/Drug Brother      Depression Sister      Social History:   Social History   Substance Use Topics     Smoking status: Former Smoker     Smokeless tobacco: Never Used      Comment: quit 30 years ago     Alcohol use Yes      Comment: 4-5 Drinks Per Week       ROS:   A comprehensive 10 point ROS was negative other than as mentioned in HPI.    Physical Examination:   VITALS: /82 (BP Location: Right arm)  Pulse 150  Temp 98.2  F (36.8  C) (Oral)  Resp 16  Ht 1.753 m (5' 9\")  Wt 83.9 kg (185 lb)  LMP 10/27/2004  SpO2 97%  BMI 27.32 kg/m2  GENERAL APPEARANCE: AxO, NAD   HEENT: NCAT, EOMI, MMM. PERRLA.   NECK: Supple.   CHEST: CTAB   CARDIOVASCULAR: irregularly irregular, tachy.  ABDOMEN: BS+, soft, NT, ND.   EXTREMITIES: No pedal edema. Distal pulses intact.   NEURO: Grossly nonfocal.   PSYCH: Normal affect.  SKIN: Warm and dry.   Data:   Labs:  BMP  Recent Labs  Lab 10/25/18  1827 10/22/18  0836 10/22/18  0821    144 144   POTASSIUM 3.7 3.9 4.2   CHLORIDE 109  --  109   ALICIA 9.7  --  9.5   CO2 22  --  25   BUN 14  --  17   CR 0.91  --  1.01   * 81 100*     CBC  Recent Labs  Lab 10/25/18  1827 10/22/18  0836 10/22/18  0821   WBC 10.5  --  10.4   RBC 4.84  --  4.84   HGB 15.7 15.3 15.8*   HCT 48.1*  --  48.5*   MCV 99  --  100   MCH 32.4  --  32.6   MCHC 32.6  --  32.6   RDW 12.9  --  13.1     --  172     Cholesterol (mg/dL)   Date Value   10/17/2018 144   10/17/2017 199   03/15/2016 218 (H)   02/16/2015 203 (H)     Cholesterol/HDL Ratio (no units)   Date Value   02/16/2015 1.9   09/03/2014 1.9   08/30/2013 2.6   08/29/2012 2.1     HDL Cholesterol (mg/dL)   Date Value   10/17/2018 83   10/17/2017 91   03/15/2016 82   02/16/2015 106     LDL Cholesterol Calculated (mg/dL)   Date Value   10/17/2018 49   10/17/2017 98   03/15/2016 119 (H)   02/16/2015 84     EKG:       ECHO:   Recent Results (from the past 4320 hour(s))   Echocardiogram Limited    Narrative    " [id number]  [facility]  [id number]  [id number]^GEORGE^MICHELLE^KIMBERLY           [facility],[place]  Echocardiography Laboratory  [address] [place] [id number]     Name: YARON BRAXTON  MRN: [id number]  : 1955  Study Date: 10/12/2018 02:49 PM  Age: 63 yrs  Gender: Female  Patient Location: [facility]  Reason For Study: Limited for effusion S/P PFO closure  Ordering Physician: MICHELLE VAZQUEZ  Referring Physician: MILY HERNANDEZ  Performed By: Stuart Nguyen RDCS     BSA: 2.0 m2  Height: 69 in  Weight: 179 lb  HR: 64  BP: 140/79 mmHg  _____________________________________________________________________________  __        Procedure  Limited Portable Echo Adult.  _____________________________________________________________________________  __        Interpretation Summary  Limited study.  No significant pericardial effusion.  PFO closure device in place.  _____________________________________________________________________________  __     _____________________________________________________________________________  __                          Report approved by: Negrita Hunter 10/12/2018 07:58 PM              _____________________________________________________________________________  __          Assessment:   Ms. Braxton is a 63 year old female who has a past medical history hypothyroidism, HepC, HTN, TIA, retinal artery occlusion, PFO s/p percutaneous closure 10/12/18, and PAF (CHADSVASC 4 on Xarelto).   She first had left arm numbness and was found to have TIA in . Work up at that time showed small PFO. A cardiac monitor was negative for that time. More recently she had a retinal artery occlusion and decision was made to pursue PFO closure. She had percutaneous PFO closure with Amplatz device on 10/12/18. She has since had two episodes of tachycardia. The first about a week ago where she counted her pulse up to 148 bpm and felt racing heart and palpitations.  She performed vagal maneuvers and then her heart rate went down to around 100 bpm. She went to bed and when she woke up she felt back to her baseline. The second episode started 10/21/18 in the afternoon. She reports she did not feel well and have very rapid heart beat. She felt her pulse rate again was around 140-150 bpm. She tried vagal maneuvers again but these were not successful. When her symptoms continued she cam into the UER. She was found to be in AF with RVR. She reported feeling generally unwell and had some palpitations. Upon exam in the UER, she converted spontaneously to sinus. She reported feeling much better in sinus.     She is now readmitted with AF with RVR. She reports onset of palpitations at 1pm on 10/25/18. Started when she was at rest, sitting at her desk. No associated shortness of breath, chest pain, nausea, vomiting, numbness/tingling in hands/arms. She has had some light headedness when standing up since being in the ED as well. She denies any prior history of arryhtmias besides her recent episodes. Recent echo showed LVEF 55-60%. She denies any chest pain/pressures, worsening shortness of breath, leg/ankle swelling, PND, orthopnea, or syncopal symptoms. Current cardiac medications include: Xarelto, Toprol XL, Lisinopril, and Plavix.     EP Recommendations:  We discussed in detail with the patient management/treatment options for Antoinette includin. Stroke Prophylaxis:  CHADSVASC=+gender, +HTN, ++TIA  4, corresponding to a 4.0% annual stroke / systemic emolism event rate. indicating need for long term oral anticoagulation. She was recently started on Xarelto. No bleeding issues.   2. Rate Control: Continue .   3. Rhythm Control: Cardioversion, Antiarrhythmics and/or ablation are options for rhythm control. Given she just recently had PFO closure, this may just be due to procedural irritation. However, given frequent occurrences of AF now, we would recommend short course of AAT. Favor  short course of amiodarone for 6 weeks and then stopping. Can load with IV bolus and then 400 mg BID X 2 weeks then 200 mg daily thereafter. We would plan to stop after 6 weeks. If chronic AAT needed would favor alternative such as sotalol, dofetilide, or possibly Flecainide. If she does not covert with amiodarone bolus would recommend DANTE/DCCV.    4. Risk Factor Management: maintain tight BP control, and ASHLEY evaluation as indicated.       Follow up as scheduled with EP and Dr. Lopez with echo.     The patient states understanding and is agreeable with plan.   Thank you for allowing us to participate in the care of this patient.       Patient was discussed with Dr. Baldwin.   JAKE Arias CNP  Electrophysiology Consult Service  Pager: [phone]    EP STAFF NOTE  I have seen and examined the patient as part of a shared visit with ANDRE Arias NP.  I agree with the note above. I reviewed today's vital signs and medications. I have reviewed and discussed with the advanced practice provider their physical examination, assessment, and plan   Briefly, s/p ASD closure and PAF afterwards, recurrent  My key history/exam findings are: AF.   The key management decisions made by me: amio short course, f/u as outpatient .    Alonzo Baldwin MD [facility]  Cardiology - Electrophysiology

## 2018-10-26 NOTE — PROGRESS NOTES
[facility]   Cardiology   Progress Note     Interval History:  - No acute events overnight  - Patient remains in afib -130's  - Patient reports feeling better with ambulation, less dizziness  - Patient denies chest pain, palpitations, lightheadedness    Physical Exam:  Temp:  [97.7  F (36.5  C)-98.5  F (36.9  C)] 98.2  F (36.8  C)  Pulse:  [150] 150  Heart Rate:  [107-151] 107  Resp:  [8-20] 16  BP: ()/(61-97) 107/82  SpO2:  [92 %-100 %] 97 %    I/O:  Intake/Output Summary (Last 24 hours) at 10/26/18 0830  Last data filed at 10/26/18 0743   Gross per 24 hour   Intake              470 ml   Output             1100 ml   Net             -630 ml       Wt:   Wt Readings from Last 5 Encounters:   10/26/18 83.9 kg (185 lb)   10/22/18 86.2 kg (190 lb 0.6 oz)   10/17/18 80.2 kg (176 lb 14.4 oz)   10/02/18 81.6 kg (180 lb)   07/20/18 83.1 kg (183 lb 1.6 oz)       General: NAD  HEENT:  PERRLA, EOMI. Sclera is non-icteric and conjunctiva is pink with no erythema. Oral mucosa moist, no oral lesions, good dentition.  Neck: JVD not appreciated. No cervical/supraclavicular LAD.   CV: irregularly irregular rhythm, tachy. No murmur appreciated. No rubs or gallops. Peripheral radial pulse intact.  Resp: No increased work of breathing or use of accessory muscles, breathing comfortably on room air.  Lung sounds clear throughout/bilaterally  Abdomen:  Normal active bowel sounds.  Abdomen is soft. No distension, non-tender to palpation. No rebound tenderness or guarding.   MSK:  Upper and lower extremity muscle strength intact 5/5 bilaterally.  No large joint swelling or erythema.    Extremities: Warm. Capillary refill less than 3 sec. 2/4 radial pulses bilaterally.  2/4 pedal pulses bilaterally. No pre-tibial edema. No cyanosis or clubbing.  Skin:  Warm and dry. No erythema, rashes, ulceration or diaphoresis.  Neuro: CN II-XII grossly intact. Alert and oriented x3.  Moving all extremities  equally.    Medications:    amiodarone  150 mg Intravenous Once     atorvastatin  20 mg Oral Daily     cholecalciferol  2,000 Units Oral BID     clopidogrel  75 mg Oral Daily     levothyroxine  100 mcg Oral Daily     metoprolol succinate  100 mg Oral Daily     rivaroxaban ANTICOAGULANT  20 mg Oral Daily with supper     senna-docusate  1 tablet Oral BID    Or     senna-docusate  2 tablet Oral BID     sodium chloride (PF)  3 mL Intracatheter Q8H       - MEDICATION INSTRUCTIONS -       sodium chloride Stopped (10/26/18 0800)       Labs:   CMP  Recent Labs  Lab 10/25/18  1827 10/22/18  0836 10/22/18  0821    144 144   POTASSIUM 3.7 3.9 4.2   CHLORIDE 109  --  109   CO2 22  --  25   ANIONGAP 11  --  10   * 81 100*   BUN 14  --  17   CR 0.91  --  1.01   GFRESTIMATED 62  --  55*   GFRESTBLACK 75  --  67   ALICIA 9.7  --  9.5     CBC  Recent Labs  Lab 10/25/18  1827 10/22/18  0836 10/22/18  0821   WBC 10.5  --  10.4   RBC 4.84  --  4.84   HGB 15.7 15.3 15.8*   HCT 48.1*  --  48.5*   MCV 99  --  100   MCH 32.4  --  32.6   MCHC 32.6  --  32.6   RDW 12.9  --  13.1     --  172     INRNo lab results found in last 7 days.  Arterial Blood GasNo lab results found in last 7 days.    Diagnostics:  ECG 10/25/18: Afib with RVR,         ASSESSMENT/PLAN:  Sofi Degroot is a 63 year old year old female with PMHx of pAF, HTN, hypothyroidism, PFO s/p percutaneous closure with Amplatz device (10/12/18), TIA (2005), retinal artery occlusion, hepatitis C who is admitted with palpitations found to be in afib with rvr.     # Paroxysmal Atrial Fibrillation   Episodes of palpitations and dizziness started after recent PFO closure, never had this prior. Patient was seen 3 days ago in ED with afib rvr and was converted back to sinus with dilt bolus, IVF, and given an increased dose of metoprolol. Patient reports having 2 episodes of palpitations at home and she was able to slow heart rate down with vagal maneuvers.   Yesterday at 1300, patient felt palpitations and was unable to get her heart rate down, decided to come into ED. Denies any recent illnesses, fevers, does not feel as if she is dehydrated.Euthyroid on medication, no h/o coronary disease or angina, no known valve disease.  Echo EF 55-60%, no WMA. Does have a history of HTN, but reports home -120's/70's and compliant with lisinopril and metoprolol.  CHADSVASC = 4 (HTN, female, TIA), currently on Xarelto.  Likely cause of atrial fibrillation PFO closure.      - s/p IVF bolus and maintenance dose at 125 mL/hr, okay to discontinue, patient does not appear to be dehydrated on exam, labs WNL.   - EP consulted; plan for amio bolus to attempted medical cardioversion, if unsuccessful will do DCCV this afternoon  - Patient will be started on PO amio (400 mg BID x 2 weeks, then 200 mg daily for 6 weeks); will also f/u with EP as outpatient  - NPO for possible DCCV this afternoon  - continue rivaroxaban   - Continue Metoprolol XL for rate control; consider change to BID dosing for better rate control     # PFO s/p Closure 10/2018   - Continue PTA Plavix     # HLD   - continue PTA Lipitor      # HTN   Patient normally takes 20 mg Lisinopril daily and 100 mg Metoprolol daily.  Both these meds held on admission due to hypotension in ED.  BP now stable, but remains soft 100-110/80    - Continue PTA Toprol XL   - Continue to hold Lisinopril    # Hypothyroidism  TSH stable   - continue pta levothyroxine         FEN: NPO for possible DANTE/DCCV  Code status: Full  Prophylaxis:  Xarelto  Isolation: None  Disposition: Possible discharge home today or tomorrow pending rate/rhythm control        Katherine Reilly, APRN, CNP  [facility] Cardiology Team  [phone]

## 2018-10-26 NOTE — PROGRESS NOTES
DISCHARGE                        10/26/2019 18:27pm  ----------------------------------------------------------------------------  Discharged to: Home  Via: private transportation  Accompanied by: Family  Discharge Instructions: Reg diet, activity as tolerated, medications, follow up appointments, when to call the MD, aftercare instructions.  Prescriptions: To be filled by discharge pharmacy; medication list reviewed & sent with pt  Follow Up Appointments: arranged; information given  Belongings: All sent with pt  IV: d/c'd  Telemetry: d/c'd  Pt exhibits understanding of above discharge instructions; all questions answered. Staff to take pt down to discharge pharm and then front lobby in wheelchair.    Discharge Paperwork: Signed, copied, and sent home with patient.

## 2018-10-26 NOTE — OP NOTE
CARDIOVERSION    PROCEDURE:  Direct current cardioversion  PROCEDURE DATE: 10/26/2018     Pre-procedure diagnosis:  TIA, s/p PFO closure, AF with RVR, [id number] - 4  Post-procedure diagnosis: Successful cardioversion  Complications:  none    BRIEF CLINICAL HISTORY:  Ms. Degroot is a 63 year old female who has a past medical history hypothyroidism, HepC, HTN, TIA, retinal artery occlusion, PFO s/p percutaneous closure 10/12/18, and PAF (CHADSVASC 4 on Xarelto).   She first had left arm numbness and was found to have TIA in 2015. Work up at that time showed small PFO. A cardiac monitor was negative for that time. More recently she had a retinal artery occlusion and decision was made to pursue PFO closure. She had percutaneous PFO closure with Amplatz device on 10/12/18. She has since had two episodes of tachycardia. The first about a week ago where she counted her pulse up to 148 bpm and felt racing heart and palpitations. She performed vagal maneuvers and then her heart rate went down to around 100 bpm. She went to bed and when she woke up she felt back to her baseline. The second episode started 10/21/18 in the afternoon. She reports she did not feel well and have very rapid heart beat. She felt her pulse rate again was around 140-150 bpm. She tried vagal maneuvers again but these were not successful. When her symptoms continued she cam into the [facility]. She was found to be in AF with RVR. She reported feeling generally unwell and had some palpitations. Upon exam in the [facility], she converted spontaneously to sinus. She reported feeling much better in sinus.     She is now readmitted with AF with RVR. She reports onset of palpitations at 1pm on 10/25/18. Started when she was at rest, sitting at her desk. No associated shortness of breath, chest pain, nausea, vomiting, numbness/tingling in hands/arms. She has had some light headedness when standing up since being in the ED as well. She denies any prior history of  arryhtmias besides her recent episodes. Recent echo showed LVEF 55-60%. She denies any chest pain/pressures, worsening shortness of breath, leg/ankle swelling, PND, orthopnea, or syncopal symptoms. Current cardiac medications include: Xarelto, Toprol XL, Lisinopril, and Plavix.      PROCEDURE:  The patient arrived at the Echo Laboratory in a fasting, non-sedated state.  Informed consent was previously obtained from patient, who understood indications, risks, and benefits of the procedure.  Transesophageal echo was performed by the echo lab team to rule out intracardiac thrombus.  With help from Anesthesia, patient was deeply sedated.  200 J of synchronized biphasic shock was delivered through the cardiac monitor, and the patient was successfully converted to normal sinus rhythm.  After sedation wore off, patient awoke and remained neurologically intact.  The patient tolerated the procedure well from a hemodynamic and respiratory standpoint without any complications.  She was observed in the Echo Laboratory and then discharged to home after sedation wore off and she was ambulatory.    IMPRESSION:  1.  Successful direct current cardioversion with 200 J biphasic shock.    RECOMMENDATIONS/PLANS:  1.   Continue anticoagulation    Cristian Rose MD  EP fellow  [phone]

## 2018-10-26 NOTE — PROGRESS NOTES
Admission          10/25/2018  ~2200  -----------------------------------------------------------  Diagnosis: Afib     Admitted from: [facility]   Via: stretcher  Accompanied by: Self  Family Aware of Admission: Yes per pt report  Belongings: Kept with pt in room  Admission Profile: In process  Teaching: orientation to unit, call don't fall, use of console, meal times, when to call for the RN (angina/sob/dizzyness, etc.), and enforced importance of safety   Access: PIV  Telemetry: Placed on pt  Ht./Wt.: complete    Temp:  [97.7  F (36.5  C)-98.5  F (36.9  C)] 97.7  F (36.5  C)  Pulse:  [150] 150  Heart Rate:  [122-151] 138  Resp:  [8-20] 16  BP: ()/(61-97) 141/97  SpO2:  [92 %-100 %] 96 %      Pt A/O. Denies CP, denies SOB. Pt endorsing slight dizziness with ambulation. Writer reinforcing with pt to call before getting up.

## 2018-10-26 NOTE — DISCHARGE SUMMARY
[address] [address]  p: [phone]    Discharge Summary: Cardiology Service    Sofi Degroot MRN# [id number]   YOB: 1955 Age: 63 year old       Admission Date: 10/25/2018  Discharge Date: 10/26/18      Discharge Diagnoses:  1. Paroxysmal atrial fibrillation  2. Hypertension  3. PFO s/p percutaneous closure 10/12    Pertinent Procedures:  1. DANTE/Cardioversion    Consults:  EP    Imaging with results:  Transthoracic Echocardiogram 10/26/18:  Interpretation Summary  Global and regional left ventricular function is normal with an EF of 55-60%.  Right ventricular function, chamber size, wall motion, and thickness are  normal.  The left atrial appendage is normal. It is free of spontaneous echo contrast  and thrombus.  No pericardial effusion is present.  A [place] PFO closure device is present and is well seated in the interatrial  septum, no residual interatrial shunt by color Doppler interrogation.  No significant valvular disease.  This study was compared to a limited TTE from 10/12/2018. There has been no  significant change.    Other imaging studies:  EKG 12Lead 10/26/18 (pre-DCCV): Afib with RVR,       Cardioversion 10/26/18:  PROCEDURE:  The patient arrived at the Echo Laboratory in a fasting, non-sedated state.  Informed consent was previously obtained from patient, who understood indications, risks, and benefits of the procedure.  Transesophageal echo was performed by the echo lab team to rule out intracardiac thrombus.  With help from Anesthesia, patient was deeply sedated.  200 J of synchronized biphasic shock was delivered through the cardiac monitor, and the patient was successfully converted to normal sinus rhythm.  After sedation wore off, patient awoke and remained neurologically intact.  The patient tolerated the procedure well from a hemodynamic and respiratory standpoint without any complications.  She was  observed in the Echo Laboratory and then discharged to home after sedation wore off and she was ambulatory.     IMPRESSION:  1.  Successful direct current cardioversion with 200 J biphasic shock.     RECOMMENDATIONS/PLANS:  1.   Continue anticoagulation    Brief HPI:  Sofi Degroot is a 63 year old year old female with PMHx of pAF, HTN, hypothyroidism, PFO s/p percutaneous closure with Amplatz device (10/12/18), TIA (2005), retinal artery occlusion, hepatitis C who is admitted with palpitations found to be in afib with rvr.      Hospital Course by Diagnosis:  # Paroxysmal Atrial Fibrillation   Episodes of palpitations and dizziness started after recent PFO closure, never had this prior. Patient was seen 3 days ago in ED with afib rvr and was converted back to sinus with dilt bolus, IVF, and given an increased dose of metoprolol. Patient reports having 2 episodes of palpitations at home and she was able to slow heart rate down with vagal maneuvers.  Yesterday at 1300, patient felt palpitations and was unable to get her heart rate down, decided to come into ED. Denies any recent illnesses, fevers, does not feel as if she is dehydrated. Euthyroid on medication, no h/o coronary disease or angina, no known valve disease.  Echo EF 55-60%, no WMA. Does have a history of HTN, but reports home -120's/70's and compliant with lisinopril and metoprolol.  CHADSVASC = 4 (HTN, female, TIA), currently on Xarelto.  Likely cause of atrial fibrillation recent PFO closure. DCCV converted patient into sinus rhythm with HR 70's.     - EP Consulted  - Attempted conversion with amio bolus, this was unsuccessful, decision made to DANTE/DCCV  - DANTE/DCCV successful   - Patient will be started on PO amio (400 mg BID x 2 weeks, then 200 mg daily for 6 weeks); will also f/u with EP as outpatient  - continue rivaroxaban   - Continue Metoprolol XL for rate control; consider change to BID dosing for better rate control; pt reports preference  to once a day dosing, but is open to switching if needed      # PFO s/p Closure 10/2018   - Continue PTA Plavix      # HLD   - continue PTA Lipitor       # HTN   Patient normally takes 20 mg Lisinopril daily and 100 mg Metoprolol daily.  Both these meds held on admission due to hypotension in ED.  BP now stable.      - Continue PTA Toprol  mg daily   - Continue PTA Lisinopril 20 mg daily     # Hypothyroidism  TSH stable   - continue PTA levothyroxine        Condition on discharge  Temp:  [97.7  F (36.5  C)-98.5  F (36.9  C)] 97.7  F (36.5  C)  Pulse:  [] 79  Heart Rate:  [] 74  Resp:  [8-20] 18  BP: ()/() 118/82  SpO2:  [90 %-100 %] 96 %  General: Alert, interactive, NAD  Eyes: sclera anicteric, EOMI  Neck: JVP not appreciated, carotid 2+ bilaterally  Cardiovascular: regular rate and rhythm, normal S1 and S2, no murmurs, gallops, or rubs  Resp: clear to auscultation bilaterally, no rales, wheezes, or rhonchi  GI: Soft, nontender, nondistended. +BS.  No HSM or masses, no rebound or guarding.  Extremities: no edema, no cyanosis or clubbing, dorsalis pedis and posterior tibialis pulses 2+ bilaterally  Skin: Warm and dry, no jaundice or rash  Neuro: CN 2-12 intact, moves all extremities equally  Psych: Alert & oriented x 3      Medication Changes:  - Start Amiodarone 400 mg BID for 2 weeks, then 200 mg daily for 6 weeks    Discharge medications:   Current Discharge Medication List      START taking these medications    Details   !! amiodarone (PACERONE/CODARONE) 200 MG tablet Take 1 tablet (200 mg) by mouth daily for 42 doses  Qty: 42 tablet, Refills: 0    Associated Diagnoses: Atrial fibrillation with RVR (H)      !! amiodarone 400 MG TABS Take 400 mg by mouth 2 times daily for 14 days  Qty: 14 tablet, Refills: 0    Associated Diagnoses: Atrial fibrillation with RVR (H)       !! - Potential duplicate medications found. Please discuss with provider.      CONTINUE these medications which  have NOT CHANGED    Details   atorvastatin (LIPITOR) 20 MG tablet Take 1 tablet (20 mg) by mouth daily  Qty: 90 tablet, Refills: 3    Associated Diagnoses: Hypercholesteremia      cholecalciferol 2000 units tablet Take 2,000 Units by mouth 2 times daily       clopidogrel (PLAVIX) 75 MG tablet Take 1 tablet (75 mg) by mouth daily  Qty: 90 tablet, Refills: 3    Associated Diagnoses: PFO (patent foramen ovale)      fish oil-omega-3 fatty acids 1000 MG capsule Take 1 g by mouth 2 times daily      levothyroxine (SYNTHROID/LEVOTHROID) 100 MCG tablet Take 1 tablet (100 mcg) by mouth daily  Qty: 90 tablet, Refills: 3    Associated Diagnoses: Other specified hypothyroidism      lisinopril (PRINIVIL/ZESTRIL) 20 MG tablet Take 1 tablet (20 mg) by mouth daily  Qty: 90 tablet, Refills: 1    Associated Diagnoses: Essential hypertension with goal blood pressure less than 140/90      magnesium 250 MG tablet Take 250 mg by mouth daily       metoprolol succinate (TOPROL-XL) 100 MG 24 hr tablet Take 1 tablet (100 mg) by mouth daily  Qty: 90 tablet, Refills: 3    Associated Diagnoses: PAF (paroxysmal atrial fibrillation) (H); Essential hypertension with goal blood pressure less than 140/90      Multiple Vitamins-Minerals (MULTIVITAMIN ADULTS) TABS Take 1 tablet by mouth daily      rivaroxaban ANTICOAGULANT (XARELTO) 20 MG TABS tablet Take 1 tablet (20 mg) by mouth daily (with dinner)  Qty: 30 tablet, Refills: 11    Associated Diagnoses: PAF (paroxysmal atrial fibrillation) (H)      Acetaminophen (TYLENOL PO) Take 1,000 mg by mouth as needed for mild pain or fever      valACYclovir (VALTREX) 500 MG tablet TAKE ONE TABLET BY MOUTH TWICE A DAY FOR THREE DAYS. (REPEAT AS NEEDED)  Qty: 12 tablet, Refills: 5    Associated Diagnoses: Recurrent herpes simplex             Follow-up:  - Follow up with EP in 2 months for follow up    Code status:  Full    Katherine Reilly, APRN, CNP  [facility] Cardiology  [phone]

## 2018-10-26 NOTE — PLAN OF CARE
D:  Pt admitted 10/25 w/ afib RVR  I/A:  Afib, -130's.  Amio bolus w/ no result.  Pt down for DANTE/cardioversion  P:  Continue w/ plan of care, notify team w/ changes/concerns

## 2018-10-26 NOTE — ANESTHESIA PREPROCEDURE EVALUATION
)Anesthesia Pre-Procedure Evaluation    Patient: Sofi Degroot   MRN:     [id number] Gender:   female   Age:    63 year old :      1955        Preoperative Diagnosis: A- Fib    Procedure(s):  ANESTHESIA CARDIOVERSION       Past Medical History:   Diagnosis Date     Cervical high risk HPV (human papillomavirus) test positive 2016, 10/11/17, 10/17/18    (not 16 or 18). 10/11/17: LSIL pap with + HR HPV (not 16 or 18) result.10/17/18: See problem list.      Low risk HPV infection ,     NIL pap, + HPV 53     Papanicolaou smear of cervix with low grade squamous intraepithelial lesion (LGSIL) 10/11/2017    10/11/17: LSIL pap with + HR HPV (not 16 or 18) result.     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     Tx'd w/ Interferon x 1 year-virus free at 5 years, blood transfusion  related     Past Surgical History:   Procedure Laterality Date     C LIGATE FALLOPIAN TUBE       SURGICAL HISTORY OF -  Right     Right 1st MTPJ, bone spur resection       Anesthesia Evaluation     .  Type: General           ROS/MED HX    ENT/Pulmonary:  - neg pulmonary ROS     Neurologic:     (+)TIA date:  features: none,     Cardiovascular:     (+) hypertension----. : . . . :. dysrhythmias a-fib, .      (-) CAD and stent   METS/Exercise Tolerance:  4 - Raking leaves, gardening   Hematologic:  - neg hematologic  ROS       Musculoskeletal:         GI/Hepatic:     (+) hepatitis liver disease,       Renal/Genitourinary:         Endo:     (+) thyroid problem .   (-) Type I DM   Psychiatric:         Infectious Disease:         Malignancy:         Other:                         PHYSICAL EXAM:   Mental Status/Neuro: A/A/O   Airway: Mallampati: I  Mouth/Opening: Full  TM distance: > 6 cm  Neck ROM: Full   Respiratory: Auscultation: CTAB     Resp. Rate: Normal     Resp. Effort: Normal      CV: Rhythm: Regular  Rate: Age appropriate  Heart: Normal Sounds   Comments:        "             Lab Results   Component Value Date    WBC 10.5 10/25/2018    HGB 15.7 10/25/2018    HCT 48.1 (H) 10/25/2018     10/25/2018    CRP 0.4 05/01/2018    SED 10 11/22/2016     10/25/2018    POTASSIUM 3.7 10/25/2018    CHLORIDE 109 10/25/2018    CO2 22 10/25/2018    BUN 14 10/25/2018    CR 0.91 10/25/2018     (H) 10/25/2018    ALICIA 9.7 10/25/2018    MAG 2.0 10/26/2018    ALBUMIN 3.6 10/17/2018    PROTTOTAL 7.7 10/17/2018    ALT 26 10/17/2018    AST 22 10/17/2018    ALKPHOS 57 10/17/2018    BILITOTAL 0.8 10/17/2018    PTT 29 02/15/2015    INR 1.18 (H) 10/26/2018    TSH 1.12 10/25/2018    HCGS Negative 10/12/2018       Preop Vitals  BP Readings from Last 3 Encounters:   10/26/18 112/85   10/22/18 127/87   10/17/18 118/83    Pulse Readings from Last 3 Encounters:   10/26/18 104   10/22/18 156   10/17/18 89      Resp Readings from Last 3 Encounters:   10/26/18 20   10/22/18 21   10/17/18 16    SpO2 Readings from Last 3 Encounters:   10/26/18 96%   10/22/18 100%   10/17/18 96%      Temp Readings from Last 1 Encounters:   10/26/18 36.5  C (97.7  F) (Oral)    Ht Readings from Last 1 Encounters:   10/25/18 1.753 m (5' 9\")      Wt Readings from Last 1 Encounters:   10/26/18 83.9 kg (185 lb)    Estimated body mass index is 27.32 kg/(m^2) as calculated from the following:    Height as of this encounter: 1.753 m (5' 9\").    Weight as of this encounter: 83.9 kg (185 lb).     LDA:  Peripheral IV 10/25/18 Right Lower forearm (Active)   Site Assessment WDL 10/26/2018  3:00 PM   Line Status Infusing 10/26/2018  3:00 PM   Phlebitis Scale 0-->no symptoms 10/26/2018  3:00 PM   Infiltration Scale 0 10/26/2018  3:00 PM   Extravasation? No 10/26/2018  9:00 AM   Dressing Intervention New dressing  10/25/2018  6:37 PM   Number of days:1       Right Groin Interventional Procedure Access (Active)   Number of days:14       Assessment:   ASA SCORE: 2    NPO Status: > 6 hours since completed Solid Foods   Documentation: " H&P complete; Preop Testing complete; Consents complete   Proceeding: Proceed without further delay  Tobacco Use:  NO Active use of Tobacco/UNKNOWN Tobacco use status     Plan:   Anes. Type:  General   Pre-Induction: Midazolam IV; Acetaminophen PO   Induction:  IV (Standard)   Airway: Oral ETT   Access/Monitoring: PIV   Maintenance: Balanced   Emergence: Procedure Site   Logistics: Same Day Surgery     Postop Pain/Sedation Strategy:  Standard-Options: Opioids PRN     PONV Management:  Adult Risk Factors: Female, Non-Smoker, Postop Opioids     CONSENT: Direct conversation   Plan and risks discussed with: Patient   Blood Products: Consent Deferred (Minimal Blood Loss)                         Yoav Parada MD

## 2018-10-26 NOTE — ANESTHESIA POSTPROCEDURE EVALUATION
Anesthesia POST Procedure Evaluation    Patient: Sofi Degroot   MRN:     [id number] Gender:   female   Age:    63 year old :      1955        Preoperative Diagnosis: A- Fib    Procedure(s):  ANESTHESIA CARDIOVERSION   Postop Comments: No value filed.       Anesthesia Type:  General    Reportable Event: NO     PAIN: Uncomplicated   Sign Out status: Comfortable, Well controlled pain     PONV: No PONV   Sign Out status:  No Nausea or Vomiting     Neuro/Psych: Uneventful perioperative course   Sign Out Status: Preoperative baseline; Age appropriate mentation     Airway/Resp.: Uneventful perioperative course   Sign Out Status: Non labored breathing, age appropriate RR; Resp. Status within EXPECTED Parameters     CV: Uneventful perioperative course   Sign Out status: Appropriate BP and perfusion indices; Appropriate HR/Rhythm     Disposition:   Sign Out in:  PACU  Disposition:  Phase II; Home  Recovery Course: Uneventful  Follow-Up: Not required           Last Anesthesia Record Vitals:  CRNA VITALS  10/26/2018 1523 - 10/26/2018 1555      10/26/2018             NIBP: 108/76    Pulse: 86          Last PACU/Preop Vitals:  Vitals:    10/26/18 1519 10/26/18 1522 10/26/18 1525   BP: (!) 97/38 110/66 112/85   Pulse: 126 124 104   Resp: 14 16 20   Temp:      SpO2: 96% 98% 96%         Electronically Signed By: Yoav Parada MD, 2018, 3:55 PM

## 2018-10-26 NOTE — ED NOTES
[facility], [place]   ED Nurse to Floor Handoff     Sofi Degroot is a 63 year old female who speaks English and lives with a spouse,  in a home  They arrived in the ED by car from home    ED Chief Complaint: Irregular Heart Beat    ED Dx;   Final diagnoses:   Atrial fibrillation with RVR (H)   Palpitations   Hypotension, unspecified hypotension type         Needed?: Yes    Allergies:   Allergies   Allergen Reactions     Latex Swelling     Dental visit--burning sensation in lips and throat, lips felt puffy, eye watery from latex gloves     Pineapple Other (See Comments)   .  Past Medical Hx:   Past Medical History:   Diagnosis Date     Cervical high risk HPV (human papillomavirus) test positive 09/16/2016, 10/11/17, 10/17/18    (not 16 or 18). 10/11/17: LSIL pap with + HR HPV (not 16 or 18) result.10/17/18: See problem list.      Low risk HPV infection 2009, 2010    NIL pap, + HPV 53     Papanicolaou smear of cervix with low grade squamous intraepithelial lesion (LGSIL) 10/11/2017    10/11/17: LSIL pap with + HR HPV (not 16 or 18) result.     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 1998    Tx'd w/ Interferon x 1 year-virus free at 5 years, blood transfusion  related      Baseline Mental status: WDL  Current Mental Status changes: at basesline    Infection present or suspected this encounter: no  Sepsis suspected: No  Isolation type: No active isolations     Activity level - Baseline/Home:  Independent  Activity Level - Current:   Independent    Bariatric equipment needed?: No    In the ED these meds were given:   Medications   0.9% sodium chloride BOLUS (1,000 mLs Intravenous New Bag 10/25/18 1837)     Followed by   sodium chloride 0.9% infusion (1,000 mLs Intravenous New Bag 10/25/18 1955)   0.9% sodium chloride BOLUS (1,000 mLs Intravenous New Bag 10/25/18 1912)       Drips running?  No    Home pump  No    Current  "LDAs  Peripheral IV 10/25/18 Right Lower forearm (Active)   Site Assessment WDL 10/25/2018  6:37 PM   Line Status Infusing 10/25/2018  6:37 PM   Phlebitis Scale 0-->no symptoms 10/25/2018  6:37 PM   Dressing Intervention New dressing  10/25/2018  6:37 PM   Number of days:0       Right Groin Interventional Procedure Access (Active)   Number of days:13       Labs results:   Labs Ordered and Resulted from Time of ED Arrival Up to the Time of Departure from the ED   CBC WITH PLATELETS DIFFERENTIAL - Abnormal; Notable for the following:        Result Value    Hematocrit 48.1 (*)     All other components within normal limits   BASIC METABOLIC PANEL - Abnormal; Notable for the following:     Glucose 139 (*)     All other components within normal limits   TROPONIN I - Abnormal; Notable for the following:     Troponin I ES 0.047 (*)     All other components within normal limits   TSH       Imaging Studies: No results found for this or any previous visit (from the past 24 hour(s)).    Recent vital signs:   /77  Pulse 150  Temp 98.5  F (36.9  C) (Oral)  Resp 16  Ht 1.753 m (5' 9\")  Wt 82.3 kg (181 lb 7 oz)  LMP 10/27/2004  SpO2 95%  BMI 26.79 kg/m2    Cardiac Rhythm: Tachycardia  Pt needs tele? Yes  Skin/wound Issues: None    Code Status: Full Code    Pain control: good    Nausea control: pt had none    Abnormal labs/tests/findings requiring intervention: see EPIC    Family present during ED course? No   Family Comments/Social Situation comments: none    Tasks needing completion: None    Tatiana Degroot, RN    [phone] [facility]      "

## 2018-10-29 ENCOUNTER — CARE COORDINATION (OUTPATIENT)
Dept: CARDIOLOGY | Facility: CLINIC | Age: 63
End: 2018-10-29

## 2018-10-29 ENCOUNTER — PATIENT OUTREACH (OUTPATIENT)
Dept: CARE COORDINATION | Facility: CLINIC | Age: 63
End: 2018-10-29

## 2018-10-29 ENCOUNTER — OFFICE VISIT (OUTPATIENT)
Dept: PODIATRY | Facility: CLINIC | Age: 63
End: 2018-10-29
Payer: COMMERCIAL

## 2018-10-29 VITALS
SYSTOLIC BLOOD PRESSURE: 128 MMHG | HEIGHT: 69 IN | BODY MASS INDEX: 27.4 KG/M2 | DIASTOLIC BLOOD PRESSURE: 80 MMHG | WEIGHT: 185 LBS

## 2018-10-29 DIAGNOSIS — M20.5X1 HALLUX LIMITUS OF RIGHT FOOT: ICD-10-CM

## 2018-10-29 DIAGNOSIS — M20.42 HAMMER TOES OF BOTH FEET: Primary | ICD-10-CM

## 2018-10-29 DIAGNOSIS — M20.41 HAMMER TOES OF BOTH FEET: Primary | ICD-10-CM

## 2018-10-29 DIAGNOSIS — L84 CALLUS: ICD-10-CM

## 2018-10-29 LAB
INTERPRETATION ECG - MUSE: NORMAL
INTERPRETATION ECG - MUSE: NORMAL

## 2018-10-29 PROCEDURE — 99203 OFFICE O/P NEW LOW 30 MIN: CPT | Performed by: PODIATRIST

## 2018-10-29 ASSESSMENT — PAIN SCALES - GENERAL: PAINLEVEL: MODERATE PAIN (4)

## 2018-10-29 NOTE — PATIENT INSTRUCTIONS
Thank you for choosing [facility]!    Follow up as needed     DR. MATOS'S CLINIC LOCATIONS     MONDAY  [place] TUESDAY & FRIDAY AM  [place]   [address] [facility]   [address]   [place] [address] [place] [address]   [phone]  [phone]  [phone]       [facility]EDNESDAY  [facility] [phone]   [address] [facility] APPOINTMENTS: [phone]   [place] [address] BILLING QUESTIONS: [phone]   [phone]         Calluses, Corns, IPKs, Porokeratosis    When there is excessive friction or pressure on the skin, the body responds by making the skin thicker to protect the deeper structures from becoming exposed.  While this works well to protect the deeper structures, the thickened skin can increase pressure and pain.    Flat, diffuse thickening are simple calluses and they are usually caused by friction.  Often these are the result of rubbing on a shoe or going barefoot.    Calluses with a central core between the toes are called corns.  These result from prominent joints on adjacent toes rubbing together.  Theses are a symptom of bone malalignment and will always recur unless the underlying bones are addressed surgically.    Calluses with a central core on the ball of the foot are usually IPKs (intractable plantar keratosis).  These are caused by excessive pressure from the metatarsals, the bones that make up the ball of the foot.  Often one of these bones is too long or too prominent.  Again, these will always recur unless the underlying bone issue is addressed.  There is no cure for these.  They will either go away by themselves, recur, or more could develop.    Regardless of the diagnosis, most of these lesions can be kept comfortable with routine maintenance.   1.This consists of filing them with a pumice stone or callus file a couple of times per week.    2. Lotion can be applied to soften the  "callus. A urea based cream such as Kerasal or Vanicream or thicker cream with shea butter are good options.  3. Toe spacers or toe covers can be used for corns, gel pads can be used for other lesions on the bottom of the foot.   If there is a surgical pathology noted, such as a prominent bone, often this needs to be addressed surgically to minimize recurrence. However, sometimes the lesion simply migrates to another spot after surgery, so it is not a guaranteed cure.     Please call with any additional questions.     DEGENERATIVE ARTHRITIS OF THE BIG TOE JOINT (hallux limitus/hallux rigidus)   Arthritis of the joint at the base of the big toe (metatarsophalangeal joint) has several causes. Usually it results from repetitive trauma to the joint, secondary to abnormal foot mechanics. Often it is hereditary. However, a one-time traumatic event can lead to arthritis. The condition can worsen with time. The cartilage wears out, joint surfaces are no longer smooth, bone rubs on bone, inflammation occurs with pain, and eventually bone spurs and loose fragments might develop.   The joint is often painful with activity, worse with flimsy shoes or walking barefoot, and it slowly progresses over time. A person might notice the toe \"locking up\" with walking. There often is an obvious, and irritating, bony bump on top of the foot. Shoes might be uncomfortable. In some people the pain is so bothersome that recreational activities sometimes even normal daily activities are difficult to perform.   The pain from this arthritis is likely a combination of joint jamming, cartilage loss and inflammation, and irritation from shoes rubbing on the bump. Sometimes other parts of the foot, leg, or back hurt from altering one's walk to compensate for the painful joint.     Ways to help a person live with the discomfort include wearing a good, supportive shoe with a rigid, rocker-type bottom. An example is a hiking boot. A rigid sole minimizes " bending of the joint, and therefore, joint motion and pain. Shoes with a high toe box allow for less rubbing on the bump. Avoiding barefoot walking, sandals, flip-flops and slippers usually helps.     Sometimes an insert or orthotic provides symptom relief. This might make shoe fit more difficult. Pads over the bump and occasionally injections into the joint provide relief.     Surgery for this condition is aimed towards alleviating pain. It does not cure the arthritis nor does it guarantee better joint motion. Depending on the condition of the metatarsophalangeal joint, there are several surgical options:    1.  Cutting off the bony bump(s) and cleaning the joint    2.  Loosening the joint up by making cuts in the first metatarsal bone or the big toe bone and removing a small section of bone.    3.  Repositioning bone to minimize jamming of the joint.    4.  In severe cases, the joint is fused. By fusing the joint, it will never bend again. This resolves the pain, because it's the movement of a worn out joint that causes pain. Oftentimes the operation involves a combination of these procedures and requires the use of screws, pins, and/or a small surgical plate.     Healing after surgery requires about six weeks of protection. This allows the bone to heal. Maximum recovery takes about one year. The scar tissue and joint structures require this amount of time to finish the healing process. Expect stiffness, swelling and numbness during that time frame.   Surgery for arthritis of the metatarsophalangeal joint does involve side effects. Some side effects are predictable and others are less common but do occur. A scar will be visible and could be irritated by shoes. The shoe may rub on the screw or internal pin requiring surgical removal of these fixation devices. The screw and pin would likely be left in place for a full year. The first toe may remain stiff after surgery. The amount of stiffness is variable. Most  people never regain normal motion of the first toe. This is due to scar tissue inherent to any surgery, in addition to the cumulative effects of arthritis. Sometimes the big toe drifts to one side or the other. Joint fusion is one option to correct an unstable, drifting toe.   All surgical procedures involve risk of infection, numbness, pain, delayed bone healing, osteotomy (bone cut) dislocation, blood clots, continued foot pain, etc. Arthritic joint surgery is quite complex and should not be taken lightly.    Any skin incision can lead to infection. Deep infection might involve the bone and thus repeat surgery and six weeks of IV antibiotics. Scar tissue can cause nerve pain or numbness. This is generally temporary but can be permanent. We do not have treatments that cure nerve problems. Second toe pain could be related to altered mechanics and pressure transferred to the second toe. Delayed bone healing would lengthen the healing time. Some bones simply do not heal. This requires repeat surgery, electronic bone stimulation and/or extended protection. Smokers have an approximate 20% chance of poor bone healing. This is double that of a non-smoker. The bone cut may displace. This may need to be repaired with a second operation. Displacement can cause joint malalignment. Immobility after surgery can cause a blood clot in the legs and lungs. This could result in death.   Foot pain is complex. Most feet hurt for more than one reason. Operating on the arthritic   big toe joint will not necessarily create a pain free foot. Appropriate shoes, healthy body weight, avoidance of bare foot walking and moderation of activity will always be necessary to enjoy foot comfort. Arthritis is incurable even with surgery.     Surgery for this type of arthritis is nevertheless quite successful. Most surgical patients are pleased with their foot following surgery. Many of the issues described above can be controlled by taking proper  care of your foot during the healing process.   Cosmetic bump surgery is discouraged for the reasons listed above. A bump and joint that is comfortable when wearing appropriate shoes should simply be treated with appropriate shoes.   Your surgeon would be happy to fully describe any of the above issues. You should pursue a full understanding of the operation, recovery process and any potential problems that could develop.       Hammertoe           What Is Hammertoe?  Hammertoe is a contracture (bending) of one or both joints of the second, third, fourth, or fifth (little) toes. This abnormal bending can put pressure on the toe when wearing shoes, causing problems to develop.  Hammertoes usually start out as mild deformities and get progressively worse over time. In the earlier stages, hammertoes are flexible and the symptoms can often be managed with noninvasive measures. But if left untreated, hammertoes can become more rigid and will not respond to non-surgical treatment.  Because of the progressive nature of hammertoes, they should receive early attention. Hammertoes never get better without some kind of intervention.  Causes  The most common cause of hammertoe is a muscle/tendon imbalance. This imbalance, which leads to a bending of the toe, results from mechanical (structural) changes in the foot that occur over time in some people.  Hammertoes may be aggravated by shoes that don t fit properly. A hammertoe may result if a toe is too long and is forced into a cramped position when a tight shoe is worn.  Occasionally, hammertoe is the result of an earlier trauma to the toe. In some people, hammertoes are inherited.  Symptoms  Common symptoms of hammertoes include:  Pain or irritation of the affected toe when wearing shoes.   Corns and calluses (a buildup of skin) on the toe, between two toes, or on the ball of the foot. Corns are caused by constant friction against the shoe. They may be soft or hard, depending  upon their location.   Inflammation, redness, or a burning sensation   Contracture of the toe   In more severe cases of hammertoe, open sores may form.   Diagnosis  Although hammertoes are readily apparent, to arrive at a diagnosis the foot and ankle surgeon will obtain a thorough history of your symptoms and examine your foot. During the physical examination, the doctor may attempt to reproduce your symptoms by manipulating your foot and will study the contractures of the toes. In addition, the foot and ankle surgeon may take x-rays to determine the degree of the deformities and assess any changes that may have occurred.   Hammertoes are progressive - they don t go away by themselves and usually they will get worse over time. However, not all cases are alike - some hammertoes progress more rapidly than others. Once your foot and ankle surgeon has evaluated your hammertoes, a treatment plan can be developed that is suited to your needs.  Non-surgical Treatment  There is a variety of treatment options for hammertoe. The treatment your foot and ankle surgeon selects will depend upon the severity of your hammertoe and other factors.  A number of non-surgical measures can be undertaken:  Padding corns and calluses. Your foot and ankle surgeon can provide or prescribe pads designed to shield corns from irritation. If you want to try over-the-counter pads, avoid the medicated types. Medicated pads are generally not recommended because they may contain a small amount of acid that can be harmful. Consult your surgeon about this option.   Changes in shoewear. Avoid shoes with pointed toes, shoes that are too short, or shoes with high heels - conditions that can force your toe against the front of the shoe. Instead, choose comfortable shoes with a deep, roomy toe box and heels no higher than two inches.   Orthotic devices. A custom orthotic device placed in your shoe may help control the muscle/tendon imbalance.   Injection  therapy. Corticosteroid injections are sometimes used to ease pain and inflammation caused by hammertoe.   Medications. Oral nonsteroidal anti-inflammatory drugs (NSAIDs), such as ibuprofen, may be recommended to reduce pain and inflammation.   Splinting/strapping. Splints or small straps may be applied by the surgeon to realign the bent toe.       HAMMERTOE SURGERY   Hammertoe surgery is complex. The surgical procedure is an attempt to help the toe lay in a better position. Nearly every structure in the toe will be cut including the tendons, ligaments, skin and bone. Hammertoes are a complex deformity and final toe position is difficult to predict. The only sure way to position a toe is to fuse all three digital joints. That will not happen as some degree of toe motion is needed for walking. The toe may not be completely reduced as the surrounding skin and other structures may not allow the toe to return to a normal position. The tendons on adjacent toes may need to be cut at the time of the original or subsequent surgeries, as interconnections exist between the toes. The toe may drift after surgery. Stiffness may develop leading to new areas of pressure.   Future shoe choices will be critical in allowing the surgery to provide comfort. The toes will still hurt if shoes rub. The original pain may also persist as other foot problems may be contributing to the current pain. The toe may or may not be pinned in place. External pins would require complete avoidance of water on the foot for six weeks. The pin would be removed about six weeks after the surgery. Strict attention to protection is critical. The pin could get bumped or loosen resulting in early removal. Removal might be necessary before the bone heals which would negatively affect the final surgical outcome and toe alignment.     OVER THE COUNTER INSERTS    SuperFeet   Sofsole Fit On The Flea   Power Step   Walk-Fit Arch Cradles     Most of these can be found at  your local [employer], [employer], [employer], sporting [employer], or online:    1.  [email or web address]  2.  Https://[email or web address]/  3.  Https://www.powersteps.com/    **A good high quality over the counter insert should cost around $40-$50              Body Mass Index (BMI)  Many things can cause foot and ankle problems. Foot structure, activity level, foot mechanics and injuries are common causes of pain.  One very important issue that often goes unmentioned, is body weight. Extra weight can cause increased stress on muscles, ligaments, bones and tendons.  Sometimes just a few extra pounds is all it takes to put one over her/his threshold. Without reducing that stress, it can be difficult to alleviate pain. Some people are uncomfortable addressing this issue, but we feel it is important for you to think about it. As Foot &  Ankle specialists, our job is addressing the lower extremity problem and possible causes. Regarding extra body weight, we encourage patients to discuss diet and weight management plans with their primary care doctors. It is this team approach that gives you the best opportunity for pain relief and getting you back on your feet.      Patient to follow up with Primary Care provider regarding elevated blood pressure.

## 2018-10-29 NOTE — PROGRESS NOTES
"PATIENT HISTORY:  Sofi Degroot is a 63 year old female who presents to clinic for b/l foot concerns.  Issues for months.  Reports L plantar 5th MPJ area callus, pain with pressure.  Callus pad helps this.  Also with R 1st MPJ \"bump,\" prior cheilectomy with Dr. Pineda years ago, which helped for a time.  Her L 4th toe is also curled and painful with walking.  She wants to avoid more surgery.  4-8/10 pain.  Better with rest.  On anticoagulation.      Review of Systems:  Patient denies fever, chills, rash, wound, stiffness, limping, numbness, weakness, heart burn, blood in stool, chest pain with activity, calf pain when walking, shortness of breath with activity, chronic cough, easy bleeding/bruising, swelling of ankles, excessive thirst, fatigue, depression, anxiety.       PAST MEDICAL HISTORY:   Past Medical History:   Diagnosis Date     Cervical high risk HPV (human papillomavirus) test positive 09/16/2016, 10/11/17, 10/17/18    (not 16 or 18). 10/11/17: LSIL pap with + HR HPV (not 16 or 18) result.10/17/18: See problem list.      Low risk HPV infection 2009, 2010    NIL pap, + HPV 53     Papanicolaou smear of cervix with low grade squamous intraepithelial lesion (LGSIL) 10/11/2017    10/11/17: LSIL pap with + HR HPV (not 16 or 18) result.     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 1998    Tx'd w/ Interferon x 1 year-virus free at 5 years, blood transfusion  related        PAST SURGICAL HISTORY:   Past Surgical History:   Procedure Laterality Date     ANESTHESIA CARDIOVERSION N/A 10/26/2018    Procedure: ANESTHESIA CARDIOVERSION;  Surgeon: GENERIC ANESTHESIA PROVIDER;  Location: [facility] OR     C LIGATE FALLOPIAN TUBE  1986     SURGICAL HISTORY OF -  Right 2008    Right 1st MTPJ, bone spur resection        MEDICATIONS:   Current Outpatient Prescriptions:      Acetaminophen (TYLENOL PO), Take 1,000 mg by mouth as needed for mild pain or fever, Disp: , Rfl:      [START ON " 11/10/2018] amiodarone (PACERONE/CODARONE) 200 MG tablet, Take 1 tablet (200 mg) by mouth daily for 42 doses, Disp: 42 tablet, Rfl: 0     amiodarone 400 MG TABS, Take 400 mg by mouth 2 times daily for 14 days, Disp: 14 tablet, Rfl: 0     atorvastatin (LIPITOR) 20 MG tablet, Take 1 tablet (20 mg) by mouth daily, Disp: 90 tablet, Rfl: 3     cholecalciferol 2000 units tablet, Take 2,000 Units by mouth 2 times daily , Disp: , Rfl:      clopidogrel (PLAVIX) 75 MG tablet, Take 1 tablet (75 mg) by mouth daily, Disp: 90 tablet, Rfl: 3     fish oil-omega-3 fatty acids 1000 MG capsule, Take 1 g by mouth 2 times daily, Disp: , Rfl:      levothyroxine (SYNTHROID/LEVOTHROID) 100 MCG tablet, Take 1 tablet (100 mcg) by mouth daily, Disp: 90 tablet, Rfl: 3     lisinopril (PRINIVIL/ZESTRIL) 20 MG tablet, Take 1 tablet (20 mg) by mouth daily, Disp: 90 tablet, Rfl: 1     magnesium 250 MG tablet, Take 250 mg by mouth daily , Disp: , Rfl:      metoprolol succinate (TOPROL-XL) 100 MG 24 hr tablet, Take 1 tablet (100 mg) by mouth daily, Disp: 90 tablet, Rfl: 3     Multiple Vitamins-Minerals (MULTIVITAMIN ADULTS) TABS, Take 1 tablet by mouth daily, Disp: , Rfl:      [START ON 11/22/2018] rivaroxaban ANTICOAGULANT (XARELTO) 20 MG TABS tablet, Take 1 tablet (20 mg) by mouth daily (with dinner), Disp: 30 tablet, Rfl: 11     valACYclovir (VALTREX) 500 MG tablet, TAKE ONE TABLET BY MOUTH TWICE A DAY FOR THREE DAYS. (REPEAT AS NEEDED), Disp: 12 tablet, Rfl: 5     [DISCONTINUED] lisinopril-hydrochlorothiazide (PRINZIDE,ZESTORETIC) 20-12.5 MG per tablet, Take 1 tablet by mouth daily, Disp: 90 tablet, Rfl: 3     ALLERGIES:    Allergies   Allergen Reactions     Latex Swelling     Dental visit--burning sensation in lips and throat, lips felt puffy, eye watery from latex gloves     Pineapple Other (See Comments)        SOCIAL HISTORY:   Social History     Social History     Marital status: Single     Spouse name: N/A     Number of children: 1      "Years of education: BSN     Occupational History     RN [employer]     Social History Main Topics     Smoking status: Former Smoker     Smokeless tobacco: Never Used      Comment: quit 30 years ago     Alcohol use Yes      Comment: 4-5 Drinks Per Week     Drug use: No     Sexual activity: Yes     Partners: Male     Birth control/ protection: Condom     Other Topics Concern     Parent/Sibling W/ Cabg, Mi Or Angioplasty Before 65f 55m? No     Social History Narrative    Social Documentation:        Balanced Diet: YES    Calcium intake: 2-3 per day    Caffeine: 3 per day    Exercise:  type of activity work is physical and walking;  5 times per week    Sunscreen: Yes    Seatbelts:  Yes    Self Breast Exam:  Yes    Self Testicular Exam: na    Physical/Emotional/Sexual Abuse: No    Do you feel safe in your environment? Yes        Cholesterol screen up to date:     CHOL      199   2009    HDL       82   2009    LDL      100   2009    TRIG       83   2009    CHOLHDLRATIO      2.4   2009    Eye Exam up to date: Yes    Dental Exam up to date: Yes    Pap smear up to date: do today. PAP      NIL   2009    Mammogram up to date:  Due in September    Dexa Scan up to date: has not had one     Colonoscopy up to date: Yes,     Immunizations up to date: Yes    Glucose screen if over 40:  cna do today                                                                FAMILY HISTORY:   Family History   Problem Relation Age of Onset     HEART DISEASE Maternal Grandfather      Breast Cancer Maternal Grandmother      Dx'd age 70     Arthritis Father      Lipids Mother      On meds     Cancer Mother      BCC     HEART DISEASE Brother       of dilated of cardiomyopathy at age 54     Alcohol/Drug Sister      Alcohol/Drug Brother      Depression Sister         EXAM:Vitals: /80  Ht 5' 9\" (1.753 m)  Wt 185 lb (83.9 kg)  LMP 10/27/2004  BMI 27.32 kg/m2  BMI= Body mass index is 27.32 " kg/(m^2).    General appearance: Patient is alert and fully cooperative with history & exam.  No sign of distress is noted during the visit.     Psychiatric: Affect is pleasant & appropriate.  Patient appears motivated to improve health.     Respiratory: Breathing is regular & unlabored while sitting.     HEENT: Hearing is intact to spoken word.  Speech is clear.  No gross evidence of visual impairment that would impact ambulation.     Dermatologic: Small hyperkeratotic lesion sub L 5th MPJ.  Skin is intact to both feet.  No paronychia or evidence of soft tissue infection is noted.     Vascular: DP & PT pulses are intact & regular b/l.  No significant edema.  CFT and skin temperature are normal to both lower extremities.     Neurologic: Lower extremity sensation is intact to light touch.  No evidence of weakness or contracture in the lower extremities.  No evidence of neuropathy.     Musculoskeletal:  R 1st MPJ with dorsal bone spur, limitation of motion of joint.  L adductovarus 4th toe. Patient is ambulatory without assistive device or brace.  No gross ankle deformity noted.  No foot or ankle joint effusion is noted.     ASSESSMENT:   L painful 4th hammertoe  R hallux limitus  L foot callus     PLAN:  Reviewed patient's chart in epic.  Discussed condition and treatment options including pros and cons.    Hammertoe treatment options were discussed.  This included both surgical and non-surgical treatment alternatives.  Non-surgical options include wide fitting shoes, deep toe box, crest pad or foam pads, foot orthotics and debridement of any callous as necessary.  Surgical treatments were explained including soft tissue release, arthroplasty.  Crest pad given.    Surgical and non-surgical treatment options for hallux limitus were discussed.  This includes my philosophy about different procedures including cheilectomy, and fusion.  I explained how fusion is for late stage treatment of advanced arthritis.  There is no  certainty that the non-fusion procedures will improve joint pain that is caused by arthritis.  I explained that hallux limitus is oftentimes surgically treated in a staged manner.  This means that additional surgery may be necessary over time.  Non-operative treatments like stiff soles, orthotics were discussed.  Shoes that provide extra room for the enlarged joint should be helpful.  I would anticipate somewhat short term benefit from joint injection.    Discussed causes of calluses/keratomas.  They are due to areas of increased friction/pressure.  Discussed treatments such as using foot file, pumice stone, pads, orthotics, and not walking barefoot.      Handouts given.  Pt will proceed with nonoperative care.  F/u prn.      Cleve Pastrana DPM, FACFAS    Weight management plan: Patient was referred to their PCP to discuss a diet and exercise plan.     Patient to follow up with Primary Care provider regarding elevated blood pressure.

## 2018-10-29 NOTE — LETTER
[facility]  Complex Care Plan  About Me  Patient Name:  Sofi Degroot    YOB: 1955  Age:     63 year old   Jayla MRN:   [id number] Telephone Information:    Home Phone [phone]   Mobile [phone]       Address:    [address]  [facility] [address] Email address:  [email or web address]      Emergency Contact(s)  Name Relationship Lgl Grd Work Phone Home Phone Mobile Phone   1. ANDREW GTZ* Son No  [phone]   2. WILTON DOE  No  [phone]            Primary language:  English     needed? No   [facility]:  [phone] op. 1  Other communication barriers:    Preferred Method of Communication:  Tu  Current living arrangement:    Mobility Status/ Medical Equipment:      Health Maintenance  Health Maintenance Reviewed:      My Access Plan  Medical Emergency 911   Primary Clinic Line [facility] [phone]   24 Hour Appointment Line [phone] or  [phone]) (toll-free)   24 Hour Nurse Line [phone] (toll-free)   Preferred Urgent Care     Preferred Hospital     Preferred Pharmacy [facility]     Behavioral Health Crisis Line The National Suicide Prevention Lifeline at 1-764.245.3982 or 917     My Care Team Members    Patient Care Team       Relationship Specialty Notifications Start End    Noemy Olivera DO PCP - General   2/15/02     Phone: [phone] Fax: [phone]         [facility]  [address] [place] [address]    Emi De Paz, RN Nurse Coordinator Neurology Admissions 4/1/15     Comment:  stroke/neuro-endovascular [phone] pager [phone]    Phone: [phone] Pager: [phone]        Noemy Olivera DO Referring Physician Family Practice  5/22/15     Phone: [phone] Fax: [phone]         [address] [address]    Mahamed Block MD Resident House Physician  5/22/15     Phone: [phone]  Fax: [phone] [address] [facility] [address]    Nasir Alvarez, RN Nurse Coordinator Cardiology Admissions 7/16/18     Comment:  Adult Congenital and Cardiovascular Genetics    Phone: [phone]         Xiomara Lopez MD MD Cardiology  7/16/18     Comment:  Adult Congenital and Cardiovascular Genetics    Phone: [phone] Fax: [phone] [address] [facility] [address] [facility] [address]    Romana Forbes, RN Lead Care Coordinator Primary Care -  Admissions 10/26/18     Phone: [phone] Fax: [phone]                My Care Plans  Self Management and Treatment Plan  Goals and (Comments)       Action Plans on File:                       Advance Care Plans/Directives Type:        My Medical and Care Information  Problem List   Patient Active Problem List   Diagnosis     Hypothyroidism     Hepatitis C virus infection without hepatic coma     CARDIOVASCULAR SCREENING; LDL GOAL LESS THAN 100     Hypertension goal BP (blood pressure) < 140/90     Transient cerebral ischemia     PFO (patent foramen ovale)     Cervical high risk HPV (human papillomavirus) test positive     Bilateral arm weakness     Lateral epicondylitis of both elbows     Retinal infarct     Patient is followed by [facility]     Atrial fibrillation (H)     Paroxysmal A-fib (H)      Current Medications and Allergies:  See printed Medication Report.    Care Coordination Start Date: No linked episodes   Frequency of Care Coordination:     Form Last Updated: 10/29/2018

## 2018-10-29 NOTE — PROGRESS NOTES
Called patient to follow up after hospital admission this weekend.  Sofi states that she is feeling really good and feels like the Amiodarone has helped control her heart rate.  She thinks she may be flipping in and out of A fib as she feels some occasional palpitations but she doesn't ever feel like her heart is racing.  She denies any shortness of breath at this time.  Verified patients dose of Amiodarone upon discharge.  Sofi denies any further questions or concerns at this time and verified her upcoming appointments on November 9th.     Nasir Alvarez, RN  RN Care Coordinator  [facility]  [phone]

## 2018-10-29 NOTE — LETTER
"    10/29/2018         RE: Sofi Degroot  [address]  [facility] [address]        Dear Colleague,    Thank you for referring your patient, Sofi Degroot, to the [facility]. Please see a copy of my visit note below.    PATIENT HISTORY:  Sofi Degroot is a 63 year old female who presents to clinic for b/l foot concerns.  Issues for months.  Reports L plantar 5th MPJ area callus, pain with pressure.  Callus pad helps this.  Also with R 1st MPJ \"bump,\" prior cheilectomy with Dr. Pineda years ago, which helped for a time.  Her L 4th toe is also curled and painful with walking.  She wants to avoid more surgery.  4-8/10 pain.  Better with rest.  On anticoagulation.      Review of Systems:  Patient denies fever, chills, rash, wound, stiffness, limping, numbness, weakness, heart burn, blood in stool, chest pain with activity, calf pain when walking, shortness of breath with activity, chronic cough, easy bleeding/bruising, swelling of ankles, excessive thirst, fatigue, depression, anxiety.       PAST MEDICAL HISTORY:   Past Medical History:   Diagnosis Date     Cervical high risk HPV (human papillomavirus) test positive 09/16/2016, 10/11/17, 10/17/18    (not 16 or 18). 10/11/17: LSIL pap with + HR HPV (not 16 or 18) result.10/17/18: See problem list.      Low risk HPV infection 2009, 2010    NIL pap, + HPV 53     Papanicolaou smear of cervix with low grade squamous intraepithelial lesion (LGSIL) 10/11/2017    10/11/17: LSIL pap with + HR HPV (not 16 or 18) result.     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 1998    Tx'd w/ Interferon x 1 year-virus free at 5 years, blood transfusion  related        PAST SURGICAL HISTORY:   Past Surgical History:   Procedure Laterality Date     ANESTHESIA CARDIOVERSION N/A 10/26/2018    Procedure: ANESTHESIA CARDIOVERSION;  Surgeon: GENERIC ANESTHESIA PROVIDER;  Location: [facility] OR     C LIGATE FALLOPIAN " TUBE  1986     SURGICAL HISTORY OF -  Right 2008    Right 1st MTPJ, bone spur resection        MEDICATIONS:   Current Outpatient Prescriptions:      Acetaminophen (TYLENOL PO), Take 1,000 mg by mouth as needed for mild pain or fever, Disp: , Rfl:      [START ON 11/10/2018] amiodarone (PACERONE/CODARONE) 200 MG tablet, Take 1 tablet (200 mg) by mouth daily for 42 doses, Disp: 42 tablet, Rfl: 0     amiodarone 400 MG TABS, Take 400 mg by mouth 2 times daily for 14 days, Disp: 14 tablet, Rfl: 0     atorvastatin (LIPITOR) 20 MG tablet, Take 1 tablet (20 mg) by mouth daily, Disp: 90 tablet, Rfl: 3     cholecalciferol 2000 units tablet, Take 2,000 Units by mouth 2 times daily , Disp: , Rfl:      clopidogrel (PLAVIX) 75 MG tablet, Take 1 tablet (75 mg) by mouth daily, Disp: 90 tablet, Rfl: 3     fish oil-omega-3 fatty acids 1000 MG capsule, Take 1 g by mouth 2 times daily, Disp: , Rfl:      levothyroxine (SYNTHROID/LEVOTHROID) 100 MCG tablet, Take 1 tablet (100 mcg) by mouth daily, Disp: 90 tablet, Rfl: 3     lisinopril (PRINIVIL/ZESTRIL) 20 MG tablet, Take 1 tablet (20 mg) by mouth daily, Disp: 90 tablet, Rfl: 1     magnesium 250 MG tablet, Take 250 mg by mouth daily , Disp: , Rfl:      metoprolol succinate (TOPROL-XL) 100 MG 24 hr tablet, Take 1 tablet (100 mg) by mouth daily, Disp: 90 tablet, Rfl: 3     Multiple Vitamins-Minerals (MULTIVITAMIN ADULTS) TABS, Take 1 tablet by mouth daily, Disp: , Rfl:      [START ON 11/22/2018] rivaroxaban ANTICOAGULANT (XARELTO) 20 MG TABS tablet, Take 1 tablet (20 mg) by mouth daily (with dinner), Disp: 30 tablet, Rfl: 11     valACYclovir (VALTREX) 500 MG tablet, TAKE ONE TABLET BY MOUTH TWICE A DAY FOR THREE DAYS. (REPEAT AS NEEDED), Disp: 12 tablet, Rfl: 5     [DISCONTINUED] lisinopril-hydrochlorothiazide (PRINZIDE,ZESTORETIC) 20-12.5 MG per tablet, Take 1 tablet by mouth daily, Disp: 90 tablet, Rfl: 3     ALLERGIES:    Allergies   Allergen Reactions     Latex Swelling     Dental  visit--burning sensation in lips and throat, lips felt puffy, eye watery from latex gloves     Pineapple Other (See Comments)        SOCIAL HISTORY:   Social History     Social History     Marital status: Single     Spouse name: N/A     Number of children: 1     Years of education: BSN     Occupational History     RN [employer]     Social History Main Topics     Smoking status: Former Smoker     Smokeless tobacco: Never Used      Comment: quit 30 years ago     Alcohol use Yes      Comment: 4-5 Drinks Per Week     Drug use: No     Sexual activity: Yes     Partners: Male     Birth control/ protection: Condom     Other Topics Concern     Parent/Sibling W/ Cabg, Mi Or Angioplasty Before 65f 55m? No     Social History Narrative    Social Documentation:        Balanced Diet: YES    Calcium intake: 2-3 per day    Caffeine: 3 per day    Exercise:  type of activity work is physical and walking;  5 times per week    Sunscreen: Yes    Seatbelts:  Yes    Self Breast Exam:  Yes    Self Testicular Exam: na    Physical/Emotional/Sexual Abuse: No    Do you feel safe in your environment? Yes        Cholesterol screen up to date:     CHOL      199   7/21/2009    HDL       82   7/21/2009    LDL      100   7/21/2009    TRIG       83   7/21/2009    CHOLHDLRATIO      2.4   7/21/2009    Eye Exam up to date: Yes    Dental Exam up to date: Yes    Pap smear up to date: do today. PAP      NIL   7/21/2009    Mammogram up to date:  Due in September    Dexa Scan up to date: has not had one     Colonoscopy up to date: Yes, 04/08    Immunizations up to date: Yes    Glucose screen if over 40:  cna do today                                                                FAMILY HISTORY:   Family History   Problem Relation Age of Onset     HEART DISEASE Maternal Grandfather      Breast Cancer Maternal Grandmother      Dx'd age 70     Arthritis Father      Lipids Mother      On meds     Cancer Mother      BCC     HEART DISEASE Brother   "     of dilated of cardiomyopathy at age 54     Alcohol/Drug Sister      Alcohol/Drug Brother      Depression Sister         EXAM:Vitals: /80  Ht 5' 9\" (1.753 m)  Wt 185 lb (83.9 kg)  LMP 10/27/2004  BMI 27.32 kg/m2  BMI= Body mass index is 27.32 kg/(m^2).    General appearance: Patient is alert and fully cooperative with history & exam.  No sign of distress is noted during the visit.     Psychiatric: Affect is pleasant & appropriate.  Patient appears motivated to improve health.     Respiratory: Breathing is regular & unlabored while sitting.     HEENT: Hearing is intact to spoken word.  Speech is clear.  No gross evidence of visual impairment that would impact ambulation.     Dermatologic: Small hyperkeratotic lesion sub L 5th MPJ.  Skin is intact to both feet.  No paronychia or evidence of soft tissue infection is noted.     Vascular: DP & PT pulses are intact & regular b/l.  No significant edema.  CFT and skin temperature are normal to both lower extremities.     Neurologic: Lower extremity sensation is intact to light touch.  No evidence of weakness or contracture in the lower extremities.  No evidence of neuropathy.     Musculoskeletal:  R 1st MPJ with dorsal bone spur, limitation of motion of joint.  L adductovarus 4th toe. Patient is ambulatory without assistive device or brace.  No gross ankle deformity noted.  No foot or ankle joint effusion is noted.     ASSESSMENT:   L painful 4th hammertoe  R hallux limitus  L foot callus     PLAN:  Reviewed patient's chart in epic.  Discussed condition and treatment options including pros and cons.    Hammertoe treatment options were discussed.  This included both surgical and non-surgical treatment alternatives.  Non-surgical options include wide fitting shoes, deep toe box, crest pad or foam pads, foot orthotics and debridement of any callous as necessary.  Surgical treatments were explained including soft tissue release, arthroplasty.  Crest pad " given.    Surgical and non-surgical treatment options for hallux limitus were discussed.  This includes my philosophy about different procedures including cheilectomy, and fusion.  I explained how fusion is for late stage treatment of advanced arthritis.  There is no certainty that the non-fusion procedures will improve joint pain that is caused by arthritis.  I explained that hallux limitus is oftentimes surgically treated in a staged manner.  This means that additional surgery may be necessary over time.  Non-operative treatments like stiff soles, orthotics were discussed.  Shoes that provide extra room for the enlarged joint should be helpful.  I would anticipate somewhat short term benefit from joint injection.    Discussed causes of calluses/keratomas.  They are due to areas of increased friction/pressure.  Discussed treatments such as using foot file, pumice stone, pads, orthotics, and not walking barefoot.      Handouts given.  Pt will proceed with nonoperative care.  F/u prn.      Cleve Pastrana DPM, FACFAS    Weight management plan: Patient was referred to their PCP to discuss a diet and exercise plan.     Patient to follow up with Primary Care provider regarding elevated blood pressure.        Again, thank you for allowing me to participate in the care of your patient.        Sincerely,        Cleve Pastrana DPM

## 2018-10-29 NOTE — PROGRESS NOTES
"Clinic Care Coordination Contact  Clinic Care Coordination Contact  OUTREACH    Referral Information: Hospital follow up        Universal Utilization:      Utilization    Last refreshed: 10/29/2018  7:33 AM:  No Show Count (past year) 0       Last refreshed: 10/29/2018  7:33 AM:  ED visits 1       Last refreshed: 10/29/2018  7:33 AM:  Hospital admissions 1          Current as of: 10/29/2018  7:33 AM             Clinical Concerns:  Current Medical Concerns:  Patient only had a brief time to talk to Clinic Care Coordinator RN as she is on her way to an appointment.   Patient reports she is feeling \"much better\"    Patient verbalizes understanding to resume her blood pressure medications as instructed at discharge. Clinic Care Coordinator RN advised patient to monitor blood pressure daily.  Patient reports she is already doing so.  Patient has appointment with cardiology 11/9/18.     Patient was hospitalized  Admission Date: 10/25/2018  Discharge Date: 10/26/18        Discharge Diagnoses:  1. Paroxysmal atrial fibrillation  2. Hypertension  3. PFO s/p percutaneous closure 10/12     Pertinent Procedures:  1. DANTE/Cardioversion                Resources and Interventions:  Current Resources:    ;                         Goals:         Patient/Caregiver understanding: yes       Future Appointments              Today Cleve Pastrana DPM [facility],     In 1 week [facility], [facility]    In 1 week Alonzo Baldwin MD [facility]    In 1 week [address]    In 1 week March, Xiomara Hagen MD [facility]          Plan: Patient will follow up with cardiology 11/9/18  Clinic Care Coordinator RN will follow up after appointment and as needed.         "

## 2018-10-29 NOTE — MR AVS SNAPSHOT
After Visit Summary   10/29/2018    Sofi Degroot    MRN: [id number]           Patient Information     Date Of Birth          1955        Visit Information        Provider Department      10/29/2018 11:15 AM Cleve Matos DPM [facility]        Today's Diagnoses     Hammer toes of both feet    -  1    Hallux limitus of right foot        Callus          Care Instructions    Thank you for choosing [facility]!    Follow up as needed     DR. MATOS'S CLINIC LOCATIONS     MONDAY  [place] TUESDAY & FRIDAY AM  [place]   [address] [facility]   [address]   [place] [address] J[place] Lara [address]   [phone]  [phone]       WEDNESDAY  [place] SCHEDULE SURGERY: [phone]   [address] [place] [address] APPOINTMENTS: [phone]   [place] [address] BILLING QUESTIONS: [phone]   [phone]         Calluses, Corns, IPKs, Porokeratosis    When there is excessive friction or pressure on the skin, the body responds by making the skin thicker to protect the deeper structures from becoming exposed.  While this works well to protect the deeper structures, the thickened skin can increase pressure and pain.    Flat, diffuse thickening are simple calluses and they are usually caused by friction.  Often these are the result of rubbing on a shoe or going barefoot.    Calluses with a central core between the toes are called corns.  These result from prominent joints on adjacent toes rubbing together.  Theses are a symptom of bone malalignment and will always recur unless the underlying bones are addressed surgically.    Calluses with a central core on the ball of the foot are usually IPKs (intractable plantar keratosis).  These are caused by excessive pressure from the metatarsals, the bones that make up the ball of the foot.  Often one of these bones is too long or too  "prominent.  Again, these will always recur unless the underlying bone issue is addressed.  There is no cure for these.  They will either go away by themselves, recur, or more could develop.    Regardless of the diagnosis, most of these lesions can be kept comfortable with routine maintenance.   1.This consists of filing them with a pumice stone or callus file a couple of times per week.    2. Lotion can be applied to soften the callus. A urea based cream such as Kerasal or Vanicream or thicker cream with shea butter are good options.  3. Toe spacers or toe covers can be used for corns, gel pads can be used for other lesions on the bottom of the foot.   If there is a surgical pathology noted, such as a prominent bone, often this needs to be addressed surgically to minimize recurrence. However, sometimes the lesion simply migrates to another spot after surgery, so it is not a guaranteed cure.     Please call with any additional questions.     DEGENERATIVE ARTHRITIS OF THE BIG TOE JOINT (hallux limitus/hallux rigidus)   Arthritis of the joint at the base of the big toe (metatarsophalangeal joint) has several causes. Usually it results from repetitive trauma to the joint, secondary to abnormal foot mechanics. Often it is hereditary. However, a one-time traumatic event can lead to arthritis. The condition can worsen with time. The cartilage wears out, joint surfaces are no longer smooth, bone rubs on bone, inflammation occurs with pain, and eventually bone spurs and loose fragments might develop.   The joint is often painful with activity, worse with flimsy shoes or walking barefoot, and it slowly progresses over time. A person might notice the toe \"locking up\" with walking. There often is an obvious, and irritating, bony bump on top of the foot. Shoes might be uncomfortable. In some people the pain is so bothersome that recreational activities sometimes even normal daily activities are difficult to perform.   The pain " from this arthritis is likely a combination of joint jamming, cartilage loss and inflammation, and irritation from shoes rubbing on the bump. Sometimes other parts of the foot, leg, or back hurt from altering one's walk to compensate for the painful joint.     Ways to help a person live with the discomfort include wearing a good, supportive shoe with a rigid, rocker-type bottom. An example is a hiking boot. A rigid sole minimizes bending of the joint, and therefore, joint motion and pain. Shoes with a high toe box allow for less rubbing on the bump. Avoiding barefoot walking, sandals, flip-flops and slippers usually helps.     Sometimes an insert or orthotic provides symptom relief. This might make shoe fit more difficult. Pads over the bump and occasionally injections into the joint provide relief.     Surgery for this condition is aimed towards alleviating pain. It does not cure the arthritis nor does it guarantee better joint motion. Depending on the condition of the metatarsophalangeal joint, there are several surgical options:    1.  Cutting off the bony bump(s) and cleaning the joint    2.  Loosening the joint up by making cuts in the first metatarsal bone or the big toe bone and removing a small section of bone.    3.  Repositioning bone to minimize jamming of the joint.    4.  In severe cases, the joint is fused. By fusing the joint, it will never bend again. This resolves the pain, because it's the movement of a worn out joint that causes pain. Oftentimes the operation involves a combination of these procedures and requires the use of screws, pins, and/or a small surgical plate.     Healing after surgery requires about six weeks of protection. This allows the bone to heal. Maximum recovery takes about one year. The scar tissue and joint structures require this amount of time to finish the healing process. Expect stiffness, swelling and numbness during that time frame.   Surgery for arthritis of the  metatarsophalangeal joint does involve side effects. Some side effects are predictable and others are less common but do occur. A scar will be visible and could be irritated by shoes. The shoe may rub on the screw or internal pin requiring surgical removal of these fixation devices. The screw and pin would likely be left in place for a full year. The first toe may remain stiff after surgery. The amount of stiffness is variable. Most people never regain normal motion of the first toe. This is due to scar tissue inherent to any surgery, in addition to the cumulative effects of arthritis. Sometimes the big toe drifts to one side or the other. Joint fusion is one option to correct an unstable, drifting toe.   All surgical procedures involve risk of infection, numbness, pain, delayed bone healing, osteotomy (bone cut) dislocation, blood clots, continued foot pain, etc. Arthritic joint surgery is quite complex and should not be taken lightly.    Any skin incision can lead to infection. Deep infection might involve the bone and thus repeat surgery and six weeks of IV antibiotics. Scar tissue can cause nerve pain or numbness. This is generally temporary but can be permanent. We do not have treatments that cure nerve problems. Second toe pain could be related to altered mechanics and pressure transferred to the second toe. Delayed bone healing would lengthen the healing time. Some bones simply do not heal. This requires repeat surgery, electronic bone stimulation and/or extended protection. Smokers have an approximate 20% chance of poor bone healing. This is double that of a non-smoker. The bone cut may displace. This may need to be repaired with a second operation. Displacement can cause joint malalignment. Immobility after surgery can cause a blood clot in the legs and lungs. This could result in death.   Foot pain is complex. Most feet hurt for more than one reason. Operating on the arthritic   big toe joint will not  necessarily create a pain free foot. Appropriate shoes, healthy body weight, avoidance of bare foot walking and moderation of activity will always be necessary to enjoy foot comfort. Arthritis is incurable even with surgery.     Surgery for this type of arthritis is nevertheless quite successful. Most surgical patients are pleased with their foot following surgery. Many of the issues described above can be controlled by taking proper care of your foot during the healing process.   Cosmetic bump surgery is discouraged for the reasons listed above. A bump and joint that is comfortable when wearing appropriate shoes should simply be treated with appropriate shoes.   Your surgeon would be happy to fully describe any of the above issues. You should pursue a full understanding of the operation, recovery process and any potential problems that could develop.       Hammertoe           What Is Hammertoe?  Hammertoe is a contracture (bending) of one or both joints of the second, third, fourth, or fifth (little) toes. This abnormal bending can put pressure on the toe when wearing shoes, causing problems to develop.  Hammertoes usually start out as mild deformities and get progressively worse over time. In the earlier stages, hammertoes are flexible and the symptoms can often be managed with noninvasive measures. But if left untreated, hammertoes can become more rigid and will not respond to non-surgical treatment.  Because of the progressive nature of hammertoes, they should receive early attention. Hammertoes never get better without some kind of intervention.  Causes  The most common cause of hammertoe is a muscle/tendon imbalance. This imbalance, which leads to a bending of the toe, results from mechanical (structural) changes in the foot that occur over time in some people.  Hammertoes may be aggravated by shoes that don t fit properly. A hammertoe may result if a toe is too long and is forced into a cramped position when  a tight shoe is worn.  Occasionally, hammertoe is the result of an earlier trauma to the toe. In some people, hammertoes are inherited.  Symptoms  Common symptoms of hammertoes include:  Pain or irritation of the affected toe when wearing shoes.   Corns and calluses (a buildup of skin) on the toe, between two toes, or on the ball of the foot. Corns are caused by constant friction against the shoe. They may be soft or hard, depending upon their location.   Inflammation, redness, or a burning sensation   Contracture of the toe   In more severe cases of hammertoe, open sores may form.   Diagnosis  Although hammertoes are readily apparent, to arrive at a diagnosis the foot and ankle surgeon will obtain a thorough history of your symptoms and examine your foot. During the physical examination, the doctor may attempt to reproduce your symptoms by manipulating your foot and will study the contractures of the toes. In addition, the foot and ankle surgeon may take x-rays to determine the degree of the deformities and assess any changes that may have occurred.   Hammertoes are progressive - they don t go away by themselves and usually they will get worse over time. However, not all cases are alike - some hammertoes progress more rapidly than others. Once your foot and ankle surgeon has evaluated your hammertoes, a treatment plan can be developed that is suited to your needs.  Non-surgical Treatment  There is a variety of treatment options for hammertoe. The treatment your foot and ankle surgeon selects will depend upon the severity of your hammertoe and other factors.  A number of non-surgical measures can be undertaken:  Padding corns and calluses. Your foot and ankle surgeon can provide or prescribe pads designed to shield corns from irritation. If you want to try over-the-counter pads, avoid the medicated types. Medicated pads are generally not recommended because they may contain a small amount of acid that can be  harmful. Consult your surgeon about this option.   Changes in shoewear. Avoid shoes with pointed toes, shoes that are too short, or shoes with high heels - conditions that can force your toe against the front of the shoe. Instead, choose comfortable shoes with a deep, roomy toe box and heels no higher than two inches.   Orthotic devices. A custom orthotic device placed in your shoe may help control the muscle/tendon imbalance.   Injection therapy. Corticosteroid injections are sometimes used to ease pain and inflammation caused by hammertoe.   Medications. Oral nonsteroidal anti-inflammatory drugs (NSAIDs), such as ibuprofen, may be recommended to reduce pain and inflammation.   Splinting/strapping. Splints or small straps may be applied by the surgeon to realign the bent toe.       HAMMERTOE SURGERY   Hammertoe surgery is complex. The surgical procedure is an attempt to help the toe lay in a better position. Nearly every structure in the toe will be cut including the tendons, ligaments, skin and bone. Hammertoes are a complex deformity and final toe position is difficult to predict. The only sure way to position a toe is to fuse all three digital joints. That will not happen as some degree of toe motion is needed for walking. The toe may not be completely reduced as the surrounding skin and other structures may not allow the toe to return to a normal position. The tendons on adjacent toes may need to be cut at the time of the original or subsequent surgeries, as interconnections exist between the toes. The toe may drift after surgery. Stiffness may develop leading to new areas of pressure.   Future shoe choices will be critical in allowing the surgery to provide comfort. The toes will still hurt if shoes rub. The original pain may also persist as other foot problems may be contributing to the current pain. The toe may or may not be pinned in place. External pins would require complete avoidance of water on the foot  for six weeks. The pin would be removed about six weeks after the surgery. Strict attention to protection is critical. The pin could get bumped or loosen resulting in early removal. Removal might be necessary before the bone heals which would negatively affect the final surgical outcome and toe alignment.     OVER THE COUNTER INSERTS    TrustYou Fit DocDep   Power Step   Walk-Fit Arch Cradles     Most of these can be found at your local [place], [employer], or online:    1.  https://[email or web address] IS/en-us/  2.  Https://TopFloor/  3.  Https://www.Eko Devices.Crystal IS/    **A good high quality over the counter insert should cost around $40-$50              Body Mass Index (BMI)  Many things can cause foot and ankle problems. Foot structure, activity level, foot mechanics and injuries are common causes of pain.  One very important issue that often goes unmentioned, is body weight. Extra weight can cause increased stress on muscles, ligaments, bones and tendons.  Sometimes just a few extra pounds is all it takes to put one over her/his threshold. Without reducing that stress, it can be difficult to alleviate pain. Some people are uncomfortable addressing this issue, but we feel it is important for you to think about it. As Foot &  Ankle specialists, our job is addressing the lower extremity problem and possible causes. Regarding extra body weight, we encourage patients to discuss diet and weight management plans with their primary care doctors. It is this team approach that gives you the best opportunity for pain relief and getting you back on your feet.      Patient to follow up with Primary Care provider regarding elevated blood pressure.            Follow-ups after your visit        Follow-up notes from your care team     Return if symptoms worsen or fail to improve.      Your next 10 appointments already scheduled     Nov 08, 2018 11:30 AM JULIO BROWN  "DIGITAL BILATERAL with URBCMA1   [facility] ([facility])    [address] [facility], [address]  [facility] [address] [phone]           How do I prepare for my exam? (Food and drink instructions) No Food and Drink Restrictions.  How do I prepare for my exam? (Other instructions) Do not use any powder, lotion or deodorant under your arms or on your breast. If you do, we will ask you to remove it before your exam.  What should I wear: Wear comfortable, two-piece clothing.  How long does the exam take: Most scans will take 15 minutes.  What should I bring: Bring any previous mammograms from other facilities or have them mailed to the breast center.  Do I need a :  No  is needed.  What do I need to tell my doctor: If you have any allergies, tell your care team.  What should I do after the exam: No restrictions, You may resume normal activities.  What is this test: This test is an x-ray of the breast to look for breast disease. The breast is pressed between two plates to flatten and spread the tissue. An X-ray is taken of the breast from different angles.  Who should I call with questions: If you have any questions, please call the Imaging Department where you will have your exam. Directions, parking instructions, and other information is available on our website, [email or web address].  Other information about my exam Three-dimensional (3D) mammograms are available at [facility] locations in [facility], [place], [place], [facility], [place], [facility] and [place].  Health locations include [place] and the [facility]ics [facility] Center in [place].  Benefits of 3D mammograms include * Improved rate of cancer detection * Decreases your chance of having to go back for more tests, which means fewer: * \"False-positive\" results (This means that there is an abnormal area but it isn't cancer.) * Invasive testing procedures, such as a biopsy or surgery * Can provide " clearer images of the breast if you have dense breast tissue.  *3D mammography is an optional exam that anyone can have with a 2D mammogram. It doesn't replace or take the place of a 2D mammogram. 2D mammograms remain an effective screening test for all women.  Not all insurance companies cover the cost of a 3D mammogram. Check with your insurance.            Nov 09, 2018  9:00 AM CST   (Arrive by 8:45 AM)   New Electrophysiology Genetics with Alonzo Baldwin MD   [facility])    [address] [place] [address]  [address] [place] [id number] [phone]            Nov 09, 2018 10:30 AM CST   Ech Complete with [address] ([facility])    [address] [place] [address]  3rd Floor  [facility] [id number] [phone]           1.  Please bring or wear a comfortable two-piece outfit. 2.  You may eat, drink and take your normal medicines. 3.  For any questions that cannot be answered, please contact the ordering physician 4.  Please do not wear perfumes or scented lotions on the day of your exam.            Nov 09, 2018 11:30 AM CST   (Arrive by 11:15 AM)   RETURN CONGENITAL HEART with Xiomara Lopez MD   [facility] ([facility])    [address] [place] [address]  [address] [place] [id number] [phone]              Who to contact     If you have questions or need follow up information about today's clinic visit or your schedule please contact [facility] directly at [phone].  Normal or non-critical lab and imaging results will be communicated to you by MyChart, letter or phone within 4 business days after the clinic has received the results. If you do not hear from us within 7 days, please contact the clinic through [employer] or phone. If you have a critical or abnormal lab result, we will notify you by phone as soon as possible.  Submit refill requests through DailyBurnt or call your  "pharmacy and they will forward the refill request to us. Please allow 3 business days for your refill to be completed.          Additional Information About Your Visit        [employer] Information     [facility] gives you secure access to your electronic health record. If you see a primary care provider, you can also send messages to your care team and make appointments. If you have questions, please call your primary care clinic.  If you do not have a primary care provider, please call [phone] and they will assist you.        Care EveryWhere ID     This is your Care EveryWhere ID. This could be used by other organizations to access your [place] medical records  [id number]        Your Vitals Were     Height Last Period BMI (Body Mass Index)             5' 9\" (1.753 m) 10/27/2004 27.32 kg/m2          Blood Pressure from Last 3 Encounters:   10/29/18 128/80   10/26/18 118/82   10/22/18 127/87    Weight from Last 3 Encounters:   10/29/18 185 lb (83.9 kg)   10/26/18 185 lb (83.9 kg)   10/22/18 190 lb 0.6 oz (86.2 kg)              Today, you had the following     No orders found for display       Primary Care Provider Office Phone # Fax #    Noemy NEWMAN JoselinDO [phone]       [address] [address]        Goals        General    Medical (pt-stated)     Notes - Note created  10/29/2018 11:14 AM by Romana Forbes RN    Goal 1  Statement: I will monitor my blood pressure daily and prn.  I will report abnormal blood pressure reading or s/s of hypo/hpyer tension to my cardiologist.   Measure of Success: completed  Supportive Steps to Achieve: na  Barriers: na  Strengths: na  Date to Achieve By: 1/1/19  Patient expressed understanding of goal: yes            Equal Access to Services     YESENIA HINOJOSA : Bossman Coy, frederic cui, qaybta kaaldeedee serna. So [facility] [phone].    ATENCIÓN: Si kenneth hector " disposición servicios gratuitos de asistencia lingüística. Meghan tony [phone].    We comply with applicable federal civil rights laws and [place] laws. We do not discriminate on the basis of race, color, national origin, age, disability, sex, sexual orientation, or gender identity.            Thank you!     Thank you for choosing [facility]  for your care. Our goal is always to provide you with excellent care. Hearing back from our patients is one way we can continue to improve our services. Please take a few minutes to complete the written survey that you may receive in the mail after your visit with us. Thank you!             Your Updated Medication List - Protect others around you: Learn how to safely use, store and throw away your medicines at www.disposemymeds.org.          This list is accurate as of 10/29/18 11:28 AM.  Always use your most recent med list.                   Brand Name Dispense Instructions for use Diagnosis    * amiodarone HCl 400 MG Tabs     14 tablet    Take 400 mg by mouth 2 times daily for 14 days    Atrial fibrillation with RVR (H)       * amiodarone 200 MG tablet   Start taking on:  11/10/2018    PACERONE/CODARONE    42 tablet    Take 1 tablet (200 mg) by mouth daily for 42 doses    Atrial fibrillation with RVR (H)       atorvastatin 20 MG tablet    LIPITOR    90 tablet    Take 1 tablet (20 mg) by mouth daily    Hypercholesteremia       cholecalciferol 2000 units tablet      Take 2,000 Units by mouth 2 times daily        clopidogrel 75 MG tablet    PLAVIX    90 tablet    Take 1 tablet (75 mg) by mouth daily    PFO (patent foramen ovale)       fish oil-omega-3 fatty acids 1000 MG capsule      Take 1 g by mouth 2 times daily        levothyroxine 100 MCG tablet    SYNTHROID/LEVOTHROID    90 tablet    Take 1 tablet (100 mcg) by mouth daily    Other specified hypothyroidism       lisinopril 20 MG tablet    PRINIVIL/ZESTRIL    90 tablet    Take 1 tablet (20 mg) by  mouth daily    Essential hypertension with goal blood pressure less than 140/90       magnesium 250 MG tablet      Take 250 mg by mouth daily        metoprolol succinate 100 MG 24 hr tablet    TOPROL-XL    90 tablet    Take 1 tablet (100 mg) by mouth daily    PAF (paroxysmal atrial fibrillation) (H), Essential hypertension with goal blood pressure less than 140/90       MULTIVITAMIN ADULTS Tabs      Take 1 tablet by mouth daily        rivaroxaban ANTICOAGULANT 20 MG Tabs tablet   Start taking on:  11/22/2018    XARELTO    30 tablet    Take 1 tablet (20 mg) by mouth daily (with dinner)    PAF (paroxysmal atrial fibrillation) (H)       TYLENOL PO      Take 1,000 mg by mouth as needed for mild pain or fever        valACYclovir 500 MG tablet    VALTREX    12 tablet    TAKE ONE TABLET BY MOUTH TWICE A DAY FOR THREE DAYS. (REPEAT AS NEEDED)    Recurrent herpes simplex       * Notice:  This list has 2 medication(s) that are the same as other medications prescribed for you. Read the directions carefully, and ask your doctor or other care provider to review them with you.

## 2018-11-08 ENCOUNTER — RADIANT APPOINTMENT (OUTPATIENT)
Dept: MAMMOGRAPHY | Facility: CLINIC | Age: 63
End: 2018-11-08
Attending: FAMILY MEDICINE
Payer: COMMERCIAL

## 2018-11-08 DIAGNOSIS — Z12.31 VISIT FOR SCREENING MAMMOGRAM: ICD-10-CM

## 2018-11-08 PROCEDURE — 77067 SCR MAMMO BI INCL CAD: CPT

## 2018-11-09 ENCOUNTER — OFFICE VISIT (OUTPATIENT)
Dept: CARDIOLOGY | Facility: CLINIC | Age: 63
End: 2018-11-09
Attending: INTERNAL MEDICINE
Payer: COMMERCIAL

## 2018-11-09 ENCOUNTER — RADIANT APPOINTMENT (OUTPATIENT)
Dept: CARDIOLOGY | Facility: CLINIC | Age: 63
End: 2018-11-09
Payer: COMMERCIAL

## 2018-11-09 VITALS
DIASTOLIC BLOOD PRESSURE: 88 MMHG | BODY MASS INDEX: 27.03 KG/M2 | OXYGEN SATURATION: 94 % | SYSTOLIC BLOOD PRESSURE: 147 MMHG | WEIGHT: 182.5 LBS | HEART RATE: 61 BPM | HEIGHT: 69 IN

## 2018-11-09 VITALS
HEIGHT: 69 IN | BODY MASS INDEX: 27.03 KG/M2 | SYSTOLIC BLOOD PRESSURE: 147 MMHG | DIASTOLIC BLOOD PRESSURE: 88 MMHG | HEART RATE: 62 BPM | WEIGHT: 182.52 LBS | OXYGEN SATURATION: 94 %

## 2018-11-09 DIAGNOSIS — I48.0 PAF (PAROXYSMAL ATRIAL FIBRILLATION) (H): ICD-10-CM

## 2018-11-09 DIAGNOSIS — Z87.74 S/P PATENT FORAMEN OVALE CLOSURE: ICD-10-CM

## 2018-11-09 DIAGNOSIS — I10 ESSENTIAL HYPERTENSION WITH GOAL BLOOD PRESSURE LESS THAN 140/90: ICD-10-CM

## 2018-11-09 DIAGNOSIS — I48.91 ATRIAL FIBRILLATION WITH RVR (H): ICD-10-CM

## 2018-11-09 DIAGNOSIS — I48.91 ATRIAL FIBRILLATION, UNSPECIFIED TYPE (H): Primary | ICD-10-CM

## 2018-11-09 PROCEDURE — G0463 HOSPITAL OUTPT CLINIC VISIT: HCPCS

## 2018-11-09 PROCEDURE — 99214 OFFICE O/P EST MOD 30 MIN: CPT | Mod: ZP | Performed by: INTERNAL MEDICINE

## 2018-11-09 PROCEDURE — 93005 ELECTROCARDIOGRAM TRACING: CPT | Mod: ZF

## 2018-11-09 PROCEDURE — 93010 ELECTROCARDIOGRAM REPORT: CPT | Mod: ZP | Performed by: INTERNAL MEDICINE

## 2018-11-09 RX ORDER — METOPROLOL SUCCINATE 50 MG/1
50 TABLET, EXTENDED RELEASE ORAL DAILY
Qty: 90 TABLET | Refills: 3 | Status: SHIPPED | OUTPATIENT
Start: 2018-11-09 | End: 2019-10-30

## 2018-11-09 RX ORDER — AMIODARONE HYDROCHLORIDE 200 MG/1
200 TABLET ORAL DAILY
Qty: 90 TABLET | Refills: 3 | Status: SHIPPED | OUTPATIENT
Start: 2018-11-10 | End: 2019-02-26

## 2018-11-09 RX ORDER — ACYCLOVIR 200 MG/1
10 CAPSULE ORAL ONCE
Status: COMPLETED | OUTPATIENT
Start: 2018-11-09 | End: 2018-11-09

## 2018-11-09 RX ORDER — METOPROLOL SUCCINATE 100 MG/1
100 TABLET, EXTENDED RELEASE ORAL DAILY
Qty: 90 TABLET | Refills: 3 | Status: SHIPPED | OUTPATIENT
Start: 2018-11-09 | End: 2019-10-30

## 2018-11-09 RX ADMIN — ACYCLOVIR 10 ML: 200 CAPSULE ORAL at 10:59

## 2018-11-09 ASSESSMENT — PAIN SCALES - GENERAL
PAINLEVEL: NO PAIN (0)
PAINLEVEL: NO PAIN (0)

## 2018-11-09 NOTE — PATIENT INSTRUCTIONS
"You were seen today in the Adult Congenital and Cardiovascular Genetics Clinic at the [facility].    Cardiology Providers you saw during your visit:  Dr Xiao Lopez and Dr Alonzo Baldwin    Diagnosis:  Status post PFO closure, A fib    Results:  The results of your EKG and preliminary results of you in clinic today.    Recommendations:    1.  Continue to eat a heart healthy, low salt diet.  2.  Continue to get 20-30 minutes of aerobic activity, 4-5 days per week.  Examples of aerobic activity include walking, running, swimming, cycling, etc.  3.  Continue to observe good oral hygiene, with regular dental visits.  4.  Increase your Metoprolol XL to 150 mg daily.  5.  We will schedule you for PFT's  6.  If you have any episodes of A fib, please call [phone] to let Smita know.       Vitals:    11/09/18 0839 11/09/18 0843   BP: 153/89 147/88   BP Location: Right arm Right arm   Patient Position: Chair Chair   Cuff Size: Adult Regular Adult Regular   Pulse: 62 61   SpO2: 94%    Weight: 82.8 kg (182 lb 8 oz)    Height: 1.753 m (5' 9\")        SBE prophylaxis:   Yes____  No__x__    Lifelong Bacterial Endocarditis Prophylaxis:  YES____  NO____    If YES is checked, follow the recommendations outlined below:   1. Take antibiotic(s) prior to recommended dental procedures and procedures on the respiratory tract or with infected skin, muscle or bones. SBE prophylaxis is not needed for routine GI and  procedures (ie. Colonoscopy or vaginal delivery)   2. Observe good oral hygiene daily, as advised by your dentist. Get regular professional dental care.   3. Keep cuts clean.   4. Infections should be treated promptly.   5. Symptoms of Infective Endocarditis could include: fever lasting more than 4-5 days or a recurrent fever that initially resolves but returns within 1-2 days)     Exercise restrictions:   Yes____  No__x__         If yes, list restrictions:  Must be allowed to rest if fatigued or SOB      Work " restrictions:  Yes____  No_x___         If yes, list restrictions:    FASTING CHOLESTEROL was checked in the last 5 years YES__x_  NO___  2017  Continue to eat a heart healthy, low salt diet.         ____ Fasting lipid panel order today         ____ No changes in medications          ____ I recommend the following changes in your cholesterol medications.:          ____ Please follow up for cholesterol screening at your primary care physician      Follow-up:   Follow up with Dr Lopez and Dr Baldwin in 3 months with an echocardiogram.     For after hours urgent needs, call [phone] and ask to speak to the Adult Congenital Physician on call.  Mention Job Code 0401.    For emergencies call [phone].    For any scheduling needs, please call [facility] , at [phone]  Thank you for your visit today!  If you have questions or concerns about today's visit, please call me.    Nasir Alvarez, RN, BSN  Cardiology Care Coordinator  [facility]  [phone]     [facility]  Mail Code [id number]  [place] [address]

## 2018-11-09 NOTE — MR AVS SNAPSHOT
"              After Visit Summary   11/9/2018    Sofi Degroot    MRN: [id number]           Patient Information     Date Of Birth          1955        Visit Information        Provider Department      11/9/2018 9:00 AM Alonzo Baldwin MD M [facility]        Today's Diagnoses     Atrial fibrillation, unspecified type (H)    -  1      Care Instructions    You were seen today in the Adult Congenital and Cardiovascular Genetics Clinic at the [facility].    Cardiology Providers you saw during your visit:  Dr Xiao Lopez and Dr Alonzo Baldwin    Diagnosis:  Status post PFO closure, A fib    Results:  The results of your EKG and     Recommendations:    1.  Continue to eat a heart healthy, low salt diet.  2.  Continue to get 20-30 minutes of aerobic activity, 4-5 days per week.  Examples of aerobic activity include walking, running, swimming, cycling, etc.  3.  Continue to observe good oral hygiene, with regular dental visits.  4.  Increase your Metoprolol XL to 150 mg daily.  5.  We will schedule you for PFT's      Vitals:    11/09/18 0839 11/09/18 0843   BP: 153/89 147/88   BP Location: Right arm Right arm   Patient Position: Chair Chair   Cuff Size: Adult Regular Adult Regular   Pulse: 62 61   SpO2: 94%    Weight: 82.8 kg (182 lb 8 oz)    Height: 1.753 m (5' 9\")        SBE prophylaxis:   Yes____  No__x__    Lifelong Bacterial Endocarditis Prophylaxis:  YES____  NO____    If YES is checked, follow the recommendations outlined below:   1. Take antibiotic(s) prior to recommended dental procedures and procedures on the respiratory tract or with infected skin, muscle or bones. SBE prophylaxis is not needed for routine GI and  procedures (ie. Colonoscopy or vaginal delivery)   2. Observe good oral hygiene daily, as advised by your dentist. Get regular professional dental care.   3. Keep cuts clean.   4. Infections should be treated promptly.   5. Symptoms of Infective Endocarditis could include: " fever lasting more than 4-5 days or a recurrent fever that initially resolves but returns within 1-2 days)     Exercise restrictions:   Yes____  No__x__         If yes, list restrictions:  Must be allowed to rest if fatigued or SOB      Work restrictions:  Yes____  No_x___         If yes, list restrictions:    FASTING CHOLESTEROL was checked in the last 5 years YES__x_  NO___  2017  Continue to eat a heart healthy, low salt diet.         ____ Fasting lipid panel order today         ____ No changes in medications          ____ I recommend the following changes in your cholesterol medications.:          ____ Please follow up for cholesterol screening at your primary care physician            Follow-up:       For after hours urgent needs, call [phone] and ask to speak to the Adult Congenital Physician on call.  Mention Job Code 0401.    For emergencies call [phone].    For any scheduling needs, please call [facility] , at [phone]  Thank you for your visit today!  If you have questions or concerns about today's visit, please call me.    Nasir Alvarez RN, BSN  Cardiology Care Coordinator  [facility]  [phone]    [address] [place] [address]  Mail Code [id number]  [place] [address]            Follow-ups after your visit        Your next 10 appointments already scheduled     Nov 14, 2018  9:30 AM CST   Six Minute Walk with UC PFL 6 MINUTE WALK 2   [facility] Pulmonary Function Testing ([facility])    [address] [place] [address] [id number] [phone]            Feb 26, 2019  9:00 AM CST   Ech Complete with [id number]    Health Echo ([facility])    [address] [place] [address] [id number] [phone]           1.  Please bring or wear a comfortable two-piece outfit. 2.  You may eat, drink and take your normal medicines. 3.  For any questions that cannot be answered, please contact  the ordering physician 4.  Please do not wear perfumes or scented lotions on the day of your exam.            Feb 26, 2019 10:00 AM CST   (Arrive by 9:45 AM)   RETURN CONGENITAL HEART with Xiomara Lopez MD   [facility] ([facility])    [address]  [address]  [facility] [address] [phone]            Feb 26, 2019 10:30 AM CST   (Arrive by 10:15 AM)   New Electrophysiology Genetics with Alonzo Baldwin MD   [facility] ([facility])    [address]  [address]  [place] [facility] [address] [phone]              Who to contact     If you have questions or need follow up information about today's clinic visit or your schedule please contact [facility] directly at [phone].  Normal or non-critical lab and imaging results will be communicated to you by MyChart, letter or phone within 4 business days after the clinic has received the results. If you do not hear from us within 7 days, please contact the clinic through [employer] or phone. If you have a critical or abnormal lab result, we will notify you by phone as soon as possible.  Submit refill requests through [employer] or call your pharmacy and they will forward the refill request to us. Please allow 3 business days for your refill to be completed.          Additional Information About Your Visit        MyChart Information     [employer] gives you secure access to your electronic health record. If you see a primary care provider, you can also send messages to your care team and make appointments. If you have questions, please call your primary care clinic.  If you do not have a primary care provider, please call [phone] and they will assist you.        Care EveryWhere ID     This is your Care EveryWhere ID. This could be used by other organizations to access your [place] medical records  [id number]        Your Vitals Were     Pulse Height Last Period Pulse  "Oximetry BMI (Body Mass Index)       61 1.753 m (5' 9\") 10/27/2004 94% 26.95 kg/m2        Blood Pressure from Last 3 Encounters:   11/09/18 147/88   11/09/18 147/88   10/29/18 128/80    Weight from Last 3 Encounters:   11/09/18 82.8 kg (182 lb 8.3 oz)   11/09/18 82.8 kg (182 lb 8 oz)   10/29/18 83.9 kg (185 lb)              We Performed the Following     EKG 12-lead, tracing only (Same Day)          Today's Medication Changes          These changes are accurate as of 11/9/18 12:23 PM.  If you have any questions, ask your nurse or doctor.               These medicines have changed or have updated prescriptions.        Dose/Directions    * amiodarone HCl 400 MG Tabs   This may have changed:  Another medication with the same name was changed. Make sure you understand how and when to take each.   Used for:  Atrial fibrillation with RVR (H)        Dose:  400 mg   Take 400 mg by mouth 2 times daily for 14 days   Quantity:  14 tablet   Refills:  0       * amiodarone 200 MG tablet   Commonly known as:  PACERONE/CODARONE   This may have changed:    - how much to take  - when to take this   Used for:  Atrial fibrillation with RVR (H)        Dose:  200 mg   Start taking on:  11/10/2018   Take 1 tablet (200 mg) by mouth daily for 42 doses   Quantity:  42 tablet   Refills:  0       * Notice:  This list has 2 medication(s) that are the same as other medications prescribed for you. Read the directions carefully, and ask your doctor or other care provider to review them with you.             Primary Care Provider Office Phone # Fax #    Noemy NEWMAN DO [place] [phone] [address] [address]        Goals        General    Medical (pt-stated)     Notes - Note created  10/29/2018 11:14 AM by Romana Forbes, RN    Goal 1  Statement: I will monitor my blood pressure daily and prn.  I will report abnormal blood pressure reading or s/s of hypo/hpyer tension to my cardiologist.   Measure of " Success: completed  Supportive Steps to Achieve: na  Barriers: na  Strengths: na  Date to Achieve By: 1/1/19  Patient expressed understanding of goal: yes            Equal Access to Services     YESENIA HINOJOSA : Bossman Coy, frederic cui, aramisphi weeksninoskajuan luis irwinvasiliydeedee mcknightin hayaabenny irwindaisy abdi enma [place]. So [place] -[phone].    ATENCIÓN: Si habla español, tiene a parsons disposición servicios gratuitos de asistencia lingüística. Llame al [phone].    We comply with applicable federal civil rights laws and [place] laws. We do not discriminate on the basis of race, color, national origin, age, disability, sex, sexual orientation, or gender identity.            Thank you!     Thank you for choosing [facility]  for your care. Our goal is always to provide you with excellent care. Hearing back from our patients is one way we can continue to improve our services. Please take a few minutes to complete the written survey that you may receive in the mail after your visit with us. Thank you!             Your Updated Medication List - Protect others around you: Learn how to safely use, store and throw away your medicines at www.disposemymeds.org.          This list is accurate as of 11/9/18 12:23 PM.  Always use your most recent med list.                   Brand Name Dispense Instructions for use Diagnosis    * amiodarone HCl 400 MG Tabs     14 tablet    Take 400 mg by mouth 2 times daily for 14 days    Atrial fibrillation with RVR (H)       * amiodarone 200 MG tablet   Start taking on:  11/10/2018    PACERONE/CODARONE    42 tablet    Take 1 tablet (200 mg) by mouth daily for 42 doses    Atrial fibrillation with RVR (H)       atorvastatin 20 MG tablet    LIPITOR    90 tablet    Take 1 tablet (20 mg) by mouth daily    Hypercholesteremia       cholecalciferol 2000 units tablet      Take 2,000 Units by mouth 2 times daily        clopidogrel 75 MG tablet    PLAVIX    90 tablet    Take 1 tablet (75  mg) by mouth daily    PFO (patent foramen ovale)       fish oil-omega-3 fatty acids 1000 MG capsule      Take 1 g by mouth 2 times daily        levothyroxine 100 MCG tablet    SYNTHROID/LEVOTHROID    90 tablet    Take 1 tablet (100 mcg) by mouth daily    Other specified hypothyroidism       lisinopril 20 MG tablet    PRINIVIL/ZESTRIL    90 tablet    Take 1 tablet (20 mg) by mouth daily    Essential hypertension with goal blood pressure less than 140/90       magnesium 250 MG tablet      Take 250 mg by mouth daily        metoprolol succinate 100 MG 24 hr tablet    TOPROL-XL    90 tablet    Take 1 tablet (100 mg) by mouth daily    PAF (paroxysmal atrial fibrillation) (H), Essential hypertension with goal blood pressure less than 140/90       MULTIVITAMIN ADULTS Tabs      Take 1 tablet by mouth daily        rivaroxaban ANTICOAGULANT 20 MG Tabs tablet   Start taking on:  11/22/2018    XARELTO    30 tablet    Take 1 tablet (20 mg) by mouth daily (with dinner)    PAF (paroxysmal atrial fibrillation) (H)       TYLENOL PO      Take 1,000 mg by mouth as needed for mild pain or fever        valACYclovir 500 MG tablet    VALTREX    12 tablet    TAKE ONE TABLET BY MOUTH TWICE A DAY FOR THREE DAYS. (REPEAT AS NEEDED)    Recurrent herpes simplex       * Notice:  This list has 2 medication(s) that are the same as other medications prescribed for you. Read the directions carefully, and ask your doctor or other care provider to review them with you.

## 2018-11-09 NOTE — NURSING NOTE
Cardiac Testing: Patient given instructions regarding  echocardiogram . Discussed purpose, preparation, procedure and when to expect results reported back to the patient. Patient demonstrated understanding of this information and agreed to call with further questions or concerns.     Med Reconcile: Reviewed and verified all current medications with the patient. The updated medication list was printed and given to the patient.    Return Appointment: Follow up with Dr Lopez and Dr Baldwin in 3 months with an echo. Patient given instructions regarding scheduling next clinic visit. Patient demonstrated understanding of this information and agreed to call with further questions or concerns.    Medication Change: Increase Metoprolol XL to 150 mg daily. Patient was educated regarding prescribed medication change, including discussion of the indication, administration, side effects, and when to report to MD or RN. Patient demonstrated understanding of this information and agreed to call with further questions or concerns.    Patient stated she understood all health information given and agreed to call with further questions or concerns.    Nasir Alvarez, RN  RN Care Coordinator  [facility]  [phone]

## 2018-11-09 NOTE — LETTER
11/9/2018      RE: Sofi Degroot  [address]  [facility] [address]       Dear Colleague,    Thank you for the opportunity to participate in the care of your patient, Sofi Degroot, at the [facility] HEART [facility]. Please see a copy of my visit note below.    [facility] Electrophysiology Clinic Note  HPI:   Ms. Degroot is a 63 year old female who has a past medical history hypothyroidism, HepC, HTN, TIA, retinal artery occlusion, PFO s/p percutaneous closure 10/12/18, and PAF (CHADSVASC 4 on Xarelto) s/p DCCV 10/26/18.   She first had left arm numbness and was found to have TIA in 2015. Work up at that time showed small PFO. A cardiac monitor was negative for that time. More recently, she had a retinal artery occlusion and decision was made to pursue PFO closure. She had percutaneous PFO closure with Amplatz device on 10/12/18. She has since had episodes of tachycardia. The first in mid-October 2018 where she counted her pulse up to 148 bpm and felt racing heart and palpitations. She performed vagal maneuvers and then her heart rate went down to around 100 bpm. She went to bed and when she woke up she felt back to her baseline. The second episode started 10/21/18 in the afternoon. She reports she did not feel well and have very rapid heart beat. She felt her pulse rate again was around 140-150 bpm. She tried vagal maneuvers again but these were not successful. When her symptoms continued she came into the [facility]. She was found to be in AF with RVR. She reported feeling generally unwell and had some palpitations. Upon exam in the [facility], she converted spontaneously to sinus. She reported feeling much better in sinus. She was then readmitted on 10/26/18 with AF with RVR. She reports onset of palpitations at 1pm on 10/25/18. Started when she was at rest, sitting at her desk. She was started on amiodarone and had DCCV. She denies any prior history of arryhtmias  besides her recent episodes. Recent echo showed LVEF 55-60%.   She presents today for follow up. She is also seeing Dr. Lopez. She reports she is still having some AF episodes that started about 1 week after her DCCV on 10/26/18. She states she had two longer episodes that lasted about 8 hours each. She then has had intermittent palpitations lasting 1-2 hours a couple times a week. She states that the most bothersome symptom to her is when her heart is racing >100 bpm. An echo today shows normal LVEF and well seated closure device with no residual PFO. She denies any chest pain/pressures, dizziness, lightheadedness, worsening shortness of breath, leg/ankle swelling, PND, orthopnea, or syncopal symptoms. Presenting 12 lead ECG shows NSR Vent Rate 61 bpm,  ms,  ms, QTc 444 ms. Current cardiac medications include: Amiodarone, Xarelto, Toprol XL, Lisinopril, Lipitor, and Plavix.     PAST MEDICAL HISTORY:  Past Medical History:   Diagnosis Date     Cervical high risk HPV (human papillomavirus) test positive 09/16/2016, 10/11/17, 10/17/18    (not 16 or 18). 10/11/17: LSIL pap with + HR HPV (not 16 or 18) result.10/17/18: See problem list.      Low risk HPV infection 2009, 2010    NIL pap, + HPV 53     Papanicolaou smear of cervix with low grade squamous intraepithelial lesion (LGSIL) 10/11/2017    10/11/17: LSIL pap with + HR HPV (not 16 or 18) result.     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 1998    Tx'd w/ Interferon x 1 year-virus free at 5 years, blood transfusion  related       CURRENT MEDICATIONS:  Current Outpatient Prescriptions   Medication Sig Dispense Refill     Acetaminophen (TYLENOL PO) Take 1,000 mg by mouth as needed for mild pain or fever       [START ON 11/10/2018] amiodarone (PACERONE/CODARONE) 200 MG tablet Take 1 tablet (200 mg) by mouth daily for 42 doses (Patient taking differently: Take 400 mg by mouth 2 times daily ) 42 tablet 0      amiodarone 400 MG TABS Take 400 mg by mouth 2 times daily for 14 days 14 tablet 0     atorvastatin (LIPITOR) 20 MG tablet Take 1 tablet (20 mg) by mouth daily 90 tablet 3     cholecalciferol 2000 units tablet Take 2,000 Units by mouth 2 times daily        clopidogrel (PLAVIX) 75 MG tablet Take 1 tablet (75 mg) by mouth daily 90 tablet 3     fish oil-omega-3 fatty acids 1000 MG capsule Take 1 g by mouth 2 times daily       levothyroxine (SYNTHROID/LEVOTHROID) 100 MCG tablet Take 1 tablet (100 mcg) by mouth daily 90 tablet 3     lisinopril (PRINIVIL/ZESTRIL) 20 MG tablet Take 1 tablet (20 mg) by mouth daily 90 tablet 1     magnesium 250 MG tablet Take 250 mg by mouth daily        metoprolol succinate (TOPROL-XL) 100 MG 24 hr tablet Take 1 tablet (100 mg) by mouth daily 90 tablet 3     Multiple Vitamins-Minerals (MULTIVITAMIN ADULTS) TABS Take 1 tablet by mouth daily       [START ON 11/22/2018] rivaroxaban ANTICOAGULANT (XARELTO) 20 MG TABS tablet Take 1 tablet (20 mg) by mouth daily (with dinner) 30 tablet 11     valACYclovir (VALTREX) 500 MG tablet TAKE ONE TABLET BY MOUTH TWICE A DAY FOR THREE DAYS. (REPEAT AS NEEDED) 12 tablet 5     [DISCONTINUED] lisinopril-hydrochlorothiazide (PRINZIDE,ZESTORETIC) 20-12.5 MG per tablet Take 1 tablet by mouth daily 90 tablet 3       PAST SURGICAL HISTORY:  Past Surgical History:   Procedure Laterality Date     ANESTHESIA CARDIOVERSION N/A 10/26/2018    Procedure: ANESTHESIA CARDIOVERSION;  Surgeon: GENERIC ANESTHESIA PROVIDER;  Location: [facility] OR     C LIGATE FALLOPIAN TUBE  1986     SURGICAL HISTORY OF -  Right 2008    Right 1st MTPJ, bone spur resection       ALLERGIES:     Allergies   Allergen Reactions     Latex Swelling     Dental visit--burning sensation in lips and throat, lips felt puffy, eye watery from latex gloves     Pineapple Other (See Comments)       FAMILY HISTORY:  Family History   Problem Relation Age of Onset     HEART DISEASE Maternal Grandfather      Breast  "Cancer Maternal Grandmother      Dx'd age 70     Arthritis Father      Lipids Mother      On meds     Cancer Mother      BCC     HEART DISEASE Brother       of dilated of cardiomyopathy at age 54     Alcohol/Drug Sister      Alcohol/Drug Brother      Depression Sister        SOCIAL HISTORY:  Social History   Substance Use Topics     Smoking status: Former Smoker     Smokeless tobacco: Never Used      Comment: quit 30 years ago     Alcohol use Yes      Comment: 4-5 Drinks Per Week       ROS:   A comprehensive 10 point review of systems negative other than as mentioned in HPI.  Exam:  /88 (BP Location: Right arm, Patient Position: Chair, Cuff Size: Adult Regular)  Pulse 61  Ht 1.753 m (5' 9\")  Wt 82.8 kg (182 lb 8 oz)  LMP 10/27/2004  SpO2 94%  BMI 26.95 kg/m2  GENERAL APPEARANCE: alert and no distress  HEENT: no icterus, no xanthelasmas, normal pupil size and reaction, normal palate, mucosa moist, no central cyanosis  NECK: no adenopathy, no asymmetry, masses, or scars, thyroid normal to palpation and no bruits, JVP not elevated  RESPIRATORY: lungs clear to auscultation - no rales, rhonchi or wheezes, no use of accessory muscles, no retractions, respirations are unlabored, normal respiratory rate  CARDIOVASCULAR: regular rhythm, normal S1 with physiologic split S2, no S3 or S4 and no murmur, click or rub, precordium quiet with normal PMI.  ABDOMEN: soft, non tender, bowel sounds normal, non-distended  EXTREMITIES: peripheral pulses normal, no edema  NEURO: alert and oriented to person/place/time, normal speech, gait and affect  SKIN: no ecchymoses, no rashes  PSYCH: normal affect, cooperative    Labs:  Reviewed.     Testing/Procedures:  18 ECHOCARDIOGRAM:   Interpretation Summary  Global and regional left ventricular function is normal with an EF of 60-65%.  Global right ventricular function is normal.  A PFO closure device is present and is well seated in the interatrial septum,  no residual " interatrial shunt by color Doppler and bubble study.  Pulmonary artery systolic pressure is normal.  The inferior vena cava was normal in size with preserved respiratory  variability.  No pericardial effusion is present.      Assessment and Plan:   Ms. Degroot is a 63 year old female who has a past medical history hypothyroidism, HepC, HTN, TIA, retinal artery occlusion, PFO s/p percutaneous closure 10/12/18, and PAF (CHADSVASC 4 on Xarelto) s/p DCCV 10/26/18. She developed episodes of PAF after her PFO closure, a couple requiring visits to ER or admission. She was ultimately started on short course of amiodarone on 10/26/18 and had DCCV. She presents today for follow up. She is also seeing Dr. Lopez. She reports she is still having some AF episodes that started about 1 week after her DCCV on 10/26/18. She states she had two longer episodes that lasted about 8 hours each. She then has had intermittent palpitations lasting 1-2 hours a couple times a week. She states that the most bothersome symptom to her is when her heart is racing >100 bpm. An echo today shows normal LVEF and well seated closure device with no residual PFO.     Paroxysmal Atrial Fibrillation:  We discussed in detail with the patient management/treatment options for Antoinette includin. Stroke Prophylaxis:  CHADSVASC=++TIA, +gender, +HTN  4, corresponding to a 4.0% annual stroke / [facility] rate. indicating need for long term oral anticoagulation.  She is appropriately on Xarelto. No bleeding issues.   2. Rate Control: Increase Toprol XL to 150 mg daily.   3. Rhythm Control: Cardioversion, Antiarrhythmics and/or ablation are options for rhythm control. She has been having episodes of PAF since her PFO closure and did require one DCCV. She is still having some episodes. We started here on amiodarone on 10/26/18. We plan for short course of amiodarone for a few months. We are hopeful that these will resolve after further post procedure  healing. She will require baseline PFTs and LFTs/TFTs. If longer term AAT required, we would favor alternative such as Sotalol or Dofetilide.   4. Risk Factor Management: Continue Statin, maintain tight BP control, and ASHLEY evaluation as indicated.      Follow up in 3 months.     The patient states understanding and is agreeable with plan.   This patient was seen and evaluated with Dr. Baldwin. The above note reflects our joint assessment and plan.   JAKE Arias CNP  Pager: [phone]    EP STAFF NOTE  I have seen and examined the patient as part of a shared visit with ANDRE Arias NP.  I agree with the note above. I reviewed today's vital signs and medications. I have reviewed and discussed with the advanced practice provider their physical examination, assessment, and plan   Briefly, s/p PFO closure and PAF starting after, now decreased burden, short course amio  My key history/exam findings are: RR.   The key management decisions made by me: continue short course amio, f/u 3 mo, amio tox screen baseline.    Alonzo Baldwin MD [facility]  Cardiology - Electrophysiology    CC  MARCH, MILY MELCHOR

## 2018-11-09 NOTE — MR AVS SNAPSHOT
"              After Visit Summary   11/9/2018    Sofi Degroot    MRN: [id number]           Patient Information     Date Of Birth          1955        Visit Information        Provider Department      11/9/2018 11:30 AM Xiomara Lopez MD [facility] Heart [facility]        Today's Diagnoses     S/P patent foramen ovale closure        Atrial fibrillation with RVR (H)          Care Instructions    You were seen today in the Adult Congenital and Cardiovascular Genetics Clinic at the [facility].    Cardiology Providers you saw during your visit:  Dr Xiao Lopez and Dr Alonzo Baldwin    Diagnosis:  Status post PFO closure, A fib    Results:  The results of your EKG and preliminary results of you in clinic today.    Recommendations:    1.  Continue to eat a heart healthy, low salt diet.  2.  Continue to get 20-30 minutes of aerobic activity, 4-5 days per week.  Examples of aerobic activity include walking, running, swimming, cycling, etc.  3.  Continue to observe good oral hygiene, with regular dental visits.  4.  Increase your Metoprolol XL to 150 mg daily.  5.  We will schedule you for PFT's  6.  If you have any episodes of A fib, please call [phone] to let Smita know.       Vitals:    11/09/18 0839 11/09/18 0843   BP: 153/89 147/88   BP Location: Right arm Right arm   Patient Position: Chair Chair   Cuff Size: Adult Regular Adult Regular   Pulse: 62 61   SpO2: 94%    Weight: 82.8 kg (182 lb 8 oz)    Height: 1.753 m (5' 9\")        SBE prophylaxis:   Yes____  No__x__    Lifelong Bacterial Endocarditis Prophylaxis:  YES____  NO____    If YES is checked, follow the recommendations outlined below:   1. Take antibiotic(s) prior to recommended dental procedures and procedures on the respiratory tract or with infected skin, muscle or bones. SBE prophylaxis is not needed for routine GI and  procedures (ie. Colonoscopy or vaginal delivery)   2. Observe good oral hygiene daily, as advised by your dentist. " Get regular professional dental care.   3. Keep cuts clean.   4. Infections should be treated promptly.   5. Symptoms of Infective Endocarditis could include: fever lasting more than 4-5 days or a recurrent fever that initially resolves but returns within 1-2 days)     Exercise restrictions:   Yes____  No__x__         If yes, list restrictions:  Must be allowed to rest if fatigued or SOB      Work restrictions:  Yes____  No_x___         If yes, list restrictions:    FASTING CHOLESTEROL was checked in the last 5 years YES__x_  NO___  2017  Continue to eat a heart healthy, low salt diet.         ____ Fasting lipid panel order today         ____ No changes in medications          ____ I recommend the following changes in your cholesterol medications.:          ____ Please follow up for cholesterol screening at your primary care physician      Follow-up:   Follow up with Dr Lopez and Dr Baldwin in 3 months with an echocardiogram.     For after hours urgent needs, call [phone] and ask to speak to the Adult Congenital Physician on call.  Mention Job Code 0401.    For emergencies call 911.    For any scheduling needs, please call [facility] Procedure , at [phone]  Thank you for your visit today!  If you have questions or concerns about today's visit, please call me.    Nasir Alvarez RN, BSN  Cardiology Care Coordinator  [facility]  [phone]    [address] [place] [address]  Mail Code [id number]  [place] [address]            Follow-ups after your visit        Your next 10 appointments already scheduled     Nov 14, 2018  9:30 AM CST   Six Minute Walk with UC PFL 6 MINUTE WALK 2   [facility] Pulmonary Function Testing ([facility])    [address] [place] [address]   [phone]            Feb 26, 2019  9:00 AM CST   Ech Complete with [id number]   [facility] Echo ([facility])    [address] [place] [address]  [facility] [address] [phone]           1.  Please bring or wear a comfortable two-piece outfit. 2.  You may eat, drink and take your normal medicines. 3.  For any questions that cannot be answered, please contact the ordering physician 4.  Please do not wear perfumes or scented lotions on the day of your exam.            Feb 26, 2019 10:00 AM CST   (Arrive by 9:45 AM)   RETURN CONGENITAL HEART with Xiomara Lopez MD   [facility] ([facility])    [address] [place] [address]  [address]  [facility] [address] [phone]            Feb 26, 2019 10:30 AM CST   (Arrive by 10:15 AM)   New Electrophysiology Genetics with Alonzo Baldwin MD   [facility] ([facility])    [address] [place] [address]  [address] [place] [address] [phone]              Who to contact     If you have questions or need follow up information about today's clinic visit or your schedule please contact [facility] directly at [phone].  Normal or non-critical lab and imaging results will be communicated to you by [employer], letter or phone within 4 business days after the clinic has received the results. If you do not hear from us within 7 days, please contact the clinic through [email or web address] or phone. If you have a critical or abnormal lab result, we will notify you by phone as soon as possible.  Submit refill requests through [employer] or call your pharmacy and they will forward the refill request to us. Please allow 3 business days for your refill to be completed.          Additional Information About Your Visit        [employer] Information     [employer] gives you secure access to your electronic health record. If you see a primary care provider, you can also send messages to your care team and make appointments. If you have questions, please call your primary care clinic.  If you do not have a primary care provider, please call [phone] and they  "will assist you.        Care EveryWhere ID     This is your Care EveryWhere ID. This could be used by other organizations to access your [place] medical records  [id number]        Your Vitals Were     Pulse Height Last Period Pulse Oximetry BMI (Body Mass Index)       62 1.753 m (5' 9\") 10/27/2004 94% 26.95 kg/m2        Blood Pressure from Last 3 Encounters:   11/09/18 147/88   11/09/18 147/88   10/29/18 128/80    Weight from Last 3 Encounters:   11/09/18 82.8 kg (182 lb 8.3 oz)   11/09/18 82.8 kg (182 lb 8 oz)   10/29/18 83.9 kg (185 lb)              We Performed the Following     Adult Congenital and CV Genetics Clinic          Today's Medication Changes          These changes are accurate as of 11/9/18 12:24 PM.  If you have any questions, ask your nurse or doctor.               These medicines have changed or have updated prescriptions.        Dose/Directions    * amiodarone HCl 400 MG Tabs   This may have changed:  Another medication with the same name was changed. Make sure you understand how and when to take each.   Used for:  Atrial fibrillation with RVR (H)        Dose:  400 mg   Take 400 mg by mouth 2 times daily for 14 days   Quantity:  14 tablet   Refills:  0       * amiodarone 200 MG tablet   Commonly known as:  PACERONE/CODARONE   This may have changed:    - how much to take  - when to take this   Used for:  Atrial fibrillation with RVR (H)        Dose:  200 mg   Start taking on:  11/10/2018   Take 1 tablet (200 mg) by mouth daily for 42 doses   Quantity:  42 tablet   Refills:  0       * Notice:  This list has 2 medication(s) that are the same as other medications prescribed for you. Read the directions carefully, and ask your doctor or other care provider to review them with you.             Primary Care Provider Office Phone # Fax #    Noemy Olivera [phone] [address] [address]        hospitals        General    Medical (pt-stated)     Notes - Note " created  10/29/2018 11:14 AM by Romana Forbes, RN    Goal 1  Statement: I will monitor my blood pressure daily and prn.  I will report abnormal blood pressure reading or s/s of hypo/hpyer tension to my cardiologist.   Measure of Success: completed  Supportive Steps to Achieve: na  Barriers: na  Strengths: na  Date to Achieve By: 1/1/19  Patient expressed understanding of goal: yes            Equal Access to Services     YESENIA HINOJOSA AH: Hadii aad ku hadasho Soomaali, waaxda luqadaha, qaybta kaalmada adeegyada, waxay idiin hayaan adeeg kharash la'aan ah. So [facility] [phone].    ATENCIÓN: Si habla español, tiene a parsons disposición servicios gratuitos de asistencia lingüística. Llame al [phone].    We comply with applicable federal civil rights laws and [place] laws. We do not discriminate on the basis of race, color, national origin, age, disability, sex, sexual orientation, or gender identity.            Thank you!     Thank you for choosing [facility]  for your care. Our goal is always to provide you with excellent care. Hearing back from our patients is one way we can continue to improve our services. Please take a few minutes to complete the written survey that you may receive in the mail after your visit with us. Thank you!             Your Updated Medication List - Protect others around you: Learn how to safely use, store and throw away your medicines at www.disposemymeds.org.          This list is accurate as of 11/9/18 12:24 PM.  Always use your most recent med list.                   Brand Name Dispense Instructions for use Diagnosis    * amiodarone HCl 400 MG Tabs     14 tablet    Take 400 mg by mouth 2 times daily for 14 days    Atrial fibrillation with RVR (H)       * amiodarone 200 MG tablet   Start taking on:  11/10/2018    PACERONE/CODARONE    42 tablet    Take 1 tablet (200 mg) by mouth daily for 42 doses    Atrial fibrillation with RVR (H)       atorvastatin 20 MG tablet    LIPITOR     90 tablet    Take 1 tablet (20 mg) by mouth daily    Hypercholesteremia       cholecalciferol 2000 units tablet      Take 2,000 Units by mouth 2 times daily        clopidogrel 75 MG tablet    PLAVIX    90 tablet    Take 1 tablet (75 mg) by mouth daily    PFO (patent foramen ovale)       fish oil-omega-3 fatty acids 1000 MG capsule      Take 1 g by mouth 2 times daily        levothyroxine 100 MCG tablet    SYNTHROID/LEVOTHROID    90 tablet    Take 1 tablet (100 mcg) by mouth daily    Other specified hypothyroidism       lisinopril 20 MG tablet    PRINIVIL/ZESTRIL    90 tablet    Take 1 tablet (20 mg) by mouth daily    Essential hypertension with goal blood pressure less than 140/90       magnesium 250 MG tablet      Take 250 mg by mouth daily        metoprolol succinate 100 MG 24 hr tablet    TOPROL-XL    90 tablet    Take 1 tablet (100 mg) by mouth daily    PAF (paroxysmal atrial fibrillation) (H), Essential hypertension with goal blood pressure less than 140/90       MULTIVITAMIN ADULTS Tabs      Take 1 tablet by mouth daily        rivaroxaban ANTICOAGULANT 20 MG Tabs tablet   Start taking on:  11/22/2018    XARELTO    30 tablet    Take 1 tablet (20 mg) by mouth daily (with dinner)    PAF (paroxysmal atrial fibrillation) (H)       TYLENOL PO      Take 1,000 mg by mouth as needed for mild pain or fever        valACYclovir 500 MG tablet    VALTREX    12 tablet    TAKE ONE TABLET BY MOUTH TWICE A DAY FOR THREE DAYS. (REPEAT AS NEEDED)    Recurrent herpes simplex       * Notice:  This list has 2 medication(s) that are the same as other medications prescribed for you. Read the directions carefully, and ask your doctor or other care provider to review them with you.

## 2018-11-09 NOTE — PROGRESS NOTES
HPI:     Please see dictated note    PAST MEDICAL HISTORY:  Past Medical History:   Diagnosis Date     Cervical high risk HPV (human papillomavirus) test positive 09/16/2016, 10/11/17, 10/17/18    (not 16 or 18). 10/11/17: LSIL pap with + HR HPV (not 16 or 18) result.10/17/18: See problem list.      Low risk HPV infection 2009, 2010    NIL pap, + HPV 53     Papanicolaou smear of cervix with low grade squamous intraepithelial lesion (LGSIL) 10/11/2017    10/11/17: LSIL pap with + HR HPV (not 16 or 18) result.     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 1998    Tx'd w/ Interferon x 1 year-virus free at 4 years, blood transfusion  related       CURRENT MEDICATIONS:  Current Outpatient Prescriptions   Medication Sig Dispense Refill     Acetaminophen (TYLENOL PO) Take 1,000 mg by mouth as needed for mild pain or fever       [START ON 11/10/2018] amiodarone (PACERONE/CODARONE) 200 MG tablet Take 1 tablet (200 mg) by mouth daily for 42 doses (Patient taking differently: Take 400 mg by mouth 2 times daily ) 42 tablet 0     amiodarone 400 MG TABS Take 400 mg by mouth 2 times daily for 14 days 14 tablet 0     atorvastatin (LIPITOR) 20 MG tablet Take 1 tablet (20 mg) by mouth daily 90 tablet 3     cholecalciferol 2000 units tablet Take 2,000 Units by mouth 2 times daily        clopidogrel (PLAVIX) 75 MG tablet Take 1 tablet (75 mg) by mouth daily 90 tablet 3     fish oil-omega-3 fatty acids 1000 MG capsule Take 1 g by mouth 2 times daily       levothyroxine (SYNTHROID/LEVOTHROID) 100 MCG tablet Take 1 tablet (100 mcg) by mouth daily 90 tablet 3     lisinopril (PRINIVIL/ZESTRIL) 20 MG tablet Take 1 tablet (20 mg) by mouth daily 90 tablet 1     magnesium 250 MG tablet Take 250 mg by mouth daily        metoprolol succinate (TOPROL-XL) 100 MG 24 hr tablet Take 1 tablet (100 mg) by mouth daily 90 tablet 3     Multiple Vitamins-Minerals (MULTIVITAMIN ADULTS) TABS Take 1 tablet by mouth  daily       [START ON 2018] rivaroxaban ANTICOAGULANT (XARELTO) 20 MG TABS tablet Take 1 tablet (20 mg) by mouth daily (with dinner) 30 tablet 11     valACYclovir (VALTREX) 500 MG tablet TAKE ONE TABLET BY MOUTH TWICE A DAY FOR THREE DAYS. (REPEAT AS NEEDED) 12 tablet 5     [DISCONTINUED] lisinopril-hydrochlorothiazide (PRINZIDE,ZESTORETIC) 20-12.5 MG per tablet Take 1 tablet by mouth daily 90 tablet 3       PAST SURGICAL HISTORY:  Past Surgical History:   Procedure Laterality Date     ANESTHESIA CARDIOVERSION N/A 10/26/2018    Procedure: ANESTHESIA CARDIOVERSION;  Surgeon: GENERIC ANESTHESIA PROVIDER;  Location: [facility] OR     C LIGATE FALLOPIAN TUBE       SURGICAL HISTORY OF -  Right 2008    Right 1st MTPJ, bone spur resection       ALLERGIES     Allergies   Allergen Reactions     Latex Swelling     Dental visit--burning sensation in lips and throat, lips felt puffy, eye watery from latex gloves     Pineapple Other (See Comments)       FAMILY HISTORY:  Family History   Problem Relation Age of Onset     HEART DISEASE Maternal Grandfather      Breast Cancer Maternal Grandmother      Dx'd age 70     Arthritis Father      Lipids Mother      On meds     Cancer Mother      BCC     HEART DISEASE Brother       of dilated of cardiomyopathy at age 54     Alcohol/Drug Sister      Alcohol/Drug Brother      Depression Sister        SOCIAL HISTORY:  Social History     Social History     Marital status: Single     Spouse name: N/A     Number of children: 1     Years of education: BSN     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 No  "    Social History Narrative    Social Documentation:        Balanced Diet: YES    Calcium intake: 2-3 per day    Caffeine: 3 per day    Exercise:  type of activity work is physical and walking;  5 times per week    Sunscreen: Yes    Seatbelts:  Yes    Self Breast Exam:  Yes    Self Testicular Exam: na    Physical/Emotional/Sexual Abuse: No    Do you feel safe in your environment? Yes        Cholesterol screen up to date:     CHOL      199   7/21/2009    HDL       82   7/21/2009    LDL      100   7/21/2009    TRIG       83   7/21/2009    CHOLHDLRATIO      2.4   7/21/2009    Eye Exam up to date: Yes    Dental Exam up to date: Yes    Pap smear up to date: do today. PAP      NIL   7/21/2009    Mammogram up to date:  Due in September    Dexa Scan up to date: has not had one     Colonoscopy up to date: Yes, 04/08    Immunizations up to date: Yes    Glucose screen if over 40:  cna do today                                                               ROS:   Constitutional: No fever, chills, or sweats. No weight gain/loss   ENT: No visual disturbance, ear ache, epistaxis, sore throat  Allergies/Immunologic: Negative.   Respiratory: No cough, hemoptysia  Cardiovascular: As per HPI  GI: No nausea, vomiting, hematemesis, melena, or hematochezia  : No urinary frequency, dysuria, or hematuria  Integument: Negative  Psychiatric: Negative  Neuro: Negative  Endocrinology: Negative   Musculoskeletal: Negative    EXAM:  /88 (BP Location: Right arm, Patient Position: Chair, Cuff Size: Adult Regular)  Pulse 62  Ht 1.753 m (5' 9\")  Wt 82.8 kg (182 lb 8.3 oz)  LMP 10/27/2004  SpO2 94%  BMI 26.95 kg/m2  In general, the patient is a pleasant female in no apparent distress.    HEENT: NC/AT.  PERRLA.  EOMI.  Sclerae white, not injected.  Nares clear.  Pharynx without erythema or exudate.  Dentition intact.    Neck: . Carotids +4/4 bilaterally without bruits.  No jugular venous distension.   Heart: RRR. Normal S1, S2 splits " physiologically. No murmur, rub, click, or gallop.  Lungs: CTA.  No ronchi, wheezes, rales.    Extremities: No clubbing, cyanosis, or edema.     Labs:  LIPID RESULTS:  Lab Results   Component Value Date    CHOL 144 10/17/2018    HDL 83 10/17/2018    LDL 49 10/17/2018    TRIG 61 10/17/2018    CHOLHDLRATIO 1.9 02/16/2015    NHDL 61 10/17/2018       LIVER ENZYME RESULTS:  Lab Results   Component Value Date    AST 22 10/17/2018    ALT 26 10/17/2018       CBC RESULTS:  Lab Results   Component Value Date    WBC 10.5 10/25/2018    RBC 4.84 10/25/2018    HGB 15.7 10/25/2018    HCT 48.1 (H) 10/25/2018    MCV 99 10/25/2018    MCH 32.4 10/25/2018    MCHC 32.6 10/25/2018    RDW 12.9 10/25/2018     10/25/2018       BMP RESULTS:  Lab Results   Component Value Date     10/25/2018    POTASSIUM 3.7 10/25/2018    CHLORIDE 109 10/25/2018    CO2 22 10/25/2018    ANIONGAP 11 10/25/2018     (H) 10/25/2018    BUN 14 10/25/2018    CR 0.91 10/25/2018    GFRESTIMATED 62 10/25/2018    GFRESTBLACK 75 10/25/2018    ALICIA 9.7 10/25/2018        A1C RESULTS:  Lab Results   Component Value Date    A1C 5.5 05/01/2018       INR RESULTS:  Lab Results   Component Value Date    INR 1.18 (H) 10/26/2018    INR 1.1 02/15/2015     RIMA Lopez MD     CC  Patient Care Team:  Noemy Olivera DO as PCP - General  Emi De Paz, RN as Nurse Coordinator (Neurology)  Noemy Olivera DO as Referring Physician (Family Practice)  Mahamed Block MD as Resident (House Physician)  Nasir Alvarez, RN as Nurse Coordinator (Cardiology)  Xiomara Lopez MD as MD (Cardiology)  Romana Forbes, RN as Lead Care Coordinator (Primary Care - CC)  Alonzo Baldwin MD as MD (Clinical Cardiac Electrophysiology)  XIOMARA LOPEZ

## 2018-11-09 NOTE — LETTER
11/9/2018      RE: Sofi Degroot  [address]  [facility] [address]       Dear Colleague,    Thank you for the opportunity to participate in the care of your patient, Sofi Degroot, at the [facility]. Please see a copy of my visit note below.    HPI:     Please see dictated note    PAST MEDICAL HISTORY:  Past Medical History:   Diagnosis Date     Cervical high risk HPV (human papillomavirus) test positive 09/16/2016, 10/11/17, 10/17/18    (not 16 or 18). 10/11/17: LSIL pap with + HR HPV (not 16 or 18) result.10/17/18: See problem list.      Low risk HPV infection 2009, 2010    NIL pap, + HPV 53     Papanicolaou smear of cervix with low grade squamous intraepithelial lesion (LGSIL) 10/11/2017    10/11/17: LSIL pap with + HR HPV (not 16 or 18) result.     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 1998    Tx'd w/ Interferon x 1 year-virus free at 4 years, blood transfusion  related       CURRENT MEDICATIONS:  Current Outpatient Prescriptions   Medication Sig Dispense Refill     Acetaminophen (TYLENOL PO) Take 1,000 mg by mouth as needed for mild pain or fever       [START ON 11/10/2018] amiodarone (PACERONE/CODARONE) 200 MG tablet Take 1 tablet (200 mg) by mouth daily for 42 doses (Patient taking differently: Take 400 mg by mouth 2 times daily ) 42 tablet 0     amiodarone 400 MG TABS Take 400 mg by mouth 2 times daily for 14 days 14 tablet 0     atorvastatin (LIPITOR) 20 MG tablet Take 1 tablet (20 mg) by mouth daily 90 tablet 3     cholecalciferol 2000 units tablet Take 2,000 Units by mouth 2 times daily        clopidogrel (PLAVIX) 75 MG tablet Take 1 tablet (75 mg) by mouth daily 90 tablet 3     fish oil-omega-3 fatty acids 1000 MG capsule Take 1 g by mouth 2 times daily       levothyroxine (SYNTHROID/LEVOTHROID) 100 MCG tablet Take 1 tablet (100 mcg) by mouth daily 90 tablet 3     lisinopril  (PRINIVIL/ZESTRIL) 20 MG tablet Take 1 tablet (20 mg) by mouth daily 90 tablet 1     magnesium 250 MG tablet Take 250 mg by mouth daily        metoprolol succinate (TOPROL-XL) 100 MG 24 hr tablet Take 1 tablet (100 mg) by mouth daily 90 tablet 3     Multiple Vitamins-Minerals (MULTIVITAMIN ADULTS) TABS Take 1 tablet by mouth daily       [START ON 2018] rivaroxaban ANTICOAGULANT (XARELTO) 20 MG TABS tablet Take 1 tablet (20 mg) by mouth daily (with dinner) 30 tablet 11     valACYclovir (VALTREX) 500 MG tablet TAKE ONE TABLET BY MOUTH TWICE A DAY FOR THREE DAYS. (REPEAT AS NEEDED) 12 tablet 5     [DISCONTINUED] lisinopril-hydrochlorothiazide (PRINZIDE,ZESTORETIC) 20-12.5 MG per tablet Take 1 tablet by mouth daily 90 tablet 3       PAST SURGICAL HISTORY:  Past Surgical History:   Procedure Laterality Date     ANESTHESIA CARDIOVERSION N/A 10/26/2018    Procedure: ANESTHESIA CARDIOVERSION;  Surgeon: GENERIC ANESTHESIA PROVIDER;  Location: [facility] OR     C LIGATE FALLOPIAN TUBE       SURGICAL HISTORY OF -  Right     Right 1st MTPJ, bone spur resection       ALLERGIES     Allergies   Allergen Reactions     Latex Swelling     Dental visit--burning sensation in lips and throat, lips felt puffy, eye watery from latex gloves     Pineapple Other (See Comments)       FAMILY HISTORY:  Family History   Problem Relation Age of Onset     HEART DISEASE Maternal Grandfather      Breast Cancer Maternal Grandmother      Dx'd age 70     Arthritis Father      Lipids Mother      On meds     Cancer Mother      BCC     HEART DISEASE Brother       of dilated of cardiomyopathy at age 54     Alcohol/Drug Sister      Alcohol/Drug Brother      Depression Sister        SOCIAL HISTORY:  Social History     Social History     Marital status: Single     Spouse name: N/A     Number of children: 1     Years of education: BSN     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 No     Social History Narrative    Social Documentation:        Balanced Diet: YES    Calcium intake: 2-3 per day    Caffeine: 3 per day    Exercise:  type of activity work is physical and walking;  5 times per week    Sunscreen: Yes    Seatbelts:  Yes    Self Breast Exam:  Yes    Self Testicular Exam: na    Physical/Emotional/Sexual Abuse: No    Do you feel safe in your environment? Yes        Cholesterol screen up to date:     CHOL      199   7/21/2009    HDL       82   7/21/2009    LDL      100   7/21/2009    TRIG       83   7/21/2009    CHOLHDLRATIO      2.4   7/21/2009    Eye Exam up to date: Yes    Dental Exam up to date: Yes    Pap smear up to date: do today. PAP      NIL   7/21/2009    Mammogram up to date:  Due in September    Dexa Scan up to date: has not had one     Colonoscopy up to date: Yes, 04/08    Immunizations up to date: Yes    Glucose screen if over 40:  cna do today                                                               ROS:   Constitutional: No fever, chills, or sweats. No weight gain/loss   ENT: No visual disturbance, ear ache, epistaxis, sore throat  Allergies/Immunologic: Negative.   Respiratory: No cough, hemoptysia  Cardiovascular: As per HPI  GI: No nausea, vomiting, hematemesis, melena, or hematochezia  : No urinary frequency, dysuria, or hematuria  Integument: Negative  Psychiatric: Negative  Neuro: Negative  Endocrinology: Negative   Musculoskeletal: Negative    EXAM:  /88 (BP Location: Right arm, Patient Position: Chair, Cuff Size: Adult Regular)  Pulse 62  Ht 1.753 m (5' 9\")  Wt 82.8 kg (182 lb 8.3 oz)  LMP 10/27/2004  SpO2 94%  BMI 26.95 kg/m2  In " general, the patient is a pleasant female in no apparent distress.    HEENT: NC/AT.  PERRLA.  EOMI.  Sclerae white, not injected.  Nares clear.  Pharynx without erythema or exudate.  Dentition intact.    Neck: . Carotids +4/4 bilaterally without bruits.  No jugular venous distension.   Heart: RRR. Normal S1, S2 splits physiologically. No murmur, rub, click, or gallop.  Lungs: CTA.  No ronchi, wheezes, rales.    Extremities: No clubbing, cyanosis, or edema.     Labs:  LIPID RESULTS:  Lab Results   Component Value Date    CHOL 144 10/17/2018    HDL 83 10/17/2018    LDL 49 10/17/2018    TRIG 61 10/17/2018    CHOLHDLRATIO 1.9 02/16/2015    NHDL 61 10/17/2018       LIVER ENZYME RESULTS:  Lab Results   Component Value Date    AST 22 10/17/2018    ALT 26 10/17/2018       CBC RESULTS:  Lab Results   Component Value Date    WBC 10.5 10/25/2018    RBC 4.84 10/25/2018    HGB 15.7 10/25/2018    HCT 48.1 (H) 10/25/2018    MCV 99 10/25/2018    MCH 32.4 10/25/2018    MCHC 32.6 10/25/2018    RDW 12.9 10/25/2018     10/25/2018       BMP RESULTS:  Lab Results   Component Value Date     10/25/2018    POTASSIUM 3.7 10/25/2018    CHLORIDE 109 10/25/2018    CO2 22 10/25/2018    ANIONGAP 11 10/25/2018     (H) 10/25/2018    BUN 14 10/25/2018    CR 0.91 10/25/2018    GFRESTIMATED 62 10/25/2018    GFRESTBLACK 75 10/25/2018    ALICIA 9.7 10/25/2018        A1C RESULTS:  Lab Results   Component Value Date    A1C 5.5 05/01/2018       INR RESULTS:  Lab Results   Component Value Date    INR 1.18 (H) 10/26/2018    INR 1.1 02/15/2015     RIMA Lopez MD     CC  Patient Care Team:  Noemy Olivera DO as PCP - General  Emi De Paz, RN as Nurse Coordinator (Neurology)  Noemy Olivera DO as Referring Physician (Family Practice)  Mahamed Block MD as Resident (House Physician)  Nasir Alvarez, RN as Nurse Coordinator (Cardiology)  Xiomara Lopez MD as MD (Cardiology)  Romana Forbes, RN as Lead Care Coordinator  (Primary Care - CC)  Alonzo Baldwin MD as MD (Clinical Cardiac Electrophysiology)  MILY HERNANDEZ    Service Date: 11/09/2018      HISTORY OF PRESENT ILLNESS:  Ms. Degroot is a pleasant 63-year-old woman who has a past medical history significant for hypertension, TIA.  She was seen by Dr. Andrade, who recommended PFO closure as she had no known arrhythmias.  She underwent closure on 10/12/2018.  She had had normal right heart pressures and had placement of a 30 mm Presidio device without issue.  She was discharged on Plavix and aspirin.  Unfortunately, she subsequently came to the emergency department with severe palpitations with a heart rate up into the 150s and she was found to be in atrial fibrillation.  She has had several other additional episodes that did require a cardioversion.  Her Toprol has since been increased today from 100 to 150.  She still is occasionally having some episodes, including yesterday.  She was also started on amiodarone, which was felt to be just for a short time, while her heart recovers from this.  She has been started on anticoagulation appropriately, aspirin has been stopped and Plavix has been continued.  She is otherwise doing well without any concerning symptomatology.  She feels great as long as she is not in atrial fibrillation.  I have reviewed her echo today which shows the device is in good condition.      PHYSICAL EXAMINATION:  Her right groin site is clean, dry and intact and well-healed.      IMPRESSION AND PLAN:   1.  History of TIA with a PFO, status post PFO closure with a 30 mm Presidio device 10/12/2018.   2.  New onset paroxysmal atrial fibrillation, on amiodarone for 3-6 months until device is in place and endothelialized and hopefully no further events will require us to continue.  Presently, she is on Xarelto.   3.  Hypertension.      DISCUSSION:  It was a pleasure seeing Ms. Degroot in followup.  Clinically, she is doing reasonably well so long as she is not in  AFib.  Hopefully, the short-term amiodarone will do the trick and we will be able to take her off of it once her device has endothelialized.  Her echo looks good.  We discussed followup in 3 months with an echo.  The evaluation for remaining on amiodarone will be done by Dr. Baldwin including PFTs.  At 3 months, he will likely stop the Plavix and just switch her to baby aspirin.  For her blood pressures, we have increased the metoprolol from 100 to 150.      It was a pleasure to see her.  Please do not hesitate to contact me with any questions or concerns.         MILY HERNANDEZ MD             D: 2018   T: 2018   MT: al      Name:     YARON BRAXTON   MRN:      [id number]        Account:      [id number]   :      1955           Service Date: 2018      Document: [id number]

## 2018-11-09 NOTE — PATIENT INSTRUCTIONS
"You were seen today in the Adult Congenital and Cardiovascular Genetics Clinic at the [facility].    Cardiology Providers you saw during your visit:  Dr Xiao Lopez and Dr Alonzo Baldwin    Diagnosis:  Status post PFO closure, A fib    Results:  The results of your EKG and preliminary results of you in clinic today.    Recommendations:    1.  Continue to eat a heart healthy, low salt diet.  2.  Continue to get 20-30 minutes of aerobic activity, 4-5 days per week.  Examples of aerobic activity include walking, running, swimming, cycling, etc.  3.  Continue to observe good oral hygiene, with regular dental visits.  4.  Increase your Metoprolol XL to 150 mg daily.  5.  We will schedule you for PFT's  6.  If you have any episodes of A fib, please call [phone] to let Smita know.       Vitals:    11/09/18 0839 11/09/18 0843   BP: 153/89 147/88   BP Location: Right arm Right arm   Patient Position: Chair Chair   Cuff Size: Adult Regular Adult Regular   Pulse: 62 61   SpO2: 94%    Weight: 82.8 kg (182 lb 8 oz)    Height: 1.753 m (5' 9\")        SBE prophylaxis:   Yes____  No__x__    Lifelong Bacterial Endocarditis Prophylaxis:  YES____  NO____    If YES is checked, follow the recommendations outlined below:   1. Take antibiotic(s) prior to recommended dental procedures and procedures on the respiratory tract or with infected skin, muscle or bones. SBE prophylaxis is not needed for routine GI and  procedures (ie. Colonoscopy or vaginal delivery)   2. Observe good oral hygiene daily, as advised by your dentist. Get regular professional dental care.   3. Keep cuts clean.   4. Infections should be treated promptly.   5. Symptoms of Infective Endocarditis could include: fever lasting more than 4-5 days or a recurrent fever that initially resolves but returns within 1-2 days)     Exercise restrictions:   Yes____  No__x__         If yes, list restrictions:  Must be allowed to rest if fatigued or SOB      Work " restrictions:  Yes____  No_x___         If yes, list restrictions:    FASTING CHOLESTEROL was checked in the last 5 years YES__x_  NO___  2017  Continue to eat a heart healthy, low salt diet.         ____ Fasting lipid panel order today         ____ No changes in medications          ____ I recommend the following changes in your cholesterol medications.:          ____ Please follow up for cholesterol screening at your primary care physician      Follow-up:   Follow up with Dr Lopez and Dr Baldwin in 3 months with an echocardiogram.     For after hours urgent needs, call [phone] and ask to speak to the Adult Congenital Physician on call.  Mention Job Code 0401.    For emergencies call 311.    For any scheduling needs, please call [facility] , at [phone]  Thank you for your visit today!  If you have questions or concerns about today's visit, please call me.    Nasir Alvarez, RN, BSN  Cardiology Care Coordinator  [facility]  [phone]     [facility]  Mail Code [id number]  [place] [address]

## 2018-11-09 NOTE — NURSING NOTE
Cardiac Testing: Patient given instructions regarding  echocardiogram . Discussed purpose, preparation, procedure and when to expect results reported back to the patient. Patient demonstrated understanding of this information and agreed to call with further questions or concerns.     Med Reconcile: Reviewed and verified all current medications with the patient. The updated medication list was printed and given to the patient.    Return Appointment: Follow up with Dr Lopez and Dr Baldwin in 3 months with an echo. Patient given instructions regarding scheduling next clinic visit. Patient demonstrated understanding of this information and agreed to call with further questions or concerns.    Medication Change: Increase Metoprolol XL to 150 mg daily. Patient was educated regarding prescribed medication change, including discussion of the indication, administration, side effects, and when to report to MD or RN. Patient demonstrated understanding of this information and agreed to call with further questions or concerns.    Patient stated she understood all health information given and agreed to call with further questions or concerns.    Nasir Alvarez, RN  RN Care Coordinator  [facility]  [phone]

## 2018-11-09 NOTE — PROGRESS NOTES
Service Date: 11/09/2018      HISTORY OF PRESENT ILLNESS:  Ms. Degroot is a pleasant 63-year-old woman who has a past medical history significant for hypertension, TIA.  She was seen by Dr. Andrade, who recommended PFO closure as she had no known arrhythmias.  She underwent closure on 10/12/2018.  She had had normal right heart pressures and had placement of a 30 mm [place] device without issue.  She was discharged on Plavix and aspirin.  Unfortunately, she subsequently came to the emergency department with severe palpitations with a heart rate up into the 150s and she was found to be in atrial fibrillation.  She has had several other additional episodes that did require a cardioversion.  Her Toprol has since been increased today from 100 to 150.  She still is occasionally having some episodes, including yesterday.  She was also started on amiodarone, which was felt to be just for a short time, while her heart recovers from this.  She has been started on anticoagulation appropriately, aspirin has been stopped and Plavix has been continued.  She is otherwise doing well without any concerning symptomatology.  She feels great as long as she is not in atrial fibrillation.  I have reviewed her echo today which shows the device is in good condition.      PHYSICAL EXAMINATION:  Her right groin site is clean, dry and intact and well-healed.      IMPRESSION AND PLAN:   1.  History of TIA with a PFO, status post PFO closure with a 30 mm [place] device 10/12/2018.   2.  New onset paroxysmal atrial fibrillation, on amiodarone for 3-6 months until device is in place and endothelialized and hopefully no further events will require us to continue.  Presently, she is on Xarelto.   3.  Hypertension.      DISCUSSION:  It was a pleasure seeing Ms. Degroot in followup.  Clinically, she is doing reasonably well so long as she is not in AFib.  Hopefully, the short-term amiodarone will do the trick and we will be able to take her off of it  once her device has endothelialized.  Her echo looks good.  We discussed followup in 3 months with an echo.  The evaluation for remaining on amiodarone will be done by Dr. Baldwin including PFTs.  At 3 months, he will likely stop the Plavix and just switch her to baby aspirin.  For her blood pressures, we have increased the metoprolol from 100 to 150.      It was a pleasure to see her.  Please do not hesitate to contact me with any questions or concerns.         MILY HERNANDEZ MD             D: 2018   T: 2018   MT: al      Name:     YARON BRAXTON   MRN:      [id number]        Account:      [id number]   :      1955           Service Date: 2018      Document: [id number]

## 2018-11-09 NOTE — PROGRESS NOTES
[facility]  HPI:   Ms. Degroot is a 63 year old female who has a past medical history hypothyroidism, HepC, HTN, TIA, retinal artery occlusion, PFO s/p percutaneous closure 10/12/18, and PAF (CHADSVASC 4 on Xarelto) s/p DCCV 10/26/18.   She first had left arm numbness and was found to have TIA in 2015. Work up at that time showed small PFO. A cardiac monitor was negative for that time. More recently, she had a retinal artery occlusion and decision was made to pursue PFO closure. She had percutaneous PFO closure with Amplatz device on 10/12/18. She has since had episodes of tachycardia. The first in mid-October 2018 where she counted her pulse up to 148 bpm and felt racing heart and palpitations. She performed vagal maneuvers and then her heart rate went down to around 100 bpm. She went to bed and when she woke up she felt back to her baseline. The second episode started 10/21/18 in the afternoon. She reports she did not feel well and have very rapid heart beat. She felt her pulse rate again was around 140-150 bpm. She tried vagal maneuvers again but these were not successful. When her symptoms continued she came into the [facility]. She was found to be in AF with RVR. She reported feeling generally unwell and had some palpitations. Upon exam in the [facility], she converted spontaneously to sinus. She reported feeling much better in sinus. She was then readmitted on 10/26/18 with AF with RVR. She reports onset of palpitations at 1pm on 10/25/18. Started when she was at rest, sitting at her desk. She was started on amiodarone and had DCCV. She denies any prior history of arryhtmias besides her recent episodes. Recent echo showed LVEF 55-60%.   She presents today for follow up. She is also seeing Dr. oLpez. She reports she is still having some AF episodes that started about 1 week after her DCCV on 10/26/18. She states she had two longer episodes that lasted about 8 hours each. She then has had intermittent  palpitations lasting 1-2 hours a couple times a week. She states that the most bothersome symptom to her is when her heart is racing >100 bpm. An echo today shows normal LVEF and well seated closure device with no residual PFO. She denies any chest pain/pressures, dizziness, lightheadedness, worsening shortness of breath, leg/ankle swelling, PND, orthopnea, or syncopal symptoms. Presenting 12 lead ECG shows NSR Vent Rate 61 bpm,  ms,  ms, QTc 444 ms. Current cardiac medications include: Amiodarone, Xarelto, Toprol XL, Lisinopril, Lipitor, and Plavix.     PAST MEDICAL HISTORY:  Past Medical History:   Diagnosis Date     Cervical high risk HPV (human papillomavirus) test positive 09/16/2016, 10/11/17, 10/17/18    (not 16 or 18). 10/11/17: LSIL pap with + HR HPV (not 16 or 18) result.10/17/18: See problem list.      Low risk HPV infection 2009, 2010    NIL pap, + HPV 53     Papanicolaou smear of cervix with low grade squamous intraepithelial lesion (LGSIL) 10/11/2017    10/11/17: LSIL pap with + HR HPV (not 16 or 18) result.     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 1998    Tx'd w/ Interferon x 1 year-virus free at 5 years, blood transfusion  related       CURRENT MEDICATIONS:  Current Outpatient Prescriptions   Medication Sig Dispense Refill     Acetaminophen (TYLENOL PO) Take 1,000 mg by mouth as needed for mild pain or fever       [START ON 11/10/2018] amiodarone (PACERONE/CODARONE) 200 MG tablet Take 1 tablet (200 mg) by mouth daily for 42 doses (Patient taking differently: Take 400 mg by mouth 2 times daily ) 42 tablet 0     amiodarone 400 MG TABS Take 400 mg by mouth 2 times daily for 14 days 14 tablet 0     atorvastatin (LIPITOR) 20 MG tablet Take 1 tablet (20 mg) by mouth daily 90 tablet 3     cholecalciferol 2000 units tablet Take 2,000 Units by mouth 2 times daily        clopidogrel (PLAVIX) 75 MG tablet Take 1 tablet (75 mg) by mouth daily 90  tablet 3     fish oil-omega-3 fatty acids 1000 MG capsule Take 1 g by mouth 2 times daily       levothyroxine (SYNTHROID/LEVOTHROID) 100 MCG tablet Take 1 tablet (100 mcg) by mouth daily 90 tablet 3     lisinopril (PRINIVIL/ZESTRIL) 20 MG tablet Take 1 tablet (20 mg) by mouth daily 90 tablet 1     magnesium 250 MG tablet Take 250 mg by mouth daily        metoprolol succinate (TOPROL-XL) 100 MG 24 hr tablet Take 1 tablet (100 mg) by mouth daily 90 tablet 3     Multiple Vitamins-Minerals (MULTIVITAMIN ADULTS) TABS Take 1 tablet by mouth daily       [START ON 2018] rivaroxaban ANTICOAGULANT (XARELTO) 20 MG TABS tablet Take 1 tablet (20 mg) by mouth daily (with dinner) 30 tablet 11     valACYclovir (VALTREX) 500 MG tablet TAKE ONE TABLET BY MOUTH TWICE A DAY FOR THREE DAYS. (REPEAT AS NEEDED) 12 tablet 5     [DISCONTINUED] lisinopril-hydrochlorothiazide (PRINZIDE,ZESTORETIC) 20-12.5 MG per tablet Take 1 tablet by mouth daily 90 tablet 3       PAST SURGICAL HISTORY:  Past Surgical History:   Procedure Laterality Date     ANESTHESIA CARDIOVERSION N/A 10/26/2018    Procedure: ANESTHESIA CARDIOVERSION;  Surgeon: GENERIC ANESTHESIA PROVIDER;  Location: [facility] OR     C LIGATE FALLOPIAN TUBE       SURGICAL HISTORY OF -  Right 2008    Right 1st MTPJ, bone spur resection       ALLERGIES:     Allergies   Allergen Reactions     Latex Swelling     Dental visit--burning sensation in lips and throat, lips felt puffy, eye watery from latex gloves     Pineapple Other (See Comments)       FAMILY HISTORY:  Family History   Problem Relation Age of Onset     HEART DISEASE Maternal Grandfather      Breast Cancer Maternal Grandmother      Dx'd age 70     Arthritis Father      Lipids Mother      On meds     Cancer Mother      BCC     HEART DISEASE Brother       of dilated of cardiomyopathy at age 54     Alcohol/Drug Sister      Alcohol/Drug Brother      Depression Sister        SOCIAL HISTORY:  Social History   Substance Use Topics  "    Smoking status: Former Smoker     Smokeless tobacco: Never Used      Comment: quit 30 years ago     Alcohol use Yes      Comment: 4-5 Drinks Per Week       ROS:   A comprehensive 10 point review of systems negative other than as mentioned in HPI.  Exam:  /88 (BP Location: Right arm, Patient Position: Chair, Cuff Size: Adult Regular)  Pulse 61  Ht 1.753 m (5' 9\")  Wt 82.8 kg (182 lb 8 oz)  LMP 10/27/2004  SpO2 94%  BMI 26.95 kg/m2  GENERAL APPEARANCE: alert and no distress  HEENT: no icterus, no xanthelasmas, normal pupil size and reaction, normal palate, mucosa moist, no central cyanosis  NECK: no adenopathy, no asymmetry, masses, or scars, thyroid normal to palpation and no bruits, JVP not elevated  RESPIRATORY: lungs clear to auscultation - no rales, rhonchi or wheezes, no use of accessory muscles, no retractions, respirations are unlabored, normal respiratory rate  CARDIOVASCULAR: regular rhythm, normal S1 with physiologic split S2, no S3 or S4 and no murmur, click or rub, precordium quiet with normal PMI.  ABDOMEN: soft, non tender, bowel sounds normal, non-distended  EXTREMITIES: peripheral pulses normal, no edema  NEURO: alert and oriented to person/place/time, normal speech, gait and affect  SKIN: no ecchymoses, no rashes  PSYCH: normal affect, cooperative    Labs:  Reviewed.     Testing/Procedures:  11/9/18 ECHOCARDIOGRAM:   Interpretation Summary  Global and regional left ventricular function is normal with an EF of 60-65%.  Global right ventricular function is normal.  A PFO closure device is present and is well seated in the interatrial septum,  no residual interatrial shunt by color Doppler and bubble study.  Pulmonary artery systolic pressure is normal.  The inferior vena cava was normal in size with preserved respiratory  variability.  No pericardial effusion is present.      Assessment and Plan:   Ms. Degroot is a 63 year old female who has a past medical history hypothyroidism, HepC, " HTN, TIA, retinal artery occlusion, PFO s/p percutaneous closure 10/12/18, and PAF (CHADSVASC 4 on Xarelto) s/p DCCV 10/26/18. She developed episodes of PAF after her PFO closure, a couple requiring visits to ER or admission. She was ultimately started on short course of amiodarone on 10/26/18 and had DCCV. She presents today for follow up. She is also seeing Dr. Lopez. She reports she is still having some AF episodes that started about 1 week after her DCCV on 10/26/18. She states she had two longer episodes that lasted about 8 hours each. She then has had intermittent palpitations lasting 1-2 hours a couple times a week. She states that the most bothersome symptom to her is when her heart is racing >100 bpm. An echo today shows normal LVEF and well seated closure device with no residual PFO.     Paroxysmal Atrial Fibrillation:  We discussed in detail with the patient management/treatment options for Antoinette includin. Stroke Prophylaxis:  CHADSVASC=++TIA, +gender, +HTN  4, corresponding to a 4.0% annual stroke / systemic [facility] event rate. indicating need for long term oral anticoagulation.  She is appropriately on Xarelto. No bleeding issues.   2. Rate Control: Increase Toprol XL to 150 mg daily.   3. Rhythm Control: Cardioversion, Antiarrhythmics and/or ablation are options for rhythm control. She has been having episodes of PAF since her PFO closure and did require one DCCV. She is still having some episodes. We started here on amiodarone on 10/26/18. We plan for short course of amiodarone for a few months. We are hopeful that these will resolve after further post procedure healing. She will require baseline PFTs and LFTs/TFTs. If longer term AAT required, we would favor alternative such as Sotalol or Dofetilide.   4. Risk Factor Management: Continue Statin, maintain tight BP control, and ASHLEY evaluation as indicated.      Follow up in 3 months.     The patient states understanding and is agreeable with plan.    This patient was seen and evaluated with Dr. Baldwin. The above note reflects our joint assessment and plan.   JAKE Arias CNP  Pager: [phone]    EP STAFF NOTE  I have seen and examined the patient as part of a shared visit with ANDRE Arais NP.  I agree with the note above. I reviewed today's vital signs and medications. I have reviewed and discussed with the advanced practice provider their physical examination, assessment, and plan   Briefly, s/p PFO closure and PAF starting after, now decreased burden, short course amio  My key history/exam findings are: RR.   The key management decisions made by me: continue short course amio, f/u 3 mo, amio tox screen baseline.    Alonzo Baldwin MD [facility]  Cardiology - Electrophysiology    CC  MARCH, MILY MELCHOR

## 2018-11-09 NOTE — NURSING NOTE
Chief Complaint   Patient presents with     New Patient     63 year old female with history of PFO s/p closure, hypertension, palpitations, TIA, and A fib s/p DCCV presenting for follow up     Vitals were taken and medications were reconciled. EKG was performed    Katie JOHNSON  8:53 AM

## 2018-11-10 LAB — INTERPRETATION ECG - MUSE: NORMAL

## 2018-11-14 ENCOUNTER — TELEPHONE (OUTPATIENT)
Dept: FAMILY MEDICINE | Facility: CLINIC | Age: 63
End: 2018-11-14

## 2018-11-14 ENCOUNTER — OFFICE VISIT (OUTPATIENT)
Dept: AUDIOLOGY | Facility: CLINIC | Age: 63
End: 2018-11-14
Payer: COMMERCIAL

## 2018-11-14 DIAGNOSIS — Q24.9 CONGENITAL ANOMALIES OF THE HEART: Primary | Chronic | ICD-10-CM

## 2018-11-14 DIAGNOSIS — Z01.10 ENCOUNTER FOR EXAM OF EARS AND HEARING W/O ABNORMAL FINDINGS: Primary | ICD-10-CM

## 2018-11-14 LAB
6 MIN WALK (FT): 1600 FT
6 MIN WALK (M): 488 M

## 2018-11-14 NOTE — MR AVS SNAPSHOT
After Visit Summary   11/14/2018    Sofi Degroot    MRN: [id number]           Patient Information     Date Of Birth          1955        Visit Information        Provider Department      11/14/2018 8:00 AM Hosea Kaufman AuD [facility] Audiology        Today's Diagnoses     Encounter for exam of ears and hearing w/o abnormal findings    -  1       Follow-ups after your visit        Your next 10 appointments already scheduled     Nov 14, 2018  9:30 AM CST   Six Minute Walk with UC PFL 6 MINUTE WALK 2   [facility] Pulmonary Function Testing ([facility])    [address] [place] [address] [id number] [phone]            Feb 26, 2019  9:00 AM CST   Ech Complete with [id number]   [facility] Echo ([facility])    [address] [place] [phone]           1.  Please bring or wear a comfortable two-piece outfit. 2.  You may eat, drink and take your normal medicines. 3.  For any questions that cannot be answered, please contact the ordering physician 4.  Please do not wear perfumes or scented lotions on the day of your exam.            Feb 26, 2019 10:00 AM CST   (Arrive by 9:45 AM)   RETURN CONGENITAL HEART with Xiomara Lopez MD   [facility] ([facility])    [address] [place] [id number] [phone]            Feb 26, 2019 10:30 AM CST   (Arrive by 10:15 AM)   New Electrophysiology Genetics with Alonzo Baldwin MD   [facility])    [address] [place] [phone]              Who to contact     Please call your clinic at [phone] to:    Ask questions about your health    Make or cancel appointments    Discuss your medicines    Learn about your test results    Speak to your doctor            Additional Information About Your Visit         [employer]ist Information     [employer] gives you secure access to your electronic health record. If you see a primary care provider, you can also send messages to your care team and make appointments. If you have questions, please call your primary care clinic.  If you do not have a primary care provider, please call [phone] and they will assist you.      [employer] is an electronic gateway that provides easy, online access to your medical records. With [employer], you can request a clinic appointment, read your test results, renew a prescription or communicate with your care team.     To access your existing account, please contact your [facility] or call [phone] for assistance.        Care EveryWhere ID     This is your Care EveryWhere ID. This could be used by other organizations to access your [facility] medical records  [id number]        Your Vitals Were     Last Period                   10/27/2004            Blood Pressure from Last 3 Encounters:   11/09/18 147/88   11/09/18 147/88   10/29/18 128/80    Weight from Last 3 Encounters:   11/09/18 82.8 kg (182 lb 8.3 oz)   11/09/18 82.8 kg (182 lb 8 oz)   10/29/18 83.9 kg (185 lb)              We Performed the Following     AUDIOGRAM/TYMPANOGRAM - INTERFACE     [facility] Audiometry Thrshld Eval & Speech Recog ([address])     Tymps / Reflex   ([id number])        Primary Care Provider Office Phone # Fax #    Noemy Olivera [phone]       [address] [address]        Goals        General    Medical (pt-stated)     Notes - Note created  10/29/2018 11:14 AM by Romana Forbes, RN    Goal 1  Statement: I will monitor my blood pressure daily and prn.  I will report abnormal blood pressure reading or s/s of hypo/hpyer tension to my cardiologist.   Measure of Success: completed  Supportive Steps to Achieve: na  Barriers: na  Strengths: na  Date to Achieve By: 1/1/19  Patient expressed understanding of goal:  yes            Equal Access to Services     [facility]: Hadii aad ku hadmooseo Somarileeali, waaxda luqadaha, qaybta kaalmada dcvasiliyjuan luis, [place] . So [facility] [phone].    ATENCIÓN: Si habla español, tiene a parsons disposición servicios gratuitos de asistencia lingüística. Llame al [phone].    We comply with applicable federal civil rights laws and [place] laws. We do not discriminate on the basis of race, color, national origin, age, disability, sex, sexual orientation, or gender identity.            Thank you!     Thank you for choosing [facility] AUDIOLOGY  for your care. Our goal is always to provide you with excellent care. Hearing back from our patients is one way we can continue to improve our services. Please take a few minutes to complete the written survey that you may receive in the mail after your visit with us. Thank you!             Your Updated Medication List - Protect others around you: Learn how to safely use, store and throw away your medicines at www.disposemymeds.org.          This list is accurate as of 11/14/18  9:06 AM.  Always use your most recent med list.                   Brand Name Dispense Instructions for use Diagnosis    * amiodarone HCl 400 MG Tabs     14 tablet    Take 400 mg by mouth 2 times daily for 14 days    Atrial fibrillation with RVR (H)       * amiodarone 200 MG tablet    PACERONE/CODARONE    90 tablet    Take 1 tablet (200 mg) by mouth daily    Atrial fibrillation with RVR (H)       atorvastatin 20 MG tablet    LIPITOR    90 tablet    Take 1 tablet (20 mg) by mouth daily    Hypercholesteremia       cholecalciferol 2000 units tablet      Take 2,000 Units by mouth 2 times daily        clopidogrel 75 MG tablet    PLAVIX    90 tablet    Take 1 tablet (75 mg) by mouth daily    PFO (patent foramen ovale)       fish oil-omega-3 fatty acids 1000 MG capsule      Take 1 g by mouth 2 times daily        levothyroxine 100 MCG tablet     SYNTHROID/LEVOTHROID    90 tablet    Take 1 tablet (100 mcg) by mouth daily    Other specified hypothyroidism       lisinopril 20 MG tablet    PRINIVIL/ZESTRIL    90 tablet    Take 1 tablet (20 mg) by mouth daily    Essential hypertension with goal blood pressure less than 140/90       magnesium 250 MG tablet      Take 250 mg by mouth daily        * metoprolol succinate 100 MG 24 hr tablet    TOPROL-XL    90 tablet    Take 1 tablet (100 mg) by mouth daily Take with a 50 mg tablet to equal 150 mg daily.    PAF (paroxysmal atrial fibrillation) (H), Essential hypertension with goal blood pressure less than 140/90       * metoprolol succinate 50 MG 24 hr tablet    TOPROL-XL    90 tablet    Take 1 tablet (50 mg) by mouth daily Take with a 100 mg tablet to equal 150 mg daily.    Atrial fibrillation with RVR (H)       MULTIVITAMIN ADULTS Tabs      Take 1 tablet by mouth daily        rivaroxaban ANTICOAGULANT 20 MG Tabs tablet   Start taking on:  11/22/2018    XARELTO    30 tablet    Take 1 tablet (20 mg) by mouth daily (with dinner)    PAF (paroxysmal atrial fibrillation) (H)       TYLENOL PO      Take 1,000 mg by mouth as needed for mild pain or fever        valACYclovir 500 MG tablet    VALTREX    12 tablet    TAKE ONE TABLET BY MOUTH TWICE A DAY FOR THREE DAYS. (REPEAT AS NEEDED)    Recurrent herpes simplex       * Notice:  This list has 4 medication(s) that are the same as other medications prescribed for you. Read the directions carefully, and ask your doctor or other care provider to review them with you.

## 2018-11-14 NOTE — TELEPHONE ENCOUNTER
Reason for Call:  Other appointment    Detailed comments: needs to schedule a Colposcopy     Phone Number Patient can be reached at: Home number on file [phone] (home)    Best Time: anytime    Can we leave a detailed message on this number? YES    Call taken on 11/14/2018 at 3:38 PM by Kaveh Taylor

## 2018-11-14 NOTE — PROGRESS NOTES
AUDIOLOGY REPORT    SUBJECTIVE:  Sofi Degroot is a 63 year old female who was seen in the Audiology Clinic at the [facility]  for audiologic evaluation, referred by Noemy Olivera D.O. The patient reports a progressive decline in hearing, and feels like she is having to ask for repetition more often. She reports a history of ear infections as a child. The patient denies aural pain, pressure, drainage, tinnitus, dizziness, recent ear infections, ear surgeries, history of noise exposure, and cancer or chemotherapy treatments.  The patient notes difficulty with communication in a variety of listening situations.      OBJECTIVE:  Fall Risk Screen:  1. Have you fallen two or more times in the past year? No  2. Have you fallen and had an injury in the past year? No    Timed Up and Go Score (in seconds): not tested  Is patient a fall risk? No  Referral initiated: No  Fall Risk Assessment Completed by Audiology    Otoscopic exam indicates ears are clear of cerumen bilaterally     Pure Tone Thresholds assessed using conventional audiometry with good  reliability from 250-8000 Hz bilaterally using insert earphones     RIGHT:  Normal hearing sensitivity    LEFT:    Normal hearing sensitivity    Tympanogram:    RIGHT: hypercompliant eardrum mobility    LEFT:   hypercompliant eardrum mobility    Reflexes (reported by stimulus ear):  RIGHT: Ipsilateral is absent at frequencies tested  RIGHT: Contralateral is could not interpret due to messy tracings (hypermobile eardrum)   LEFT:   Ipsilateral is present at normal levels  LEFT:   Contralateral is absent at frequencies tested      Speech Reception Threshold:    RIGHT: 15 dB HL    LEFT:   20 dB HL  Word Recognition Score:     RIGHT: 100% at 60 dB HL using NU-6 recorded word list.    LEFT:   100% at 60 dB HL using NU-6 recorded word list.      ASSESSMENT:   Normal hearing sensitivity bilaterally. Today s results were discussed with  the patient in detail.     PLAN:  Patient was counseled regarding hearing loss and impact on communication.  Patient is not a candidate for amplification at this time.  Handout on good communication strategies was given to patient. It is recommended that the patient return to monitor hearing if changes are noted, or new ear symptoms arise.  Please call this clinic with questions regarding these results or recommendations.      Carmita Almaguer.  Licensed Audiologist  [place] # [id number]

## 2018-11-14 NOTE — Clinical Note
Thanks for the referral. Find my results and recommendations below.  See my note for the full report.  ASSESSMENT:  Normal hearing sensitivity bilaterally. Today's results were discussed with the patient in detail.   PLAN:  Patient was counseled regarding hearing loss and impact on communication.  Patient is not a candidate for amplification at this time.  Handout on good communication strategies was given to patient. It is recommended that the patient return to monitor hearing if changes are noted, or new ear symptoms arise.  Please call this clinic with questions regarding these results or recommendations.   Carmita Almaguer. Licensed Audiologist [place] # [id number]

## 2018-11-15 NOTE — TELEPHONE ENCOUNTER
Called patient and she is scheduled for a Colposcopy on 12/19/18 with Dr. Joselin Callejas [facility] Coordinator

## 2018-11-20 LAB — FIO2-PRE: 21 %

## 2018-12-12 ENCOUNTER — PATIENT OUTREACH (OUTPATIENT)
Dept: CARE COORDINATION | Facility: CLINIC | Age: 63
End: 2018-12-12

## 2018-12-12 NOTE — PROGRESS NOTES
Clinic Care Coordination Contact  [facility]       Clinical Data: Care Coordinator Outreach  Outreach attempted. No answer. No mailbox.    Plan: Care Coordinator will attempt follow up in the next 4 weeks or sooner.

## 2018-12-19 ENCOUNTER — OFFICE VISIT (OUTPATIENT)
Dept: FAMILY MEDICINE | Facility: CLINIC | Age: 63
End: 2018-12-19
Payer: COMMERCIAL

## 2018-12-19 VITALS
TEMPERATURE: 97.3 F | WEIGHT: 186.38 LBS | RESPIRATION RATE: 14 BRPM | OXYGEN SATURATION: 97 % | DIASTOLIC BLOOD PRESSURE: 77 MMHG | SYSTOLIC BLOOD PRESSURE: 119 MMHG | BODY MASS INDEX: 27.52 KG/M2 | HEART RATE: 66 BPM

## 2018-12-19 DIAGNOSIS — R87.810 CERVICAL HIGH RISK HPV (HUMAN PAPILLOMAVIRUS) TEST POSITIVE: Primary | ICD-10-CM

## 2018-12-19 DIAGNOSIS — Z01.812 PRE-PROCEDURE LAB EXAM: ICD-10-CM

## 2018-12-19 LAB — BETA HCG QUAL IFA URINE: NEGATIVE

## 2018-12-19 PROCEDURE — 88305 TISSUE EXAM BY PATHOLOGIST: CPT | Mod: 59 | Performed by: FAMILY MEDICINE

## 2018-12-19 PROCEDURE — 84703 CHORIONIC GONADOTROPIN ASSAY: CPT | Performed by: FAMILY MEDICINE

## 2018-12-19 PROCEDURE — 57454 BX/CURETT OF CERVIX W/SCOPE: CPT | Performed by: FAMILY MEDICINE

## 2018-12-19 ASSESSMENT — PAIN SCALES - GENERAL: PAINLEVEL: NO PAIN (0)

## 2018-12-19 NOTE — PROGRESS NOTES
Sofi Degroot is a 63 year old female who presents for repeat colposcopy. Pap smear 1 months ago showed: NIL but high risk HPV. The prior pap showed ASC-US with HR HPV   and LSIL.     Past Medical History:   Diagnosis Date     Cervical high risk HPV (human papillomavirus) test positive 2016, 10/11/17, 10/17/18    (not 16 or 18). 10/11/17: LSIL pap with + HR HPV (not 16 or 18) result.10/17/18: See problem list.      Low risk HPV infection ,     NIL pap, + HPV 53     Papanicolaou smear of cervix with low grade squamous intraepithelial lesion (LGSIL) 10/11/2017    10/11/17: LSIL pap with + HR HPV (not 16 or 18) result.     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     Tx'd w/ Interferon x 1 year-virus free at 4 years, blood transfusion  related     Family History   Problem Relation Age of Onset     Heart Disease Maternal Grandfather      Breast Cancer Maternal Grandmother         Dx'd age 70     Arthritis Father      Lipids Mother         On meds     Cancer Mother         BCC     Heart Disease Brother          of dilated of cardiomyopathy at age 54     Alcohol/Drug Sister      Alcohol/Drug Brother      Depression Sister        Previous history of abnormal paps?: Yes    History of cryotherapy (freezing)?: : No  History of veneral diseases: : No  Do you desire testing for any of these diseases? : No  History of genital warts:  No  Visible warts now?:  No       Patient's last menstrual period was 10/27/2004.    Type of contraception: post menopausal status    History   Smoking Status     Former Smoker   Smokeless Tobacco     Never Used     Comment: quit 30 years ago       Allergies as of 2018 - Review Complete 2018   Allergen Reaction Noted     Latex Swelling 2007     Pineapple Other (See Comments) 02/15/2015       PROCEDURE:  Before the procedure, it was ensured that the patient was educated regarding the nature of her findings to  date, the implications of them, and what was to be done. She has been made aware of the role of HPV, the natural history of infection, ways to minimize her future risk, the effect of HPV on the cervix, and treatment options available should they be indicated. The pathophysiology of the cervix, including a discussion of squamous vs. endometrial cells, and squamous metaplasia have all been reviewed, using illustrations and sketches. The details of the colposcopic procedure were reviewed, as well as the risks of missed diagnoses, pain, infection and bleeding. All questions were answered before proceeding, and informed consent was therefore obtained.    Bimanual examination: was performed and was unremarkable.  Unenhanced examination of the cervix was normal without lesions.   Please refer to images section for details!  Pap repeated?:  No  SCJ seen?:  yes  Endocervical speculum needed?:  No  ECC done?:  Yes    Lugol's solution used?:  Yes    Satisfactory examination?:  yes    FINDINGS:  Please see image   Cervix: acetowhitening noted 8:00 area  Other area 2:00 but biospy showed nabotian cyst   Procedure: biopsies taken (not including ECC): 2.    Procedure summary: Patient tolerated procedure well     Assessment:   (R87.810) Cervical high risk HPV (human papillomavirus) test positive  (primary encounter diagnosis)  Comment:    Plan: Surgical pathology exam, COLP CERVIX/UPPER         VAGINA W BX CERVIX/ENDOCERV CURETT             (Z01.812) Pre-procedure lab exam  Comment:    Plan: Beta HCG qual IFA urine              Plan: Specimens labelled and sent to pathology., Will base further treatment on pathology findings., treatment options discussed with patient and post biopsy instructions given to patient      Noemy Olivera DO

## 2018-12-19 NOTE — NURSING NOTE
"Chief Complaint   Patient presents with     Colposcopy     /77 (BP Location: Left arm, Patient Position: Sitting, Cuff Size: Adult Regular)   Pulse 66   Temp 97.3  F (36.3  C) (Oral)   Resp 14   Wt 84.5 kg (186 lb 6 oz)   LMP 10/27/2004   SpO2 97%   BMI 27.52 kg/m   Estimated body mass index is 27.52 kg/m  as calculated from the following:    Height as of 11/9/18: 1.753 m (5' 9\").    Weight as of this encounter: 84.5 kg (186 lb 6 oz).  bp completed using cuff size: regular      Health Maintenance addressed:  NONE    n/a              "

## 2018-12-20 LAB — COPATH REPORT: NORMAL

## 2019-01-09 ENCOUNTER — TELEPHONE (OUTPATIENT)
Dept: FAMILY MEDICINE | Facility: CLINIC | Age: 64
End: 2019-01-09

## 2019-01-09 NOTE — TELEPHONE ENCOUNTER
Hi Dr. Singh,  Please review [place] results and make recommendations. It doesn't look like the pt has been notified yet.   Thanks,  Holly Prado RN-Pap Tracking     Collected: 12/19/2018   Received: 12/19/2018   Reported: 12/20/2018 09:32   Ordering Phy(s): MANDY SINGH     For improved result formatting, select 'View Enhanced Report Format' under    Linked Documents section.     SPECIMEN(S):   A: Cervical biopsy, 8 o'clock   B: Cervical biopsy, 2 o'clock   C: Endocervical curettings     FINAL DIAGNOSIS:   A. Cervix, 8 o'clock, biopsy:   - Focal squamous atypia; cannot exclude koilocytosis.   - No evidence of high grade squamous intraepithelial lesion or malignancy.   - Very small fragments of cervical glandular epithelium with no evidence   of glandular neoplasia.   - Moderate chronic inflammation with reactive epithelial changes.     B. Cervix 2 o'clock, biopsy:   - Cervical glandular and squamous epithelium with no evidence of dysplasia    or malignancy.   - Moderate chronic inflammation with reactive epithelial changes.     C. Cervix, endocervical curettings:   - Small fragments of damaged squamous mucosa with atypia of undetermined   significance.   - Cervical glandular epithelium with no evidence of glandular neoplasia.     Electronically signed out by:     Kayden Travis M.D., PhD

## 2019-01-09 NOTE — RESULT ENCOUNTER NOTE
Dear Sofi,   Your test results are all back -   Your colposcopy biopsy shows mild changes consistent with your pap tests.  This is good news - plan to recheck another pap with HPV test in one year.  Let us know if you have any questions.  -Noemy Olivera, DO

## 2019-01-11 ENCOUNTER — PATIENT OUTREACH (OUTPATIENT)
Dept: CARE COORDINATION | Facility: CLINIC | Age: 64
End: 2019-01-11

## 2019-01-11 NOTE — PROGRESS NOTES
Clinic Care Coordination Contact  [facility]/Voicemail       Clinical Data: Care Coordinator Outreach  Follow up blood pressure.  Patient is scheduled to follow up with cardiology in Feb 2019  Outreach attempted x 1.  Left message on voicemail with call back information and requested return call.  Plan:  Care Coordinator will try to reach patient again in 7-10 business days.

## 2019-02-04 ENCOUNTER — CARE COORDINATION (OUTPATIENT)
Dept: CARDIOLOGY | Facility: CLINIC | Age: 64
End: 2019-02-04

## 2019-02-04 DIAGNOSIS — Z79.899 LONG TERM CURRENT USE OF AMIODARONE: Primary | ICD-10-CM

## 2019-02-06 ENCOUNTER — PATIENT OUTREACH (OUTPATIENT)
Dept: CARE COORDINATION | Facility: CLINIC | Age: 64
End: 2019-02-06

## 2019-02-06 NOTE — PROGRESS NOTES
Clinic Care Coordination Contact  [facility]/Voicemail       Clinical Data: Care Coordinator Outreach  Follow up on a-fib and pfo closure.  Patient is scheduled to follow up with cardiology 2/26/19.   Outreach attempted .  Left message on voicemail with call back information and requested return call.  Plan: Care Coordinator will try to reach patient again in 3-5 business days.

## 2019-02-18 ENCOUNTER — PATIENT OUTREACH (OUTPATIENT)
Dept: CARE COORDINATION | Facility: CLINIC | Age: 64
End: 2019-02-18

## 2019-02-18 NOTE — PROGRESS NOTES
Clinic Care Coordination Contact  [facility]/Voicemail       Clinical Data: Care Coordinator Outreach  Follow up on a-fib and pfo closure.  Patient is scheduled to follow up with cardiology 2/26/19.       Outreach attempted x 3.  Left message on voicemail with call back information and requested return call.  Plan: Care Coordinator will mail out care coordination [place] letter with care coordinator contact information Care Coordinator will try to reach patient again in 5-7 business days.

## 2019-02-18 NOTE — LETTER
[facility] CARE COORDINATION  [address]  [facility] [facility] [address]   February 18, 2019    Sofi Degroot  [address]   [place] [facility] [place] [address]      Dear Sofi,    I am a clinic care coordinator who works with Noemy Olivera DO at [facility] . I have been trying to reach you recently to introduce Clinic Care Coordination and to see if there was anything I could assist you with.  I wanted to introduce myself and provide you with my contact information so that you can call me with questions or concerns about your health care. Below is a description of clinic care coordination and how I can further assist you.     The clinic care coordinator is a registered nurse and/or  who understand the health care system. The goal of clinic care coordination is to help you manage your health and improve access to the [facility] system in the most efficient manner. The registered nurse can assist you in meeting your health care goals by providing education, coordinating services, and strengthening the communication among your providers. The  can assist you with financial, behavioral, psychosocial, chemical dependency, counseling, and/or psychiatric resources.    Please feel free to contact me at [phone]  , with any questions or concerns. We at [facility] are focused on providing you with the highest-quality healthcare experience possible and that all starts with you.     Sincerely,       Bianca Forbes RN  Clinic Care Coordinator   [facility] [place], [facility] and [facility]   Phone: [phone]

## 2019-02-26 ENCOUNTER — ANCILLARY PROCEDURE (OUTPATIENT)
Dept: CARDIOLOGY | Facility: CLINIC | Age: 64
End: 2019-02-26
Payer: COMMERCIAL

## 2019-02-26 ENCOUNTER — CARE COORDINATION (OUTPATIENT)
Dept: CARDIOLOGY | Facility: CLINIC | Age: 64
End: 2019-02-26

## 2019-02-26 ENCOUNTER — OFFICE VISIT (OUTPATIENT)
Dept: CARDIOLOGY | Facility: CLINIC | Age: 64
End: 2019-02-26
Attending: INTERNAL MEDICINE
Payer: COMMERCIAL

## 2019-02-26 VITALS
WEIGHT: 187 LBS | SYSTOLIC BLOOD PRESSURE: 116 MMHG | OXYGEN SATURATION: 95 % | HEIGHT: 69 IN | DIASTOLIC BLOOD PRESSURE: 71 MMHG | HEART RATE: 61 BPM | BODY MASS INDEX: 27.7 KG/M2

## 2019-02-26 DIAGNOSIS — Z87.74 S/P PATENT FORAMEN OVALE CLOSURE: ICD-10-CM

## 2019-02-26 DIAGNOSIS — Z87.74 S/P PATENT FORAMEN OVALE CLOSURE: Primary | ICD-10-CM

## 2019-02-26 DIAGNOSIS — I48.91 ATRIAL FIBRILLATION, UNSPECIFIED TYPE (H): ICD-10-CM

## 2019-02-26 DIAGNOSIS — I48.91 ATRIAL FIBRILLATION, UNSPECIFIED TYPE (H): Primary | ICD-10-CM

## 2019-02-26 DIAGNOSIS — Z79.899 LONG TERM CURRENT USE OF AMIODARONE: ICD-10-CM

## 2019-02-26 PROCEDURE — 93005 ELECTROCARDIOGRAM TRACING: CPT | Mod: ZF

## 2019-02-26 PROCEDURE — 99214 OFFICE O/P EST MOD 30 MIN: CPT | Mod: ZP | Performed by: INTERNAL MEDICINE

## 2019-02-26 PROCEDURE — G0463 HOSPITAL OUTPT CLINIC VISIT: HCPCS | Mod: 25,ZF

## 2019-02-26 ASSESSMENT — PAIN SCALES - GENERAL: PAINLEVEL: NO PAIN (0)

## 2019-02-26 ASSESSMENT — MIFFLIN-ST. JEOR: SCORE: 1462.61

## 2019-02-26 NOTE — PROGRESS NOTES
[facility]  HPI:   Ms. Degroot is a 63 year old female who has a past medical history hypothyroidism, HepC, HTN, TIA, retinal artery occlusion, PFO s/p percutaneous closure 10/12/18, and PAF (CHADSVASC 4 on Xarelto) s/p DCCV 10/26/18.   She first had left arm numbness and was found to have TIA in 2015. Work up at that time showed small PFO. A cardiac monitor was negative for that time. More recently, she had a retinal artery occlusion and decision was made to pursue PFO closure. She had percutaneous PFO closure with Amplatz device on 10/12/18. She has since had episodes of tachycardia. The first in mid-October 2018 where she counted her pulse up to 148 bpm and felt racing heart and palpitations. She performed vagal maneuvers and then her heart rate went down to around 100 bpm. She went to bed and when she woke up she felt back to her baseline. The second episode started 10/21/18 in the afternoon. She reports she did not feel well and have very rapid heart beat. She felt her pulse rate again was around 140-150 bpm. She tried vagal maneuvers again but these were not successful. When her symptoms continued she came into the [facility]. She was found to be in AF with RVR. She reported feeling generally unwell and had some palpitations. Upon exam in the [facility], she converted spontaneously to sinus. She reported feeling much better in sinus. She was then readmitted on 10/26/18 with AF with RVR. She reports onset of palpitations at 1pm on 10/25/18. Started when she was at rest, sitting at her desk. She was started on amiodarone and had DCCV. She denies any prior history of arryhtmias besides her recent episodes. Recent echo showed LVEF 55-60%.   [facility] and reported she was still having some AF episodes that started about 1 week after her DCCV on 10/26/18. She reported she had two longer episodes that lasted about 8 hours each. She then had intermittent palpitations lasting  1-2 hours a couple times a week. She states that the most bothersome symptom to her is when her heart is racing >100 bpm. An echo showed normal LVEF and well seated closure device with no residual PFO. We are hopeful her AF episodes would resolve after further post procedure healing and she was continued on short course of amiodaorne.   She presents today for follow up. She reports feeling well. No further AF episodes. She denies any chest pain/pressures, dizziness, lightheadedness, worsening shortness of breath, leg/ankle swelling, PND, orthopnea, palpitations, or syncopal symptoms. Presenting 12 lead ECG shows SR Vent Rate 59 bpm,  ms,  ms, QTc 435 ms. Current cardiac medications include: Amiodarone, Xarelto, Toprol XL, Lisinopril, Lipitor, and Plavix.     PAST MEDICAL HISTORY:  Past Medical History:   Diagnosis Date     Cervical high risk HPV (human papillomavirus) test positive 09/16/2016, 10/11/17, 10/17/18    (not 16 or 18). 10/11/17: LSIL pap with + HR HPV (not 16 or 18) result.10/17/18: See problem list.      Low risk HPV infection 2009, 2010    NIL pap, + HPV 53     Papanicolaou smear of cervix with low grade squamous intraepithelial lesion (LGSIL) 10/11/2017    10/11/17: LSIL pap with + HR HPV (not 16 or 18) result.     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 1998    Tx'd w/ Interferon x 1 year-virus free at 4 years, blood transfusion  related       CURRENT MEDICATIONS:  Current Outpatient Medications   Medication Sig Dispense Refill     Acetaminophen (TYLENOL PO) Take 1,000 mg by mouth as needed for mild pain or fever       amiodarone (PACERONE/CODARONE) 200 MG tablet Take 1 tablet (200 mg) by mouth daily 90 tablet 3     amiodarone 400 MG TABS Take 400 mg by mouth 2 times daily for 14 days 14 tablet 0     atorvastatin (LIPITOR) 20 MG tablet Take 1 tablet (20 mg) by mouth daily 90 tablet 3     cholecalciferol 2000 units tablet Take 2,000 Units by  mouth 2 times daily        clopidogrel (PLAVIX) 75 MG tablet Take 1 tablet (75 mg) by mouth daily 90 tablet 3     fish oil-omega-3 fatty acids 1000 MG capsule Take 1 g by mouth 2 times daily       levothyroxine (SYNTHROID/LEVOTHROID) 100 MCG tablet Take 1 tablet (100 mcg) by mouth daily 90 tablet 3     lisinopril (PRINIVIL/ZESTRIL) 20 MG tablet Take 1 tablet (20 mg) by mouth daily 90 tablet 1     magnesium 250 MG tablet Take 250 mg by mouth daily        metoprolol succinate (TOPROL-XL) 100 MG 24 hr tablet Take 1 tablet (100 mg) by mouth daily Take with a 50 mg tablet to equal 150 mg daily. 90 tablet 3     metoprolol succinate (TOPROL-XL) 50 MG 24 hr tablet Take 1 tablet (50 mg) by mouth daily Take with a 100 mg tablet to equal 150 mg daily. 90 tablet 3     Multiple Vitamins-Minerals (MULTIVITAMIN ADULTS) TABS Take 1 tablet by mouth daily       rivaroxaban ANTICOAGULANT (XARELTO) 20 MG TABS tablet Take 1 tablet (20 mg) by mouth daily (with dinner) 30 tablet 11     valACYclovir (VALTREX) 500 MG tablet TAKE ONE TABLET BY MOUTH TWICE A DAY FOR THREE DAYS. (REPEAT AS NEEDED) (Patient not taking: Reported on 12/19/2018) 12 tablet 5       PAST SURGICAL HISTORY:  Past Surgical History:   Procedure Laterality Date     ANESTHESIA CARDIOVERSION N/A 10/26/2018    Procedure: ANESTHESIA CARDIOVERSION;  Surgeon: GENERIC ANESTHESIA PROVIDER;  Location: [facility] OR     C LIGATE FALLOPIAN TUBE  1986     SURGICAL HISTORY OF -  Right 2008    Right 1st MTPJ, bone spur resection       ALLERGIES:     Allergies   Allergen Reactions     Latex Swelling     Dental visit--burning sensation in lips and throat, lips felt puffy, eye watery from latex gloves     Pineapple Other (See Comments)       FAMILY HISTORY:  Family History   Problem Relation Age of Onset     Heart Disease Maternal Grandfather      Breast Cancer Maternal Grandmother         Dx'd age 70     Arthritis Father      Lipids Mother         On meds     Cancer Mother         BCC     Heart  Disease Brother          of dilated of cardiomyopathy at age 54     Alcohol/Drug Sister      Alcohol/Drug Brother      Depression Sister        SOCIAL HISTORY:  Social History     Tobacco Use     Smoking status: Former Smoker     Smokeless tobacco: Never Used     Tobacco comment: quit 30 years ago   Substance Use Topics     Alcohol use: Yes     Comment: 4-5 Drinks Per Week     Drug use: No       ROS:   A comprehensive 10 point review of systems negative other than as mentioned in HPI.  Exam:  [facility] 10/27/2004   GENERAL APPEARANCE: alert and no distress  HEENT: no icterus, no xanthelasmas, normal pupil size and reaction, normal palate, mucosa moist, no central cyanosis  NECK: no adenopathy, no asymmetry, masses, or scars, thyroid normal to palpation and no bruits, JVP not elevated  RESPIRATORY: lungs clear to auscultation - no rales, rhonchi or wheezes, no use of accessory muscles, no retractions, respirations are unlabored, normal respiratory rate  CARDIOVASCULAR: regular rhythm, normal S1 with physiologic split S2, no S3 or S4 and no murmur, click or rub, precordium quiet with normal PMI.  ABDOMEN: soft, non tender, bowel sounds normal, non-distended  EXTREMITIES: peripheral pulses normal, no edema  NEURO: alert and oriented to person/place/time, normal speech, gait and affect  SKIN: no ecchymoses, no rashes  PSYCH: normal affect, cooperative    Labs:  Reviewed.     Testing/Procedures:  18 ECHOCARDIOGRAM:   Interpretation Summary  Global and regional left ventricular function is normal with an EF of 60-65%.  Global right ventricular function is normal.  A PFO closure device is present and is well seated in the interatrial septum,  no residual interatrial shunt by color Doppler and bubble study.  Pulmonary artery systolic pressure is normal.  The inferior vena cava was normal in size with preserved respiratory  variability.  No pericardial effusion is present.      Assessment and Plan:   Ms. Degroot is a 63  year old female who has a past medical history hypothyroidism, HepC, HTN, TIA, retinal artery occlusion, PFO s/p percutaneous closure 10/12/18, and PAF (CHADSVASC 4 on Xarelto) s/p DCCV 10/26/18. She developed episodes of PAF after her PFO closure, a couple requiring visits to ER or admission. She was ultimately started on short course of amiodarone on 10/26/18 and had DCCV. She continued to have some AF episodes after her DCCV that were self limiting. She presents today for follow up. She reports feeling well. No further AF episodes.     Paroxysmal Atrial Fibrillation:  We discussed in detail with the patient management/treatment options for A.fib includin. Stroke Prophylaxis:  CHADSVASC=++TIA, +gender, +HTN  4, corresponding to a 4.0% annual stroke / systemic emolism event rate. indicating need for long term oral anticoagulation.  She is appropriately on Xarelto. No bleeding issues. She asked about possibility of stopping anticoagulation. At this time, we do not advise. We discussed that could consider her AF postop/post procedure in which case could consider in the future stopping anticoagulation if no further recurrences;however, she has had hx of TIAs and retinal artery occlusion so staying on anticoagulation may ultimately be advisable.   2. Rate Control:Continue Toprol XL.   3. Rhythm Control: Cardioversion, Antiarrhythmics and/or ablation are options for rhythm control. She has been having episodes of PAF since her PFO closure and did require one DCCV. She still had some episodes after DCCV. We started here on amiodarone on 10/26/18. She is now not having any further AF episodes. We will have her stop amiodarone now.  If longer term AAT required, we would favor alternative such as Sotalol or Dofetilide.   4. Risk Factor Management: Continue Statin, maintain tight BP control, and ASHLEY evaluation as indicated.      Follow up in 4 months with zio patch completed prior.     The patient states understanding  and is agreeable with plan.   This patient was seen and evaluated with Dr. Baldwin. The above note reflects our joint assessment and plan.   JAKE Arias CNP  Pager: [phone]    EP STAFF NOTE  I have seen and examined the patient as part of a shared visit with ANDRE Arias NP.  I agree with the note above. I reviewed today's vital signs and medications. I have reviewed and discussed with the advanced practice provider their physical examination, assessment, and plan   Briefly, PAF after PFO closure device, resolved on amio  My key history/exam findings are: RRR.   The key management decisions made by me: wean off amio, f/u in 4 months with monitor.    Alonzo Baldwin MD [facility]  Cardiology - Electrophysiology      CC  MARCH, MILY MELCHOR

## 2019-02-26 NOTE — NURSING NOTE
Cardiac Monitors: Patient was instructed regarding the indication, function, care and prompt return of a 14 day Zio monitor. The monitor was placed on the patient with instructions regarding care of the skin electrodes and monitor, as well as documentation in the patient diary. Patient demonstrated understanding of this information and agreed to call with further questions or concerns.    Cardiac Testing: Patient given instructions regarding  echocardiogram . Discussed purpose, preparation, procedure and when to expect results reported back to the patient. Patient demonstrated understanding of this information and agreed to call with further questions or concerns.    Med Reconcile: Reviewed and verified all current medications with the patient. The updated medication list was printed and given to the patient.    New Medication: Aspirin 81 mg daily. Patient was educated regarding newly prescribed medication, including discussion of  the indication, administration, side effects, and when to report to MD or RN. Patient demonstrated understanding of this information and agreed to call with further questions or concerns.    Return Appointment: Follow up in 4 months with an echo and Zio monitor prior. Patient given instructions regarding scheduling next clinic visit. Patient demonstrated understanding of this information and agreed to call with further questions or concerns.    Medication Change:  Stop Plavix and Amiodarone.Patient was educated regarding prescribed medication change, including discussion of the indication, administration, side effects, and when to report to MD or RN. Patient demonstrated understanding of this information and agreed to call with further questions or concerns.    Patient stated she understood all health information given and agreed to call with further questions or concerns.    Nasir Alvarez, RN  RN Care Coordinator  [facility]  [phone]

## 2019-02-26 NOTE — PROGRESS NOTES
Service Date: 02/26/2019      HISTORY OF PRESENT ILLNESS:  Ms. Degroot is a delightful 63-year-old woman who I met in the fall of 2018.  She underwent PFO closure on 10/12/2018 with a 30 mm [place] device.  She subsequently had AFib, and she was seen by Dr. Baldwin.  Her Toprol was increased from 75 to 150, and she was started on amiodarone.  She has been since titrated down to now amiodarone 100 mg daily and reports no concerning rapid heartbeats.  She has occasional what sounds like skipped beats but nothing like what brought her in.  She is still on Plavix, which can be stopped at this point.  Her echo today is unchanged.  Her device looks good.  There is no residual shunt.  She is on Eliquis, which will continue until the amiodarone comes out of her system and we are certain that she has no recurrence.  She is planning to see the dentist in April, which will be 6 months out from her device.  She is otherwise feeling well.      IMPRESSION, REPORT, PLAN:   1.  History of TIA with a PFO, status post closure with a 30 mm [place] device 10/12/2018.   2.  New onset paroxysmal atrial fibrillation, resolved on amiodarone now 100 mg a day without evidence of recurrence, still on Xarelto as well.   3.  Hypertension, well controlled.   4.  Fatigue.      DISCUSSION:  It was a pleasure to see Ms. Degroot in followup.  Clinically, she is doing well from a cardiovascular standpoint without any concerning symptoms.  It does not sound like she has not had any recurrence of arrhythmias of concern.  We discussed that at this point I think coming off the amiodarone will be reasonable and will discuss with Dr. Baldwin and then bring her back in 4 months with a Zio Patch monitor to make sure that there is no asymptomatic recurrence and, if at that time there is not, then probably come off of the Xarelto.  At this point she can come off the Plavix.  We will continue just on the baby aspirin.  With her fatigue she is pretty certain that it  is the amiodarone.  We discussed that she also had an increase in the Toprol.  We will make just 1 change at a time, though, and plan to titrate down the Toprol maybe when she returns in 4 months.  She understands and is in agreement with the plan as outlined.  All questions were answered.  It was a pleasure to see her.  Please do not hesitate to contact me with any questions or concerns.         MILY HERNANDEZ MD             D: 2019   T: 2019   MT: MARIA ELENA      Name:     YARON BRAXTON   MRN:      [id number]        Account:      [id number]   :      1955           Service Date: 2019      Document: [id number]

## 2019-02-26 NOTE — NURSING NOTE
Vitals completed successfully and medication reconciled.     Jacey Hedrick, LEMUEL  9:09 AM  Chief Complaint   Patient presents with     Follow Up      63 year old female with history of PFO s/p closure, hypertension, palpitations, TIA, and A fib s/p DCCV presenting for follow up

## 2019-02-26 NOTE — LETTER
2/26/2019      RE: Sofi Shahyolandanadege  [address]  [place] [facility] [place] [facility] [address]       Dear Colleague,    Thank you for the opportunity to participate in the care of your patient, Sofi Degroot, at the [facility]. Please see a copy of my visit note below.    HPI:     Please see dictated note    PAST MEDICAL HISTORY:  Past Medical History:   Diagnosis Date     Cervical high risk HPV (human papillomavirus) test positive 09/16/2016, 10/11/17, 10/17/18    (not 16 or 18). 10/11/17: LSIL pap with + HR HPV (not 16 or 18) result.10/17/18: See problem list.      Low risk HPV infection 2009, 2010    NIL pap, + HPV 53     Papanicolaou smear of cervix with low grade squamous intraepithelial lesion (LGSIL) 10/11/2017    10/11/17: LSIL pap with + HR HPV (not 16 or 18) result.     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 1998    Tx'd w/ Interferon x 1 year-virus free at 5 years, blood transfusion  related       CURRENT MEDICATIONS:  Current Outpatient Medications   Medication Sig Dispense Refill     Acetaminophen (TYLENOL PO) Take 1,000 mg by mouth as needed for mild pain or fever       amiodarone (PACERONE/CODARONE) 200 MG tablet Take 1 tablet (200 mg) by mouth daily 90 tablet 3     atorvastatin (LIPITOR) 20 MG tablet Take 1 tablet (20 mg) by mouth daily 90 tablet 3     cholecalciferol 2000 units tablet Take 2,000 Units by mouth 2 times daily        clopidogrel (PLAVIX) 75 MG tablet Take 1 tablet (75 mg) by mouth daily 90 tablet 3     fish oil-omega-3 fatty acids 1000 MG capsule Take 1 g by mouth 2 times daily       levothyroxine (SYNTHROID/LEVOTHROID) 100 MCG tablet Take 1 tablet (100 mcg) by mouth daily 90 tablet 3     lisinopril (PRINIVIL/ZESTRIL) 20 MG tablet Take 1 tablet (20 mg) by mouth daily 90 tablet 1     magnesium 250 MG tablet Take 250 mg by mouth daily        metoprolol succinate (TOPROL-XL) 100 MG 24 hr  tablet Take 1 tablet (100 mg) by mouth daily Take with a 50 mg tablet to equal 150 mg daily. 90 tablet 3     metoprolol succinate (TOPROL-XL) 50 MG 24 hr tablet Take 1 tablet (50 mg) by mouth daily Take with a 100 mg tablet to equal 150 mg daily. 90 tablet 3     Multiple Vitamins-Minerals (MULTIVITAMIN ADULTS) TABS Take 1 tablet by mouth daily       rivaroxaban ANTICOAGULANT (XARELTO) 20 MG TABS tablet Take 1 tablet (20 mg) by mouth daily (with dinner) 30 tablet 11     valACYclovir (VALTREX) 500 MG tablet TAKE ONE TABLET BY MOUTH TWICE A DAY FOR THREE DAYS. (REPEAT AS NEEDED) 12 tablet 5     amiodarone 400 MG TABS Take 400 mg by mouth 2 times daily for 14 days 14 tablet 0       PAST SURGICAL HISTORY:  Past Surgical History:   Procedure Laterality Date     ANESTHESIA CARDIOVERSION N/A 10/26/2018    Procedure: ANESTHESIA CARDIOVERSION;  Surgeon: GENERIC ANESTHESIA PROVIDER;  Location: [facility] OR     C LIGATE FALLOPIAN TUBE       SURGICAL HISTORY OF -  Right     Right 1st MTPJ, bone spur resection       ALLERGIES     Allergies   Allergen Reactions     Latex Swelling     Dental visit--burning sensation in lips and throat, lips felt puffy, eye watery from latex gloves     Pineapple Other (See Comments)       FAMILY HISTORY:  Family History   Problem Relation Age of Onset     Heart Disease Maternal Grandfather      Breast Cancer Maternal Grandmother         Dx'd age 70     Arthritis Father      Lipids Mother         On meds     Cancer Mother         BCC     Heart Disease Brother          of dilated of cardiomyopathy at age 54     Alcohol/Drug Sister      Alcohol/Drug Brother      Depression Sister        SOCIAL HISTORY:  Social History     Socioeconomic History     Marital status: Single     Spouse name: None     Number of children: 1     Years of education: BSN     Highest education level: None   Occupational History     Occupation: RN     Employer: [employer]   Social Needs     Financial  resource strain: None     Food insecurity:     Worry: None     Inability: None     Transportation needs:     Medical: None     Non-medical: None   Tobacco Use     Smoking status: Former Smoker     Smokeless tobacco: Never Used     Tobacco comment: quit 30 years ago   Substance and Sexual Activity     Alcohol use: Yes     Comment: 4-5 Drinks Per Week     Drug use: No     Sexual activity: Yes     Partners: Male     Birth control/protection: Condom   Lifestyle     Physical activity:     Days per week: None     Minutes per session: None     Stress: None   Relationships     Social connections:     Talks on phone: None     Gets together: None     Attends Yarsanism service: None     Active member of club or organization: None     Attends meetings of clubs or organizations: None     Relationship status: None     Intimate partner violence:     Fear of current or ex partner: None     Emotionally abused: None     Physically abused: None     Forced sexual activity: None   Other Topics Concern     Parent/sibling w/ CABG, MI or angioplasty before 65F 55M? No   Social History Narrative    Social Documentation:        Balanced Diet: YES    Calcium intake: 2-3 per day    Caffeine: 3 per day    Exercise:  type of activity work is physical and walking;  5 times per week    Sunscreen: Yes    Seatbelts:  Yes    Self Breast Exam:  Yes    Self Testicular Exam: na    Physical/Emotional/Sexual Abuse: No    Do you feel safe in your environment? Yes        Cholesterol screen up to date:     CHOL      199   7/21/2009    HDL       82   7/21/2009    LDL      100   7/21/2009    TRIG       83   7/21/2009    CHOLHDLRATIO      2.4   7/21/2009    Eye Exam up to date: Yes    Dental Exam up to date: Yes    Pap smear up to date: do today. PAP      NIL   7/21/2009    Mammogram up to date:  Due in September    Dexa Scan up to date: has not had one     Colonoscopy up to date: Yes, 04/08    Immunizations up to date: Yes    Glucose screen if over 40:  cna do  "today                                                               ROS:   Constitutional: No fever, chills, or sweats. No weight gain/loss   ENT: No visual disturbance, ear ache, epistaxis, sore throat  Allergies/Immunologic: Negative.   Respiratory: No cough, hemoptysia  Cardiovascular: As per HPI  GI: No nausea, vomiting, hematemesis, melena, or hematochezia  : No urinary frequency, dysuria, or hematuria  Integument: Negative  Psychiatric: Negative  Neuro: Negative  Endocrinology: Negative   Musculoskeletal: Negative    EXAM:  /71 (BP Location: Left arm, Patient Position: Chair, Cuff Size: Adult Large)   Pulse 61   Ht 1.753 m (5' 9\")   Wt 84.8 kg (187 lb)   LMP 10/27/2004   SpO2 95%   BMI 27.62 kg/m     In general, the patient is a pleasant female in no apparent distress.      Labs:  LIPID RESULTS:  Lab Results   Component Value Date    CHOL 144 10/17/2018    HDL 83 10/17/2018    LDL 49 10/17/2018    TRIG 61 10/17/2018    CHOLHDLRATIO 1.9 02/16/2015    NHDL 61 10/17/2018       LIVER ENZYME RESULTS:  Lab Results   Component Value Date    AST 22 10/17/2018    ALT 26 10/17/2018       CBC RESULTS:  Lab Results   Component Value Date    WBC 10.5 10/25/2018    RBC 4.84 10/25/2018    HGB 15.7 10/25/2018    HCT 48.1 (H) 10/25/2018    MCV 99 10/25/2018    MCH 32.4 10/25/2018    MCHC 32.6 10/25/2018    RDW 12.9 10/25/2018     10/25/2018       BMP RESULTS:  Lab Results   Component Value Date     10/25/2018    POTASSIUM 3.7 10/25/2018    CHLORIDE 109 10/25/2018    CO2 22 10/25/2018    ANIONGAP 11 10/25/2018     (H) 10/25/2018    BUN 14 10/25/2018    CR 0.91 10/25/2018    GFRESTIMATED 62 10/25/2018    GFRESTBLACK 75 10/25/2018    ALICIA 9.7 10/25/2018        A1C RESULTS:  Lab Results   Component Value Date    A1C 5.5 05/01/2018       INR RESULTS:  Lab Results   Component Value Date    INR 1.18 (H) 10/26/2018    INR 1.1 02/15/2015     RIMA Lopez MD    CC  Patient Care Team:  Noemy Olivera, "  as PCP - General  Noemy Olivera DO as PCP - Assigned PCP  Noemy Olivera DO as Referring Physician (Family Practice)  Mahamed Block MD as Resident (House Physician)  Nasir Alvarez, RN as Nurse Coordinator (Cardiology)  Xiomara Lopez MD as MD (Cardiology)  Romana Forbes, RN as Clinic Care Coordinator (Primary Care - CC)  Alonzo Baldwin MD as MD (Clinical Cardiac Electrophysiology)  MARCH, XIOMARA MELCHOR    Service Date: 02/26/2019      HISTORY OF PRESENT ILLNESS:  Ms. Degroot is a delightful 63-year-old woman who I met in the fall of 2018.  She underwent PFO closure on 10/12/2018 with a 30 mm [place] device.  She subsequently had AFib, and she was seen by Dr. Baldwin.  Her Toprol was increased from 75 to 150, and she was started on amiodarone.  She has been since titrated down to now amiodarone 100 mg daily and reports no concerning rapid heartbeats.  She has occasional what sounds like skipped beats but nothing like what brought her in.  She is still on Plavix, which can be stopped at this point.  Her echo today is unchanged.  Her device looks good.  There is no residual shunt.  She is on Eliquis, which will continue until the amiodarone comes out of her system and we are certain that she has no recurrence.  She is planning to see the dentist in April, which will be 6 months out from her device.  She is otherwise feeling well.      IMPRESSION, REPORT, PLAN:   1.  History of TIA with a PFO, status post closure with a 30 mm [place] device 10/12/2018.   2.  New onset paroxysmal atrial fibrillation, resolved on amiodarone now 100 mg a day without evidence of recurrence, still on Xarelto as well.   3.  Hypertension, well controlled.   4.  Fatigue.      DISCUSSION:  It was a pleasure to see Ms. Degroot in followup.  Clinically, she is doing well from a cardiovascular standpoint without any concerning symptoms.  It does not sound like she has not had any recurrence of arrhythmias of concern.  We  discussed that at this point I think coming off the amiodarone will be reasonable and will discuss with Dr. Baldwin and then bring her back in 4 months with a Zio Patch monitor to make sure that there is no asymptomatic recurrence and, if at that time there is not, then probably come off of the Xarelto.  At this point she can come off the Plavix.  We will continue just on the baby aspirin.  With her fatigue she is pretty certain that it is the amiodarone.  We discussed that she also had an increase in the Toprol.  We will make just 1 change at a time, though, and plan to titrate down the Toprol maybe when she returns in 4 months.  She understands and is in agreement with the plan as outlined.  All questions were answered.  It was a pleasure to see her.  Please do not hesitate to contact me with any questions or concerns.      MILY HERNANDEZ MD       D: 2019   T: 2019   MT: MARIA ELENA      Name:     YARON BRAXTON   MRN:      [id number]        Account:      [id number]   :      1955           Service Date: 2019      Document: [id number]

## 2019-02-26 NOTE — LETTER
2/26/2019      RE: Sofi Degroot  [address]  [facility] [address]       Dear Colleague,    Thank you for the opportunity to participate in the care of your patient, Sofi Degroot, at the [facility] HEART [facility]. Please see a copy of my visit note below.    [facility] Electrophysiology Clinic Note  HPI:   Ms. Degroot is a 63 year old female who has a past medical history hypothyroidism, HepC, HTN, TIA, retinal artery occlusion, PFO s/p percutaneous closure 10/12/18, and PAF (CHADSVASC 4 on Xarelto) s/p DCCV 10/26/18.   She first had left arm numbness and was found to have TIA in 2015. Work up at that time showed small PFO. A cardiac monitor was negative for that time. More recently, she had a retinal artery occlusion and decision was made to pursue PFO closure. She had percutaneous PFO closure with Amplatz device on 10/12/18. She has since had episodes of tachycardia. The first in mid-October 2018 where she counted her pulse up to 148 bpm and felt racing heart and palpitations. She performed vagal maneuvers and then her heart rate went down to around 100 bpm. She went to bed and when she woke up she felt back to her baseline. The second episode started 10/21/18 in the afternoon. She reports she did not feel well and have very rapid heart beat. She felt her pulse rate again was around 140-150 bpm. She tried vagal maneuvers again but these were not successful. When her symptoms continued she came into the [facility]. She was found to be in AF with RVR. She reported feeling generally unwell and had some palpitations. Upon exam in the [facility], she converted spontaneously to sinus. She reported feeling much better in sinus. She was then readmitted on 10/26/18 with AF with RVR. She reports onset of palpitations at 1pm on 10/25/18. Started when she was at rest, sitting at her desk. She was started on amiodarone and had DCCV. She denies any prior history of arryhtmias  besides her recent episodes. Recent echo showed LVEF 55-60%.   She followed up with [facility] on 11/9/18 and reported she was still having some AF episodes that started about 1 week after her DCCV on 10/26/18. She reported she had two longer episodes that lasted about 8 hours each. She then had intermittent palpitations lasting 1-2 hours a couple times a week. She states that the most bothersome symptom to her is when her heart is racing >100 bpm. An echo showed normal LVEF and well seated closure device with no residual PFO. We are hopeful her AF episodes would resolve after further post procedure healing and she was continued on short course of amiodaorne.   She presents today for follow up. She reports feeling well. No further AF episodes. She denies any chest pain/pressures, dizziness, lightheadedness, worsening shortness of breath, leg/ankle swelling, PND, orthopnea, palpitations, or syncopal symptoms. Presenting 12 lead ECG shows SR Vent Rate 59 bpm,  ms,  ms, QTc 435 ms. Current cardiac medications include: Amiodarone, Xarelto, Toprol XL, Lisinopril, Lipitor, and Plavix.     PAST MEDICAL HISTORY:  Past Medical History:   Diagnosis Date     Cervical high risk HPV (human papillomavirus) test positive 09/16/2016, 10/11/17, 10/17/18    (not 16 or 18). 10/11/17: LSIL pap with + HR HPV (not 16 or 18) result.10/17/18: See problem list.      Low risk HPV infection 2009, 2010    NIL pap, + HPV 53     Papanicolaou smear of cervix with low grade squamous intraepithelial lesion (LGSIL) 10/11/2017    10/11/17: LSIL pap with + HR HPV (not 16 or 18) result.     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 1998    Tx'd w/ Interferon x 1 year-virus free at 5 years, blood transfusion  related       CURRENT MEDICATIONS:  Current Outpatient Medications   Medication Sig Dispense Refill     Acetaminophen (TYLENOL PO) Take 1,000 mg by mouth as needed for mild pain or fever        amiodarone (PACERONE/CODARONE) 200 MG tablet Take 1 tablet (200 mg) by mouth daily 90 tablet 3     amiodarone 400 MG TABS Take 400 mg by mouth 2 times daily for 14 days 14 tablet 0     atorvastatin (LIPITOR) 20 MG tablet Take 1 tablet (20 mg) by mouth daily 90 tablet 3     cholecalciferol 2000 units tablet Take 2,000 Units by mouth 2 times daily        clopidogrel (PLAVIX) 75 MG tablet Take 1 tablet (75 mg) by mouth daily 90 tablet 3     fish oil-omega-3 fatty acids 1000 MG capsule Take 1 g by mouth 2 times daily       levothyroxine (SYNTHROID/LEVOTHROID) 100 MCG tablet Take 1 tablet (100 mcg) by mouth daily 90 tablet 3     lisinopril (PRINIVIL/ZESTRIL) 20 MG tablet Take 1 tablet (20 mg) by mouth daily 90 tablet 1     magnesium 250 MG tablet Take 250 mg by mouth daily        metoprolol succinate (TOPROL-XL) 100 MG 24 hr tablet Take 1 tablet (100 mg) by mouth daily Take with a 50 mg tablet to equal 150 mg daily. 90 tablet 3     metoprolol succinate (TOPROL-XL) 50 MG 24 hr tablet Take 1 tablet (50 mg) by mouth daily Take with a 100 mg tablet to equal 150 mg daily. 90 tablet 3     Multiple Vitamins-Minerals (MULTIVITAMIN ADULTS) TABS Take 1 tablet by mouth daily       rivaroxaban ANTICOAGULANT (XARELTO) 20 MG TABS tablet Take 1 tablet (20 mg) by mouth daily (with dinner) 30 tablet 11     valACYclovir (VALTREX) 500 MG tablet TAKE ONE TABLET BY MOUTH TWICE A DAY FOR THREE DAYS. (REPEAT AS NEEDED) (Patient not taking: Reported on 12/19/2018) 12 tablet 5       PAST SURGICAL HISTORY:  Past Surgical History:   Procedure Laterality Date     ANESTHESIA CARDIOVERSION N/A 10/26/2018    Procedure: ANESTHESIA CARDIOVERSION;  Surgeon: GENERIC ANESTHESIA PROVIDER;  Location: [facility] OR     C LIGATE FALLOPIAN TUBE  1986     SURGICAL HISTORY OF -  Right 2008    Right 1st MTPJ, bone spur resection       ALLERGIES:     Allergies   Allergen Reactions     Latex Swelling     Dental visit--burning sensation in lips and throat, lips felt puffy,  eye watery from latex gloves     Pineapple Other (See Comments)       FAMILY HISTORY:  Family History   Problem Relation Age of Onset     Heart Disease Maternal Grandfather      Breast Cancer Maternal Grandmother         Dx'd age 70     Arthritis Father      Lipids Mother         On meds     Cancer Mother         BCC     Heart Disease Brother          of dilated of cardiomyopathy at age 54     Alcohol/Drug Sister      Alcohol/Drug Brother      Depression Sister        SOCIAL HISTORY:  Social History     Tobacco Use     Smoking status: Former Smoker     Smokeless tobacco: Never Used     Tobacco comment: quit 30 years ago   Substance Use Topics     Alcohol use: Yes     Comment: 4-5 Drinks Per Week     Drug use: No       ROS:   A comprehensive 10 point review of systems negative other than as mentioned in HPI.  Exam:  LMP 10/27/2004   GENERAL APPEARANCE: alert and no distress  HEENT: no icterus, no xanthelasmas, normal pupil size and reaction, normal palate, mucosa moist, no central cyanosis  NECK: no adenopathy, no asymmetry, masses, or scars, thyroid normal to palpation and no bruits, JVP not elevated  RESPIRATORY: lungs clear to auscultation - no rales, rhonchi or wheezes, no use of accessory muscles, no retractions, respirations are unlabored, normal respiratory rate  CARDIOVASCULAR: regular rhythm, normal S1 with physiologic split S2, no S3 or S4 and no murmur, click or rub, precordium quiet with normal PMI.  ABDOMEN: soft, non tender, bowel sounds normal, non-distended  EXTREMITIES: peripheral pulses normal, no edema  NEURO: alert and oriented to person/place/time, normal speech, gait and affect  SKIN: no ecchymoses, no rashes  PSYCH: normal affect, cooperative    Labs:  Reviewed.     Testing/Procedures:  18 ECHOCARDIOGRAM:   Interpretation Summary  Global and regional left ventricular function is normal with an EF of 60-65%.  Global right ventricular function is normal.  A PFO closure device is present  and is well seated in the interatrial septum,  no residual interatrial shunt by color Doppler and bubble study.  Pulmonary artery systolic pressure is normal.  The inferior vena cava was normal in size with preserved respiratory  variability.  No pericardial effusion is present.      Assessment and Plan:   Ms. Degroot is a 63 year old female who has a past medical history hypothyroidism, HepC, HTN, TIA, retinal artery occlusion, PFO s/p percutaneous closure 10/12/18, and PAF (CHADSVASC 4 on Xarelto) s/p DCCV 10/26/18. She developed episodes of PAF after her PFO closure, a couple requiring visits to ER or admission. She was ultimately started on short course of amiodarone on 10/26/18 and had DCCV. She continued to have some AF episodes after her DCCV that were self limiting. She presents today for follow up. She reports feeling well. No further AF episodes.     Paroxysmal Atrial Fibrillation:  We discussed in detail with the patient management/treatment options for Antoinette includin. Stroke Prophylaxis:  CHADSVASC=++TIA, +gender, +HTN  4, corresponding to a 4.0% annual stroke / systemic emolism event rate. indicating need for long term oral anticoagulation.  She is appropriately on Xarelto. No bleeding issues. She asked about possibility of stopping anticoagulation. At this time, we do not advise. We discussed that could consider her AF postop/post procedure in which case could consider in the future stopping anticoagulation if no further recurrences;however, she has had hx of TIAs and retinal artery occlusion so staying on anticoagulation may ultimately be advisable.   2. Rate Control:Continue Toprol XL.   3. Rhythm Control: Cardioversion, Antiarrhythmics and/or ablation are options for rhythm control. She has been having episodes of PAF since her PFO closure and did require one DCCV. She still had some episodes after DCCV. We started here on amiodarone on 10/26/18. She is now not having any further AF episodes.  We will have her stop amiodarone now.  If longer term AAT required, we would favor alternative such as Sotalol or Dofetilide.   4. Risk Factor Management: Continue Statin, maintain tight BP control, and ASHLEY evaluation as indicated.      Follow up in 4 months with zio patch completed prior.     The patient states understanding and is agreeable with plan.   This patient was seen and evaluated with Dr. Baldwin. The above note reflects our joint assessment and plan.   JAKE Arias CNP  Pager: [phone]    EP STAFF NOTE  I have seen and examined the patient as part of a shared visit with Smita Kwon, ANDRE NP.  I agree with the note above. I reviewed today's vital signs and medications. I have reviewed and discussed with the advanced practice provider their physical examination, assessment, and plan   Briefly, PAF after PFO closure device, resolved on amio  My key history/exam findings are: RRR.   The key management decisions made by me: wean off amio, f/u in 4 months with monitor.    Alonzo Baldwin MD [facility]  Cardiology - Electrophysiology      CC  MARCH, MILY MELCHOR

## 2019-02-26 NOTE — PATIENT INSTRUCTIONS
"You were seen today in the Adult Congenital and Cardiovascular Genetics Clinic at the [facility].    Cardiology Providers you saw during your visit:  Dr Xiao Lopez and Dr Alonzo Baldwin    Diagnosis:  History of PFO closure, A fib    Results:  The results of your PFT's, echo, and EKG were reviewed with you in clinic today    Recommendations:    1.  Continue to eat a heart healthy, low salt diet.  2.  Continue to get 20-30 minutes of aerobic activity, 4-5 days per week.  Examples of aerobic activity include walking, running, swimming, cycling, etc.  3.  Continue to observe good oral hygiene, with regular dental visits.  4.  Please stop taking your Plavix and Amiodarone and start taking a baby Aspirin 81 mg daily.        Vitals:    02/26/19 0906   BP: 116/71   BP Location: Left arm   Patient Position: Chair   Cuff Size: Adult Large   Pulse: 61   SpO2: 95%   Weight: 84.8 kg (187 lb)   Height: 1.753 m (5' 9\")       SBE prophylaxis:   Yes____  No__x__    Lifelong Bacterial Endocarditis Prophylaxis:  YES____  NO____    If YES is checked, follow the recommendations outlined below:   1. Take antibiotic(s) prior to recommended dental procedures and procedures on the respiratory tract or with infected skin, muscle or bones. SBE prophylaxis is not needed for routine GI and  procedures (ie. Colonoscopy or vaginal delivery)   2. Observe good oral hygiene daily, as advised by your dentist. Get regular professional dental care.   3. Keep cuts clean.   4. Infections should be treated promptly.   5. Symptoms of Infective Endocarditis could include: fever lasting more than 4-5 days or a recurrent fever that initially resolves but returns within 1-2 days)     Exercise restrictions:   Yes____  No__x__         If yes, list restrictions:  Must be allowed to rest if fatigued or SOB      Work restrictions:  Yes____  No__x__         If yes, list restrictions:    FASTING CHOLESTEROL was checked in the last 5 years YES_x__  " NO___ 2017  Continue to eat a heart healthy, low salt diet.         ____ Fasting lipid panel order today         ____ No changes in medications          ____ I recommend the following changes in your cholesterol medications.:          ____ Please follow up for cholesterol screening at your primary care physician      Follow-up:  Follow up with Dr Lopez and Dr Baldwin in 4 months with a 2 week Zio monitor prior and echocardiogram.     For after hours urgent needs, call [phone] and ask to speak to the Adult Congenital Physician on call.  Mention Job Code 0401.    For emergencies call 121.    For any scheduling needs, please call [facility] Procedure , at [phone]  Thank you for your visit today!  If you have questions or concerns about today's visit, please call me.    Nasir Alvarez RN, BSN  Cardiology Care Coordinator  [facility]  [phone]    [address] [facility]  Mail Code [id number]  [place] [address]

## 2019-02-26 NOTE — NURSING NOTE
Cardiac Monitors: Patient was instructed regarding the indication, function, care and prompt return of a 14 day Zio monitor. The monitor was placed on the patient with instructions regarding care of the skin electrodes and monitor, as well as documentation in the patient diary. Patient demonstrated understanding of this information and agreed to call with further questions or concerns.    Cardiac Testing: Patient given instructions regarding  echocardiogram . Discussed purpose, preparation, procedure and when to expect results reported back to the patient. Patient demonstrated understanding of this information and agreed to call with further questions or concerns.    Med Reconcile: Reviewed and verified all current medications with the patient. The updated medication list was printed and given to the patient.    New Medication: Aspirin 81 mg daily. Patient was educated regarding newly prescribed medication, including discussion of  the indication, administration, side effects, and when to report to MD or RN. Patient demonstrated understanding of this information and agreed to call with further questions or concerns.    Return Appointment: Follow up in 4 months with an echo and Zio monitor prior. Patient given instructions regarding scheduling next clinic visit. Patient demonstrated understanding of this information and agreed to call with further questions or concerns.    Medication Change:  Stop Plavix and Amiodarone.Patient was educated regarding prescribed medication change, including discussion of the indication, administration, side effects, and when to report to MD or RN. Patient demonstrated understanding of this information and agreed to call with further questions or concerns.    Patient stated she understood all health information given and agreed to call with further questions or concerns.    Nasir Alvarez, RN  RN Care Coordinator  [facility]  [phone]

## 2019-02-26 NOTE — PROGRESS NOTES
HPI:     Please see dictated note    PAST MEDICAL HISTORY:  Past Medical History:   Diagnosis Date     Cervical high risk HPV (human papillomavirus) test positive 09/16/2016, 10/11/17, 10/17/18    (not 16 or 18). 10/11/17: LSIL pap with + HR HPV (not 16 or 18) result.10/17/18: See problem list.      Low risk HPV infection 2009, 2010    NIL pap, + HPV 53     Papanicolaou smear of cervix with low grade squamous intraepithelial lesion (LGSIL) 10/11/2017    10/11/17: LSIL pap with + HR HPV (not 16 or 18) result.     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 1998    Tx'd w/ Interferon x 1 year-virus free at 4 years, blood transfusion  related       CURRENT MEDICATIONS:  Current Outpatient Medications   Medication Sig Dispense Refill     Acetaminophen (TYLENOL PO) Take 1,000 mg by mouth as needed for mild pain or fever       amiodarone (PACERONE/CODARONE) 200 MG tablet Take 1 tablet (200 mg) by mouth daily 90 tablet 3     atorvastatin (LIPITOR) 20 MG tablet Take 1 tablet (20 mg) by mouth daily 90 tablet 3     cholecalciferol 2000 units tablet Take 2,000 Units by mouth 2 times daily        clopidogrel (PLAVIX) 75 MG tablet Take 1 tablet (75 mg) by mouth daily 90 tablet 3     fish oil-omega-3 fatty acids 1000 MG capsule Take 1 g by mouth 2 times daily       levothyroxine (SYNTHROID/LEVOTHROID) 100 MCG tablet Take 1 tablet (100 mcg) by mouth daily 90 tablet 3     lisinopril (PRINIVIL/ZESTRIL) 20 MG tablet Take 1 tablet (20 mg) by mouth daily 90 tablet 1     magnesium 250 MG tablet Take 250 mg by mouth daily        metoprolol succinate (TOPROL-XL) 100 MG 24 hr tablet Take 1 tablet (100 mg) by mouth daily Take with a 50 mg tablet to equal 150 mg daily. 90 tablet 3     metoprolol succinate (TOPROL-XL) 50 MG 24 hr tablet Take 1 tablet (50 mg) by mouth daily Take with a 100 mg tablet to equal 150 mg daily. 90 tablet 3     Multiple Vitamins-Minerals (MULTIVITAMIN ADULTS) TABS Take 1  tablet by mouth daily       rivaroxaban ANTICOAGULANT (XARELTO) 20 MG TABS tablet Take 1 tablet (20 mg) by mouth daily (with dinner) 30 tablet 11     valACYclovir (VALTREX) 500 MG tablet TAKE ONE TABLET BY MOUTH TWICE A DAY FOR THREE DAYS. (REPEAT AS NEEDED) 12 tablet 5     amiodarone 400 MG TABS Take 400 mg by mouth 2 times daily for 14 days 14 tablet 0       PAST SURGICAL HISTORY:  Past Surgical History:   Procedure Laterality Date     ANESTHESIA CARDIOVERSION N/A 10/26/2018    Procedure: ANESTHESIA CARDIOVERSION;  Surgeon: GENERIC ANESTHESIA PROVIDER;  Location: [facility] OR     C LIGATE FALLOPIAN TUBE       SURGICAL HISTORY OF -  Right 2008    Right 1st MTPJ, bone spur resection       ALLERGIES     Allergies   Allergen Reactions     Latex Swelling     Dental visit--burning sensation in lips and throat, lips felt puffy, eye watery from latex gloves     Pineapple Other (See Comments)       FAMILY HISTORY:  Family History   Problem Relation Age of Onset     Heart Disease Maternal Grandfather      Breast Cancer Maternal Grandmother         Dx'd age 70     Arthritis Father      Lipids Mother         On meds     Cancer Mother         BCC     Heart Disease Brother          of dilated of cardiomyopathy at age 54     Alcohol/Drug Sister      Alcohol/Drug Brother      Depression Sister        SOCIAL HISTORY:  Social History     Socioeconomic History     Marital status: Single     Spouse name: None     Number of children: 1     Years of education: BSN     Highest education level: None   Occupational History     Occupation: RN     Employer: [employer]   Social Needs     Financial resource strain: None     Food insecurity:     Worry: None     Inability: None     Transportation needs:     Medical: None     Non-medical: None   Tobacco Use     Smoking status: Former Smoker     Smokeless tobacco: Never Used     Tobacco comment: quit 30 years ago   Substance and Sexual Activity     Alcohol use: Yes     Comment:  4-5 Drinks Per Week     Drug use: No     Sexual activity: Yes     Partners: Male     Birth control/protection: Condom   Lifestyle     Physical activity:     Days per week: None     Minutes per session: None     Stress: None   Relationships     Social connections:     Talks on phone: None     Gets together: None     Attends Restoration service: None     Active member of club or organization: None     Attends meetings of clubs or organizations: None     Relationship status: None     Intimate partner violence:     Fear of current or ex partner: None     Emotionally abused: None     Physically abused: None     Forced sexual activity: None   Other Topics Concern     Parent/sibling w/ CABG, MI or angioplasty before 65F 55M? No   Social History Narrative    Social Documentation:        Balanced Diet: YES    Calcium intake: 2-3 per day    Caffeine: 3 per day    Exercise:  type of activity work is physical and walking;  5 times per week    Sunscreen: Yes    Seatbelts:  Yes    Self Breast Exam:  Yes    Self Testicular Exam: na    Physical/Emotional/Sexual Abuse: No    Do you feel safe in your environment? Yes        Cholesterol screen up to date:     CHOL      199   7/21/2009    HDL       82   7/21/2009    LDL      100   7/21/2009    TRIG       83   7/21/2009    CHOLHDLRATIO      2.4   7/21/2009    Eye Exam up to date: Yes    Dental Exam up to date: Yes    Pap smear up to date: do today. PAP      NIL   7/21/2009    Mammogram up to date:  Due in September    Dexa Scan up to date: has not had one     Colonoscopy up to date: Yes, 04/08    Immunizations up to date: Yes    Glucose screen if over 40:  cna do today                                                               ROS:   Constitutional: No fever, chills, or sweats. No weight gain/loss   ENT: No visual disturbance, ear ache, epistaxis, sore throat  Allergies/Immunologic: Negative.   Respiratory: No cough, hemoptysia  Cardiovascular: As per HPI  GI: No nausea, vomiting,  "hematemesis, melena, or hematochezia  : No urinary frequency, dysuria, or hematuria  Integument: Negative  Psychiatric: Negative  Neuro: Negative  Endocrinology: Negative   Musculoskeletal: Negative    EXAM:  /71 (BP Location: Left arm, Patient Position: Chair, Cuff Size: Adult Large)   Pulse 61   Ht 1.753 m (5' 9\")   Wt 84.8 kg (187 lb)   LMP 10/27/2004   SpO2 95%   BMI 27.62 kg/m    In general, the patient is a pleasant female in no apparent distress.      Labs:  LIPID RESULTS:  Lab Results   Component Value Date    CHOL 144 10/17/2018    HDL 83 10/17/2018    LDL 49 10/17/2018    TRIG 61 10/17/2018    CHOLHDLRATIO 1.9 02/16/2015    NHDL 61 10/17/2018       LIVER ENZYME RESULTS:  Lab Results   Component Value Date    AST 22 10/17/2018    ALT 26 10/17/2018       CBC RESULTS:  Lab Results   Component Value Date    WBC 10.5 10/25/2018    RBC 4.84 10/25/2018    HGB 15.7 10/25/2018    HCT 48.1 (H) 10/25/2018    MCV 99 10/25/2018    MCH 32.4 10/25/2018    MCHC 32.6 10/25/2018    RDW 12.9 10/25/2018     10/25/2018       BMP RESULTS:  Lab Results   Component Value Date     10/25/2018    POTASSIUM 3.7 10/25/2018    CHLORIDE 109 10/25/2018    CO2 22 10/25/2018    ANIONGAP 11 10/25/2018     (H) 10/25/2018    BUN 14 10/25/2018    CR 0.91 10/25/2018    GFRESTIMATED 62 10/25/2018    GFRESTBLACK 75 10/25/2018    ALICIA 9.7 10/25/2018        A1C RESULTS:  Lab Results   Component Value Date    A1C 5.5 05/01/2018       INR RESULTS:  Lab Results   Component Value Date    INR 1.18 (H) 10/26/2018    INR 1.1 02/15/2015     RIMA Lopez MD    CC  Patient Care Team:  Noemy Olivera DO as PCP - General  Noemy Olivera DO as PCP - Assigned PCP  Noemy Olivera DO as Referring Physician (Family Practice)  aMhamed Block MD as Resident (House Physician)  Antonio, Nasir, RN as Nurse Coordinator (Cardiology)  Xiomara Lopez MD as MD (Cardiology)  Romana Forbes, RN as Clinic Care " Coordinator (Primary Care - CC)  Alonzo Baldwin MD as MD (Clinical Cardiac Electrophysiology)  MILY HERNANDEZ

## 2019-02-26 NOTE — PATIENT INSTRUCTIONS
"You were seen today in the [facility] at the [facility].    Cardiology Providers you saw during your visit:  Dr Xiao Lopez and Dr Alonzo Baldwin    Diagnosis:  History of PFO closure, A fib    Results:  The results of your PFT's, echo, and EKG were reviewed with you in clinic today    Recommendations:    1.  Continue to eat a heart healthy, low salt diet.  2.  Continue to get 20-30 minutes of aerobic activity, 4-5 days per week.  Examples of aerobic activity include walking, running, swimming, cycling, etc.  3.  Continue to observe good oral hygiene, with regular dental visits.  4.  Please stop taking your Plavix and Amiodarone and start taking a baby Aspirin 81 mg daily.        Vitals:    02/26/19 0906   BP: 116/71   BP Location: Left arm   Patient Position: Chair   Cuff Size: Adult Large   Pulse: 61   SpO2: 95%   Weight: 84.8 kg (187 lb)   Height: 1.753 m (5' 9\")       SBE prophylaxis:   Yes____  No__x__    Lifelong Bacterial Endocarditis Prophylaxis:  YES____  NO____    If YES is checked, follow the recommendations outlined below:   1. Take antibiotic(s) prior to recommended dental procedures and procedures on the respiratory tract or with infected skin, muscle or bones. SBE prophylaxis is not needed for routine GI and  procedures (ie. Colonoscopy or vaginal delivery)   2. Observe good oral hygiene daily, as advised by your dentist. Get regular professional dental care.   3. Keep cuts clean.   4. Infections should be treated promptly.   5. Symptoms of Infective Endocarditis could include: fever lasting more than 4-5 days or a recurrent fever that initially resolves but returns within 1-2 days)     Exercise restrictions:   Yes____  No__x__         If yes, list restrictions:  Must be allowed to rest if fatigued or SOB      Work restrictions:  Yes____  No__x__         If yes, list restrictions:    FASTING CHOLESTEROL was checked in the last 5 years YES_x__  " NO___ 2017  Continue to eat a heart healthy, low salt diet.         ____ Fasting lipid panel order today         ____ No changes in medications          ____ I recommend the following changes in your cholesterol medications.:          ____ Please follow up for cholesterol screening at your primary care physician      Follow-up:  Follow up with Dr Lopez and Dr Baldwin in 4 months with a 2 week Zio monitor prior and echocardiogram.     For after hours urgent needs, call [phone] and ask to speak to the Adult Congenital Physician on call.  Mention Job Code 0401.    For emergencies call 101.    For any scheduling needs, please call [facility] , at [phone]  Thank you for your visit today!  If you have questions or concerns about today's visit, please call me.    Nasir Alvarez RN, BSN  Cardiology Care Coordinator  [facility]  [phone]    [address]  Mail Code [id number]  [place] [address]

## 2019-02-27 LAB — INTERPRETATION ECG - MUSE: NORMAL

## 2019-03-04 LAB
DLCOUNC-%PRED-PRE: 75 %
DLCOUNC-PRE: 17.66 ML/MIN/MMHG
DLCOUNC-PRED: 23.44 ML/MIN/MMHG
ERV-%PRED-PRE: 91 %
ERV-PRE: 0.77 L
ERV-PRED: 0.84 L
EXPTIME-PRE: 6.91 SEC
FEF2575-%PRED-PRE: 109 %
FEF2575-PRE: 2.56 L/SEC
FEF2575-PRED: 2.34 L/SEC
FEFMAX-%PRED-PRE: 130 %
FEFMAX-PRE: 8.95 L/SEC
FEFMAX-PRED: 6.86 L/SEC
FEV1-%PRED-PRE: 111 %
FEV1-PRE: 3.11 L
FEV1FEV6-PRE: 78 %
FEV1FEV6-PRED: 80 %
FEV1FVC-PRE: 78 %
FEV1FVC-PRED: 78 %
FEV1SVC-PRE: 76 %
FEV1SVC-PRED: 73 %
FIFMAX-PRE: 6.72 L/SEC
FRCPLETH-%PRED-PRE: 89 %
FRCPLETH-PRE: 2.69 L
FRCPLETH-PRED: 2.99 L
FVC-%PRED-PRE: 111 %
FVC-PRE: 4.02 L
FVC-PRED: 3.61 L
IC-%PRED-PRE: 112 %
IC-PRE: 3.35 L
IC-PRED: 2.99 L
RVPLETH-%PRED-PRE: 87 %
RVPLETH-PRE: 1.92 L
RVPLETH-PRED: 2.2 L
TLCPLETH-%PRED-PRE: 104 %
TLCPLETH-PRE: 6.04 L
TLCPLETH-PRED: 5.78 L
VA-%PRED-PRE: 96 %
VA-PRE: 5.62 L
VC-%PRED-PRE: 107 %
VC-PRE: 4.12 L
VC-PRED: 3.82 L

## 2019-03-11 ENCOUNTER — PATIENT OUTREACH (OUTPATIENT)
Dept: CARE COORDINATION | Facility: CLINIC | Age: 64
End: 2019-03-11

## 2019-03-11 NOTE — PROGRESS NOTES
Clinic Care Coordination Contact    Care Coordination Contact Attempt        Clinical Data: Clinic Care Coordinator RN attempted to contact patient.  Unable to contact letter was sent to patient . There has been no response from patient.    Patient has followed up with Cardiology.     Plan:  Case closed.

## 2019-04-15 ENCOUNTER — TELEPHONE (OUTPATIENT)
Dept: CARDIOLOGY | Facility: CLINIC | Age: 64
End: 2019-04-15

## 2019-04-15 NOTE — TELEPHONE ENCOUNTER
Patient called reporting that she had developed bilateral leg pain several weeks ago. She decided to hold her lipitor and after holding this for 5 days the pain has resolved. She states she will continue to hold this medication until her follow up apt with Dr. Baldwin or March to further discuss.   JAKE Arias CNP  Electrophysiology Consult Service  Pager: [phone]

## 2019-05-20 ENCOUNTER — ALLIED HEALTH/NURSE VISIT (OUTPATIENT)
Dept: CARDIOLOGY | Facility: CLINIC | Age: 64
End: 2019-05-20
Attending: INTERNAL MEDICINE
Payer: COMMERCIAL

## 2019-05-20 DIAGNOSIS — I48.91 ATRIAL FIBRILLATION, UNSPECIFIED TYPE (H): ICD-10-CM

## 2019-05-20 PROCEDURE — 0298T ZZC EXT ECG > 48HR TO 21 DAY REVIEW AND INTERPRETATN: CPT | Performed by: INTERNAL MEDICINE

## 2019-05-27 DIAGNOSIS — I10 ESSENTIAL HYPERTENSION WITH GOAL BLOOD PRESSURE LESS THAN 140/90: ICD-10-CM

## 2019-05-28 RX ORDER — LISINOPRIL 20 MG/1
TABLET ORAL
Qty: 90 TABLET | Refills: 1 | Status: SHIPPED | OUTPATIENT
Start: 2019-05-28 | End: 2019-10-30

## 2019-05-28 NOTE — TELEPHONE ENCOUNTER
"LISINOPRIL 20MG TABS  Last Written Prescription Date:  10/27/2018  Last Fill Quantity: 90,  # refills: 1   Last office visit: 12/19/2018 with prescribing provider:  Joselin   Future Office Visit:  Nothing at this time scheduled.    Requested Prescriptions   Pending Prescriptions Disp Refills     lisinopril (PRINIVIL/ZESTRIL) 20 MG tablet [Pharmacy Med Name: LISINOPRIL 20MG TABS] 90 tablet 1     Sig: TAKE ONE TABLET BY MOUTH EVERY DAY       ACE Inhibitors (Including Combos) Protocol Passed - 5/27/2019  8:24 AM        Passed - Blood pressure under 140/90 in past 12 months     BP Readings from Last 3 Encounters:   02/26/19 116/71   12/19/18 119/77   11/09/18 147/88                 Passed - Recent (12 mo) or future (30 days) visit within the authorizing provider's specialty     Patient had office visit in the last 12 months or has a visit in the next 30 days with authorizing provider or within the authorizing provider's specialty.  See \"Patient Info\" tab in inbasket, or \"Choose Columns\" in Meds & Orders section of the refill encounter.              Passed - Medication is active on med list        Passed - Patient is age 18 or older        Passed - No active pregnancy on record        Passed - Normal serum creatinine on file in past 12 months     Recent Labs   Lab Test 10/25/18  1827  02/15/15  1929   CR 0.91   < >  --    CREAT  --   --  0.8    < > = values in this interval not displayed.             Passed - Normal serum potassium on file in past 12 months     Recent Labs   Lab Test 10/25/18  1827   POTASSIUM 3.7             Passed - No positive pregnancy test within past 12 months          "

## 2019-05-28 NOTE — TELEPHONE ENCOUNTER
Prescription approved per [facility] Refill Protocol.  Alisa KEITH RN

## 2019-06-17 ENCOUNTER — MYC MEDICAL ADVICE (OUTPATIENT)
Dept: FAMILY MEDICINE | Facility: CLINIC | Age: 64
End: 2019-06-17

## 2019-06-18 ENCOUNTER — OFFICE VISIT (OUTPATIENT)
Dept: FAMILY MEDICINE | Facility: CLINIC | Age: 64
End: 2019-06-18
Payer: COMMERCIAL

## 2019-06-18 VITALS
HEART RATE: 75 BPM | TEMPERATURE: 97.1 F | BODY MASS INDEX: 27.25 KG/M2 | SYSTOLIC BLOOD PRESSURE: 138 MMHG | HEIGHT: 69 IN | DIASTOLIC BLOOD PRESSURE: 80 MMHG | WEIGHT: 184 LBS | OXYGEN SATURATION: 98 % | RESPIRATION RATE: 16 BRPM

## 2019-06-18 DIAGNOSIS — N76.0 VAGINOSIS: Primary | ICD-10-CM

## 2019-06-18 DIAGNOSIS — R30.0 DYSURIA: ICD-10-CM

## 2019-06-18 DIAGNOSIS — N76.0 BV (BACTERIAL VAGINOSIS): ICD-10-CM

## 2019-06-18 DIAGNOSIS — B96.89 BV (BACTERIAL VAGINOSIS): ICD-10-CM

## 2019-06-18 DIAGNOSIS — I10 ESSENTIAL HYPERTENSION WITH GOAL BLOOD PRESSURE LESS THAN 140/90: ICD-10-CM

## 2019-06-18 LAB
ALBUMIN UR-MCNC: 100 MG/DL
APPEARANCE UR: CLEAR
BILIRUB UR QL STRIP: NEGATIVE
COLOR UR AUTO: YELLOW
GLUCOSE UR STRIP-MCNC: NEGATIVE MG/DL
HGB UR QL STRIP: ABNORMAL
KETONES UR STRIP-MCNC: NEGATIVE MG/DL
LEUKOCYTE ESTERASE UR QL STRIP: NEGATIVE
NITRATE UR QL: NEGATIVE
NON-SQ EPI CELLS #/AREA URNS LPF: ABNORMAL /LPF
PH UR STRIP: 6 PH (ref 5–7)
RBC #/AREA URNS AUTO: ABNORMAL /HPF
SOURCE: ABNORMAL
SP GR UR STRIP: 1.01 (ref 1–1.03)
SPECIMEN SOURCE: NORMAL
UROBILINOGEN UR STRIP-ACNC: 0.2 EU/DL (ref 0.2–1)
WBC #/AREA URNS AUTO: ABNORMAL /HPF
WET PREP SPEC: NORMAL

## 2019-06-18 PROCEDURE — 81001 URINALYSIS AUTO W/SCOPE: CPT | Performed by: FAMILY MEDICINE

## 2019-06-18 PROCEDURE — 87210 SMEAR WET MOUNT SALINE/INK: CPT | Performed by: FAMILY MEDICINE

## 2019-06-18 PROCEDURE — 99214 OFFICE O/P EST MOD 30 MIN: CPT | Performed by: FAMILY MEDICINE

## 2019-06-18 RX ORDER — METRONIDAZOLE 500 MG/1
500 TABLET ORAL 2 TIMES DAILY
Qty: 14 TABLET | Refills: 0 | Status: SHIPPED | OUTPATIENT
Start: 2019-06-18 | End: 2019-06-25

## 2019-06-18 ASSESSMENT — MIFFLIN-ST. JEOR: SCORE: 1444

## 2019-06-18 NOTE — NURSING NOTE
"Chief Complaint   Patient presents with     Vaginal Problem     BP (!) 152/95 (BP Location: Right arm, Patient Position: Sitting, Cuff Size: Adult Regular)   Pulse 75   Temp 97.1  F (36.2  C) (Oral)   Resp 16   Ht 1.753 m (5' 9\")   Wt 83.5 kg (184 lb)   LMP 10/27/2004   SpO2 98%   Breastfeeding? No   BMI 27.17 kg/m   Estimated body mass index is 27.17 kg/m  as calculated from the following:    Height as of this encounter: 1.753 m (5' 9\").    Weight as of this encounter: 83.5 kg (184 lb).  bp completed using cuff size: regular       Health Maintenance addressed:  NONE    n/a    Jackson Ferraro MA     "

## 2019-06-18 NOTE — PROGRESS NOTES
Subjective     Sofi Degroot is a 64 year old female who presents to clinic today for the following health issues:    HPI   Vaginal Symptoms- in the past she has had similar symptoms and has used metronidazole which helped , same odor now not a lot of discharge       Duration: started one week     Description  odor    Intensity:  severe    Accompanying signs and symptoms (fever/dysuria/abdominal or back pain): None    History  Sexually active: yes, single partner, contraception - not needed  Possibility of pregnancy: No  Recent antibiotic use: no     Precipitating or alleviating factors: None    Therapies tried and outcome: douche   Outcome: no relief     2) pt originally did try to do a self swab for the wet prep but she accidentally ended up jamming the q tip at the tip of her urethra , so there was some bleeding and pain after wards , so we had to re do the wet prep by me and do a vag exam to look at the urethra     Patient Active Problem List   Diagnosis     Hypothyroidism     Hepatitis C virus infection without hepatic coma     CARDIOVASCULAR SCREENING; LDL GOAL LESS THAN 100     Hypertension goal BP (blood pressure) < 140/90     Transient cerebral ischemia     PFO (patent foramen ovale)     Cervical high risk HPV (human papillomavirus) test positive     Bilateral arm weakness     Lateral epicondylitis of both elbows     Retinal infarct     Patient is followed by the [facility]     Atrial fibrillation (H)     Paroxysmal A-fib (H)     Past Surgical History:   Procedure Laterality Date     ANESTHESIA CARDIOVERSION N/A 10/26/2018    Procedure: ANESTHESIA CARDIOVERSION;  Surgeon: GENERIC ANESTHESIA PROVIDER;  Location: [facility] OR     C LIGATE FALLOPIAN TUBE  1986     SURGICAL HISTORY OF -  Right 2008    Right 1st MTPJ, bone spur resection       Social History     Tobacco Use     Smoking status: Former Smoker     Smokeless tobacco: Never Used     Tobacco comment: quit 30 years  ago   Substance Use Topics     Alcohol use: Yes     Comment: 4-5 Drinks Per Week     Family History   Problem Relation Age of Onset     Heart Disease Maternal Grandfather      Breast Cancer Maternal Grandmother         Dx'd age 70     Arthritis Father      Lipids Mother         On meds     Cancer Mother         BCC     Heart Disease Brother          of dilated of cardiomyopathy at age 54     Alcohol/Drug Sister      Alcohol/Drug Brother      Depression Sister          Current Outpatient Medications   Medication Sig Dispense Refill     Acetaminophen (TYLENOL PO) Take 1,000 mg by mouth as needed for mild pain or fever       aspirin (ASA) 81 MG tablet Take 1 tablet (81 mg) by mouth daily 90 tablet 3     cholecalciferol 2000 units tablet Take 2,000 Units by mouth 2 times daily        fish oil-omega-3 fatty acids 1000 MG capsule Take 1 g by mouth 2 times daily       levothyroxine (SYNTHROID/LEVOTHROID) 100 MCG tablet Take 1 tablet (100 mcg) by mouth daily 90 tablet 3     lisinopril (PRINIVIL/ZESTRIL) 20 MG tablet TAKE ONE TABLET BY MOUTH EVERY DAY 90 tablet 1     magnesium 250 MG tablet Take 250 mg by mouth daily        metoprolol succinate (TOPROL-XL) 100 MG 24 hr tablet Take 1 tablet (100 mg) by mouth daily Take with a 50 mg tablet to equal 150 mg daily. 90 tablet 3     metoprolol succinate (TOPROL-XL) 50 MG 24 hr tablet Take 1 tablet (50 mg) by mouth daily Take with a 100 mg tablet to equal 150 mg daily. 90 tablet 3     metroNIDAZOLE (FLAGYL) 500 MG tablet Take 1 tablet (500 mg) by mouth 2 times daily for 7 days 14 tablet 0     Multiple Vitamins-Minerals (MULTIVITAMIN ADULTS) TABS Take 1 tablet by mouth daily       rivaroxaban ANTICOAGULANT (XARELTO) 20 MG TABS tablet Take 1 tablet (20 mg) by mouth daily (with dinner) 30 tablet 11     valACYclovir (VALTREX) 500 MG tablet TAKE ONE TABLET BY MOUTH TWICE A DAY FOR THREE DAYS. (REPEAT AS NEEDED) 12 tablet 5     Allergies   Allergen Reactions     Latex Swelling      Dental visit--burning sensation in lips and throat, lips felt puffy, eye watery from latex gloves     Pineapple Other (See Comments)     Recent Labs   Lab Test 10/25/18  1827 10/22/18  0836 10/22/18  0821 10/17/18  0920  05/01/18  0750 10/17/17  0919  09/16/16  1149 03/15/16  0918  02/16/15  0805  08/30/13  0944   A1C  --   --   --   --   --  5.5  --   --   --   --   --  5.5  --  5.4   LDL  --   --   --  49  --   --  98  --   --  119*  --  84   < > 91   HDL  --   --   --  83  --   --  91  --   --  82  --  106   < > 71   TRIG  --   --   --  61  --   --  52  --   --  86  --  67   < > 112   ALT  --   --   --  26  --   --  26  --  29 17  --   --    < > 23   CR 0.91  --  1.01  --    < >  --  0.70  --  0.74 0.75   < >  --    < > 0.77   GFRESTIMATED 62  --  55*  --    < >  --  84  --  80 79   < >  --    < > 77   GFRESTBLACK 75  --  67  --    < >  --  >90  --  >90   GFR Calc   >90   GFR Calc     < >  --    < > >90   POTASSIUM 3.7 3.9 4.2  --    < >  --  4.2  --  4.4 4.0   < >  --    < > 3.6   TSH 1.12  --  2.07 1.28  --   --  1.11   < > 0.82 1.16   < >  --    < > 0.83    < > = values in this interval not displayed.      BP Readings from Last 3 Encounters:   06/18/19 (!) 152/95   02/26/19 116/71   12/19/18 119/77    Wt Readings from Last 3 Encounters:   06/18/19 83.5 kg (184 lb)   02/26/19 84.8 kg (187 lb)   12/19/18 84.5 kg (186 lb 6 oz)                      Reviewed and updated as needed this visit by Provider         Review of Systems   ROS COMP: Constitutional, HEENT, cardiovascular, pulmonary, GI, , musculoskeletal, neuro, skin, endocrine and psych systems are negative, except as otherwise noted.      Objective    LMP 10/27/2004   There is no height or weight on file to calculate BMI.  Physical Exam   GENERAL: healthy, alert and no distress  ABDOMEN: soft, nontender, no hepatosplenomegaly, no masses and bowel sounds normal   (female): normal female external genitalia, normal urethral  meatus,no visible blood around the urethral meatus , small area of a cut, normal  vaginal mucosa, normal cervix/adnexa/uterus without masses or discharge  MS: no gross musculoskeletal defects noted, no edema    Diagnostic Test Results:  Labs reviewed in Epic  Results for orders placed or performed in visit on 06/18/19 (from the past 24 hour(s))   Wet prep   Result Value Ref Range    Specimen Description Vagina     Wet Prep No Trichomonas seen     Wet Prep No clue cells seen     Wet Prep No yeast seen     Wet Prep Few     Wet Prep WBC'S seen    *UA reflex to Microscopic and Culture ([facility] (except [place] and [place])   Result Value Ref Range    Color Urine Yellow     Appearance Urine Clear     Glucose Urine Negative NEG^Negative mg/dL    Bilirubin Urine Negative NEG^Negative    Ketones Urine Negative NEG^Negative mg/dL    Specific Gravity Urine 1.015 1.003 - 1.035    Blood Urine Large (A) NEG^Negative    pH Urine 6.0 5.0 - 7.0 pH    Protein Albumin Urine 100 (A) NEG^Negative mg/dL    Urobilinogen Urine 0.2 0.2 - 1.0 EU/dL    Nitrite Urine Negative NEG^Negative    Leukocyte Esterase Urine Negative NEG^Negative    Source Midstream Urine    Urine Microscopic   Result Value Ref Range    WBC Urine 0 - 5 OTO5^0 - 5 /HPF    RBC Urine 25-50 (A) OTO2^O - 2 /HPF    Squamous Epithelial /LPF Urine Few FEW^Few /LPF           Assessment & Plan     1. Vaginosis  No signs of BV on the wet prep but will treat based on symptoms.  - Wet prep  - Urine Microscopic    2. Essential hypertension with goal blood pressure less than 140/90  Is well controlled on her current meds     3. Dysuria  Just blood in the urine , as it was related to her accidentally traumatizing the tip of her urethra , but no signs of UTI   - *UA reflex to Microscopic and Culture ([facility] (except [place] and [place])    4. BV (bacterial vaginosis)  We will treat based on symptoms , as she has had BV in the past with  these symptoms and then the Metronidazole helped a lot .  - metroNIDAZOLE (FLAGYL) 500 MG tablet; Take 1 tablet (500 mg) by mouth 2 times daily for 7 days  Dispense: 14 tablet; Refill: 0       RTC if no improving or worsening.  Pt is aware  and comfortable with the current plan.      Flaquita Conley MD  [facility]

## 2019-06-24 NOTE — PROGRESS NOTES
Electrophysiology Clinic Note  HPI:   Ms. Degroot is a 63 year old female who has a past medical history hypothyroidism, HepC, HTN, TIA, retinal artery occlusion, PFO s/p percutaneous closure 10/12/18, and PAF (CHADSVASC 4 on Xarelto) s/p DCCV 10/26/18. She presents today for EP follow up for AF management.   She first had left arm numbness and was found to have TIA in 2015. Work up at that time showed small PFO. A cardiac monitor was negative for that time. More recently, she had a retinal artery occlusion and decision was made to pursue PFO closure. She had percutaneous PFO closure with Amplatz device on 10/12/18. She has since had episodes of tachycardia. The first in mid-October 2018 where she counted her pulse up to 148 bpm and felt racing heart and palpitations. She performed vagal maneuvers and then her heart rate went down to around 100 bpm. She went to bed and when she woke up she felt back to her baseline. The second episode started 10/21/18 in the afternoon. She reports she did not feel well and have very rapid heart beat. She felt her pulse rate again was around 140-150 bpm. She tried vagal maneuvers again but these were not successful. When her symptoms continued she came into the [facility]. She was found to be in AF with RVR. She reported feeling generally unwell and had some palpitations. Upon exam in the [facility], she converted spontaneously to sinus. She reported feeling much better in sinus. She was then readmitted on 10/26/18 with AF with RVR. She reports onset of palpitations at 1pm on 10/25/18. Started when she was at rest, sitting at her desk. She was started on amiodarone and had DCCV. She denies any prior history of arryhtmias besides her recent episodes. Recent echo showed LVEF 55-60%.   [facility] and reported she was still having some AF episodes that started about 1 week after her DCCV on 10/26/18. She reported she had two longer episodes that lasted about 8 hours each.  She then had intermittent palpitations lasting 1-2 hours a couple times a week. She states that the most bothersome symptom to her is when her heart is racing >100 bpm. An echo showed normal LVEF and well seated closure device with no residual PFO. We are hopeful her AF episodes would resolve after further post procedure healing and she was continued on short course of amiodaorne.   [facility] EP Visit 2/26/19: She presents today for follow up. She reports feeling well. No further AF episodes. She denies any chest pain/pressures, dizziness, lightheadedness, worsening shortness of breath, leg/ankle swelling, PND, orthopnea, palpitations, or syncopal symptoms. Presenting 12 lead ECG shows SR Vent Rate 59 bpm,  ms,  ms, QTc 435 ms. Current cardiac medications include: Amiodarone, Xarelto, Toprol XL, Lisinopril, Lipitor, and Plavix. Amiodarone was stopped at this visit.   She called on 4/15/19 reporting that she had developed bilateral leg pain several weeks ago. She decided to hold her lipitor and after holding this for 5 days the pain has resolved.   She presents today for follow up. She reports feeling very well. A zio patch monitor from 5/16/19-5/30/19 showed SR with 3 PSVT up to 20.5 seconds associated with patient symptoms. She denies any chest pain/pressures, dizziness, lightheadedness, worsening shortness of breath, leg/ankle swelling, PND, orthopnea, palpitations, or syncopal symptoms. Presenting 12 lead ECG shows SR Vent Rate 65 bpm,  ms,  ms, QTc 413 ms. Current cardiac medications include: Xarelto, Toprol XL,and  Lisinopril.     PAST MEDICAL HISTORY:  Past Medical History:   Diagnosis Date     Cervical high risk HPV (human papillomavirus) test positive 09/16/2016, 10/11/17, 10/17/18    (not 16 or 18). 10/11/17: LSIL pap with + HR HPV (not 16 or 18) result.10/17/18: See problem list.      Low risk HPV infection 2009, 2010    NIL pap, + HPV 53     Papanicolaou smear of cervix with low grade  squamous intraepithelial lesion (LGSIL) 10/11/2017    10/11/17: LSIL pap with + HR HPV (not 16 or 18) result.     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 1998    Tx'd w/ Interferon x 1 year-virus free at 5 years, blood transfusion  related       CURRENT MEDICATIONS:  Current Outpatient Medications   Medication Sig Dispense Refill     Acetaminophen (TYLENOL PO) Take 1,000 mg by mouth as needed for mild pain or fever       aspirin (ASA) 81 MG tablet Take 1 tablet (81 mg) by mouth daily 90 tablet 3     cholecalciferol 2000 units tablet Take 2,000 Units by mouth 2 times daily        fish oil-omega-3 fatty acids 1000 MG capsule Take 1 g by mouth 2 times daily       levothyroxine (SYNTHROID/LEVOTHROID) 100 MCG tablet Take 1 tablet (100 mcg) by mouth daily 90 tablet 3     lisinopril (PRINIVIL/ZESTRIL) 20 MG tablet TAKE ONE TABLET BY MOUTH EVERY DAY 90 tablet 1     magnesium 250 MG tablet Take 250 mg by mouth daily        metoprolol succinate (TOPROL-XL) 100 MG 24 hr tablet Take 1 tablet (100 mg) by mouth daily Take with a 50 mg tablet to equal 150 mg daily. 90 tablet 3     metoprolol succinate (TOPROL-XL) 50 MG 24 hr tablet Take 1 tablet (50 mg) by mouth daily Take with a 100 mg tablet to equal 150 mg daily. 90 tablet 3     metroNIDAZOLE (FLAGYL) 500 MG tablet Take 1 tablet (500 mg) by mouth 2 times daily for 7 days 14 tablet 0     Multiple Vitamins-Minerals (MULTIVITAMIN ADULTS) TABS Take 1 tablet by mouth daily       rivaroxaban ANTICOAGULANT (XARELTO) 20 MG TABS tablet Take 1 tablet (20 mg) by mouth daily (with dinner) 30 tablet 11     valACYclovir (VALTREX) 500 MG tablet TAKE ONE TABLET BY MOUTH TWICE A DAY FOR THREE DAYS. (REPEAT AS NEEDED) 12 tablet 5       PAST SURGICAL HISTORY:  Past Surgical History:   Procedure Laterality Date     ANESTHESIA CARDIOVERSION N/A 10/26/2018    Procedure: ANESTHESIA CARDIOVERSION;  Surgeon: GENERIC ANESTHESIA PROVIDER;  Location:  OR      C LIGATE FALLOPIAN TUBE       SURGICAL HISTORY OF -  Right 2008    Right 1st MTPJ, bone spur resection       ALLERGIES:     Allergies   Allergen Reactions     Latex Swelling     Dental visit--burning sensation in lips and throat, lips felt puffy, eye watery from latex gloves     Pineapple Other (See Comments)       FAMILY HISTORY:  Family History   Problem Relation Age of Onset     Heart Disease Maternal Grandfather      Breast Cancer Maternal Grandmother         Dx'd age 70     Arthritis Father      Lipids Mother         On meds     Cancer Mother         BCC     Heart Disease Brother          of dilated of cardiomyopathy at age 54     Alcohol/Drug Sister      Alcohol/Drug Brother      Depression Sister        SOCIAL HISTORY:  Social History     Tobacco Use     Smoking status: Former Smoker     Smokeless tobacco: Never Used     Tobacco comment: quit 30 years ago   Substance Use Topics     Alcohol use: Yes     Comment: 4-5 Drinks Per Week     Drug use: No       ROS:   A comprehensive 10 point review of systems negative other than as mentioned in HPI.  Exam:  /82 (BP Location: Right arm, Patient Position: Chair, Cuff Size: Adult Regular)   Pulse 62   Wt 82.6 kg (182 lb)   LMP 10/27/2004   SpO2 95%   BMI 26.88 kg/m    GENERAL APPEARANCE: alert and no distress  HEENT: no icterus, no xanthelasmas, normal pupil size and reaction, normal palate, mucosa moist, no central cyanosis  NECK: no adenopathy, no asymmetry, masses, or scars, thyroid normal to palpation and no bruits, JVP not elevated  RESPIRATORY: lungs clear to auscultation - no rales, rhonchi or wheezes, no use of accessory muscles, no retractions, respirations are unlabored, normal respiratory rate  CARDIOVASCULAR: regular rhythm, normal S1 with physiologic split S2, no S3 or S4 and no murmur, click or rub, precordium quiet with normal PMI.  ABDOMEN: soft, non tender, bowel sounds normal, non-distended  EXTREMITIES: peripheral pulses  normal, no edema  NEURO: alert and oriented to person/place/time, normal speech, gait and affect  SKIN: no ecchymoses, no rashes  PSYCH: normal affect, cooperative    Labs:  Reviewed.     Testing/Procedures:  18 ECHOCARDIOGRAM:   Interpretation Summary  Global and regional left ventricular function is normal with an EF of 60-65%.  Global right ventricular function is normal.  A PFO closure device is present and is well seated in the interatrial septum,  no residual interatrial shunt by color Doppler and bubble study.  Pulmonary artery systolic pressure is normal.  The inferior vena cava was normal in size with preserved respiratory  variability.  No pericardial effusion is present.      Assessment and Plan:   Ms. Degroot is a 63 year old female who has a past medical history hypothyroidism, HepC, HTN, TIA, retinal artery occlusion, PFO s/p percutaneous closure 10/12/18, and PAF (CHADSVASC 4 on Xarelto) s/p DCCV 10/26/18. She developed episodes of PAF after her PFO closure, a couple requiring visits to ER or admission. She was ultimately started on short course of amiodarone on 10/26/18 and had DCCV. She continued to have some AF episodes after her DCCV that were self limiting. She was taken off amiodarone in 2019.  A zio patch monitor OFF amiodarone from 19-19 showed SR with 3 PSVT up to 20.5 seconds associated with patient symptoms. No recurrences of AF. She is feeling very well.     Paroxysmal Atrial Fibrillation:  We discussed in detail with the patient management/treatment options for Antoinette includin. Stroke Prophylaxis:  CHADSVASC=++TIA, +gender, +HTN  4, corresponding to a 4.0% annual stroke / systemic emolism event rate. indicating need for long term oral anticoagulation.  She is appropriately on Xarelto. No bleeding issues. She asked about possibility of stopping anticoagulation. We discussed that could consider her AF postop/post procedure in which case could consider stopping  anticoagulation;however, she has had hx of TIAs and retinal artery occlusion so staying on anticoagulation may ultimately be advisable. We had a preference to continue chronic anticoagulation. She really wanted to be off anticoagulation. She states the understanding of the risks and wishes to stop anticoagulation at this time. She would be willing to resume if any recurrent AF.  2. Rate Control:Continue Toprol XL.   3. Rhythm Control: Cardioversion, Antiarrhythmics and/or ablation are options for rhythm control. She had AF episodes after her PFO closure and did require one DCCV. She had a short course of amiodarone and has been off since 2/2019. She has not had any further AF episodes. If longer term AAT required, we would favor alternative such as Sotalol or Dofetilide.   4. Risk Factor Management: Continue Statin, maintain tight BP control, and ASHLEY evaluation as indicated.      She will continue to follow with Dr. Lopez. She can follow up with EP as needed.     The patient states understanding and is agreeable with plan.   This patient was seen and evaluated with Dr. Baldwin. The above note reflects our joint assessment and plan.   JAKE Arias CNP  Pager: [phone]    EP STAFF NOTE  I have seen and examined the patient as part of a shared visit with ANDRE Arias NP.  I agree with the note above. I reviewed today's vital signs and medications. I have reviewed and discussed with the advanced practice provider their physical examination, assessment, and plan   Briefly, post ASD closure Afib, self limited, no recurrence off short term amio, CHADS VASC 4  My key history/exam findings are: RRR.   The key management decisions made by me: recommended continuing AC but the patient really wanted to stop AC and did not want to continue AC for now knowing the potential risks, no clear data for postop afib on AC.    Alonzo Baldwin MD [facility]  Cardiology - Electrophysiology        CC  MILY LOPEZ

## 2019-06-25 ENCOUNTER — OFFICE VISIT (OUTPATIENT)
Dept: CARDIOLOGY | Facility: CLINIC | Age: 64
End: 2019-06-25
Attending: INTERNAL MEDICINE
Payer: COMMERCIAL

## 2019-06-25 VITALS
OXYGEN SATURATION: 95 % | SYSTOLIC BLOOD PRESSURE: 132 MMHG | DIASTOLIC BLOOD PRESSURE: 82 MMHG | HEART RATE: 62 BPM | BODY MASS INDEX: 27 KG/M2 | WEIGHT: 182.3 LBS | HEIGHT: 69 IN

## 2019-06-25 VITALS
HEART RATE: 62 BPM | OXYGEN SATURATION: 95 % | BODY MASS INDEX: 26.88 KG/M2 | WEIGHT: 182 LBS | DIASTOLIC BLOOD PRESSURE: 82 MMHG | SYSTOLIC BLOOD PRESSURE: 132 MMHG

## 2019-06-25 DIAGNOSIS — Z87.74 S/P PATENT FORAMEN OVALE CLOSURE: ICD-10-CM

## 2019-06-25 DIAGNOSIS — I48.91 ATRIAL FIBRILLATION, UNSPECIFIED TYPE (H): ICD-10-CM

## 2019-06-25 DIAGNOSIS — I48.91 ATRIAL FIBRILLATION WITH RVR (H): ICD-10-CM

## 2019-06-25 DIAGNOSIS — Z87.74 S/P PATENT FORAMEN OVALE CLOSURE: Primary | ICD-10-CM

## 2019-06-25 PROCEDURE — 99214 OFFICE O/P EST MOD 30 MIN: CPT | Mod: ZP | Performed by: INTERNAL MEDICINE

## 2019-06-25 PROCEDURE — G0463 HOSPITAL OUTPT CLINIC VISIT: HCPCS

## 2019-06-25 PROCEDURE — 99213 OFFICE O/P EST LOW 20 MIN: CPT | Mod: 25 | Performed by: INTERNAL MEDICINE

## 2019-06-25 PROCEDURE — 93005 ELECTROCARDIOGRAM TRACING: CPT | Mod: ZF

## 2019-06-25 PROCEDURE — 93010 ELECTROCARDIOGRAM REPORT: CPT | Mod: ZP | Performed by: INTERNAL MEDICINE

## 2019-06-25 ASSESSMENT — PAIN SCALES - GENERAL
PAINLEVEL: NO PAIN (0)
PAINLEVEL: NO PAIN (0)

## 2019-06-25 ASSESSMENT — MIFFLIN-ST. JEOR: SCORE: 1436.29

## 2019-06-25 NOTE — NURSING NOTE
Cardiac Testing: Patient given instructions regarding  echocardiogram . Discussed purpose, preparation, procedure and when to expect results reported back to the patient. Patient demonstrated understanding of this information and agreed to call with further questions or concerns.    Med Reconcile: Reviewed and verified all current medications with the patient. The updated medication list was printed and given to the patient.    Return Appointment: Follow up with Dr Lopez in one year with an echo. Patient given instructions regarding scheduling next clinic visit. Patient demonstrated understanding of this information and agreed to call with further questions or concerns.    Patient stated she understood all health information given and agreed to call with further questions or concerns.    Nasir Alvarez, RN  RN Care Coordinator  [facility]  [phone]

## 2019-06-25 NOTE — NURSING NOTE
Chief Complaint   Patient presents with     Follow Up     [facility] Pre Visit 6/25/19-- 64 year old female with history of PFO s/p closure, hypertension, palpitations, TIA, and A fib s/p DCCV presenting for follow up     Vitals were taken and medications were reconciled.     ALEX Ann  9:13 AM

## 2019-06-25 NOTE — PATIENT INSTRUCTIONS
"You were seen today in the Adult Congenital and Cardiovascular Genetics Clinic at the [facility].    Cardiology Providers you saw during your visit:  Dr Alonzo Baldwin and Dr Xiao Lopez    Diagnosis:  History of PFO closure, A fib    Results:  The results of your echo and EKG were reviewed with you in clinic today.     Recommendations:    1.  Continue to eat a heart healthy, low salt diet.  2.  Continue to get 20-30 minutes of aerobic activity, 4-5 days per week.  Examples of aerobic activity include walking, running, swimming, cycling, etc.  3.  Continue to observe good oral hygiene, with regular dental visits.  4.  Stop taking your Xarelto.  Start taking Aspirin 325 mg 1 tablet daily for 1 month, then 81 mg daily.       Vitals:    06/25/19 0845   BP: 132/82   BP Location: Right arm   Patient Position: Chair   Cuff Size: Adult Regular   Pulse: 62   SpO2: 95%   Weight: 82.7 kg (182 lb 4.8 oz)   Height: 1.753 m (5' 9\")       SBE prophylaxis:   Yes____  No__x__    Lifelong Bacterial Endocarditis Prophylaxis:  YES____  NO____    If YES is checked, follow the recommendations outlined below:   1. Take antibiotic(s) prior to recommended dental procedures and procedures on the respiratory tract or with infected skin, muscle or bones. SBE prophylaxis is not needed for routine GI and  procedures (ie. Colonoscopy or vaginal delivery)   2. Observe good oral hygiene daily, as advised by your dentist. Get regular professional dental care.   3. Keep cuts clean.   4. Infections should be treated promptly.   5. Symptoms of Infective Endocarditis could include: fever lasting more than 4-5 days or a recurrent fever that initially resolves but returns within 1-2 days)     Exercise restrictions:   Yes____  No__x__         If yes, list restrictions:  Must be allowed to rest if fatigued or SOB      Work restrictions:  Yes____  No__x__         If yes, list restrictions:    FASTING CHOLESTEROL was checked in the last 5 years " YES_x__  NO___  2018  Continue to eat a heart healthy, low salt diet.         ____ Fasting lipid panel order today         ____ No changes in medications          ____ I recommend the following changes in your cholesterol medications.:          ____ Please follow up for cholesterol screening at your primary care physician      Follow-up:  Follow up with Dr Lopez in one year with an echocardiogram.  Follow up with Dr Baldwin as needed.     For after hours urgent needs, call [phone] and ask to speak to the Adult Congenital Physician on call.  Mention Job Code 0401.    For emergencies call [phone].    For any scheduling needs, please call [facility] , at [phone]  Thank you for your visit today!  If you have questions or concerns about today's visit, please call me.    Nasir Alvarez, RN, BSN  Cardiology Care Coordinator  [facility]  [phone]    [address]  Mail Code [id number]  [place] [address]

## 2019-06-25 NOTE — PATIENT INSTRUCTIONS
"You were seen today in the Adult Congenital and Cardiovascular Genetics Clinic at the [facility].    Cardiology Providers you saw during your visit:  Dr Alonzo Baldwin and Dr Xiao Lopez    Diagnosis:  History of PFO closure, A fib    Results:  The results of your echo and EKG were reviewed with you in clinic today.     Recommendations:    1.  Continue to eat a heart healthy, low salt diet.  2.  Continue to get 20-30 minutes of aerobic activity, 4-5 days per week.  Examples of aerobic activity include walking, running, swimming, cycling, etc.  3.  Continue to observe good oral hygiene, with regular dental visits.  4.  Stop taking your Xarelto.  Start taking Aspirin 325 mg 1 tablet daily for 1 month, then 81 mg daily.       Vitals:    06/25/19 0845   BP: 132/82   BP Location: Right arm   Patient Position: Chair   Cuff Size: Adult Regular   Pulse: 62   SpO2: 95%   Weight: 82.7 kg (182 lb 4.8 oz)   Height: 1.753 m (5' 9\")       SBE prophylaxis:   Yes____  No__x__    Lifelong Bacterial Endocarditis Prophylaxis:  YES____  NO____    If YES is checked, follow the recommendations outlined below:   1. Take antibiotic(s) prior to recommended dental procedures and procedures on the respiratory tract or with infected skin, muscle or bones. SBE prophylaxis is not needed for routine GI and  procedures (ie. Colonoscopy or vaginal delivery)   2. Observe good oral hygiene daily, as advised by your dentist. Get regular professional dental care.   3. Keep cuts clean.   4. Infections should be treated promptly.   5. Symptoms of Infective Endocarditis could include: fever lasting more than 4-5 days or a recurrent fever that initially resolves but returns within 1-2 days)     Exercise restrictions:   Yes____  No__x__         If yes, list restrictions:  Must be allowed to rest if fatigued or SOB      Work restrictions:  Yes____  No__x__         If yes, list restrictions:    FASTING CHOLESTEROL was checked in the last 5 years " YES_x__  NO___  2018  Continue to eat a heart healthy, low salt diet.         ____ Fasting lipid panel order today         ____ No changes in medications          ____ I recommend the following changes in your cholesterol medications.:          ____ Please follow up for cholesterol screening at your primary care physician      Follow-up:  Follow up with Dr Lopez in one year with an echocardiogram.  Follow up with Dr Baldwin as needed.     For after hours urgent needs, call [phone] and ask to speak to the Adult Congenital Physician on call.  Mention Job Code 0401.    For emergencies call [phone].    For any scheduling needs, please call [facility] , at [phone]  Thank you for your visit today!  If you have questions or concerns about today's visit, please call me.    Nasir Alvarez, RN, BSN  Cardiology Care Coordinator  [facility]  [phone]    [address]  Mail Code [id number]  [place] [address]

## 2019-06-25 NOTE — LETTER
6/25/2019      RE: Sofi Degroot  [address]  [facility] [address]       Dear Colleague,    Thank you for the opportunity to participate in the care of your patient, Sofi Degroot, at the [facility] at [facility]. Please see a copy of my visit note below.    CARDIOLOGY CONSULTATION:    Ms. Degroot is a delightful 64-year-old woman who I met in the fall of 2018.  She underwent PFO closure on 10/12/2018 with a 30 mm [place] device.  She subsequently had AFib, and she was seen by Dr. Baldwin. Her Toprol was increased from 75 to 150, and she was started on amiodarone and after 3 months it was stopped in February.  She had a monitor as part of her visit today and there is no recurrence of afib. She remains on Xarelto and will discuss with Dr. Baldwin coming off it.     Her echo today is unchanged.  Her device looks good.  There is no residual shunt.  She is on a baby asa.  Dr. Andrade had started her on Lipitor and she got severe muscle aches, so has stopped that.  She feels really great.       PAST MEDICAL HISTORY:  Past Medical History:   Diagnosis Date     Cervical high risk HPV (human papillomavirus) test positive 09/16/2016, 10/11/17, 10/17/18    (not 16 or 18). 10/11/17: LSIL pap with + HR HPV (not 16 or 18) result.10/17/18: See problem list.      Low risk HPV infection 2009, 2010    NIL pap, + HPV 53     Papanicolaou smear of cervix with low grade squamous intraepithelial lesion (LGSIL) 10/11/2017    10/11/17: LSIL pap with + HR HPV (not 16 or 18) result.     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 1998    Tx'd w/ Interferon x 1 year-virus free at 5 years, blood transfusion  related       CURRENT MEDICATIONS:  Current Outpatient Medications   Medication Sig Dispense Refill     Acetaminophen (TYLENOL PO) Take 1,000 mg by mouth as needed for mild pain or fever       aspirin (ASA) 81 MG tablet Take 1 tablet  (81 mg) by mouth daily 90 tablet 3     cholecalciferol 2000 units tablet Take 2,000 Units by mouth 2 times daily        fish oil-omega-3 fatty acids 1000 MG capsule Take 1 g by mouth 2 times daily       levothyroxine (SYNTHROID/LEVOTHROID) 100 MCG tablet Take 1 tablet (100 mcg) by mouth daily 90 tablet 3     lisinopril (PRINIVIL/ZESTRIL) 20 MG tablet TAKE ONE TABLET BY MOUTH EVERY DAY 90 tablet 1     magnesium 250 MG tablet Take 250 mg by mouth daily        metoprolol succinate (TOPROL-XL) 100 MG 24 hr tablet Take 1 tablet (100 mg) by mouth daily Take with a 50 mg tablet to equal 150 mg daily. 90 tablet 3     metoprolol succinate (TOPROL-XL) 50 MG 24 hr tablet Take 1 tablet (50 mg) by mouth daily Take with a 100 mg tablet to equal 150 mg daily. 90 tablet 3     Multiple Vitamins-Minerals (MULTIVITAMIN ADULTS) TABS Take 1 tablet by mouth daily       rivaroxaban ANTICOAGULANT (XARELTO) 20 MG TABS tablet Take 1 tablet (20 mg) by mouth daily (with dinner) 30 tablet 11     valACYclovir (VALTREX) 500 MG tablet TAKE ONE TABLET BY MOUTH TWICE A DAY FOR THREE DAYS. (REPEAT AS NEEDED) 12 tablet 5     metroNIDAZOLE (FLAGYL) 500 MG tablet Take 1 tablet (500 mg) by mouth 2 times daily for 7 days (Patient not taking: Reported on 2019) 14 tablet 0       PAST SURGICAL HISTORY:  Past Surgical History:   Procedure Laterality Date     ANESTHESIA CARDIOVERSION N/A 10/26/2018    Procedure: ANESTHESIA CARDIOVERSION;  Surgeon: GENERIC ANESTHESIA PROVIDER;  Location: [facility] OR     C LIGATE FALLOPIAN TUBE       SURGICAL HISTORY OF -  Right     Right 1st MTPJ, bone spur resection       ALLERGIES  Flagyl [metronidazole]; Latex; and Pineapple    FAMILY HX:  Family History   Problem Relation Age of Onset     Heart Disease Maternal Grandfather      Breast Cancer Maternal Grandmother         Dx'd age 70     Arthritis Father      Lipids Mother         On meds     Cancer Mother         BCC     Heart Disease Brother          of dilated  of cardiomyopathy at age 54     Alcohol/Drug Sister      Alcohol/Drug Brother      Depression Sister        SOCIAL HX:  Social History     Socioeconomic History     Marital status: Single     Spouse name: None     Number of children: 1     Years of education: BSN     Highest education level: None   Occupational History     Occupation: RN     Employer: [employer]   Social Needs     Financial resource strain: None     Food insecurity:     Worry: None     Inability: None     Transportation needs:     Medical: None     Non-medical: None   Tobacco Use     Smoking status: Former Smoker     Smokeless tobacco: Never Used     Tobacco comment: quit 30 years ago   Substance and Sexual Activity     Alcohol use: Yes     Comment: 4-5 Drinks Per Week     Drug use: No     Sexual activity: Yes     Partners: Male     Birth control/protection: Condom   Lifestyle     Physical activity:     Days per week: None     Minutes per session: None     Stress: None   Relationships     Social connections:     Talks on phone: None     Gets together: None     Attends Jew service: None     Active member of club or organization: None     Attends meetings of clubs or organizations: None     Relationship status: None     Intimate partner violence:     Fear of current or ex partner: None     Emotionally abused: None     Physically abused: None     Forced sexual activity: None   Other Topics Concern     Parent/sibling w/ CABG, MI or angioplasty before 65F 55M? No   Social History Narrative    Social Documentation:        Balanced Diet: YES    Calcium intake: 2-3 per day    Caffeine: 3 per day    Exercise:  type of activity work is physical and walking;  5 times per week    Sunscreen: Yes    Seatbelts:  Yes    Self Breast Exam:  Yes    Self Testicular Exam: na    Physical/Emotional/Sexual Abuse: No    Do you feel safe in your environment? Yes        Cholesterol screen up to date:     CHOL      199   7/21/2009    HDL       82    "7/21/2009    LDL      100   7/21/2009    TRIG       83   7/21/2009    CHOLHDLRATIO      2.4   7/21/2009    Eye Exam up to date: Yes    Dental Exam up to date: Yes    Pap smear up to date: do today. PAP      NIL   7/21/2009    Mammogram up to date:  Due in September    Dexa Scan up to date: has not had one     Colonoscopy up to date: Yes, 04/08    Immunizations up to date: Yes    Glucose screen if over 40:  cna do today                                                               ROS:  Constitutional: No fever, chills, or sweats. No weight gain/loss.   ENT: No visual disturbance, ear ache, epistaxis, sore throat.   Allergies/Immunologic: Negative.   Respiratory: No cough, hemoptysis.   Cardiovascular: As per HPI.   GI: No nausea, vomiting, hematemesis, melena, or hematochezia.   : No urinary frequency, dysuria, or hematuria.   Integument: Negative.   Psychiatric: Negative.   Neuro: Negative.   Endocrinology: Negative.   Musculoskeletal: No myalgia.    VITAL SIGNS:  /82 (BP Location: Right arm, Patient Position: Chair, Cuff Size: Adult Regular)   Pulse 62   Ht 1.753 m (5' 9\")   Wt 82.7 kg (182 lb 4.8 oz)   LMP 10/27/2004   SpO2 95%   BMI 26.92 kg/m     Body mass index is 26.92 kg/m .  Wt Readings from Last 2 Encounters:   06/25/19 82.7 kg (182 lb 4.8 oz)   06/18/19 83.5 kg (184 lb)       PHYSICAL EXAM  Sofi Degroot IS A 64 year old female.in no acute distress.  HEENT: Unremarkable.  Neck: JVP normal.  Carotids +4/4 bilaterally without bruits.  Lungs: CTA.  Cor: RRR. Normal S1 and S2.  No murmur, rub, or gallop.  PMI in Lf 5th ICS.  Abd: Soft, nontender, nondistended.  NABS.  No pulsatile mass.  Extremities: No C/C/E.  Pulses +4/4 symmetric in upper and lower extremities.  Neuro: Grossly intact.    LABS    Lab Results   Component Value Date    WBC 10.5 10/25/2018     Lab Results   Component Value Date    RBC 4.84 10/25/2018     Lab Results   Component Value Date    HGB 15.7 10/25/2018     Lab Results "   Component Value Date    HCT 48.1 10/25/2018     No components found for: MCT  Lab Results   Component Value Date    MCV 99 10/25/2018     Lab Results   Component Value Date    MCH 32.4 10/25/2018     Lab Results   Component Value Date    MCHC 32.6 10/25/2018     Lab Results   Component Value Date    RDW 12.9 10/25/2018     Lab Results   Component Value Date     10/25/2018      Recent Labs   Lab Test 10/25/18  1827 10/22/18  0836 10/22/18  0821    144 144   POTASSIUM 3.7 3.9 4.2   CHLORIDE 109  --  109   CO2 22  --  25   ANIONGAP 11  --  10   * 81 100*   BUN 14  --  17   CR 0.91  --  1.01   ALICIA 9.7  --  9.5     Recent Labs   Lab Test 10/17/18  0920 10/17/17  0919  02/16/15  0805 09/03/14  0832   CHOL 144 199   < > 203* 201*   HDL 83 91   < > 106 108   LDL 49 98   < > 84 80   TRIG 61 52   < > 67 65   CHOLHDLRATIO  --   --   --  1.9 1.9   NHDL 61 108   < >  --   --     < > = values in this interval not displayed.           IMPRESSION, REPORT, PLAN:   1.  History of TIA with a PFO, status post closure with a 30 mm [place] device 10/12/2018.   2.   Atrial fibrillation S/P device, symptomatic, briefly on Amiodarone, now resolved still on Xarelto  3.  Hypertension, well controlled.   4.   Muscle weakness with Lipitor     It was a pleasure to see Ms. Degroot in follow up.  She is doing well clinically and would like to come off xarelto, which she will discuss with Dr. Baldwin. Her device looks great and she is otherwise doing well.  Will plan to see back in a year with an echo.     RIMA Lopez MD

## 2019-06-25 NOTE — PROGRESS NOTES
CARDIOLOGY CONSULTATION:    Ms. Degroot is a delightful 64-year-old woman who I met in the fall of 2018.  She underwent PFO closure on 10/12/2018 with a 30 mm [place] device.  She subsequently had AFib, and she was seen by Dr. Baldwin. Her Toprol was increased from 75 to 150, and she was started on amiodarone and after 3 months it was stopped in February.  She had a monitor as part of her visit today and there is no recurrence of afib. She remains on Xarelto and will discuss with Dr. Baldwin coming off it.     Her echo today is unchanged.  Her device looks good.  There is no residual shunt.  She is on a baby asa.  Dr. Andrade had started her on Lipitor and she got severe muscle aches, so has stopped that.  She feels really great.      PAST MEDICAL HISTORY:  Past Medical History:   Diagnosis Date     Cervical high risk HPV (human papillomavirus) test positive 09/16/2016, 10/11/17, 10/17/18    (not 16 or 18). 10/11/17: LSIL pap with + HR HPV (not 16 or 18) result.10/17/18: See problem list.      Low risk HPV infection 2009, 2010    NIL pap, + HPV 53     Papanicolaou smear of cervix with low grade squamous intraepithelial lesion (LGSIL) 10/11/2017    10/11/17: LSIL pap with + HR HPV (not 16 or 18) result.     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 1998    Tx'd w/ Interferon x 1 year-virus free at 5 years, blood transfusion  related       CURRENT MEDICATIONS:  Current Outpatient Medications   Medication Sig Dispense Refill     Acetaminophen (TYLENOL PO) Take 1,000 mg by mouth as needed for mild pain or fever       aspirin (ASA) 81 MG tablet Take 1 tablet (81 mg) by mouth daily 90 tablet 3     cholecalciferol 2000 units tablet Take 2,000 Units by mouth 2 times daily        fish oil-omega-3 fatty acids 1000 MG capsule Take 1 g by mouth 2 times daily       levothyroxine (SYNTHROID/LEVOTHROID) 100 MCG tablet Take 1 tablet (100 mcg) by mouth daily 90 tablet 3     lisinopril  (PRINIVIL/ZESTRIL) 20 MG tablet TAKE ONE TABLET BY MOUTH EVERY DAY 90 tablet 1     magnesium 250 MG tablet Take 250 mg by mouth daily        metoprolol succinate (TOPROL-XL) 100 MG 24 hr tablet Take 1 tablet (100 mg) by mouth daily Take with a 50 mg tablet to equal 150 mg daily. 90 tablet 3     metoprolol succinate (TOPROL-XL) 50 MG 24 hr tablet Take 1 tablet (50 mg) by mouth daily Take with a 100 mg tablet to equal 150 mg daily. 90 tablet 3     Multiple Vitamins-Minerals (MULTIVITAMIN ADULTS) TABS Take 1 tablet by mouth daily       rivaroxaban ANTICOAGULANT (XARELTO) 20 MG TABS tablet Take 1 tablet (20 mg) by mouth daily (with dinner) 30 tablet 11     valACYclovir (VALTREX) 500 MG tablet TAKE ONE TABLET BY MOUTH TWICE A DAY FOR THREE DAYS. (REPEAT AS NEEDED) 12 tablet 5     metroNIDAZOLE (FLAGYL) 500 MG tablet Take 1 tablet (500 mg) by mouth 2 times daily for 7 days (Patient not taking: Reported on 2019) 14 tablet 0       PAST SURGICAL HISTORY:  Past Surgical History:   Procedure Laterality Date     ANESTHESIA CARDIOVERSION N/A 10/26/2018    Procedure: ANESTHESIA CARDIOVERSION;  Surgeon: GENERIC ANESTHESIA PROVIDER;  Location: [facility] OR     C LIGATE FALLOPIAN TUBE       SURGICAL HISTORY OF -  Right     Right 1st MTPJ, bone spur resection       ALLERGIES  Flagyl [metronidazole]; Latex; and Pineapple    FAMILY HX:  Family History   Problem Relation Age of Onset     Heart Disease Maternal Grandfather      Breast Cancer Maternal Grandmother         Dx'd age 70     Arthritis Father      Lipids Mother         On meds     Cancer Mother         BCC     Heart Disease Brother          of dilated of cardiomyopathy at age 54     Alcohol/Drug Sister      Alcohol/Drug Brother      Depression Sister        SOCIAL HX:  Social History     Socioeconomic History     Marital status: Single     Spouse name: None     Number of children: 1     Years of education: BSN     Highest education level: None   Occupational History      Occupation: RN     Employer: [employer]   Social Needs     Financial resource strain: None     Food insecurity:     Worry: None     Inability: None     Transportation needs:     Medical: None     Non-medical: None   Tobacco Use     Smoking status: Former Smoker     Smokeless tobacco: Never Used     Tobacco comment: quit 30 years ago   Substance and Sexual Activity     Alcohol use: Yes     Comment: 4-5 Drinks Per Week     Drug use: No     Sexual activity: Yes     Partners: Male     Birth control/protection: Condom   Lifestyle     Physical activity:     Days per week: None     Minutes per session: None     Stress: None   Relationships     Social connections:     Talks on phone: None     Gets together: None     Attends Quaker service: None     Active member of club or organization: None     Attends meetings of clubs or organizations: None     Relationship status: None     Intimate partner violence:     Fear of current or ex partner: None     Emotionally abused: None     Physically abused: None     Forced sexual activity: None   Other Topics Concern     Parent/sibling w/ CABG, MI or angioplasty before 65F 55M? No   Social History Narrative    Social Documentation:        Balanced Diet: YES    Calcium intake: 2-3 per day    Caffeine: 3 per day    Exercise:  type of activity work is physical and walking;  5 times per week    Sunscreen: Yes    Seatbelts:  Yes    Self Breast Exam:  Yes    Self Testicular Exam: na    Physical/Emotional/Sexual Abuse: No    Do you feel safe in your environment? Yes        Cholesterol screen up to date:     CHOL      199   7/21/2009    HDL       82   7/21/2009    LDL      100   7/21/2009    TRIG       83   7/21/2009    CHOLHDLRATIO      2.4   7/21/2009    Eye Exam up to date: Yes    Dental Exam up to date: Yes    Pap smear up to date: do today. PAP      NIL   7/21/2009    Mammogram up to date:  Due in September    Dexa Scan up to date: has not had one     Colonoscopy up  "to date: Yes, 04/08    Immunizations up to date: Yes    Glucose screen if over 40:  cna do today                                                               ROS:  Constitutional: No fever, chills, or sweats. No weight gain/loss.   ENT: No visual disturbance, ear ache, epistaxis, sore throat.   Allergies/Immunologic: Negative.   Respiratory: No cough, hemoptysis.   Cardiovascular: As per HPI.   GI: No nausea, vomiting, hematemesis, melena, or hematochezia.   : No urinary frequency, dysuria, or hematuria.   Integument: Negative.   Psychiatric: Negative.   Neuro: Negative.   Endocrinology: Negative.   Musculoskeletal: No myalgia.    VITAL SIGNS:  /82 (BP Location: Right arm, Patient Position: Chair, Cuff Size: Adult Regular)   Pulse 62   Ht 1.753 m (5' 9\")   Wt 82.7 kg (182 lb 4.8 oz)   LMP 10/27/2004   SpO2 95%   BMI 26.92 kg/m    Body mass index is 26.92 kg/m .  Wt Readings from Last 2 Encounters:   06/25/19 82.7 kg (182 lb 4.8 oz)   06/18/19 83.5 kg (184 lb)       PHYSICAL EXAM  Sofi Degroot IS A 64 year old female.in no acute distress.  HEENT: Unremarkable.  Neck: JVP normal.  Carotids +4/4 bilaterally without bruits.  Lungs: CTA.  Cor: RRR. Normal S1 and S2.  No murmur, rub, or gallop.  PMI in Lf 5th ICS.  Abd: Soft, nontender, nondistended.  NABS.  No pulsatile mass.  Extremities: No C/C/E.  Pulses +4/4 symmetric in upper and lower extremities.  Neuro: Grossly intact.    LABS    Lab Results   Component Value Date    WBC 10.5 10/25/2018     Lab Results   Component Value Date    RBC 4.84 10/25/2018     Lab Results   Component Value Date    HGB 15.7 10/25/2018     Lab Results   Component Value Date    HCT 48.1 10/25/2018     No components found for: MCT  Lab Results   Component Value Date    MCV 99 10/25/2018     Lab Results   Component Value Date    MCH 32.4 10/25/2018     Lab Results   Component Value Date    MCHC 32.6 10/25/2018     Lab Results   Component Value Date    RDW 12.9 10/25/2018 "     Lab Results   Component Value Date     10/25/2018      Recent Labs   Lab Test 10/25/18  1827 10/22/18  0836 10/22/18  0821    144 144   POTASSIUM 3.7 3.9 4.2   CHLORIDE 109  --  109   CO2 22  --  25   ANIONGAP 11  --  10   * 81 100*   BUN 14  --  17   CR 0.91  --  1.01   ALICIA 9.7  --  9.5     Recent Labs   Lab Test 10/17/18  0920 10/17/17  0919  02/16/15  0805 09/03/14  0832   CHOL 144 199   < > 203* 201*   HDL 83 91   < > 106 108   LDL 49 98   < > 84 80   TRIG 61 52   < > 67 65   CHOLHDLRATIO  --   --   --  1.9 1.9   NHDL 61 108   < >  --   --     < > = values in this interval not displayed.           IMPRESSION, REPORT, PLAN:   1.  History of TIA with a PFO, status post closure with a 30 mm [place] device 10/12/2018.   2.  Atrial fibrillation S/P device, symptomatic, briefly on Amiodarone, now resolved still on Xarelto  3.  Hypertension, well controlled.   4.  Muscle weakness with Lipitor     It was a pleasure to see Ms. Degroot in follow up.  She is doing well clinically and would like to come off xarelto, which she will discuss with Dr. Baldwin. Her device looks great and she is otherwise doing well.  Will plan to see back in a year with an echo.     RIMA Lopez MD

## 2019-06-25 NOTE — NURSING NOTE
Chief Complaint   Patient presents with     Follow Up     [facility] Pre Visit 6/25/19-- 64 year old female with history of PFO s/p closure, hypertension, palpitations, TIA, and A fib s/p DCCV presenting for follow up     Vitals were taken and medications were reconciled. EKG was performed.    ALEX Ann  8:48 AM

## 2019-06-25 NOTE — LETTER
6/25/2019      RE: Sofi Degroot  [address]  [facility] [address]       Dear Colleague,    Thank you for the opportunity to participate in the care of your patient, Sofi Degroot, at the [facility]. Please see a copy of my visit note below.    Electrophysiology Clinic Note  HPI:   Ms. Degroot is a 63 year old female who has a past medical history hypothyroidism, HepC, HTN, TIA, retinal artery occlusion, PFO s/p percutaneous closure 10/12/18, and PAF (CHADSVASC 4 on Xarelto) s/p DCCV 10/26/18. She presents today for EP follow up for AF management.   She first had left arm numbness and was found to have TIA in 2015. Work up at that time showed small PFO. A cardiac monitor was negative for that time. More recently, she had a retinal artery occlusion and decision was made to pursue PFO closure. She had percutaneous PFO closure with Amplatz device on 10/12/18. She has since had episodes of tachycardia. The first in mid-October 2018 where she counted her pulse up to 148 bpm and felt racing heart and palpitations. She performed vagal maneuvers and then her heart rate went down to around 100 bpm. She went to bed and when she woke up she felt back to her baseline. The second episode started 10/21/18 in the afternoon. She reports she did not feel well and have very rapid heart beat. She felt her pulse rate again was around 140-150 bpm. She tried vagal maneuvers again but these were not successful. When her symptoms continued she came into the [facility]. She was found to be in AF with RVR. She reported feeling generally unwell and had some palpitations. Upon exam in the [facility], she converted spontaneously to sinus. She reported feeling much better in sinus. She was then readmitted on 10/26/18 with AF with RVR. She reports onset of palpitations at 1pm on 10/25/18. Started when she was at rest, sitting at her desk. She was started on amiodarone and had  DCCV. She denies any prior history of arryhtmias besides her recent episodes. Recent echo showed LVEF 55-60%.   She followed up with [facility] EP on 11/9/18 and reported she was still having some AF episodes that started about 1 week after her DCCV on 10/26/18. She reported she had two longer episodes that lasted about 8 hours each. She then had intermittent palpitations lasting 1-2 hours a couple times a week. She states that the most bothersome symptom to her is when her heart is racing >100 bpm. An echo showed normal LVEF and well seated closure device with no residual PFO. We are hopeful her AF episodes would resolve after further post procedure healing and she was continued on short course of amiodaorne.   [facility] EP Visit 2/26/19: She presents today for follow up. She reports feeling well. No further AF episodes. She denies any chest pain/pressures, dizziness, lightheadedness, worsening shortness of breath, leg/ankle swelling, PND, orthopnea, palpitations, or syncopal symptoms. Presenting 12 lead ECG shows SR Vent Rate 59 bpm,  ms,  ms, QTc 435 ms. Current cardiac medications include: Amiodarone, Xarelto, Toprol XL, Lisinopril, Lipitor, and Plavix. Amiodarone was stopped at this visit.   She called on 4/15/19 reporting that she had developed bilateral leg pain several weeks ago. She decided to hold her lipitor and after holding this for 5 days the pain has resolved.   She presents today for follow up. She reports feeling very well. A zio patch monitor from 5/16/19-5/30/19 showed SR with 3 PSVT up to 20.5 seconds associated with patient symptoms. She denies any chest pain/pressures, dizziness, lightheadedness, worsening shortness of breath, leg/ankle swelling, PND, orthopnea, palpitations, or syncopal symptoms. Presenting 12 lead ECG shows SR Vent Rate 65 bpm,  ms,  ms, QTc 413 ms. Current cardiac medications include: Xarelto, Toprol XL,and  Lisinopril.     PAST MEDICAL HISTORY:  Past Medical  History:   Diagnosis Date     Cervical high risk HPV (human papillomavirus) test positive 09/16/2016, 10/11/17, 10/17/18    (not 16 or 18). 10/11/17: LSIL pap with + HR HPV (not 16 or 18) result.10/17/18: See problem list.      Low risk HPV infection 2009, 2010    NIL pap, + HPV 53     Papanicolaou smear of cervix with low grade squamous intraepithelial lesion (LGSIL) 10/11/2017    10/11/17: LSIL pap with + HR HPV (not 16 or 18) result.     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 1998    Tx'd w/ Interferon x 1 year-virus free at 5 years, blood transfusion  related       CURRENT MEDICATIONS:  Current Outpatient Medications   Medication Sig Dispense Refill     Acetaminophen (TYLENOL PO) Take 1,000 mg by mouth as needed for mild pain or fever       aspirin (ASA) 81 MG tablet Take 1 tablet (81 mg) by mouth daily 90 tablet 3     cholecalciferol 2000 units tablet Take 2,000 Units by mouth 2 times daily        fish oil-omega-3 fatty acids 1000 MG capsule Take 1 g by mouth 2 times daily       levothyroxine (SYNTHROID/LEVOTHROID) 100 MCG tablet Take 1 tablet (100 mcg) by mouth daily 90 tablet 3     lisinopril (PRINIVIL/ZESTRIL) 20 MG tablet TAKE ONE TABLET BY MOUTH EVERY DAY 90 tablet 1     magnesium 250 MG tablet Take 250 mg by mouth daily        metoprolol succinate (TOPROL-XL) 100 MG 24 hr tablet Take 1 tablet (100 mg) by mouth daily Take with a 50 mg tablet to equal 150 mg daily. 90 tablet 3     metoprolol succinate (TOPROL-XL) 50 MG 24 hr tablet Take 1 tablet (50 mg) by mouth daily Take with a 100 mg tablet to equal 150 mg daily. 90 tablet 3     metroNIDAZOLE (FLAGYL) 500 MG tablet Take 1 tablet (500 mg) by mouth 2 times daily for 7 days 14 tablet 0     Multiple Vitamins-Minerals (MULTIVITAMIN ADULTS) TABS Take 1 tablet by mouth daily       rivaroxaban ANTICOAGULANT (XARELTO) 20 MG TABS tablet Take 1 tablet (20 mg) by mouth daily (with dinner) 30 tablet 11     valACYclovir  (VALTREX) 500 MG tablet TAKE ONE TABLET BY MOUTH TWICE A DAY FOR THREE DAYS. (REPEAT AS NEEDED) 12 tablet 5       PAST SURGICAL HISTORY:  Past Surgical History:   Procedure Laterality Date     ANESTHESIA CARDIOVERSION N/A 10/26/2018    Procedure: ANESTHESIA CARDIOVERSION;  Surgeon: GENERIC ANESTHESIA PROVIDER;  Location: [facility] OR     C LIGATE FALLOPIAN TUBE       SURGICAL HISTORY OF -  Right 2008    Right 1st MTPJ, bone spur resection       ALLERGIES:     Allergies   Allergen Reactions     Latex Swelling     Dental visit--burning sensation in lips and throat, lips felt puffy, eye watery from latex gloves     Pineapple Other (See Comments)       FAMILY HISTORY:  Family History   Problem Relation Age of Onset     Heart Disease Maternal Grandfather      Breast Cancer Maternal Grandmother         Dx'd age 70     Arthritis Father      Lipids Mother         On meds     Cancer Mother         BCC     Heart Disease Brother          of dilated of cardiomyopathy at age 54     Alcohol/Drug Sister      Alcohol/Drug Brother      Depression Sister        SOCIAL HISTORY:  Social History     Tobacco Use     Smoking status: Former Smoker     Smokeless tobacco: Never Used     Tobacco comment: quit 30 years ago   Substance Use Topics     Alcohol use: Yes     Comment: 4-5 Drinks Per Week     Drug use: No       ROS:   A comprehensive 10 point review of systems negative other than as mentioned in HPI.  Exam:  /82 (BP Location: Right arm, Patient Position: Chair, Cuff Size: Adult Regular)   Pulse 62   Wt 82.6 kg (182 lb)   LMP 10/27/2004   SpO2 95%   BMI 26.88 kg/m     GENERAL APPEARANCE: alert and no distress  HEENT: no icterus, no xanthelasmas, normal pupil size and reaction, normal palate, mucosa moist, no central cyanosis  NECK: no adenopathy, no asymmetry, masses, or scars, thyroid normal to palpation and no bruits, JVP not elevated  RESPIRATORY: lungs clear to auscultation - no rales, rhonchi or wheezes, no use of  accessory muscles, no retractions, respirations are unlabored, normal respiratory rate  CARDIOVASCULAR: regular rhythm, normal S1 with physiologic split S2, no S3 or S4 and no murmur, click or rub, precordium quiet with normal PMI.  ABDOMEN: soft, non tender, bowel sounds normal, non-distended  EXTREMITIES: peripheral pulses normal, no edema  NEURO: alert and oriented to person/place/time, normal speech, gait and affect  SKIN: no ecchymoses, no rashes  PSYCH: normal affect, cooperative    Labs:  Reviewed.     Testing/Procedures:  18 ECHOCARDIOGRAM:   Interpretation Summary  Global and regional left ventricular function is normal with an EF of 60-65%.  Global right ventricular function is normal.  A PFO closure device is present and is well seated in the interatrial septum,  no residual interatrial shunt by color Doppler and bubble study.  Pulmonary artery systolic pressure is normal.  The inferior vena cava was normal in size with preserved respiratory  variability.  No pericardial effusion is present.      Assessment and Plan:   Ms. Degroot is a 63 year old female who has a past medical history hypothyroidism, HepC, HTN, TIA, retinal artery occlusion, PFO s/p percutaneous closure 10/12/18, and PAF (CHADSVASC 4 on Xarelto) s/p DCCV 10/26/18. She developed episodes of PAF after her PFO closure, a couple requiring visits to ER or admission. She was ultimately started on short course of amiodarone on 10/26/18 and had DCCV. She continued to have some AF episodes after her DCCV that were self limiting. She was taken off amiodarone in 2019.  A zio patch monitor OFF amiodarone from 19-19 showed SR with 3 PSVT up to 20.5 seconds associated with patient symptoms. No recurrences of AF. She is feeling very well.     Paroxysmal Atrial Fibrillation:  We discussed in detail with the patient management/treatment options for Antoinette includin. Stroke Prophylaxis:  CHADSVASC=++TIA, +gender, +HTN  4, corresponding  to a 4.0% annual stroke / systemic [facility] event rate. indicating need for long term oral anticoagulation.  She is appropriately on Xarelto. No bleeding issues. She asked about possibility of stopping anticoagulation. We discussed that could consider her AF postop/post procedure in which case could consider stopping anticoagulation;however, she has had hx of TIAs and retinal artery occlusion so staying on anticoagulation may ultimately be advisable. We had a preference to continue chronic anticoagulation. She really wanted to be off anticoagulation. She states the understanding of the risks and wishes to stop anticoagulation at this time. She would be willing to resume if any recurrent AF.  2. Rate Control:Continue Toprol XL.   3. Rhythm Control: Cardioversion, Antiarrhythmics and/or ablation are options for rhythm control. She had AF episodes after her PFO closure and did require one DCCV. She had a short course of amiodarone and has been off since 2/2019. She has not had any further AF episodes. If longer term AAT required, we would favor alternative such as Sotalol or Dofetilide.   4. Risk Factor Management: Continue Statin, maintain tight BP control, and ASHLEY evaluation as indicated.      She will continue to follow with Dr. Lopez. She can follow up with EP as needed.     The patient states understanding and is agreeable with plan.   This patient was seen and evaluated with Dr. Baldwin. The above note reflects our joint assessment and plan.   JAKE Arias CNP  Pager: [phone]    EP STAFF NOTE  I have seen and examined the patient as part of a shared visit with ANDRE Arias NP.  I agree with the note above. I reviewed today's vital signs and medications. I have reviewed and discussed with the advanced practice provider their physical examination, assessment, and plan   Briefly, post ASD closure Afib, self limited, no recurrence off short term amio, CHADS VASC 4  My key history/exam findings are: RRR.   The  key management decisions made by me: recommended continuing AC but the patient really wanted to stop AC and did not want to continue AC for now knowing the potential risks, no clear data for postop afib on AC.    Alonzo Baldwin MD [facility]  Cardiology - Electrophysiology        CC  MARCH, MILY MELCHOR

## 2019-06-25 NOTE — NURSING NOTE
Cardiac Testing: Patient given instructions regarding  echocardiogram . Discussed purpose, preparation, procedure and when to expect results reported back to the patient. Patient demonstrated understanding of this information and agreed to call with further questions or concerns.    Med Reconcile: Reviewed and verified all current medications with the patient. The updated medication list was printed and given to the patient.    Return Appointment: Follow up with Dr Lopez in one year with an echo. Patient given instructions regarding scheduling next clinic visit. Patient demonstrated understanding of this information and agreed to call with further questions or concerns.    Patient stated she understood all health information given and agreed to call with further questions or concerns.    Nasir Alvarez, RN  RN Care Coordinator  [facility]  [phone]

## 2019-06-26 LAB — INTERPRETATION ECG - MUSE: NORMAL

## 2019-07-17 NOTE — TELEPHONE ENCOUNTER
Patient called wondering if she needs a pre-op physical prior to her PFO closure procedure.   Present (15 x15 bpm)

## 2019-10-01 ENCOUNTER — HEALTH MAINTENANCE LETTER (OUTPATIENT)
Age: 64
End: 2019-10-01

## 2019-10-29 ENCOUNTER — HEALTH MAINTENANCE LETTER (OUTPATIENT)
Age: 64
End: 2019-10-29

## 2019-10-30 ENCOUNTER — OFFICE VISIT (OUTPATIENT)
Dept: FAMILY MEDICINE | Facility: CLINIC | Age: 64
End: 2019-10-30
Payer: COMMERCIAL

## 2019-10-30 VITALS
RESPIRATION RATE: 14 BRPM | SYSTOLIC BLOOD PRESSURE: 129 MMHG | WEIGHT: 179.5 LBS | DIASTOLIC BLOOD PRESSURE: 83 MMHG | OXYGEN SATURATION: 98 % | HEIGHT: 69 IN | HEART RATE: 70 BPM | TEMPERATURE: 98.4 F | BODY MASS INDEX: 26.59 KG/M2

## 2019-10-30 DIAGNOSIS — I48.0 PAF (PAROXYSMAL ATRIAL FIBRILLATION) (H): ICD-10-CM

## 2019-10-30 DIAGNOSIS — E03.8 OTHER SPECIFIED HYPOTHYROIDISM: Chronic | ICD-10-CM

## 2019-10-30 DIAGNOSIS — I48.91 ATRIAL FIBRILLATION WITH RVR (H): ICD-10-CM

## 2019-10-30 DIAGNOSIS — R87.810 CERVICAL HIGH RISK HPV (HUMAN PAPILLOMAVIRUS) TEST POSITIVE: ICD-10-CM

## 2019-10-30 DIAGNOSIS — Z12.11 SCREEN FOR COLON CANCER: ICD-10-CM

## 2019-10-30 DIAGNOSIS — Z00.00 ENCOUNTER FOR MEDICARE ANNUAL WELLNESS EXAM: Primary | ICD-10-CM

## 2019-10-30 DIAGNOSIS — I10 ESSENTIAL HYPERTENSION WITH GOAL BLOOD PRESSURE LESS THAN 140/90: ICD-10-CM

## 2019-10-30 DIAGNOSIS — R31.29 MICROHEMATURIA: ICD-10-CM

## 2019-10-30 DIAGNOSIS — B00.9 RECURRENT HERPES SIMPLEX: ICD-10-CM

## 2019-10-30 LAB
ALBUMIN SERPL-MCNC: 3.5 G/DL (ref 3.4–5)
ALBUMIN UR-MCNC: NEGATIVE MG/DL
ALP SERPL-CCNC: 56 U/L (ref 40–150)
ALT SERPL W P-5'-P-CCNC: 25 U/L (ref 0–50)
ANION GAP SERPL CALCULATED.3IONS-SCNC: 9 MMOL/L (ref 3–14)
APPEARANCE UR: CLEAR
AST SERPL W P-5'-P-CCNC: 16 U/L (ref 0–45)
BILIRUB SERPL-MCNC: 0.6 MG/DL (ref 0.2–1.3)
BILIRUB UR QL STRIP: NEGATIVE
BUN SERPL-MCNC: 12 MG/DL (ref 7–30)
CALCIUM SERPL-MCNC: 9.7 MG/DL (ref 8.5–10.1)
CHLORIDE SERPL-SCNC: 109 MMOL/L (ref 94–109)
CHOLEST SERPL-MCNC: 225 MG/DL
CO2 SERPL-SCNC: 23 MMOL/L (ref 20–32)
COLOR UR AUTO: YELLOW
CREAT SERPL-MCNC: 0.73 MG/DL (ref 0.52–1.04)
GFR SERPL CREATININE-BSD FRML MDRD: 86 ML/MIN/{1.73_M2}
GLUCOSE SERPL-MCNC: 92 MG/DL (ref 70–99)
GLUCOSE UR STRIP-MCNC: NEGATIVE MG/DL
HDLC SERPL-MCNC: 78 MG/DL
HGB UR QL STRIP: NEGATIVE
KETONES UR STRIP-MCNC: NEGATIVE MG/DL
LDLC SERPL CALC-MCNC: 132 MG/DL
LEUKOCYTE ESTERASE UR QL STRIP: ABNORMAL
NITRATE UR QL: NEGATIVE
NONHDLC SERPL-MCNC: 147 MG/DL
PH UR STRIP: 8.5 PH (ref 5–7)
POTASSIUM SERPL-SCNC: 4.1 MMOL/L (ref 3.4–5.3)
PROT SERPL-MCNC: 7 G/DL (ref 6.8–8.8)
RBC #/AREA URNS AUTO: NORMAL /HPF
SODIUM SERPL-SCNC: 141 MMOL/L (ref 133–144)
SOURCE: ABNORMAL
SP GR UR STRIP: 1.01 (ref 1–1.03)
TRIGL SERPL-MCNC: 77 MG/DL
TSH SERPL DL<=0.005 MIU/L-ACNC: 0.73 MU/L (ref 0.4–4)
UROBILINOGEN UR STRIP-ACNC: 0.2 EU/DL (ref 0.2–1)
WBC #/AREA URNS AUTO: NORMAL /HPF

## 2019-10-30 PROCEDURE — 90471 IMMUNIZATION ADMIN: CPT | Performed by: FAMILY MEDICINE

## 2019-10-30 PROCEDURE — 36415 COLL VENOUS BLD VENIPUNCTURE: CPT | Performed by: FAMILY MEDICINE

## 2019-10-30 PROCEDURE — 80061 LIPID PANEL: CPT | Performed by: FAMILY MEDICINE

## 2019-10-30 PROCEDURE — 99397 PER PM REEVAL EST PAT 65+ YR: CPT | Mod: 25 | Performed by: FAMILY MEDICINE

## 2019-10-30 PROCEDURE — 84443 ASSAY THYROID STIM HORMONE: CPT | Performed by: FAMILY MEDICINE

## 2019-10-30 PROCEDURE — 81001 URINALYSIS AUTO W/SCOPE: CPT | Performed by: FAMILY MEDICINE

## 2019-10-30 PROCEDURE — 90732 PPSV23 VACC 2 YRS+ SUBQ/IM: CPT | Performed by: FAMILY MEDICINE

## 2019-10-30 PROCEDURE — 87624 HPV HI-RISK TYP POOLED RSLT: CPT | Performed by: FAMILY MEDICINE

## 2019-10-30 PROCEDURE — 88175 CYTOPATH C/V AUTO FLUID REDO: CPT | Performed by: FAMILY MEDICINE

## 2019-10-30 PROCEDURE — 80053 COMPREHEN METABOLIC PANEL: CPT | Performed by: FAMILY MEDICINE

## 2019-10-30 RX ORDER — METOPROLOL SUCCINATE 100 MG/1
100 TABLET, EXTENDED RELEASE ORAL DAILY
Qty: 90 TABLET | Refills: 3 | Status: SHIPPED | OUTPATIENT
Start: 2019-10-30 | End: 2020-07-17

## 2019-10-30 RX ORDER — METOPROLOL SUCCINATE 50 MG/1
50 TABLET, EXTENDED RELEASE ORAL DAILY
Qty: 90 TABLET | Refills: 3 | Status: SHIPPED | OUTPATIENT
Start: 2019-10-30 | End: 2020-07-17

## 2019-10-30 RX ORDER — LEVOTHYROXINE SODIUM 100 UG/1
100 TABLET ORAL DAILY
Qty: 90 TABLET | Refills: 3 | Status: SHIPPED | OUTPATIENT
Start: 2019-10-30 | End: 2020-07-17

## 2019-10-30 RX ORDER — VALACYCLOVIR HYDROCHLORIDE 500 MG/1
TABLET, FILM COATED ORAL
Qty: 12 TABLET | Refills: 5 | Status: SHIPPED | OUTPATIENT
Start: 2019-10-30 | End: 2022-02-01

## 2019-10-30 RX ORDER — LISINOPRIL 20 MG/1
20 TABLET ORAL DAILY
Qty: 90 TABLET | Refills: 1 | Status: SHIPPED | OUTPATIENT
Start: 2019-10-30 | End: 2020-05-19

## 2019-10-30 ASSESSMENT — ACTIVITIES OF DAILY LIVING (ADL): CURRENT_FUNCTION: NO ASSISTANCE NEEDED

## 2019-10-30 ASSESSMENT — PAIN SCALES - GENERAL: PAINLEVEL: NO PAIN (0)

## 2019-10-30 ASSESSMENT — MIFFLIN-ST. JEOR: SCORE: 1423.59

## 2019-10-30 NOTE — RESULT ENCOUNTER NOTE
Dear Sofi,   Your test results are all back -   -Cholesterol levels (LDL,HDL, Triglycerides) are borderline  ADVISE: rechecking in 1 year.   -Liver and gallbladder tests are normal (ALT,AST, Alk phos, bilirubin), kidney function is normal (Cr, GFR), sodium is normal, potassium is normal, calcium is normal, glucose is normal.  -TSH (thyroid stimulating hormone) level is normal which indicates appropriate thyroid replacement dosing.  ADVISE: continuing same replacement dose and rechecking this in 12 months.  -Urine is normal.  Let us know if you have any questions.  -Noemy Olivera, DO

## 2019-10-30 NOTE — NURSING NOTE
Chief Complaint   Patient presents with     Physical     patient is fasting      Prior to immunization administration, verified patients identity using patient s name and date of birth. Please see Immunization Activity for additional information.     Screening Questionnaire for Adult Immunization    Are you sick today?   No   Do you have allergies to medications, food, a vaccine component or latex?   No   Have you ever had a serious reaction after receiving a vaccination?   No   Do you have a long-term health problem with heart disease, lung disease, asthma, kidney disease, metabolic disease (e.g. diabetes), anemia, or other blood disorder?   No   Do you have cancer, leukemia, HIV/AIDS, or any other immune system problem?   No   In the past 3 months, have you taken medications that affect  your immune system, such as prednisone, other steroids, or anticancer drugs; drugs for the treatment of rheumatoid arthritis, Crohn s disease, or psoriasis; or have you had radiation treatments?   No   Have you had a seizure, or a brain or other nervous system problem?   No   During the past year, have you received a transfusion of blood or blood     products, or been given immune (gamma) globulin or antiviral drug?   No   For women: Are you pregnant or is there a chance you could become        pregnant during the next month?   No   Have you received any vaccinations in the past 4 weeks?   No     Immunization questionnaire answers were all negative.        Per orders of Dr. Olivera, injection of PNE 23 given by Yanet Fish CMA. Patient instructed to remain in clinic for 15 minutes afterwards, and to report any adverse reaction to me immediately.       Screening performed by Yanet Fish CMA on 10/30/2019 at 11:05 AM.

## 2019-10-30 NOTE — PROGRESS NOTES
She is at risk for lack of exercise and has been provided with information to increase physical activity for the benefit of her well-being.

## 2019-10-30 NOTE — PROGRESS NOTES
"   SUBJECTIVE:   CC: Sofi Degroot is an 64 year old woman who presents for preventive health visit.     Healthy Habits:     In general, how would you rate your overall health?  Good    Frequency of exercise:  None    Do you usually eat at least 4 servings of fruit and vegetables a day, include whole grains    & fiber and avoid regularly eating high fat or \"junk\" foods?  Yes    Taking medications regularly:  Yes    Ability to successfully perform activities of daily living:  No assistance needed    Home Safety:  No safety concerns identified    Hearing Impairment:  No hearing concerns    In the past 6 months, have you been bothered by leaking of urine?  No    In general, how would you rate your overall mental or emotional health?  Excellent      PHQ-2 Total Score: 0    Additional concerns today:  No          -------------------------------------    Today's PHQ-2 Score:   PHQ-2 ( 1999 Pf[employer]) 10/30/2019   Q1: Little interest or pleasure in doing things 0   Q2: Feeling down, depressed or hopeless 0   PHQ-2 Score 0   Q1: Little interest or pleasure in doing things Not at all   Q2: Feeling down, depressed or hopeless Not at all   PHQ-2 Score 0       Abuse: Current or Past(Physical, Sexual or Emotional)- No  Do you feel safe in your environment? Yes        Social History     Tobacco Use     Smoking status: Former Smoker     Smokeless tobacco: Never Used     Tobacco comment: quit 30 years ago   Substance Use Topics     Alcohol use: Yes     Comment: 4-5 Drinks Per Week         Alcohol Use 10/30/2019   Prescreen: >3 drinks/day or >7 drinks/week? No   Prescreen: >3 drinks/day or >7 drinks/week? -       Reviewed orders with patient.  Reviewed health maintenance and updated orders accordingly - Yes  Patient Active Problem List   Diagnosis     Hypothyroidism     Hepatitis C virus infection without hepatic coma     CARDIOVASCULAR SCREENING; LDL GOAL LESS THAN 100     Hypertension goal BP (blood pressure) < 140/90     " Transient cerebral ischemia     PFO (patent foramen ovale)     Cervical high risk HPV (human papillomavirus) test positive     Bilateral arm weakness     Lateral epicondylitis of both elbows     Retinal infarct     [facility]     Atrial fibrillation (H)     Paroxysmal A-fib (H)     Past Surgical History:   Procedure Laterality Date     ANESTHESIA CARDIOVERSION N/A 10/26/2018    Procedure: ANESTHESIA CARDIOVERSION;  Surgeon: GENERIC ANESTHESIA PROVIDER;  Location: [facility] OR     C LIGATE FALLOPIAN TUBE       SURGICAL HISTORY OF -  Right 2008    Right 1st MTPJ, bone spur resection       Social History     Tobacco Use     Smoking status: Former Smoker     Smokeless tobacco: Never Used     Tobacco comment: quit 30 years ago   Substance Use Topics     Alcohol use: Yes     Comment: 4-5 Drinks Per Week     Family History   Problem Relation Age of Onset     Heart Disease Maternal Grandfather      Breast Cancer Maternal Grandmother         Dx'd age 70     Arthritis Father      Lipids Mother         On meds     Cancer Mother         BCC     Heart Disease Brother          of dilated of cardiomyopathy at age 54     Alcohol/Drug Sister      Alcohol/Drug Brother      Depression Sister            Mammogram Screening: Patient over age 50, mutual decision to screen reflected in health maintenance.    Pertinent mammograms are reviewed under the imaging tab.  History of abnormal Pap smear: YES - updated in Problem List and Health Maintenance accordingly  PAP / HPV Latest Ref Rng & Units 10/17/2018 10/11/2017 2016   PAP - NIL LSIL(A) NIL   HPV 16 DNA NEG:Negative Negative Negative Negative   HPV 18 DNA NEG:Negative Negative Negative Negative   OTHER HR HPV NEG:Negative Positive(A) Positive(A) Positive(A)     Reviewed and updated as needed this visit by clinical staff  Tobacco  Allergies  Meds  Problems  Med Hx  Surg Hx  Fam Hx         Reviewed and  "updated as needed this visit by Provider  Tobacco  Allergies  Meds  Problems  Med Hx  Surg Hx  Fam Hx            Review of Systems  CONSTITUTIONAL: NEGATIVE for fever, chills, change in weight  INTEGUMENTARY/SKIN: NEGATIVE for worrisome rashes, moles or lesions  EYES: NEGATIVE for vision changes or irritation  ENT: NEGATIVE for ear, mouth and throat problems  RESP: NEGATIVE for significant cough or SOB  BREAST: NEGATIVE for masses, tenderness or discharge  CV: NEGATIVE for chest pain, palpitations or peripheral edema  GI: NEGATIVE for nausea, abdominal pain, heartburn, or change in bowel habits  : NEGATIVE for unusual urinary or vaginal symptoms. No vaginal bleeding.  MUSCULOSKELETAL: NEGATIVE for significant arthralgias or myalgia  NEURO: NEGATIVE for weakness, dizziness or paresthesias  PSYCHIATRIC: NEGATIVE for changes in mood or affect      OBJECTIVE:   /83 (BP Location: Right arm, Patient Position: Sitting, Cuff Size: Adult Regular)   Pulse 70   Temp 98.4  F (36.9  C) (Oral)   Resp 14   Ht 1.753 m (5' 9\")   Wt 81.4 kg (179 lb 8 oz)   LMP 10/27/2004   SpO2 98%   BMI 26.51 kg/m    Physical Exam  GENERAL APPEARANCE: healthy, alert and no distress  EYES: Eyes grossly normal to inspection, PERRL and conjunctivae and sclerae normal  HENT: ear canals and TM's normal, nose and mouth without ulcers or lesions, oropharynx clear and oral mucous membranes moist  NECK: no adenopathy, no asymmetry, masses, or scars and thyroid normal to palpation  RESP: lungs clear to auscultation - no rales, rhonchi or wheezes  BREAST: normal without masses, tenderness or nipple discharge and no palpable axillary masses or adenopathy  CV: regular rate and rhythm, normal S1 S2, no S3 or S4, no murmur, click or rub, no peripheral edema and peripheral pulses strong  ABDOMEN: soft, nontender, no hepatosplenomegaly, no masses and bowel sounds normal   (female): normal female external genitalia, normal urethral meatus, " vaginal mucosal atrophy noted, normal cervix, adnexae, and uterus without masses or abnormal discharge  MS: no musculoskeletal defects are noted and gait is age appropriate without ataxia  SKIN: no suspicious lesions or rashes  NEURO: Normal strength and tone, sensory exam grossly normal, mentation intact and speech normal  PSYCH: mentation appears normal and affect normal/bright    Diagnostic Test Results:  Labs reviewed in Epic  pending    ASSESSMENT/PLAN:   1. Encounter for Medicare annual wellness exam  Routine screening  - Lipid panel reflex to direct LDL Fasting    2. Recurrent herpes simplex  refilled  - valACYclovir (VALTREX) 500 MG tablet; TAKE ONE TABLET BY MOUTH TWICE A DAY FOR THREE DAYS. (REPEAT AS NEEDED)  Dispense: 12 tablet; Refill: 5    3. Atrial fibrillation with RVR (H)  Refilled - in NSR today -   - metoprolol succinate ER (TOPROL-XL) 50 MG 24 hr tablet; Take 1 tablet (50 mg) by mouth daily Take with a 100 mg tablet to equal 150 mg daily.  Dispense: 90 tablet; Refill: 3    4. PAF (paroxysmal atrial fibrillation) (H)     - metoprolol succinate ER (TOPROL-XL) 100 MG 24 hr tablet; Take 1 tablet (100 mg) by mouth daily Take with a 50 mg tablet to equal 150 mg daily.  Dispense: 90 tablet; Refill: 3    5. Essential hypertension with goal blood pressure less than 140/90  Refilled - controlled on current meds  - Comprehensive metabolic panel (BMP + Alb, Alk Phos, ALT, AST, Total. Bili, TP)  - metoprolol succinate ER (TOPROL-XL) 100 MG 24 hr tablet; Take 1 tablet (100 mg) by mouth daily Take with a 50 mg tablet to equal 150 mg daily.  Dispense: 90 tablet; Refill: 3  - lisinopril (PRINIVIL/ZESTRIL) 20 MG tablet; Take 1 tablet (20 mg) by mouth daily  Dispense: 90 tablet; Refill: 1    6. Other specified hypothyroidism  Pending TSH but refilled - will adjust if needed  - TSH with free T4 reflex  - levothyroxine (SYNTHROID/LEVOTHROID) 100 MCG tablet; Take 1 tablet (100 mcg) by mouth daily  Dispense: 90 tablet;  "Refill: 3    7. Screen for colon cancer     - Fecal colorectal cancer screen (FIT); Future    8. Microhematuria  Hx of RBC after wet prep last spring - want to make sure resolves  - *UA reflex to Microscopic and Culture ([facility] (except [place] and [place])    9. Cervical high risk HPV (human papillomavirus) test positive  pending  - Pap imaged thin layer diagnostic with HPV (select HPV order below)  - HPV High Risk Types DNA Cervical    COUNSELING:  Reviewed preventive health counseling, as reflected in patient instructions    Estimated body mass index is 26.51 kg/m  as calculated from the following:    Height as of this encounter: 1.753 m (5' 9\").    Weight as of this encounter: 81.4 kg (179 lb 8 oz).         reports that she has quit smoking. She has never used smokeless tobacco.      Counseling Resources:  ATP IV Guidelines  Pooled Cohorts Equation Calculator  Breast Cancer Risk Calculator  FRAX Risk Assessment  ICSI Preventive Guidelines  Dietary Guidelines for Americans, 2010  USDA's MyPlate  ASA Prophylaxis  Lung CA Screening    Noemy Olivera, DO  [facility]  "

## 2019-10-30 NOTE — PATIENT INSTRUCTIONS
Patient Education   Personalized Prevention Plan  You are due for the preventive services outlined below.  Your care team is available to assist you in scheduling these services.  If you have already completed any of these items, please share that information with your care team to update in your medical record.  Health Maintenance Due   Topic Date Due     Zoster (Shingles) Vaccine (2 of 3) 11/07/2014     FALL RISK ASSESSMENT  05/15/2019     Flu Vaccine (1) 09/01/2019     FIT Test  10/18/2019     Pneumococcal Vaccine (1 of 2 - PCV13) 05/15/2019     Annual Wellness Visit  10/17/2019     Thyroid Function Lab  10/25/2019     Mammogram  11/08/2019       Exercise for a Healthier Heart     Exercise with a friend. When activity is fun, you're more likely to stick with it.   You may wonder how you can improve the health of your heart. If you re thinking about exercise, you re on the right track. You don t need to become an athlete, but you do need a certain amount of brisk exercise to help strengthen your heart. If you have been diagnosed with a heart condition, your doctor may recommend exercise to help stabilize your condition. To help make exercise a habit, choose safe, fun activities.  Be sure to check with your healthcare provider before starting an exercise program.   Why exercise?  Exercising regularly offers many healthy rewards. It can help you do all of the following:    Improve your blood cholesterol level to help prevent further heart trouble    Lower your blood pressure to help prevent a stroke or heart attack    Control diabetes, or reduce your risk of getting this disease    Improve your heart and lung function    Reach and maintain a healthy weight    Make your muscles stronger and more limber so you can stay active    Prevent falls and fractures by slowing the loss of bone mass (osteoporosis)    Manage stress better    Reduce your blood pressure    Improve your sense of self and your body image  Exercise  tips  Ease into your routine. Set small goals. Then build on them.  Exercise on most days. Aim for a total of 150 or more minutes of moderate to  vigorous intensity activity each week. Consider 40 minutes, 3 to 4 times a week. For best results, activity should last for 40 minutes on average. It is OK to work up to the 40 minute period over time. Examples of moderate-intensity activity is walking 1 mile in 15 minutes or 30 to 45 minutes of yard work.  Step up your daily activity level. Along with your exercise program, try being more active throughout the day. Walk instead of drive. Do more household tasks or yard work.  Choose one or more activities you enjoy. Walking is one of the easiest things you can do. You can also try swimming, riding a bike, dancing, or taking an exercise class.  Stop exercising and call your doctor if you:    Have chest pain or feel dizzy or lightheaded    Feel burning, tightness, pressure, or heaviness in your chest, neck, shoulders, back, or arms    Have unusual shortness of breath    Have increased joint or muscle pain    Have palpitations or an irregular heartbeat   Date Last Reviewed: 5/1/2016 2000-2018 [employer]. [address] [place] [id number]. All rights reserved. This information is not intended as a substitute for professional medical care. Always follow your healthcare professional's instructions.

## 2019-11-04 LAB
COPATH REPORT: NORMAL
PAP: NORMAL

## 2019-11-05 LAB
FINAL DIAGNOSIS: NORMAL
HPV HR 12 DNA CVX QL NAA+PROBE: NEGATIVE
HPV16 DNA SPEC QL NAA+PROBE: NEGATIVE
HPV18 DNA SPEC QL NAA+PROBE: NEGATIVE
SPECIMEN DESCRIPTION: NORMAL
SPECIMEN SOURCE CVX/VAG CYTO: NORMAL

## 2019-11-06 ENCOUNTER — MYC MEDICAL ADVICE (OUTPATIENT)
Dept: FAMILY MEDICINE | Facility: CLINIC | Age: 64
End: 2019-11-06

## 2019-11-08 DIAGNOSIS — Z12.11 SCREEN FOR COLON CANCER: ICD-10-CM

## 2019-11-08 PROCEDURE — 82274 ASSAY TEST FOR BLOOD FECAL: CPT | Performed by: FAMILY MEDICINE

## 2019-11-10 LAB — HEMOCCULT STL QL IA: NEGATIVE

## 2019-11-12 NOTE — RESULT ENCOUNTER NOTE
Dear Sofi,   Your test results are all back -   -FIT test (screening test for colon cancer) was normal. ADVISE: rechecking this test in 1 year.  Let us know if you have any questions.  -Noemy Olivera, DO

## 2019-11-19 ENCOUNTER — ANCILLARY PROCEDURE (OUTPATIENT)
Dept: MAMMOGRAPHY | Facility: CLINIC | Age: 64
End: 2019-11-19
Attending: FAMILY MEDICINE
Payer: COMMERCIAL

## 2019-11-19 DIAGNOSIS — Z12.31 SCREENING MAMMOGRAM, ENCOUNTER FOR: ICD-10-CM

## 2019-11-19 PROCEDURE — 77067 SCR MAMMO BI INCL CAD: CPT

## 2020-05-18 DIAGNOSIS — B00.9 RECURRENT HERPES SIMPLEX: ICD-10-CM

## 2020-05-18 DIAGNOSIS — E03.8 OTHER SPECIFIED HYPOTHYROIDISM: Chronic | ICD-10-CM

## 2020-05-18 DIAGNOSIS — I10 ESSENTIAL HYPERTENSION WITH GOAL BLOOD PRESSURE LESS THAN 140/90: ICD-10-CM

## 2020-05-18 DIAGNOSIS — I48.91 ATRIAL FIBRILLATION WITH RVR (H): ICD-10-CM

## 2020-05-18 DIAGNOSIS — I48.0 PAF (PAROXYSMAL ATRIAL FIBRILLATION) (H): ICD-10-CM

## 2020-05-19 NOTE — TELEPHONE ENCOUNTER
Note from 10/30/19 OV:   Return in about 6 months (around 4/30/2020) for BP Recheck.     [employer] message sent to patient.  Irene Franco RN

## 2020-05-22 RX ORDER — VALACYCLOVIR HYDROCHLORIDE 500 MG/1
TABLET, FILM COATED ORAL
Qty: 12 TABLET | Refills: 0 | Status: CANCELLED | OUTPATIENT
Start: 2020-05-22

## 2020-05-22 RX ORDER — LEVOTHYROXINE SODIUM 100 UG/1
100 TABLET ORAL DAILY
Qty: 30 TABLET | Refills: 0 | Status: CANCELLED | OUTPATIENT
Start: 2020-05-22

## 2020-05-22 RX ORDER — LISINOPRIL 20 MG/1
20 TABLET ORAL DAILY
Qty: 30 TABLET | Refills: 0 | Status: SHIPPED | OUTPATIENT
Start: 2020-05-22 | End: 2020-05-29

## 2020-05-22 RX ORDER — METOPROLOL SUCCINATE 50 MG/1
50 TABLET, EXTENDED RELEASE ORAL DAILY
Qty: 30 TABLET | Refills: 0 | Status: CANCELLED | OUTPATIENT
Start: 2020-05-22

## 2020-05-22 RX ORDER — METOPROLOL SUCCINATE 100 MG/1
100 TABLET, EXTENDED RELEASE ORAL DAILY
Qty: 30 TABLET | Refills: 0 | Status: CANCELLED | OUTPATIENT
Start: 2020-05-22

## 2020-05-22 NOTE — TELEPHONE ENCOUNTER
Next 5 appointments (look out 90 days)    May 29, 2020 10:00 AM CDT  Telephone Visit with Noemy Olivera DO  [facility] [address] [phone]        Patient only wants Lisinopril right now  Prescription approved per [facility] Refill Protocol.  Alisa KEITH RN    New Insurance:  [employer]  [employer] Advantage   Member ID [id number]  Group # [id number]    Gave to Pat to update  Alisa KEITH RN

## 2020-05-29 ENCOUNTER — VIRTUAL VISIT (OUTPATIENT)
Dept: FAMILY MEDICINE | Facility: CLINIC | Age: 65
End: 2020-05-29
Payer: COMMERCIAL

## 2020-05-29 VITALS — HEART RATE: 65 BPM | SYSTOLIC BLOOD PRESSURE: 130 MMHG | DIASTOLIC BLOOD PRESSURE: 84 MMHG

## 2020-05-29 DIAGNOSIS — I10 ESSENTIAL HYPERTENSION WITH GOAL BLOOD PRESSURE LESS THAN 140/90: ICD-10-CM

## 2020-05-29 DIAGNOSIS — E78.2 MIXED HYPERLIPIDEMIA: Primary | ICD-10-CM

## 2020-05-29 PROCEDURE — 99214 OFFICE O/P EST MOD 30 MIN: CPT | Mod: 95 | Performed by: FAMILY MEDICINE

## 2020-05-29 RX ORDER — LISINOPRIL 20 MG/1
20 TABLET ORAL DAILY
Qty: 90 TABLET | Refills: 1 | Status: SHIPPED | OUTPATIENT
Start: 2020-05-29 | End: 2020-11-03

## 2020-05-29 RX ORDER — ROSUVASTATIN CALCIUM 10 MG/1
10 TABLET, COATED ORAL DAILY
Qty: 90 TABLET | Refills: 0 | Status: SHIPPED | OUTPATIENT
Start: 2020-05-29 | End: 2020-11-03

## 2020-05-29 NOTE — PROGRESS NOTES
"Sofi Degroot is a 65 year old female who is being evaluated via a billable telephone visit.      The patient has been notified of following:     \"This telephone visit will be conducted via a call between you and your physician/provider. We have found that certain health care needs can be provided without the need for a physical exam.  This service lets us provide the care you need with a short phone conversation.  If a prescription is necessary we can send it directly to your pharmacy.  If lab work is needed we can place an order for that and you can then stop by our lab to have the test done at a later time.    Telephone visits are billed at different rates depending on your insurance coverage. During this emergency period, for some insurers they may be billed the same as an in-person visit.  Please reach out to your insurance provider with any questions.    If during the course of the call the physician/provider feels a telephone visit is not appropriate, you will not be charged for this service.\"    Patient has given verbal consent for Telephone visit?  Yes    What phone number would you like to be contacted at? [phone]    How would you like to obtain your AVS? Tu Gonzalez     Sofi Degroot is a 65 year old female who presents via phone visit today for the following health issues:    HPI  Hypertension Follow-up      Do you check your blood pressure regularly outside of the clinic? Yes     Are you following a low salt diet? Yes    Are your blood pressures ever more than 140 on the top number (systolic) OR more   than 90 on the bottom number (diastolic), for example 140/90? No 140/80'S usually  At 120's/80'S      How many servings of fruits and vegetables do you eat daily?  4 or more    On average, how many sweetened beverages do you drink each day (Examples: soda, juice, sweet tea, etc.  Do NOT count diet or artificially sweetened beverages)?   0    How many days per week do you exercise " enough to make your heart beat faster? 3 or less    How many minutes a day do you exercise enough to make your heart beat faster? 9 or less    How many days per week do you miss taking your medication? 0       Retiring - but still working from home  July 3 will be last day of work.  Going to stay on as casual     Feels good -   Blood pressure -   Taking lisinopril in the AM and taking metoprolol 150mg at night   Was checking readings at home -   Typically 120/80's  Occasionally will have 140's - typically in the morning -     Pulse in the 60's      -------------------------------------    Patient Active Problem List   Diagnosis     Hypothyroidism     Hepatitis C virus infection without hepatic coma     CARDIOVASCULAR SCREENING; LDL GOAL LESS THAN 100     Hypertension goal BP (blood pressure) < 140/90     Transient cerebral ischemia     PFO (patent foramen ovale)     Cervical high risk HPV (human papillomavirus) test positive     Bilateral arm weakness     Lateral epicondylitis of both elbows     Retinal infarct     [facility]     Atrial fibrillation (H)     Paroxysmal A-fib (H)     Past Surgical History:   Procedure Laterality Date     ANESTHESIA CARDIOVERSION N/A 10/26/2018    Procedure: ANESTHESIA CARDIOVERSION;  Surgeon: GENERIC ANESTHESIA PROVIDER;  Location: [facility] OR     C LIGATE FALLOPIAN TUBE  1986     SURGICAL HISTORY OF -  Right 2008    Right 1st MTPJ, bone spur resection       Social History     Tobacco Use     Smoking status: Former Smoker     Smokeless tobacco: Never Used     Tobacco comment: quit 30 years ago   Substance Use Topics     Alcohol use: Yes     Comment: 4-5 Drinks Per Week     Family History   Problem Relation Age of Onset     Heart Disease Maternal Grandfather      Breast Cancer Maternal Grandmother         Dx'd age 70     Arthritis Father      Lipids Mother         On meds     Cancer Mother         BCC     Heart Disease  Brother          of dilated of cardiomyopathy at age 54     Alcohol/Drug Sister      Alcohol/Drug Brother      Depression Sister            Reviewed and updated as needed this visit by Provider         Review of Systems   Constitutional, HEENT, cardiovascular, pulmonary, GI, , musculoskeletal, neuro, skin, endocrine and psych systems are negative, except as otherwise noted.       Objective   Reported vitals:  LMP 10/27/2004    healthy, alert and no distress  PSYCH: Alert and oriented times 3; coherent speech, normal   rate and volume, able to articulate logical thoughts, able   to abstract reason, no tangential thoughts, no hallucinations   or delusions  Her affect is normal  RESP: No cough, no audible wheezing, able to talk in full sentences  Remainder of exam unable to be completed due to telephone visits    Diagnostic Test Results:  Labs reviewed in Epic        Assessment/Plan:  1. Essential hypertension with goal blood pressure less than 140/90  BP is controlled -   Will refill meds  F/u 6 months   - lisinopril (ZESTRIL) 20 MG tablet; Take 1 tablet (20 mg) by mouth daily  Dispense: 90 tablet; Refill: 1    2. Mixed hyperlipidemia  Had trouble tolerating statin due to myalgia  Will try Crestor M, W, F -   - rosuvastatin (CRESTOR) 10 MG tablet; Take 1 tablet (10 mg) by mouth daily  Dispense: 90 tablet; Refill: 0    No follow-ups on file.      Phone call duration:  11 minutes    Noemy Olivera DO

## 2020-07-17 ENCOUNTER — MYC MEDICAL ADVICE (OUTPATIENT)
Dept: FAMILY MEDICINE | Facility: CLINIC | Age: 65
End: 2020-07-17

## 2020-07-17 DIAGNOSIS — I48.0 PAF (PAROXYSMAL ATRIAL FIBRILLATION) (H): ICD-10-CM

## 2020-07-17 DIAGNOSIS — E03.8 OTHER SPECIFIED HYPOTHYROIDISM: Chronic | ICD-10-CM

## 2020-07-17 DIAGNOSIS — I48.91 ATRIAL FIBRILLATION WITH RVR (H): ICD-10-CM

## 2020-07-17 DIAGNOSIS — I10 ESSENTIAL HYPERTENSION WITH GOAL BLOOD PRESSURE LESS THAN 140/90: ICD-10-CM

## 2020-07-17 RX ORDER — LEVOTHYROXINE SODIUM 100 UG/1
100 TABLET ORAL DAILY
Qty: 90 TABLET | Refills: 0 | Status: SHIPPED | OUTPATIENT
Start: 2020-07-17 | End: 2020-09-08

## 2020-07-17 RX ORDER — METOPROLOL SUCCINATE 50 MG/1
50 TABLET, EXTENDED RELEASE ORAL DAILY
Qty: 90 TABLET | Refills: 0 | Status: SHIPPED | OUTPATIENT
Start: 2020-07-17 | End: 2020-09-11

## 2020-07-17 RX ORDER — METOPROLOL SUCCINATE 100 MG/1
100 TABLET, EXTENDED RELEASE ORAL DAILY
Qty: 90 TABLET | Refills: 0 | Status: SHIPPED | OUTPATIENT
Start: 2020-07-17 | End: 2020-09-11

## 2020-07-17 NOTE — TELEPHONE ENCOUNTER
Prescription approved per [facility] Refill Protocol.  Alisa KEITH RN

## 2020-07-23 ENCOUNTER — MYC MEDICAL ADVICE (OUTPATIENT)
Dept: FAMILY MEDICINE | Facility: CLINIC | Age: 65
End: 2020-07-23

## 2020-08-19 DIAGNOSIS — E03.8 OTHER SPECIFIED HYPOTHYROIDISM: Chronic | ICD-10-CM

## 2020-08-20 RX ORDER — LEVOTHYROXINE SODIUM 100 UG/1
TABLET ORAL
Refills: 0
Start: 2020-08-20

## 2020-11-03 ENCOUNTER — VIRTUAL VISIT (OUTPATIENT)
Dept: FAMILY MEDICINE | Facility: CLINIC | Age: 65
End: 2020-11-03
Payer: COMMERCIAL

## 2020-11-03 DIAGNOSIS — I48.0 PAF (PAROXYSMAL ATRIAL FIBRILLATION) (H): ICD-10-CM

## 2020-11-03 DIAGNOSIS — E03.8 OTHER SPECIFIED HYPOTHYROIDISM: Chronic | ICD-10-CM

## 2020-11-03 DIAGNOSIS — I10 HYPERTENSION GOAL BP (BLOOD PRESSURE) < 140/90: Primary | Chronic | ICD-10-CM

## 2020-11-03 DIAGNOSIS — Z12.11 SCREEN FOR COLON CANCER: ICD-10-CM

## 2020-11-03 DIAGNOSIS — I48.91 ATRIAL FIBRILLATION, UNSPECIFIED TYPE (H): ICD-10-CM

## 2020-11-03 DIAGNOSIS — Z00.00 ROUTINE ADULT HEALTH MAINTENANCE: ICD-10-CM

## 2020-11-03 DIAGNOSIS — I10 ESSENTIAL HYPERTENSION WITH GOAL BLOOD PRESSURE LESS THAN 140/90: ICD-10-CM

## 2020-11-03 DIAGNOSIS — I48.91 ATRIAL FIBRILLATION WITH RVR (H): ICD-10-CM

## 2020-11-03 PROCEDURE — 99397 PER PM REEVAL EST PAT 65+ YR: CPT | Mod: 95 | Performed by: FAMILY MEDICINE

## 2020-11-03 RX ORDER — METOPROLOL SUCCINATE 100 MG/1
TABLET, EXTENDED RELEASE ORAL
Qty: 90 TABLET | Refills: 1 | Status: SHIPPED | OUTPATIENT
Start: 2020-11-03 | End: 2021-05-12

## 2020-11-03 RX ORDER — LISINOPRIL 20 MG/1
20 TABLET ORAL DAILY
Qty: 90 TABLET | Refills: 1 | Status: SHIPPED | OUTPATIENT
Start: 2020-11-03 | End: 2021-04-14

## 2020-11-03 RX ORDER — METOPROLOL SUCCINATE 50 MG/1
TABLET, EXTENDED RELEASE ORAL
Qty: 90 TABLET | Refills: 1 | Status: SHIPPED | OUTPATIENT
Start: 2020-11-03 | End: 2021-05-12

## 2020-11-03 NOTE — PATIENT INSTRUCTIONS
PLEASE CALL TO SCHEDULE YOUR MAMMOGRAM  [facility] [phone]  [facility] [phone]  [facility]   [phone]  Central Scheduling (all locations) [phone]

## 2020-11-03 NOTE — PROGRESS NOTES
"Sofi Degroot is a 65 year old female who is being evaluated via a billable video visit.      The patient has been notified of following:     \"This video visit will be conducted via a call between you and your physician/provider. We have found that certain health care needs can be provided without the need for an in-person physical exam.  This service lets us provide the care you need with a video conversation.  If a prescription is necessary we can send it directly to your pharmacy.  If lab work is needed we can place an order for that and you can then stop by our lab to have the test done at a later time.    Video visits are billed at different rates depending on your insurance coverage.  Please reach out to your insurance provider with any questions.    If during the course of the call the physician/provider feels a video visit is not appropriate, you will not be charged for this service.\"    Patient has given verbal consent for Video visit? Yes  How would you like to obtain your AVS? MyChart  If you are dropped from the video visit, the video invite should be resent to: Text to cell phone: [phone]  Will anyone else be joining your video visit? No     Subjective     Sofi Degroot is a 65 year old female who presents today via video visit for the following health issues:    HPI     Annual Wellness Visit    Patient has been advised of split billing requirements and indicates understanding: Yes     Are you in the first 12 months of your Medicare Part B coverage?  No    Physical Health:    In general, how would you rate your overall physical health? good    Outside of work, how many days during the week do you exercise?2-3 days/week    Outside of work, approximately how many minutes a day do you exercise?15-30 minutes  If you drink alcohol do you typically have >3 drinks per day or >7 drinks per week? Yes - AUDIT SCORE:   0        Do you usually eat at least 4 servings of fruit and vegetables a day, " "include whole grains & fiber and avoid regularly eating high fat or \"junk\" foods? Yes    Do you have any problems taking medications regularly? No    Do you have any side effects from medications? none    Needs assistance for the following daily activities: no assistance needed    Which of the following safety concerns are present in your home?  lack of handrails on bathroom     Hearing impairment: No    In the past 6 months, have you been bothered by leaking of urine? Yes all the time    Mental Health:    In general, how would you rate your overall mental or emotional health? good  PHQ-2 Score:      Do you feel safe in your environment? Yes    Have you ever done Advance Care Planning? (For example, a Health Directive, POLST, or a discussion with a medical provider or your loved ones about your wishes)? Yes, advance care planning is on file.    Fall risk:  Unable to complete due to virtual visit; need for additional assessment in future face-to-face visit    Cognitive Screening: Unable to complete due to virtual visit; need for additional assessment in future face-to-face visit    Do you have sleep apnea, excessive snoring or daytime drowsiness?: no    Current providers sharing in care for this patient include:    Patient Care Team:  Noemy Olivera DO as PCP - General  Noemy Olivera DO as Referring Physician (Family Practice)  Mahamed Block MD as Resident (House Physician)  Nasir Alvarez, KAN as Nurse Coordinator (Cardiology)  Xiomara Lopez MD as MD (Cardiology)  Alonzo Baldwin MD as MD (Clinical Cardiac Electrophysiology)  Noemy Olivera DO as Assigned PCP  Alonzo Baldwin MD as Assigned Heart and Vascular Provider    Patient has been advised of split billing requirements and indicates understanding: Yes       Video Start Time: 11:17am        Review of Systems   Constitutional, HEENT, cardiovascular, pulmonary, GI, , musculoskeletal, neuro, skin, endocrine and psych systems are negative, " except as otherwise noted.      Objective           Vitals:  No vitals were obtained today due to virtual visit.    Physical Exam     GENERAL: Healthy, alert and no distress  EYES: Eyes grossly normal to inspection.  No discharge or erythema, or obvious scleral/conjunctival abnormalities.  RESP: No audible wheeze, cough, or visible cyanosis.  No visible retractions or increased work of breathing.    SKIN: Visible skin clear. No significant rash, abnormal pigmentation or lesions.  NEURO: Cranial nerves grossly intact.  Mentation and speech appropriate for age.  PSYCH: Mentation appears normal, affect normal/bright, judgement and insight intact, normal speech and appearance well-groomed.      Labs orderd         Assessment & Plan     Routine adult health maintenance  Routine   Will  FIT test  Plan to do mammogram and pap after COVID improves -     Atrial fibrillation with RVR (H)  Stable - tolerating without side effects   - metoprolol succinate ER (TOPROL-XL) 50 MG 24 hr tablet; TAKE 1 TABLET BY MOUTH  DAILY - TAKE WITH 100MG  TABLET TO EQUAL 150MG DAILY  - Lipid panel reflex to direct LDL Fasting; Future    PAF (paroxysmal atrial fibrillation) (H)     - metoprolol succinate ER (TOPROL-XL) 100 MG 24 hr tablet; TAKE 1 TABLET BY MOUTH  DAILY TAKE WITH A 50 MG  TABLET TO EQUAL 150 MG  DAILY.  - Lipid panel reflex to direct LDL Fasting; Future    Essential hypertension with goal blood pressure less than 140/90     - metoprolol succinate ER (TOPROL-XL) 100 MG 24 hr tablet; TAKE 1 TABLET BY MOUTH  DAILY TAKE WITH A 50 MG  TABLET TO EQUAL 150 MG  DAILY.  - lisinopril (ZESTRIL) 20 MG tablet; Take 1 tablet (20 mg) by mouth daily  - Lipid panel reflex to direct LDL Fasting; Future    Hypertension goal BP (blood pressure) < 140/90  BP controlled - patient reported today   - Comprehensive metabolic panel (BMP + Alb, Alk Phos, ALT, AST, Total. Bili, TP); Future  - Lipid panel reflex to direct LDL Fasting; Future    Other  specified hypothyroidism  TSH pending   If in normal range can refill med   - TSH with free T4 reflex; Future  - Lipid panel reflex to direct LDL Fasting; Future    Screen for colon cancer     - Fecal colorectal cancer screen (FIT); Future    Atrial fibrillation, unspecified type (H)               Return in about 1 year (around 11/3/2021) for Physical Exam.    Noemy Olivera DO  [facility]      Video-Visit Details    Type of service:  Video Visit    Video End Time:11:33 AM    Originating Location (pt. Location): Home    Distant Location (provider location):  [facility]     Platform used for Video Visit: [employer]

## 2020-11-08 DIAGNOSIS — E03.8 OTHER SPECIFIED HYPOTHYROIDISM: Chronic | ICD-10-CM

## 2020-11-09 NOTE — TELEPHONE ENCOUNTER
Patient scheduled lab only for 11/12/2020  Has plenty of thyroid medication right now  Will postpone refill to Friday 11/13/2020 when result will be back   Alisa KEITH RN

## 2020-11-12 DIAGNOSIS — E03.8 OTHER SPECIFIED HYPOTHYROIDISM: Chronic | ICD-10-CM

## 2020-11-12 DIAGNOSIS — I48.91 ATRIAL FIBRILLATION WITH RVR (H): ICD-10-CM

## 2020-11-12 DIAGNOSIS — I10 ESSENTIAL HYPERTENSION WITH GOAL BLOOD PRESSURE LESS THAN 140/90: ICD-10-CM

## 2020-11-12 DIAGNOSIS — I48.0 PAF (PAROXYSMAL ATRIAL FIBRILLATION) (H): ICD-10-CM

## 2020-11-12 DIAGNOSIS — I10 HYPERTENSION GOAL BP (BLOOD PRESSURE) < 140/90: Chronic | ICD-10-CM

## 2020-11-12 LAB
ALBUMIN SERPL-MCNC: 3.6 G/DL (ref 3.4–5)
ALP SERPL-CCNC: 61 U/L (ref 40–150)
ALT SERPL W P-5'-P-CCNC: 29 U/L (ref 0–50)
ANION GAP SERPL CALCULATED.3IONS-SCNC: 6 MMOL/L (ref 3–14)
AST SERPL W P-5'-P-CCNC: 23 U/L (ref 0–45)
BILIRUB SERPL-MCNC: 0.6 MG/DL (ref 0.2–1.3)
BUN SERPL-MCNC: 14 MG/DL (ref 7–30)
CALCIUM SERPL-MCNC: 9.5 MG/DL (ref 8.5–10.1)
CHLORIDE SERPL-SCNC: 109 MMOL/L (ref 94–109)
CHOLEST SERPL-MCNC: 218 MG/DL
CO2 SERPL-SCNC: 25 MMOL/L (ref 20–32)
CREAT SERPL-MCNC: 0.86 MG/DL (ref 0.52–1.04)
GFR SERPL CREATININE-BSD FRML MDRD: 71 ML/MIN/{1.73_M2}
GLUCOSE SERPL-MCNC: 99 MG/DL (ref 70–99)
HDLC SERPL-MCNC: 70 MG/DL
LDLC SERPL CALC-MCNC: 132 MG/DL
NONHDLC SERPL-MCNC: 148 MG/DL
POTASSIUM SERPL-SCNC: 4.3 MMOL/L (ref 3.4–5.3)
PROT SERPL-MCNC: 7.2 G/DL (ref 6.8–8.8)
SODIUM SERPL-SCNC: 140 MMOL/L (ref 133–144)
TRIGL SERPL-MCNC: 78 MG/DL
TSH SERPL DL<=0.005 MIU/L-ACNC: 1.16 MU/L (ref 0.4–4)

## 2020-11-12 PROCEDURE — 80061 LIPID PANEL: CPT | Performed by: FAMILY MEDICINE

## 2020-11-12 PROCEDURE — 84443 ASSAY THYROID STIM HORMONE: CPT | Performed by: FAMILY MEDICINE

## 2020-11-12 PROCEDURE — 36415 COLL VENOUS BLD VENIPUNCTURE: CPT | Performed by: FAMILY MEDICINE

## 2020-11-12 PROCEDURE — 80053 COMPREHEN METABOLIC PANEL: CPT | Performed by: FAMILY MEDICINE

## 2020-11-13 DIAGNOSIS — Z12.11 SCREEN FOR COLON CANCER: ICD-10-CM

## 2020-11-13 PROCEDURE — 82274 ASSAY TEST FOR BLOOD FECAL: CPT | Performed by: FAMILY MEDICINE

## 2020-11-14 LAB — HEMOCCULT STL QL IA: NEGATIVE

## 2020-11-19 RX ORDER — LEVOTHYROXINE SODIUM 100 UG/1
TABLET ORAL
Qty: 90 TABLET | Refills: 0 | Status: SHIPPED | OUTPATIENT
Start: 2020-11-19 | End: 2021-01-29

## 2020-11-19 NOTE — TELEPHONE ENCOUNTER
TSH   Date Value Ref Range Status   11/12/2020 1.16 0.40 - 4.00 mU/L Final     Pres[place] [facility] [place].  Irene Franco RN

## 2020-12-29 ENCOUNTER — PATIENT OUTREACH (OUTPATIENT)
Dept: FAMILY MEDICINE | Facility: CLINIC | Age: 65
End: 2020-12-29

## 2020-12-29 NOTE — TELEPHONE ENCOUNTER
Evgeny Olivera,     Pap RN contacted patient today as reminder to schedule pap and HPV test. Patient states that she declines to do further paps. Patient was advised why further pap screening is recommended with regard to her pap history and patient again politely refused, but she does agree to discuss with you at her next physical.     Yulissa Underwood, FARHATN, RN-BC

## 2021-02-01 ENCOUNTER — MYC MEDICAL ADVICE (OUTPATIENT)
Dept: FAMILY MEDICINE | Facility: CLINIC | Age: 66
End: 2021-02-01

## 2021-02-02 ENCOUNTER — VIRTUAL VISIT (OUTPATIENT)
Dept: FAMILY MEDICINE | Facility: CLINIC | Age: 66
End: 2021-02-02
Payer: COMMERCIAL

## 2021-02-02 DIAGNOSIS — B02.9 HERPES ZOSTER WITHOUT COMPLICATION: Primary | ICD-10-CM

## 2021-02-02 PROCEDURE — 99213 OFFICE O/P EST LOW 20 MIN: CPT | Mod: 95 | Performed by: PHYSICIAN ASSISTANT

## 2021-02-02 RX ORDER — VALACYCLOVIR HYDROCHLORIDE 1 G/1
1000 TABLET, FILM COATED ORAL 3 TIMES DAILY
Qty: 21 TABLET | Refills: 0 | Status: SHIPPED | OUTPATIENT
Start: 2021-02-02 | End: 2022-02-01

## 2021-02-02 NOTE — PROGRESS NOTES
Sofi is a 65 year old who is being evaluated via a billable video visit.      How would you like to obtain your AVS? MyChart  If the video visit is dropped, the invitation should be resent by: Text to cell phone: [phone]  Will anyone else be joining your video visit? No    Video Start Time: 2:03 PM  Assessment & Plan     Herpes zoster without complication  Will treat based on symptoms.  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  - valACYclovir (VALTREX) 1000 mg tablet; Take 1 tablet (1,000 mg) by mouth 3 times daily for 7 days              No follow-ups on file.    Dereck Guerra PA-C  [facility]     Sofi is a 65 year old who presents to clinic today for the following health issues     HPI       Concern - Shingles   Onset: 4 days   Description: Rash on right side   Intensity: moderate  Progression of Symptoms:  worsening  Accompanying Signs & Symptoms: muscle pain on that location started last night , rash is raised and irritated   Previous history of similar problem: Pt states that she had a herpes outbreak and had just finished antibiotic   Precipitating factors:        Worsened by:   Alleviating factors:        Improved by:   Therapies tried and outcome: tylenol for the pain         Review of Systems   Constitutional, HEENT, cardiovascular, pulmonary, gi and gu systems are negative, except as otherwise noted.      Objective           Vitals:  No vitals were obtained today due to virtual visit.    Physical Exam   GENERAL: alert and no distress  EYES: Eyes grossly normal to inspection.  No discharge or erythema, or obvious scleral/conjunctival abnormalities.  RESP: No audible wheeze, cough, or visible cyanosis.  No visible retractions or increased work of breathing.    SKIN:  Patient unable to focus camera on back where rash is  NEURO: Cranial nerves grossly intact.  Mentation and speech appropriate for age.  PSYCH:  Mentation appears normal, affect normal/bright, judgement and insight intact, normal speech and appearance well-groomed.                Video-Visit Details    Type of service:  Video Visit    Video End Time:2:24 PM    Originating Location (pt. Location): Home    Distant Location (provider location):  [facility]     Platform used for Video Visit: [employer]

## 2021-03-20 ENCOUNTER — HEALTH MAINTENANCE LETTER (OUTPATIENT)
Age: 66
End: 2021-03-20

## 2021-05-10 DIAGNOSIS — Z12.31 VISIT FOR SCREENING MAMMOGRAM: ICD-10-CM

## 2021-05-10 PROCEDURE — 77067 SCR MAMMO BI INCL CAD: CPT | Mod: 26 | Performed by: RADIOLOGY

## 2021-05-10 PROCEDURE — 77067 SCR MAMMO BI INCL CAD: CPT

## 2021-08-05 NOTE — PROGRESS NOTES
Sofi is a 66 year old who is being evaluated via a billable video visit.      How would you like to obtain your AVS? MyChart  If the video visit is dropped, the invitation should be resent by: Text to cell phone: [phone]   Will anyone else be joining your video visit? No     Video Start Time: 12:35 PM    Assessment & Plan     Other urinary incontinence  Urge incontinence   Will try oxybutynin once daily   Discussed potential side effects  May need to titrate dose up   Will refill for 90 days supply if working well - pt will send message.  Pt will call or RTC if symptoms worsen or do not improve.   - oxybutynin ER (DITROPAN-XL) 5 MG 24 hr tablet; Take 1 tablet (5 mg) by mouth daily          No follow-ups on file.    Noemy Olivera DO  [facility]   Sofi is a 66 year old who presents for the following health issues     HPI     Concern - Urinary Incontinence  Onset: 6 months ago  Description: leaks 3-5 a DAY  Intensity: moderate  Progression of Symptoms:  worsening  Accompanying Signs & Symptoms: NOTHING  Previous history of similar problem: YES  Precipitating factors:        Worsened by: NOTHING  Alleviating factors:        Improved by: NOTHING  Therapies tried and outcome:  none     Feels good she immunized -     Urinary symptoms-  Has had incontinence for years  Was always behavior controlled by regular urination  About 6 months ago - started to progress  Will dribble without feeling like she has to urinate  For past few weeks - noticed that movement from sitting to standing position will have sudden leaking  Sister started on medication and really helped.  Feels like she is able to empty bladder completely     Going on a trip to [facility] on Sept 3rd -   Would like to try     No bladder infection symptoms, no odor, no frequency   No hematuria -     No pelvic symptoms -   Has ovaries and uterus -   No constipation - uses leg cramps so has loose stools -         Review of  Systems   Genitourinary: Positive for frequency.        Objective           Vitals:  No vitals were obtained today due to virtual visit.    Physical Exam                   Video-Visit Details    Type of service:  Video Visit    Video End Time:12:50    Originating Location (pt. Location): Home    Distant Location (provider location):  [facility]     Platform used for Video Visit: [employer]

## 2021-08-06 ENCOUNTER — VIRTUAL VISIT (OUTPATIENT)
Dept: FAMILY MEDICINE | Facility: CLINIC | Age: 66
End: 2021-08-06
Payer: COMMERCIAL

## 2021-08-06 DIAGNOSIS — N39.498 OTHER URINARY INCONTINENCE: Primary | ICD-10-CM

## 2021-08-06 PROCEDURE — 99213 OFFICE O/P EST LOW 20 MIN: CPT | Mod: 95 | Performed by: FAMILY MEDICINE

## 2021-08-06 RX ORDER — OXYBUTYNIN CHLORIDE 5 MG/1
5 TABLET, EXTENDED RELEASE ORAL DAILY
Qty: 30 TABLET | Refills: 1 | Status: SHIPPED | OUTPATIENT
Start: 2021-08-06 | End: 2022-02-01 | Stop reason: DRUGHIGH

## 2021-08-06 ASSESSMENT — ENCOUNTER SYMPTOMS: FREQUENCY: 1

## 2021-08-20 ENCOUNTER — MYC MEDICAL ADVICE (OUTPATIENT)
Dept: FAMILY MEDICINE | Facility: CLINIC | Age: 66
End: 2021-08-20

## 2021-08-20 DIAGNOSIS — N39.41 URGE INCONTINENCE OF URINE: Primary | ICD-10-CM

## 2021-08-20 RX ORDER — OXYBUTYNIN CHLORIDE 10 MG/1
10 TABLET, EXTENDED RELEASE ORAL DAILY
Qty: 90 TABLET | Refills: 1 | Status: SHIPPED | OUTPATIENT
Start: 2021-08-20 | End: 2022-01-17

## 2021-09-04 ENCOUNTER — HEALTH MAINTENANCE LETTER (OUTPATIENT)
Age: 66
End: 2021-09-04

## 2021-10-11 ENCOUNTER — MYC MEDICAL ADVICE (OUTPATIENT)
Dept: FAMILY MEDICINE | Facility: CLINIC | Age: 66
End: 2021-10-11

## 2021-12-25 ENCOUNTER — HEALTH MAINTENANCE LETTER (OUTPATIENT)
Age: 66
End: 2021-12-25

## 2021-12-26 DIAGNOSIS — E03.8 OTHER SPECIFIED HYPOTHYROIDISM: Chronic | ICD-10-CM

## 2021-12-28 RX ORDER — LEVOTHYROXINE SODIUM 100 UG/1
TABLET ORAL
Qty: 30 TABLET | Refills: 0 | Status: SHIPPED | OUTPATIENT
Start: 2021-12-28 | End: 2022-02-01

## 2021-12-28 NOTE — TELEPHONE ENCOUNTER
Medication is being filled for 1 time refill only due to:  Patient needs to be seen because due for physical and labs.     Irene Franco RN

## 2022-01-04 ENCOUNTER — MYC MEDICAL ADVICE (OUTPATIENT)
Dept: FAMILY MEDICINE | Facility: CLINIC | Age: 67
End: 2022-01-04
Payer: COMMERCIAL

## 2022-01-19 ENCOUNTER — TRANSFERRED RECORDS (OUTPATIENT)
Dept: HEALTH INFORMATION MANAGEMENT | Facility: CLINIC | Age: 67
End: 2022-01-19
Payer: COMMERCIAL

## 2022-01-25 ENCOUNTER — MYC MEDICAL ADVICE (OUTPATIENT)
Dept: FAMILY MEDICINE | Facility: CLINIC | Age: 67
End: 2022-01-25
Payer: COMMERCIAL

## 2022-01-28 NOTE — PROGRESS NOTES
"SUBJECTIVE:   Sofi Degroot is a 66 year old female who presents for Preventive Visit.    Patient has been advised of split billing requirements and indicates understanding: Yes  Are you in the first 12 months of your Medicare coverage?  No    Healthy Habits:     In general, how would you rate your overall health?  Good    Frequency of exercise:  2-3 days/week    Duration of exercise:  Less than 15 minutes    Do you usually eat at least 4 servings of fruit and vegetables a day, include whole grains    & fiber and avoid regularly eating high fat or \"junk\" foods?  Yes    Taking medications regularly:  Yes    Medication side effects:  None    Ability to successfully perform activities of daily living:  No assistance needed    Home Safety:  No safety concerns identified    Hearing Impairment:  No hearing concerns    In the past 6 months, have you been bothered by leaking of urine? Yes    In general, how would you rate your overall mental or emotional health?  Good      PHQ-2 Total Score: 0    Additional concerns today:  No    Do you feel safe in your environment? Yes    Have you ever done Advance Care Planning? (For example, a Health Directive, POLST, or a discussion with a medical provider or your loved ones about your wishes): Yes, advance care planning is on file.       Fall risk       Cognitive Screening   1) Repeat 3 items (Leader, Season, Table)    2) Clock draw: NORMAL  3) 3 item recall: Recalls 1 object   Results: NORMAL clock, 1-2 items recalled: COGNITIVE IMPAIRMENT LESS LIKELY    Mini-CogTM Copyright REJI Shaffer. Licensed by the author for use in [facility]; reprinted with permission (ronak@.[facility]). All rights reserved.      Do you have sleep apnea, excessive snoring or daytime drowsiness?: no but may snore    Reviewed and updated as needed this visit by clinical staff                Reviewed and updated as needed this visit by Provider               Social History     Tobacco Use     Smoking " status: Former Smoker     Packs/day: 0.50     Years: 5.00     Pack years: 2.50     Types: Cigarettes     Start date: 1970     Quit date: 5/15/1975     Years since quittin.7     Smokeless tobacco: Never Used     Tobacco comment: quit 30 years ago   Substance Use Topics     Alcohol use: Yes     Comment: 4-5 Drinks Per Week     If you drink alcohol do you typically have >3 drinks per day or >7 drinks per week? Yes      Alcohol Use 10/30/2019   Prescreen: >3 drinks/day or >7 drinks/week? No   Prescreen: >3 drinks/day or >7 drinks/week? -   No flowsheet data found.            Current providers sharing in care for this patient include: {Rooming staff:  Please update Care Team in Rooming Activity, refresh this note and then delete this statement}  Patient Care Team:  Noemy Olivera DO as PCP - General  Noemy Olivera DO as Referring Physician (Family Practice)  Mahamed Block MD as Resident (House Physician)  Nasir Alvarez, KAN as Nurse Coordinator (Cardiology)  Xiomara Lopez MD as MD (Cardiology)  Alonzo Baldwin MD as MD (Clinical Cardiac Electrophysiology)  Noemy Olivera DO as Assigned PCP  Elza Hatfield RN as Specialty Care Coordinator (Cardiology)    The following health maintenance items are reviewed in Epic and correct as of today:  Health Maintenance Due   Topic Date Due     ANNUAL REVIEW OF HM ORDERS  Never done     ZOSTER IMMUNIZATION (2 of 3) 2014     PAP FOLLOW-UP  10/30/2020     HPV FOLLOW-UP  10/30/2020     MEDICARE ANNUAL WELLNESS VISIT  2021     FALL RISK ASSESSMENT  2021     TSH W/FREE T4 REFLEX  2021     COLORECTAL CANCER SCREENING  2021     PHQ-2  2022     {Chronicprobdata (optional):422421}  {Decision Support (Optional):860986}  Any new diagnosis of family breast, ovarian, or bowel cancer? {Yes_Link to Screening / No:696153}    FHS-7: No flowsheet data found.  {If any of the questions to the BCRA (FHS-7) are answered yes, consider  "ordering referral for genetic counseling (Optional) :882347::\"click delete button to remove this line now\"}  {AMB Mammogram Decision Support (Optional) :241436}  Pertinent mammograms are reviewed under the imaging tab.    Review of Systems  {ROS COMP (Optional):337628}    OBJECTIVE:   LMP 10/27/2004 (Approximate)  Estimated body mass index is 26.51 kg/m  as calculated from the following:    Height as of 10/30/19: 1.753 m (5' 9\").    Weight as of 10/30/19: 81.4 kg (179 lb 8 oz).  Physical Exam  {Exam (Optional) :864164}    {Diagnostic Test Results (Optional):469243::\"Diagnostic Test Results:\",\"Labs reviewed in Epic\"}    ASSESSMENT / PLAN:   {Diag Picklist:999272}    {Patient advised of split billing (Optional):772670}    COUNSELING:  {Medicare Counselin}    Estimated body mass index is 26.51 kg/m  as calculated from the following:    Height as of 10/30/19: 1.753 m (5' 9\").    Weight as of 10/30/19: 81.4 kg (179 lb 8 oz).    {Weight Management Plan (ACO) Complete if BMI is abnormal-  Ages 18-64  BMI >24.9.  Age 65+ with BMI <23 or >30 (Optional):106934}    She reports that she quit smoking about 46 years ago. Her smoking use included cigarettes. She started smoking about 51 years ago. She has a 2.50 pack-year smoking history. She has never used smokeless tobacco.      Appropriate preventive services were discussed with this patient, including applicable screening as appropriate for cardiovascular disease, diabetes, osteopenia/osteoporosis, and glaucoma.  As appropriate for age/gender, discussed screening for colorectal cancer, prostate cancer, breast cancer, and cervical cancer. Checklist reviewing preventive services available has been given to the patient.    Reviewed patients plan of care and provided an AVS. The {CarePlan:595259} for Sofi meets the Care Plan requirement. This Care Plan has been established and reviewed with the {PATIENT, FAMILY MEMBER, CAREGIVER:082919}.    Counseling Resources:  ATP IV " Guidelines  Pooled Cohorts Equation Calculator  Breast Cancer Risk Calculator  Breast Cancer: Medication to Reduce Risk  FRAX Risk Assessment  ICSI Preventive Guidelines  Dietary Guidelines for Americans, 2010  USDA's MyPlate  ASA Prophylaxis  Lung CA Screening    Noemy Olivera DO  [facility]    Identified Health Risks:

## 2022-01-31 DIAGNOSIS — I10 ESSENTIAL HYPERTENSION WITH GOAL BLOOD PRESSURE LESS THAN 140/90: ICD-10-CM

## 2022-02-01 ENCOUNTER — OFFICE VISIT (OUTPATIENT)
Dept: FAMILY MEDICINE | Facility: CLINIC | Age: 67
End: 2022-02-01
Payer: COMMERCIAL

## 2022-02-01 VITALS
BODY MASS INDEX: 28.34 KG/M2 | SYSTOLIC BLOOD PRESSURE: 136 MMHG | HEIGHT: 68 IN | WEIGHT: 187 LBS | DIASTOLIC BLOOD PRESSURE: 80 MMHG | RESPIRATION RATE: 16 BRPM | HEART RATE: 82 BPM | TEMPERATURE: 97.7 F

## 2022-02-01 DIAGNOSIS — E28.39 ESTROGEN DEFICIENCY: ICD-10-CM

## 2022-02-01 DIAGNOSIS — R87.810 CERVICAL HIGH RISK HPV (HUMAN PAPILLOMAVIRUS) TEST POSITIVE: ICD-10-CM

## 2022-02-01 DIAGNOSIS — B00.9 RECURRENT HERPES SIMPLEX: ICD-10-CM

## 2022-02-01 DIAGNOSIS — N39.41 URGE INCONTINENCE OF URINE: ICD-10-CM

## 2022-02-01 DIAGNOSIS — I48.91 ATRIAL FIBRILLATION WITH RVR (H): ICD-10-CM

## 2022-02-01 DIAGNOSIS — Z78.0 POSTMENOPAUSAL STATUS: ICD-10-CM

## 2022-02-01 DIAGNOSIS — E03.8 OTHER SPECIFIED HYPOTHYROIDISM: Chronic | ICD-10-CM

## 2022-02-01 DIAGNOSIS — I10 ESSENTIAL HYPERTENSION WITH GOAL BLOOD PRESSURE LESS THAN 140/90: ICD-10-CM

## 2022-02-01 DIAGNOSIS — Z12.11 SPECIAL SCREENING FOR MALIGNANT NEOPLASMS, COLON: ICD-10-CM

## 2022-02-01 DIAGNOSIS — Z00.00 ENCOUNTER FOR MEDICARE ANNUAL WELLNESS EXAM: Primary | ICD-10-CM

## 2022-02-01 DIAGNOSIS — I48.0 PAF (PAROXYSMAL ATRIAL FIBRILLATION) (H): ICD-10-CM

## 2022-02-01 LAB
ALBUMIN SERPL-MCNC: 3.5 G/DL (ref 3.4–5)
ALP SERPL-CCNC: 62 U/L (ref 40–150)
ALT SERPL W P-5'-P-CCNC: 30 U/L (ref 0–50)
ANION GAP SERPL CALCULATED.3IONS-SCNC: 5 MMOL/L (ref 3–14)
AST SERPL W P-5'-P-CCNC: 23 U/L (ref 0–45)
BILIRUB SERPL-MCNC: 0.8 MG/DL (ref 0.2–1.3)
BUN SERPL-MCNC: 10 MG/DL (ref 7–30)
CALCIUM SERPL-MCNC: 10.1 MG/DL (ref 8.5–10.1)
CHLORIDE BLD-SCNC: 107 MMOL/L (ref 94–109)
CHOLEST SERPL-MCNC: 226 MG/DL
CO2 SERPL-SCNC: 25 MMOL/L (ref 20–32)
CREAT SERPL-MCNC: 0.81 MG/DL (ref 0.52–1.04)
ERYTHROCYTE [DISTWIDTH] IN BLOOD BY AUTOMATED COUNT: 13.5 % (ref 10–15)
FASTING STATUS PATIENT QL REPORTED: YES
GFR SERPL CREATININE-BSD FRML MDRD: 79 ML/MIN/1.73M2
GLUCOSE BLD-MCNC: 95 MG/DL (ref 70–99)
HCT VFR BLD AUTO: 46.2 % (ref 35–47)
HDLC SERPL-MCNC: 101 MG/DL
HGB BLD-MCNC: 15.3 G/DL (ref 11.7–15.7)
LDLC SERPL CALC-MCNC: 107 MG/DL
MCH RBC QN AUTO: 32.8 PG (ref 26.5–33)
MCHC RBC AUTO-ENTMCNC: 33.1 G/DL (ref 31.5–36.5)
MCV RBC AUTO: 99 FL (ref 78–100)
NONHDLC SERPL-MCNC: 125 MG/DL
PLATELET # BLD AUTO: 157 10E3/UL (ref 150–450)
POTASSIUM BLD-SCNC: 4 MMOL/L (ref 3.4–5.3)
PROT SERPL-MCNC: 7.3 G/DL (ref 6.8–8.8)
RBC # BLD AUTO: 4.66 10E6/UL (ref 3.8–5.2)
SODIUM SERPL-SCNC: 137 MMOL/L (ref 133–144)
TRIGL SERPL-MCNC: 89 MG/DL
TSH SERPL DL<=0.005 MIU/L-ACNC: 1.08 MU/L (ref 0.4–4)
WBC # BLD AUTO: 10 10E3/UL (ref 4–11)

## 2022-02-01 PROCEDURE — 99397 PER PM REEVAL EST PAT 65+ YR: CPT | Performed by: FAMILY MEDICINE

## 2022-02-01 PROCEDURE — 80061 LIPID PANEL: CPT | Performed by: FAMILY MEDICINE

## 2022-02-01 PROCEDURE — 85027 COMPLETE CBC AUTOMATED: CPT | Performed by: FAMILY MEDICINE

## 2022-02-01 PROCEDURE — 36415 COLL VENOUS BLD VENIPUNCTURE: CPT | Performed by: FAMILY MEDICINE

## 2022-02-01 PROCEDURE — 84443 ASSAY THYROID STIM HORMONE: CPT | Performed by: FAMILY MEDICINE

## 2022-02-01 PROCEDURE — 80053 COMPREHEN METABOLIC PANEL: CPT | Performed by: FAMILY MEDICINE

## 2022-02-01 RX ORDER — BIOTIN 1 MG
1000 TABLET ORAL DAILY
COMMUNITY
End: 2023-05-10

## 2022-02-01 RX ORDER — LISINOPRIL 20 MG/1
20 TABLET ORAL DAILY
Qty: 90 TABLET | Refills: 3 | Status: SHIPPED | OUTPATIENT
Start: 2022-02-01 | End: 2023-02-13

## 2022-02-01 RX ORDER — LISINOPRIL 20 MG/1
TABLET ORAL
Qty: 1 TABLET | Refills: 0 | OUTPATIENT
Start: 2022-02-01

## 2022-02-01 RX ORDER — VALACYCLOVIR HYDROCHLORIDE 500 MG/1
TABLET, FILM COATED ORAL
Qty: 12 TABLET | Refills: 5 | Status: SHIPPED | OUTPATIENT
Start: 2022-02-01 | End: 2023-05-10

## 2022-02-01 RX ORDER — METOPROLOL SUCCINATE 50 MG/1
TABLET, EXTENDED RELEASE ORAL
Qty: 90 TABLET | Refills: 3 | Status: SHIPPED | OUTPATIENT
Start: 2022-02-01 | End: 2022-12-01

## 2022-02-01 RX ORDER — FAMOTIDINE 10 MG
10 TABLET ORAL 2 TIMES DAILY
COMMUNITY

## 2022-02-01 RX ORDER — LEVOTHYROXINE SODIUM 100 UG/1
100 TABLET ORAL DAILY
Qty: 90 TABLET | Refills: 3 | Status: SHIPPED | OUTPATIENT
Start: 2022-02-01 | End: 2023-04-17

## 2022-02-01 RX ORDER — LORATADINE 10 MG/1
10 TABLET ORAL DAILY
COMMUNITY

## 2022-02-01 RX ORDER — METOPROLOL SUCCINATE 100 MG/1
TABLET, EXTENDED RELEASE ORAL
Qty: 90 TABLET | Refills: 3 | Status: SHIPPED | OUTPATIENT
Start: 2022-02-01 | End: 2023-01-27

## 2022-02-01 RX ORDER — OXYBUTYNIN CHLORIDE 10 MG/1
10 TABLET, EXTENDED RELEASE ORAL DAILY
Qty: 90 TABLET | Refills: 3 | Status: SHIPPED | OUTPATIENT
Start: 2022-02-01 | End: 2023-02-13

## 2022-02-01 ASSESSMENT — ENCOUNTER SYMPTOMS
WEAKNESS: 0
CHILLS: 0
SORE THROAT: 0
FEVER: 0
PALPITATIONS: 0
ARTHRALGIAS: 0
HEMATOCHEZIA: 0
NERVOUS/ANXIOUS: 0
HEADACHES: 0
MYALGIAS: 0
EYE PAIN: 0
HEARTBURN: 0
ABDOMINAL PAIN: 0
JOINT SWELLING: 0
HEMATURIA: 0
FREQUENCY: 0
COUGH: 0
DIARRHEA: 0
SHORTNESS OF BREATH: 0
PARESTHESIAS: 0
BREAST MASS: 0
DIZZINESS: 0
DYSURIA: 0
CONSTIPATION: 0
NAUSEA: 0

## 2022-02-01 ASSESSMENT — MIFFLIN-ST. JEOR: SCORE: 1431.73

## 2022-02-01 ASSESSMENT — ACTIVITIES OF DAILY LIVING (ADL): CURRENT_FUNCTION: NO ASSISTANCE NEEDED

## 2022-02-01 NOTE — TELEPHONE ENCOUNTER
One month supply sent 1/5/22  Has appointment with PN today.  Refill will be addressed then  Irene Franco RN

## 2022-02-01 NOTE — PROGRESS NOTES
"SUBJECTIVE:   Sofi Degroot is a 66 year old female who presents for Preventive Visit.        Are you in the first 12 months of your Medicare coverage?      Healthy Habits:     In general, how would you rate your overall health?  Good    Frequency of exercise:  2-3 days/week    Duration of exercise:  Less than 15 minutes    Do you usually eat at least 4 servings of fruit and vegetables a day, include whole grains    & fiber and avoid regularly eating high fat or \"junk\" foods?  Yes    Taking medications regularly:  Yes    Medication side effects:  None    Ability to successfully perform activities of daily living:  No assistance needed    Home Safety:  No safety concerns identified    Hearing Impairment:  No hearing concerns    In the past 6 months, have you been bothered by leaking of urine? Yes    In general, how would you rate your overall mental or emotional health?  Good      PHQ-2 Total Score: 0    Additional concerns today:  No    Do you feel safe in your environment? Yes    Have you ever done Advance Care Planning? (For example, a Health Directive, POLST, or a discussion with a medical provider or your loved ones about your wishes): Yes, advance care planning is on file.       Fall risk  Fallen 2 or more times in the past year?: No  Any fall with injury in the past year?: No    Cognitive Screening   1) Repeat 3 items (Leader, Season, Table)    2) Clock draw: NORMAL  3) 3 item recall: Recalls 1 object   Results: NORMAL clock, 1-2 items recalled: COGNITIVE IMPAIRMENT LESS LIKELY    Mini-CogTM Copyright REJI Shaffer. Licensed by the author for use in [facility]; reprinted with permission (ronak@.[facility]). All rights reserved.      Do you have sleep apnea, excessive snoring or daytime drowsiness?: no    Reviewed and updated as needed this visit by clinical staff  Tobacco  Allergies  Meds             Reviewed and updated as needed this visit by Provider               Social History     Tobacco Use     " Smoking status: Former Smoker     Packs/day: 0.50     Years: 5.00     Pack years: 2.50     Types: Cigarettes     Start date: 1970     Quit date: 5/15/1975     Years since quittin.7     Smokeless tobacco: Never Used     Tobacco comment: quit 30 years ago   Substance Use Topics     Alcohol use: Yes     Comment: 4-5 Drinks Per Week     If you drink alcohol do you typically have >3 drinks per day or >7 drinks per week? No    Alcohol Use 2022   Prescreen: >3 drinks/day or >7 drinks/week? No   Prescreen: >3 drinks/day or >7 drinks/week? -   No flowsheet data found.        -------------------------------------    Current providers sharing in care for this patient include:   Patient Care Team:  Noemy Olivera DO as PCP - General  Noemy Olivera DO as Referring Physician (Family Practice)  Mahamed Block MD as Resident (House Physician)  Nasir Alvarez, KAN as Nurse Coordinator (Cardiology)  MarchXiomara MD as MD (Cardiology)  Alonzo Baldwin MD as MD (Clinical Cardiac Electrophysiology)  Noemy Olivera DO as Assigned PCP  Elza Hatfield RN as Specialty Care Coordinator (Cardiology)    The following health maintenance items are reviewed in Epic and correct as of today:  Health Maintenance Due   Topic Date Due     ANNUAL REVIEW OF HM ORDERS  Never done     ZOSTER IMMUNIZATION (2 of 3) 2014     PAP FOLLOW-UP  10/30/2020     HPV FOLLOW-UP  10/30/2020     FALL RISK ASSESSMENT  2021     TSH W/FREE T4 REFLEX  2021     COLORECTAL CANCER SCREENING  2021     Patient Active Problem List   Diagnosis     Hypothyroidism     Hepatitis C virus infection without hepatic coma     CARDIOVASCULAR SCREENING; LDL GOAL LESS THAN 100     Hypertension goal BP (blood pressure) < 140/90     Transient cerebral ischemia     PFO (patent foramen ovale)     Cervical high risk HPV (human papillomavirus) test positive     Bilateral arm weakness     Lateral epicondylitis of both elbows     Retinal  infarct     [facility]     Atrial fibrillation (H)     Paroxysmal A-fib (H)     Past Surgical History:   Procedure Laterality Date     ANESTHESIA CARDIOVERSION N/A 10/26/2018    Procedure: ANESTHESIA CARDIOVERSION;  Surgeon: GENERIC ANESTHESIA PROVIDER;  Location: [facility] OR     COLONOSCOPY       GENITOURINARY SURGERY  1986    tubal ligation     ORTHOPEDIC SURGERY      removal of bone spur right foot     SURGICAL HISTORY OF -  Right 2008    Right 1st MTPJ, bone spur resection     ZZC LIGATE FALLOPIAN TUBE         Social History     Tobacco Use     Smoking status: Former Smoker     Packs/day: 0.50     Years: 5.00     Pack years: 2.50     Types: Cigarettes     Start date: 1970     Quit date: 5/15/1975     Years since quittin.7     Smokeless tobacco: Never Used     Tobacco comment: quit 30 years ago   Substance Use Topics     Alcohol use: Yes     Comment: 4-5 Drinks Per Week     Family History   Problem Relation Age of Onset     Heart Disease Maternal Grandfather      Breast Cancer Maternal Grandmother         Dx'd age 70     Arthritis Father      Lipids Mother         On meds     Cancer Mother         BCC     Heart Disease Brother          of dilated of cardiomyopathy at age 54     Alcohol/Drug Sister      Alcohol/Drug Brother      Depression Sister      Depression Sister          Pneumonia Vaccine:discussed two types - will readdress next year  Mammogram Screening: Mammogram Screening: Recommended mammography every 1-2 years with patient discussion and risk factor consideration  History of abnormal Pap smear: Yes - she declined pap today   Discussed and she does agree to Cotest in one year -     Breast CA Risk Assessment (FHS-7) 2022   Do you have a family history of breast, colon, or ovarian cancer? No / Unknown           Pertinent mammograms are reviewed under the imaging tab.    Review of Systems   Constitutional:  "Negative for chills and fever.   HENT: Negative for congestion, ear pain, hearing loss and sore throat.    Eyes: Negative for pain and visual disturbance.   Respiratory: Negative for cough.    Cardiovascular: Negative for chest pain, palpitations and peripheral edema.   Gastrointestinal: Negative for abdominal pain, constipation, diarrhea, hematochezia and nausea.   Breasts:  Negative for tenderness, breast mass and discharge.   Genitourinary: Negative for dysuria, frequency, genital sores, hematuria, pelvic pain, urgency, vaginal bleeding and vaginal discharge.   Musculoskeletal: Negative for joint swelling and myalgias.   Skin: Negative for rash.   Neurological: Negative for dizziness, weakness, headaches and paresthesias.   Psychiatric/Behavioral: Negative for mood changes. The patient is not nervous/anxious.          OBJECTIVE:   /80   Pulse 82   Temp 97.7  F (36.5  C) (Oral)   Resp 16   Ht 1.727 m (5' 8\")   Wt 84.8 kg (187 lb)   LMP 10/27/2004 (Approximate)   BMI 28.43 kg/m   Estimated body mass index is 28.43 kg/m  as calculated from the following:    Height as of this encounter: 1.727 m (5' 8\").    Weight as of this encounter: 84.8 kg (187 lb).  Physical Exam  GENERAL APPEARANCE: healthy, alert and no distress  EYES: Eyes grossly normal to inspection, PERRL and conjunctivae and sclerae normal  HENT: ear canals and TM's normal  NECK: no adenopathy, no asymmetry, masses, or scars and thyroid normal to palpation  RESP: lungs clear to auscultation - no rales, rhonchi or wheezes  BREAST: normal without masses, tenderness or nipple discharge and no palpable axillary masses or adenopathy  CV: regular rate and rhythm, normal S1 S2, no S3 or S4, no murmur, click or rub, no peripheral edema and peripheral pulses strong  ABDOMEN: soft, nontender, no hepatosplenomegaly, no masses and bowel sounds normal  MS: no musculoskeletal defects are noted and gait is age appropriate without ataxia  SKIN: no suspicious " lesions or rashes  NEURO: Normal strength and tone, sensory exam grossly normal, mentation intact and speech normal  PSYCH: mentation appears normal and affect normal/brightDiagnostic Test Results:  Labs reviewed in Epic        ASSESSMENT / PLAN:   (Z00.00) Encounter for Medicare annual wellness exam  (primary encounter diagnosis)  Comment:    Plan: Lipid panel reflex to direct LDL Fasting,         Comprehensive metabolic panel (BMP + Alb, Alk         Phos, ALT, AST, Total. Bili, TP), CBC with         platelets, Albumin Random Urine Quantitative         with Creat Ratio             (R87.810) Cervical high risk HPV (human papillomavirus) test positive  Comment:    Plan: Comprehensive metabolic panel (BMP + Alb, Alk         Phos, ALT, AST, Total. Bili, TP), CBC with         platelets             (I48.91) Atrial fibrillation with RVR (H)  Comment:    Plan: metoprolol succinate ER (TOPROL-XL) 50 MG 24 hr        tablet, Comprehensive metabolic panel (BMP +         Alb, Alk Phos, ALT, AST, Total. Bili, TP), CBC         with platelets             (I48.0) PAF (paroxysmal atrial fibrillation) (H)  Comment:    Plan: metoprolol succinate ER (TOPROL-XL) 100 MG 24         hr tablet, Comprehensive metabolic panel (BMP +        Alb, Alk Phos, ALT, AST, Total. Bili, TP), CBC         with platelets             (I10) Essential hypertension with goal blood pressure less than 140/90  Comment:    Plan: metoprolol succinate ER (TOPROL-XL) 100 MG 24         hr tablet, lisinopril (ZESTRIL) 20 MG tablet,         Comprehensive metabolic panel (BMP + Alb, Alk         Phos, ALT, AST, Total. Bili, TP), CBC with         platelets, Albumin Random Urine Quantitative         with Creat Ratio             (N39.41) Urge incontinence of urine  Comment:    Plan: oxybutynin ER (DITROPAN-XL) 10 MG 24 hr tablet,        Comprehensive metabolic panel (BMP + Alb, Alk         Phos, ALT, AST, Total. Bili, TP), CBC with         platelets, Albumin Random Urine  "Quantitative         with Creat Ratio             (B00.9) Recurrent herpes simplex  Comment:    Plan: valACYclovir (VALTREX) 500 MG tablet,         Comprehensive metabolic panel (BMP + Alb, Alk         Phos, ALT, AST, Total. Bili, TP), CBC with         platelets             (E03.8) Other specified hypothyroidism  Comment:    Plan: levothyroxine (SYNTHROID/LEVOTHROID) 100 MCG         tablet, Comprehensive metabolic panel (BMP +         Alb, Alk Phos, ALT, AST, Total. Bili, TP), TSH         with free T4 reflex, CBC with platelets             (Z12.11) Special screening for malignant neoplasms, colon  Comment:    Plan: COLOGUARD(EX[employer] SCI[employer]), Comprehensive         metabolic panel (BMP + Alb, Alk Phos, ALT, AST,        Total. Bili, TP), CBC with platelets             (Z78.0) Postmenopausal status  Comment:    Plan: DX Hip/Pelvis/Spine             (E28.39) Estrogen deficiency  Comment:    Plan: DX Hip/Pelvis/Spine                   COUNSELING:  Reviewed preventive health counseling, as reflected in patient instructions    Estimated body mass index is 28.43 kg/m  as calculated from the following:    Height as of this encounter: 1.727 m (5' 8\").    Weight as of this encounter: 84.8 kg (187 lb).        She reports that she quit smoking about 46 years ago. Her smoking use included cigarettes. She started smoking about 51 years ago. She has a 2.50 pack-year smoking history. She has never used smokeless tobacco.      Appropriate preventive services were discussed with this patient, including applicable screening as appropriate for cardiovascular disease, diabetes, osteopenia/osteoporosis, and glaucoma.  As appropriate for age/gender, discussed screening for colorectal cancer, prostate cancer, breast cancer, and cervical cancer. Checklist reviewing preventive services available has been given to the patient.    Reviewed patients plan of care and provided an AVS. The Basic Care Plan (routine screening as documented in Health " Maintenance) for Sofi meets the Care Plan requirement. This Care Plan has been established and reviewed with the Patient.    Counseling Resources:  ATP IV Guidelines  Pooled Cohorts Equation Calculator  Breast Cancer Risk Calculator  Breast Cancer: Medication to Reduce Risk  FRAX Risk Assessment  ICSI Preventive Guidelines  Dietary Guidelines for Americans, 2010  USDA's MyPlate  ASA Prophylaxis  Lung CA Screening    Noemy Olivera [facility]    Identified Health Risks:

## 2022-02-01 NOTE — PATIENT INSTRUCTIONS
Patient Education   Personalized Prevention Plan  You are due for the preventive services outlined below.  Your care team is available to assist you in scheduling these services.  If you have already completed any of these items, please share that information with your care team to update in your medical record.  Health Maintenance Due   Topic Date Due     ANNUAL REVIEW OF HM ORDERS  Never done     Zoster (Shingles) Vaccine (2 of 3) 11/07/2014     PAP  10/30/2020     HPV Follow Up  10/30/2020     FALL RISK ASSESSMENT  11/03/2021     Thyroid Function Lab  11/12/2021     Colorectal Cancer Screening  11/13/2021       Urinary Incontinence, Female (Adult)   Urinary incontinence means loss of bladder control. This problem affects many women, especially as they get older. If you have incontinence, you may be embarrassed to ask for help. But know that this problem can be treated.   Types of Incontinence  There are different types of incontinence. Two of the main types are described here. You can have more than one type.     Stress incontinence. With this type, urine leaks when pressure (stress) is put on the bladder. This may happen when you cough, sneeze, or laugh. Stress incontinence most often occurs because the pelvic floor muscles that support the bladder and urethra are weak. This can happen after pregnancy and vaginal childbirth or a hysterectomy. It can also be due to excess body weight or hormone changes.    Urge incontinence (also called overactive bladder). With this type, a sudden urge to urinate is felt often. This may happen even though there may not be much urine in the bladder. The need to urinate often during the night is common. Urge incontinence most often occurs because of bladder spasms. This may be due to bladder irritation or infection. Damage to bladder nerves or pelvic muscles, constipation, and certain medicines can also lead to urge incontinence.  Treatment depends on the cause. Further  evaluation is needed to find the type you have. This will likely include an exam and certain tests. Based on the results, you and your healthcare provider can then plan treatment. Until a diagnosis is made, the home care tips below can help ease symptoms.   Home care    Do pelvic floor muscle exercises, if they are prescribed. The pelvic floor muscles help support the bladder and urethra. Many women find that their symptoms improve when doing special exercises that strengthen these muscles. To do the exercises, contract the muscles you would use to stop your stream of urine. But do this when you re not urinating. Hold for 10 seconds, then relax. Repeat 10 to 20 times in a row, at least 3 times a day. Your healthcare provider may give you other instructions for how to do the exercises and how often.    Keep a bladder diary. This helps track how often and how much you urinate over a set period of time. Bring this diary with you to your next visit with the provider. The information can help your provider learn more about your bladder problem.    Lose weight, if advised to by your provider. Extra weight puts pressure on the bladder. Your provider can help you create a weight-loss plan that s right for you. This may include exercising more and making certain diet changes.    Don't have foods and drinks that may irritate the bladder. These can include alcohol and caffeinated drinks.    Quit smoking. Smoking and other tobacco use can lead to a long-term (chronic) cough that strains the pelvic floor muscles. Smoking may also damage the bladder and urethra. Talk with your provider about treatments or methods you can use to quit smoking.    If drinking large amounts of fluid makes you have symptoms, you may be advised to limit your fluid intake. You may also be advised to drink most of your fluids during the day and to limit fluids at night.    If you re worried about urine leakage or accidents, you may wear absorbent pads to  catch urine. Change the pads often. This helps reduce discomfort. It may also reduce the risk of skin or bladder infections.    Follow-up care  Follow up with your healthcare provider, or as directed. It may take some to find the right treatment for your problem. But healthy lifestyle changes can be made right away. These include such things as exercising on a regular basis, eating a healthy diet, losing weight (if needed), and quitting smoking. Your treatment plan may include special therapies or medicines. Certain procedures or surgery may also be options. Talk about any questions you have with your provider.   When to seek medical advice  Call the healthcare provider right away if any of these occur:    Fever of 100.4 F (38 C) or higher, or as directed by your provider    Bladder pain or fullness    Belly swelling    Nausea or vomiting    Back pain    Weakness, dizziness, or fainting  [place] last reviewed this educational content on 1/1/2020 2000-2021 [employer]. All rights reserved. This information is not intended as a substitute for professional medical care. Always follow your healthcare professional's instructions.

## 2022-02-07 ENCOUNTER — LAB (OUTPATIENT)
Dept: LAB | Facility: CLINIC | Age: 67
End: 2022-02-07
Payer: COMMERCIAL

## 2022-02-07 DIAGNOSIS — N39.41 URGE INCONTINENCE OF URINE: ICD-10-CM

## 2022-02-07 DIAGNOSIS — Z00.00 ENCOUNTER FOR MEDICARE ANNUAL WELLNESS EXAM: ICD-10-CM

## 2022-02-07 PROCEDURE — 82043 UR ALBUMIN QUANTITATIVE: CPT

## 2022-02-08 LAB
CREAT UR-MCNC: 65 MG/DL
MICROALBUMIN UR-MCNC: 6 MG/L
MICROALBUMIN/CREAT UR: 9.23 MG/G CR (ref 0–25)

## 2022-02-09 NOTE — RESULT ENCOUNTER NOTE
Dear Sofi,   Your test results are all back -   -Microalbumin (urine protein) test is normal.  ADVISE: rechecking this annually.  Let us know if you have any questions.  -Noemy Olivera, DO

## 2022-02-14 LAB — COLOGUARD-ABSTRACT: POSITIVE

## 2022-02-21 ENCOUNTER — ANCILLARY PROCEDURE (OUTPATIENT)
Dept: BONE DENSITY | Facility: CLINIC | Age: 67
End: 2022-02-21
Attending: FAMILY MEDICINE
Payer: COMMERCIAL

## 2022-02-21 DIAGNOSIS — Z78.0 POSTMENOPAUSAL STATUS: ICD-10-CM

## 2022-02-21 DIAGNOSIS — E28.39 ESTROGEN DEFICIENCY: ICD-10-CM

## 2022-02-21 PROCEDURE — 77080 DXA BONE DENSITY AXIAL: CPT | Performed by: INTERNAL MEDICINE

## 2022-02-22 ENCOUNTER — MYC MEDICAL ADVICE (OUTPATIENT)
Dept: FAMILY MEDICINE | Facility: CLINIC | Age: 67
End: 2022-02-22
Payer: COMMERCIAL

## 2022-02-22 DIAGNOSIS — Z12.11 SPECIAL SCREENING FOR MALIGNANT NEOPLASMS, COLON: Primary | ICD-10-CM

## 2022-02-22 NOTE — TELEPHONE ENCOUNTER
PN,  Please see below MyChart message and advise.  Started referral if you approve  Thanks,  Alisa KEITH RN

## 2022-02-22 NOTE — RESULT ENCOUNTER NOTE
Dear Sofi,   Your test results are all back -   Stool test is POSITIVE - I am recommending you get a colonoscopy. Please let me know if you would prefer to get the test done at [facility] or [facility]?  -Normal red blood cell (hgb) levels, normal white blood cell count and normal platelet levels.  -Cholesterol levels (LDL,HDL, Triglycerides) are borderline.  ADVISE: rechecking in 1 year.   -Liver and gallbladder tests are normal (ALT,AST, Alk phos, bilirubin), kidney function is normal (Cr, GFR), sodium is normal, potassium is normal, calcium is normal, glucose is normal.  -TSH (thyroid stimulating hormone) level is normal which indicates appropriate thyroid replacement dosing.  ADVISE: continuing same replacement dose and rechecking this in 12 months.    Let us know if you have any questions.  -Noemy Olivera, DO

## 2022-02-23 ENCOUNTER — TELEPHONE (OUTPATIENT)
Dept: GASTROENTEROLOGY | Facility: CLINIC | Age: 67
End: 2022-02-23
Payer: COMMERCIAL

## 2022-02-23 DIAGNOSIS — Z11.59 ENCOUNTER FOR SCREENING FOR OTHER VIRAL DISEASES: Primary | ICD-10-CM

## 2022-02-23 NOTE — TELEPHONE ENCOUNTER
Screening Questions  Blue=prep questions Red=location Green=sedation   1. Are you active on mychart? y    2. What insurance is in the chart? [facility]     3.  Ordering/Referring Provider: Joselin    4. BMI 28.1, If greater than 40 review exclusion criteria also will need EXTENDED PREP    5.  Respiratory Screening (If yes to any of the following HOSPITAL setting only):     Do you use daily home oxygen? n  Do you have mod to severe Obstructive Sleep Apnea? n (can be seen at [facility] or hospital setting)    Do you have Pulmonary Hypertension? n   Do you have UNCONTROLLED asthma? n    6. Have you had a heart or lung transplant? n  (If yes, please review exclusion criteria)    7. Are you currently on dialysis?n  (If yes, schedule in HOSPITAL setting only)(If yes, please send Golytely prep)    8. Do you have chronic kidney disease? n (If yes, please send Golytely prep)    9. Have you had a stroke or Transient ischemic attack (TIA) within 6 months? n (If yes, do not schedule at [facility])    10. In the past 6 months, have you had any heart related issues including cardiomyopathy or heart attack? n (If yes, please review exclusion criteria)           If yes, did it require cardiac stenting or other implantable device?n  (If yes, please review exclusion criteria)      11. Do you have any implantable devices in your body (pacemaker, defib, LVAD)? n (If yes, schedule at [facility])    12. Do you take nitroglycerin? If yes, how often? n (if yes, schedule at HOSPITAL setting)    13. Are you currently taking any blood thinners?n (If yes- inform patient to follow up with PCP or provider for follow up instructions)     14. Are you a diabetic? n (If yes, please send Golytely prep)    15. (Females) Are you currently pregnant?   If yes, how many weeks?      16. Are you taking any prescription pain medications on a routine schedule? n If yes, MAC sedation and patient will need EXTENDED PREP.    17. Do you have any chemical dependencies such as alcohol, street  drugs, or methadone? n If yes, MAC sedation     18. Do you have any history of post-traumatic stress syndrome, severe anxiety or history of psychosis? n  If yes, MAC sedation.     19. Do you transfer independently? y    20.  Do you have any issues with constipation? n   If yes, pt will need EXTENDED PREP     21. Preferred Pharmacy for Pre Prescription [id number] [facility] [place], [place] - [facility] [address]    Scheduling Details    Which Colonoscopy Prep was Sent?: Miralax   Type of Procedure Scheduled: Colonoscopy  Surgeon: Leonardo  Date of Procedure: 3/10  [place] [facility] [place] (Please ask for phone number if not scheduled by patient): Sofi Degroot        Sedation Type: CS  Conscious Sedation- Needs  for 6 hours after the procedure  MAC/General-Needs  for 24 hours after procedure    Pre-op Required at [facility] [place], [place] and OR for MAC sedation: n  (if yes advise patient they will need a pre-op prior to procedure)      Informed patient they will need an adult  y  Cannot take any type of public or medical transportation alone    Pre-Procedure Covid test to be completed at [facility] or Externally: y    Confirmed Nurse will call to complete assessment y    Additional comments:  ([facility] PATIENTS NEED EXTENDED PREP)

## 2022-02-25 ENCOUNTER — TELEPHONE (OUTPATIENT)
Dept: GASTROENTEROLOGY | Facility: CLINIC | Age: 67
End: 2022-02-25
Payer: COMMERCIAL

## 2022-02-25 DIAGNOSIS — Z11.59 ENCOUNTER FOR SCREENING FOR OTHER VIRAL DISEASES: Primary | ICD-10-CM

## 2022-02-25 NOTE — TELEPHONE ENCOUNTER
Caller:  Outbound call Made to Sofi Degroot to help reschedule     Procedure: Colonoscopy     Date, Location, and Surgeon of Procedure Cancelled: 03/10/2022    Ordering Provider: Noemy Olivera    Reason for cancel (please be detailed, any staff messages or encounters to note?):       MD Mckeon/MALACHI Patterson has extended leave      Rescheduled:      If rescheduled:    Date: 03/29/2022   Location: [facility] [place]    Note any change or update to original order/sedation:     Pt Request MAC ===> CS

## 2022-03-22 ENCOUNTER — TELEPHONE (OUTPATIENT)
Dept: GASTROENTEROLOGY | Facility: CLINIC | Age: 67
End: 2022-03-22
Payer: COMMERCIAL

## 2022-03-22 DIAGNOSIS — R19.5 POSITIVE COLORECTAL CANCER SCREENING USING COLOGUARD TEST: Primary | ICD-10-CM

## 2022-03-22 RX ORDER — BISACODYL 5 MG/1
TABLET, DELAYED RELEASE ORAL
Qty: 2 TABLET | Refills: 0 | Status: SHIPPED | OUTPATIENT
Start: 2022-03-22 | End: 2023-05-10

## 2022-03-22 NOTE — TELEPHONE ENCOUNTER
Pre assessment questions completed for upcoming colonoscopy procedure scheduled on 3.29.2022    COVID test scheduled 3.25.2022    Reviewed procedural arrival time 1300 and facility location [facility].    Designated  policy reviewed. Instructed to have someone stay 6 hours post procedure.     Anticoagulation/blood thinners? ASA    Electronic implanted devices? no    Reviewed extended prep instructions with patient. No fiber/iron supplements or foods that contain nuts/seeds prior to procedure.     Patient verbalized understanding and had no questions or concerns at this time.    Isa Ritchie RN

## 2022-03-22 NOTE — TELEPHONE ENCOUNTER
Patient scheduled for colonoscopy on 3/29/22.     Covid test scheduled: 3/25/22    Arrival time: 1300    Facility location: [facility]    Sedation type: CS     Indication for procedure: +cologuard    Anticoagulations? no     Bowel prep recommendation: Extended prep (deGroen)    Extended prep sent to [facility] [id number] [facility] - [place], [place] - [address]. Prep instructions sent via M[facility]in    Pre visit planning completed.    Romana Yepez RN

## 2022-03-25 ENCOUNTER — LAB (OUTPATIENT)
Dept: LAB | Facility: CLINIC | Age: 67
End: 2022-03-25

## 2022-03-25 DIAGNOSIS — Z11.59 ENCOUNTER FOR SCREENING FOR OTHER VIRAL DISEASES: ICD-10-CM

## 2022-03-25 PROCEDURE — U0005 INFEC AGEN DETEC AMPLI PROBE: HCPCS | Performed by: INTERNAL MEDICINE

## 2022-03-26 LAB — SARS-COV-2 RNA RESP QL NAA+PROBE: NEGATIVE

## 2022-03-29 ENCOUNTER — HOSPITAL ENCOUNTER (OUTPATIENT)
Facility: AMBULATORY SURGERY CENTER | Age: 67
Discharge: HOME | End: 2022-03-29
Attending: INTERNAL MEDICINE | Admitting: INTERNAL MEDICINE
Payer: COMMERCIAL

## 2022-03-29 VITALS
SYSTOLIC BLOOD PRESSURE: 138 MMHG | TEMPERATURE: 97.6 F | OXYGEN SATURATION: 100 % | BODY MASS INDEX: 27.7 KG/M2 | HEIGHT: 69 IN | RESPIRATION RATE: 16 BRPM | DIASTOLIC BLOOD PRESSURE: 80 MMHG | WEIGHT: 187 LBS | HEART RATE: 70 BPM

## 2022-03-29 PROCEDURE — 45390 COLONOSCOPY W/RESECTION: CPT | Mod: 59,PT

## 2022-03-29 PROCEDURE — 45385 COLONOSCOPY W/LESION REMOVAL: CPT | Mod: PT

## 2022-03-29 PROCEDURE — 88305 TISSUE EXAM BY PATHOLOGIST: CPT | Mod: TC | Performed by: INTERNAL MEDICINE

## 2022-03-29 RX ORDER — SIMETHICONE
LIQUID (ML) MISCELLANEOUS PRN
Status: DISCONTINUED | OUTPATIENT
Start: 2022-03-29 | End: 2022-03-29 | Stop reason: HOSPADM

## 2022-03-29 RX ORDER — LIDOCAINE 40 MG/G
CREAM TOPICAL
Status: DISCONTINUED | OUTPATIENT
Start: 2022-03-29 | End: 2022-03-30 | Stop reason: HOSPADM

## 2022-03-29 RX ORDER — FENTANYL CITRATE 50 UG/ML
INJECTION, SOLUTION INTRAMUSCULAR; INTRAVENOUS PRN
Status: DISCONTINUED | OUTPATIENT
Start: 2022-03-29 | End: 2022-03-29 | Stop reason: HOSPADM

## 2022-03-29 RX ORDER — COVID-19 MOLECULAR TEST ASSAY
KIT MISCELLANEOUS
COMMUNITY
Start: 2021-09-02 | End: 2023-05-10

## 2022-03-29 RX ORDER — ONDANSETRON 2 MG/ML
4 INJECTION INTRAMUSCULAR; INTRAVENOUS
Status: DISCONTINUED | OUTPATIENT
Start: 2022-03-29 | End: 2022-03-30 | Stop reason: HOSPADM

## 2022-03-29 NOTE — DISCHARGE INSTRUCTIONS
Discharge Instructions after Colonoscopy or Sigmoidoscopy       Today you had a Colonoscopy     Activity and Diet   You were given medicine for pain. You may be dizzy or sleepy.   For 24 hours:     Do not drive or use heavy equipment.     Do not make important decisions.     Do not drink any alcohol.   You may return to your normal diet and medicines.       Discomfort     Air was placed in your colon during the exam in order to see it. Walking helps to pass the air.     You may take Tylenol (acetaminophen) for pain unless your doctor has told you not to.   Do not take aspirin or ibuprofen (Advil, Motrin, or other anti-inflammatory   drugs) for _____ days.       Follow-up   ____ We took small tissue samples or polyps to study. Your doctor will call you with the results within two weeks.       When to call:       Call right away if you have:     Unusual pain in belly or chest pain not relieved with passing air.     More than 1 to 2 Tablespoons of bleeding from your rectum.     Fever above 100.6  F (37.5  C).       If you have severe pain, bleeding, or shortness of breath, go to an emergency room.       If you have questions, call:   Monday to Friday, 7 a.m. to 4:30 p.m.   Endoscopy: [phone] (We may have to call you back)       After hours   Hospital: [phone] (Ask for the GI fellow on call)

## 2022-03-31 LAB
PATH REPORT.COMMENTS IMP SPEC: NORMAL
PATH REPORT.COMMENTS IMP SPEC: NORMAL
PATH REPORT.FINAL DX SPEC: NORMAL
PATH REPORT.GROSS SPEC: NORMAL
PATH REPORT.MICROSCOPIC SPEC OTHER STN: NORMAL
PATH REPORT.RELEVANT HX SPEC: NORMAL
PHOTO IMAGE: NORMAL

## 2022-03-31 PROCEDURE — 88305 TISSUE EXAM BY PATHOLOGIST: CPT | Mod: 26 | Performed by: PATHOLOGY

## 2022-04-05 LAB — COLONOSCOPY: NORMAL

## 2022-05-12 ENCOUNTER — ANCILLARY PROCEDURE (OUTPATIENT)
Dept: MAMMOGRAPHY | Facility: CLINIC | Age: 67
End: 2022-05-12
Attending: FAMILY MEDICINE
Payer: COMMERCIAL

## 2022-05-12 DIAGNOSIS — Z12.31 VISIT FOR SCREENING MAMMOGRAM: ICD-10-CM

## 2022-05-12 PROCEDURE — 77067 SCR MAMMO BI INCL CAD: CPT | Mod: 26

## 2022-05-12 PROCEDURE — 77067 SCR MAMMO BI INCL CAD: CPT

## 2022-10-16 ENCOUNTER — HEALTH MAINTENANCE LETTER (OUTPATIENT)
Age: 67
End: 2022-10-16

## 2022-12-01 DIAGNOSIS — I48.91 ATRIAL FIBRILLATION WITH RVR (H): ICD-10-CM

## 2022-12-01 RX ORDER — METOPROLOL SUCCINATE 50 MG/1
TABLET, EXTENDED RELEASE ORAL
Qty: 100 TABLET | Refills: 2 | Status: SHIPPED | OUTPATIENT
Start: 2022-12-01 | End: 2023-05-10

## 2022-12-01 NOTE — TELEPHONE ENCOUNTER
"Requested Prescriptions   Pending Prescriptions Disp Refills     metoprolol succinate ER (TOPROL XL) 50 MG 24 hr tablet [Pharmacy Med Name: Metoprolol Succinate ER 50 MG Oral Tablet Extended Release 24 Hour] 100 tablet 2     Sig: TAKE 1 TABLET BY MOUTH  DAILY WITH 100MG TABLET TO  EQUAL 150MG DAILY       Beta-Blockers Protocol Passed - 12/1/2022  3:31 AM        Passed - Blood pressure under 140/90 in past 12 months     BP Readings from Last 3 Encounters:   03/29/22 138/80   02/01/22 136/80   05/29/20 130/84                 Passed - Patient is age 6 or older        Passed - Recent (12 mo) or future (30 days) visit within the authorizing provider's specialty     Patient has had an office visit with the authorizing provider or a provider within the authorizing providers department within the previous 12 mos or has a future within next 30 days. See \"Patient Info\" tab in inbasket, or \"Choose Columns\" in Meds & Orders section of the refill encounter.              Passed - Medication is active on med list           Prescription approved per [facility] Refill Protocol.    Pearl Guerra RN  [facility]   "

## 2023-03-26 ENCOUNTER — HEALTH MAINTENANCE LETTER (OUTPATIENT)
Age: 68
End: 2023-03-26

## 2023-05-08 ENCOUNTER — MYC MEDICAL ADVICE (OUTPATIENT)
Dept: FAMILY MEDICINE | Facility: CLINIC | Age: 68
End: 2023-05-08
Payer: COMMERCIAL

## 2023-05-09 ASSESSMENT — ENCOUNTER SYMPTOMS
FREQUENCY: 0
SORE THROAT: 0
HEARTBURN: 0
DIARRHEA: 0
PALPITATIONS: 0
CONSTIPATION: 0
WEAKNESS: 0
HEMATOCHEZIA: 0
FEVER: 0
ABDOMINAL PAIN: 0
HEADACHES: 0
DIZZINESS: 0
HEMATURIA: 0
MYALGIAS: 0
COUGH: 0
PARESTHESIAS: 0
DYSURIA: 0
CHILLS: 0
BREAST MASS: 0
EYE PAIN: 0
NAUSEA: 0
SHORTNESS OF BREATH: 0
NERVOUS/ANXIOUS: 0
JOINT SWELLING: 0
ARTHRALGIAS: 0

## 2023-05-09 ASSESSMENT — ACTIVITIES OF DAILY LIVING (ADL): CURRENT_FUNCTION: NO ASSISTANCE NEEDED

## 2023-05-09 NOTE — PROGRESS NOTES
"SUBJECTIVE:   Sofi is a 67 year old who presents for Preventive Visit.       View : No data to display.               Patient has been advised of split billing requirements and indicates understanding: Yes  Are you in the first 12 months of your Medicare coverage?  No    Healthy Habits:     In general, how would you rate your overall health?  Good    Frequency of exercise:  2-3 days/week    Duration of exercise:  15-30 minutes    Do you usually eat at least 4 servings of fruit and vegetables a day, include whole grains    & fiber and avoid regularly eating high fat or \"junk\" foods?  Yes    Taking medications regularly:  Yes    Medication side effects:  Not applicable    Ability to successfully perform activities of daily living:  No assistance needed    Home Safety:  No safety concerns identified    Hearing Impairment:  No hearing concerns    In the past 6 months, have you been bothered by leaking of urine? Yes    In general, how would you rate your overall mental or emotional health?  Fair      PHQ-2 Total Score: 2    Additional concerns today:  No       Wellness Visit Notes:    -Last mammo done 5/12/22 (impression: negative). Mammo is scheduled for 5/23/23.   -Last DEXA done 2/21/22 (impression: ostepenia; most negative T-score of -1.6 at R femur).  Next due in 2025.   -Last colon cancer screening done via colonoscopy on 3/29/22 (impression: 5 polyps removed, otherwise normal). Next due in 2027.  -Immunizations: Pt is due for pneumonia vaccine ([id number]) and due for Shingles vaccine, however advised pt complete Shingles vaccine at local pharmacy given Medicare insurance. Pt agrees to have shingles at local pharmacy. [id number] ordered/given today.   -ACP: Medical Directive/ACP on file. Advised pt to update as necessary.   -Education: Pt education provided on Incontinence Concerns  -Pt due for lung cancer screening per . Smoking history updated. Pack years approx 2.5 and quit greater than 15 years ago. Pt " smoking history does not warrant CT screening.   -Pt prepped in room today for PAP      Have you ever done Advance Care Planning? (For example, a Health Directive, POLST, or a discussion with a medical provider or your loved ones about your wishes): Yes, advance care planning is on file.       Fall risk  Fallen 2 or more times in the past year?: No  Any fall with injury in the past year?: No    Cognitive Screening   1) Repeat 3 items (Leader, Season, Table)    2) Clock draw: NORMAL  3) 3 item recall: Recalls 3 objects  Results: 3 items recalled: COGNITIVE IMPAIRMENT LESS LIKELY    Mini-CogTM Copyright S Deena. Licensed by the author for use in [facility]; reprinted with permission (ronak@[facility]). All rights reserved.      Do you have sleep apnea, excessive snoring or daytime drowsiness?: no    Reviewed and updated as needed this visit by clinical staff   Tobacco  Allergies  Meds  Problems             Reviewed and updated as needed this visit by Provider                 Social History     Tobacco Use     Smoking status: Former     Packs/day: 0.50     Years: 5.00     Pack years: 2.50     Types: Cigarettes     Start date: 1970     Quit date: 5/15/1975     Years since quittin.0     Smokeless tobacco: Never     Tobacco comments:     quit 30 years ago   Vaping Use     Vaping status: Not on file   Substance Use Topics     Alcohol use: Yes     Comment: 4-5 Drinks Per Week              2023    12:36 PM   Alcohol Use   Prescreen: >3 drinks/day or >7 drinks/week? No          View : No data to display.              Do you have a current opioid prescription? No  Do you use any other controlled substances or medications that are not prescribed by a provider? None          PROBLEMS TO ADD ON...  -------------------------------------  Feeling down -   No depressed but just low energy -   Not able to enjoy assisted like she was hoping   Daughter-in-law struggling with mental health  Patient would  like to try something  In the remote past took paxil - tolerated but had side effects coming off med  Has gained weight and would like to avoid weight gain    Current providers sharing in care for this patient include:   Patient Care Team:  Noemy Olivera DO as PCP - General  Noemy Olivera DO as Referring Physician (Family Practice)  Mahamed Block MD as Resident (House Physician)  Nasir Alvarez, RN (Inactive) as Nurse Coordinator (Cardiology)  Xiomara Lopez MD as MD (Cardiology)  Alonzo Baldwin MD as MD (Clinical Cardiac Electrophysiology)  Noemy Olivera DO as Assigned PCP  Elza Hatfield RN as Specialty Care Coordinator (Cardiology)    The following health maintenance items are reviewed in Epic and correct as of today:  Health Maintenance   Topic Date Due     ZOSTER IMMUNIZATION (2 of 3) 11/07/2014     PAP FOLLOW-UP  10/30/2020     HPV FOLLOW-UP  10/30/2020     TSH W/FREE T4 REFLEX  02/01/2023     MAMMO SCREENING  05/12/2023     MEDICARE ANNUAL WELLNESS VISIT  05/10/2024     ANNUAL REVIEW OF HM ORDERS  05/10/2024     FALL RISK ASSESSMENT  05/10/2024     DEXA  02/21/2025     COLORECTAL CANCER SCREENING  03/29/2025     LIPID  02/01/2027     ADVANCE CARE PLANNING  05/10/2028     DTAP/TDAP/TD IMMUNIZATION (3 - Td or Tdap) 10/17/2028     HEPATITIS C SCREENING  Completed     PHQ-2 (once per calendar year)  Completed     INFLUENZA VACCINE  Completed     Pneumococcal Vaccine: 65+ Years  Completed     COVID-19 Vaccine  Completed     IPV IMMUNIZATION  Aged Out     MENINGITIS IMMUNIZATION  Aged Out     Patient Active Problem List   Diagnosis     Hypothyroidism     Hepatitis C virus infection without hepatic coma     CARDIOVASCULAR SCREENING; LDL GOAL LESS THAN 100     Hypertension goal BP (blood pressure) < 140/90     Transient cerebral ischemia     PFO (patent foramen ovale)     Cervical high risk HPV (human papillomavirus) test positive     Bilateral arm weakness     Lateral epicondylitis of  both elbows     Retinal infarct     [facility]     Atrial fibrillation (H)     Paroxysmal A-fib (H)     Past Surgical History:   Procedure Laterality Date     ANESTHESIA CARDIOVERSION N/A 10/26/2018    Procedure: ANESTHESIA CARDIOVERSION;  Surgeon: GENERIC ANESTHESIA PROVIDER;  Location: [facility] OR     COLONOSCOPY       COLONOSCOPY N/A 3/29/2022    Procedure: COLONOSCOPY, WITH HOT POLYPECTOMY AND CLIP;  Surgeon: Geoffrey Garcia MD;  Location: [facility] OR     GENITOURINARY SURGERY      tubal ligation     ORTHOPEDIC SURGERY      removal of bone spur right foot     SURGICAL HISTORY OF -  Right 2008    Right 1st MTPJ, bone spur resection     ZZC LIGATE FALLOPIAN TUBE         Social History     Tobacco Use     Smoking status: Former     Packs/day: 0.50     Years: 5.00     Pack years: 2.50     Types: Cigarettes     Start date: 1970     Quit date: 5/15/1975     Years since quittin.0     Smokeless tobacco: Never     Tobacco comments:     quit 30 years ago   Vaping Use     Vaping status: Not on file   Substance Use Topics     Alcohol use: Yes     Comment: 4-5 Drinks Per Week     Family History   Problem Relation Age of Onset     Heart Disease Maternal Grandfather      Breast Cancer Maternal Grandmother         Dx'd age 70     Arthritis Father      Lipids Mother         On meds     Cancer Mother         BCC     Heart Disease Brother          of dilated of cardiomyopathy at age 54     Alcohol/Drug Sister      Alcohol/Drug Brother      Depression Sister      Depression Sister               Mammogram Screening: Mammogram Screening: Recommended mammography every 1-2 years with patient discussion and risk factor consideration  History of abnormal Pap smear: YES - updated in Problem List and Health Maintenance accordingly        2022    11:04 PM   Breast CA Risk Assessment (FHS-7)   Do you have a family history of breast, colon, or  "ovarian cancer? No / Unknown           Pertinent mammograms are reviewed under the imaging tab.    Review of Systems   Constitutional: Negative for chills and fever.   HENT: Negative for congestion, ear pain, hearing loss and sore throat.    Eyes: Negative for pain and visual disturbance.   Respiratory: Negative for cough and shortness of breath.    Cardiovascular: Negative for chest pain, palpitations and peripheral edema.   Gastrointestinal: Negative for abdominal pain, constipation, diarrhea, heartburn, hematochezia and nausea.   Breasts:  Negative for tenderness, breast mass and discharge.   Genitourinary: Negative for dysuria, frequency, genital sores, hematuria, pelvic pain, urgency, vaginal bleeding and vaginal discharge.   Musculoskeletal: Negative for arthralgias, joint swelling and myalgias.   Skin: Negative for rash.   Neurological: Negative for dizziness, weakness, headaches and paresthesias.   Psychiatric/Behavioral: Negative for mood changes. The patient is not nervous/anxious.          OBJECTIVE:   BP (!) 153/87   Pulse 66   Temp 97.2  F (36.2  C) (Temporal)   Resp 18   Ht 1.753 m (5' 9\")   Wt 84.9 kg (187 lb 3.2 oz)   LMP 10/27/2004 (Approximate)   SpO2 100%   BMI 27.64 kg/m   Estimated body mass index is 27.64 kg/m  as calculated from the following:    Height as of this encounter: 1.753 m (5' 9\").    Weight as of this encounter: 84.9 kg (187 lb 3.2 oz).  Physical Exam  GENERAL APPEARANCE: healthy, alert and no distress  EYES: Eyes grossly normal to inspection, PERRL and conjunctivae and sclerae normal  HENT: ear canals and TM's normal, nose and mouth without ulcers or lesions, oropharynx clear and oral mucous membranes moist  NECK: no adenopathy, no asymmetry, masses, or scars and thyroid normal to palpation  RESP: lungs clear to auscultation - no rales, rhonchi or wheezes  BREAST: normal without masses, tenderness or nipple discharge and no palpable axillary masses or adenopathy  CV: " regular rate and rhythm, normal S1 S2, no S3 or S4, no murmur, click or rub, no peripheral edema and peripheral pulses strong  ABDOMEN: soft, nontender, no hepatosplenomegaly, no masses and bowel sounds normal   (female): normal female external genitalia, normal urethral meatus, vaginal mucosal atrophy noted, normal cervix, adnexae, and uterus without masses or abnormal discharge  MS: no musculoskeletal defects are noted and gait is age appropriate without ataxia  SKIN: no suspicious lesions or rashes  NEURO: Normal strength and tone, sensory exam grossly normal, mentation intact and speech normal  PSYCH: mentation appears normal and affect normal/bright    Diagnostic Test Results:  Labs reviewed in Epic    ASSESSMENT / PLAN:   (Z00.00) Encounter for Medicare annual wellness exam  (primary encounter diagnosis)  Comment:    Plan: PRIMARY CARE FOLLOW-UP SCHEDULING             (Z23) Need for shingles vaccine  Comment: sent to pharmacy   Plan: zoster vaccine recombinant adjuvanted         (SHINGRIX) injection             (Z12.4) Cervical cancer screening  Comment:  Pap completed today   Plan: Pap Screen with HPV - recommended age 30 - 65         years             (E03.9) Hypothyroidism, unspecified type  Comment: TSH pending   Has been stable for years so will refill   Plan: TSH WITH FREE T4 REFLEX             (E03.8) Other specified hypothyroidism  Comment:    Plan: Lipid panel reflex to direct LDL Fasting,         levothyroxine (SYNTHROID/LEVOTHROID) 100 MCG         tablet             (I10) Essential hypertension with goal blood pressure less than 140/90  Comment: BP elevated today -   Plan: Lipid panel reflex to direct LDL Fasting,         Comprehensive metabolic panel (BMP + Alb, Alk         Phos, ALT, AST, Total. Bili, TP), Albumin         Random Urine Quantitative with Creat Ratio,         lisinopril (ZESTRIL) 20 MG tablet, metoprolol         succinate ER (TOPROL XL) 100 MG 24 hr tablet             (I48.0) PAF  (paroxysmal atrial fibrillation) (H)  Comment:    Plan: metoprolol succinate ER (TOPROL XL) 100 MG 24         hr tablet             (I48.91) Atrial fibrillation with RVR (H)  Comment:    Plan: metoprolol succinate ER (TOPROL XL) 50 MG 24 hr        tablet             (N39.41) Urge incontinence of urine  Comment:    Plan: oxybutynin ER (DITROPAN XL) 10 MG 24 hr tablet             (B00.9) Recurrent herpes simplex  Comment:    Plan: valACYclovir (VALTREX) 500 MG tablet             (F34.1) Dysthymia  Comment:  Will start prozac - low dose  Plan to follow-up in 6 weeks  Plan: FLUoxetine (PROZAC) 10 MG capsule             (N76.0) Vaginitis and vulvovaginitis  Comment:    Plan: Wet prep - lab collect                     COUNSELING:  Reviewed preventive health counseling, as reflected in patient instructions        She reports that she quit smoking about 48 years ago. Her smoking use included cigarettes. She started smoking about 52 years ago. She has a 2.50 pack-year smoking history. She has never used smokeless tobacco.      Appropriate preventive services were discussed with this patient, including applicable screening as appropriate for cardiovascular disease, diabetes, osteopenia/osteoporosis, and glaucoma.  As appropriate for age/gender, discussed screening for colorectal cancer, prostate cancer, breast cancer, and cervical cancer. Checklist reviewing preventive services available has been given to the patient.    Reviewed patients plan of care and provided an AVS. The Basic Care Plan (routine screening as documented in Health Maintenance) for Sofi meets the Care Plan requirement. This Care Plan has been established and reviewed with the Patient.       Noemy Olivera, [facility]    Identified Health Risks:    I have reviewed Opioid Use Disorder and Substance Use Disorder risk factors and made any needed referrals.       Information on urinary incontinence and treatment options given to  patient.  The patient was provided with suggestions to help her develop a healthy emotional lifestyle.

## 2023-05-10 ENCOUNTER — OFFICE VISIT (OUTPATIENT)
Dept: FAMILY MEDICINE | Facility: CLINIC | Age: 68
End: 2023-05-10
Payer: COMMERCIAL

## 2023-05-10 VITALS
OXYGEN SATURATION: 100 % | WEIGHT: 187.2 LBS | SYSTOLIC BLOOD PRESSURE: 153 MMHG | DIASTOLIC BLOOD PRESSURE: 87 MMHG | HEIGHT: 69 IN | TEMPERATURE: 97.2 F | HEART RATE: 66 BPM | BODY MASS INDEX: 27.73 KG/M2 | RESPIRATION RATE: 18 BRPM

## 2023-05-10 DIAGNOSIS — B00.9 RECURRENT HERPES SIMPLEX: ICD-10-CM

## 2023-05-10 DIAGNOSIS — E03.8 OTHER SPECIFIED HYPOTHYROIDISM: Chronic | ICD-10-CM

## 2023-05-10 DIAGNOSIS — I48.91 ATRIAL FIBRILLATION WITH RVR (H): ICD-10-CM

## 2023-05-10 DIAGNOSIS — F34.1 DYSTHYMIA: ICD-10-CM

## 2023-05-10 DIAGNOSIS — N76.0 VAGINITIS AND VULVOVAGINITIS: ICD-10-CM

## 2023-05-10 DIAGNOSIS — N39.41 URGE INCONTINENCE OF URINE: ICD-10-CM

## 2023-05-10 DIAGNOSIS — I10 ESSENTIAL HYPERTENSION WITH GOAL BLOOD PRESSURE LESS THAN 140/90: ICD-10-CM

## 2023-05-10 DIAGNOSIS — I48.0 PAF (PAROXYSMAL ATRIAL FIBRILLATION) (H): ICD-10-CM

## 2023-05-10 DIAGNOSIS — Z23 NEED FOR SHINGLES VACCINE: ICD-10-CM

## 2023-05-10 DIAGNOSIS — Z12.4 CERVICAL CANCER SCREENING: ICD-10-CM

## 2023-05-10 DIAGNOSIS — E03.9 HYPOTHYROIDISM, UNSPECIFIED TYPE: ICD-10-CM

## 2023-05-10 DIAGNOSIS — Z00.00 ENCOUNTER FOR MEDICARE ANNUAL WELLNESS EXAM: Primary | ICD-10-CM

## 2023-05-10 LAB
ALBUMIN SERPL BCG-MCNC: 4.3 G/DL (ref 3.5–5.2)
ALP SERPL-CCNC: 61 U/L (ref 35–104)
ALT SERPL W P-5'-P-CCNC: 17 U/L (ref 10–35)
ANION GAP SERPL CALCULATED.3IONS-SCNC: 11 MMOL/L (ref 7–15)
AST SERPL W P-5'-P-CCNC: 25 U/L (ref 10–35)
BILIRUB SERPL-MCNC: 0.5 MG/DL
BUN SERPL-MCNC: 12.5 MG/DL (ref 8–23)
CALCIUM SERPL-MCNC: 10.2 MG/DL (ref 8.8–10.2)
CHLORIDE SERPL-SCNC: 104 MMOL/L (ref 98–107)
CHOLEST SERPL-MCNC: 225 MG/DL
CLUE CELLS: ABNORMAL
CREAT SERPL-MCNC: 0.79 MG/DL (ref 0.51–0.95)
CREAT UR-MCNC: 145 MG/DL
DEPRECATED HCO3 PLAS-SCNC: 25 MMOL/L (ref 22–29)
GFR SERPL CREATININE-BSD FRML MDRD: 81 ML/MIN/1.73M2
GLUCOSE SERPL-MCNC: 97 MG/DL (ref 70–99)
HDLC SERPL-MCNC: 72 MG/DL
LDLC SERPL CALC-MCNC: 132 MG/DL
MICROALBUMIN UR-MCNC: 23.7 MG/L
MICROALBUMIN/CREAT UR: 16.34 MG/G CR (ref 0–25)
NONHDLC SERPL-MCNC: 153 MG/DL
POTASSIUM SERPL-SCNC: 4.4 MMOL/L (ref 3.4–5.3)
PROT SERPL-MCNC: 7.2 G/DL (ref 6.4–8.3)
SODIUM SERPL-SCNC: 140 MMOL/L (ref 136–145)
TRICHOMONAS, WET PREP: ABNORMAL
TRIGL SERPL-MCNC: 104 MG/DL
TSH SERPL DL<=0.005 MIU/L-ACNC: 1.02 UIU/ML (ref 0.3–4.2)
WBC'S/HIGH POWER FIELD, WET PREP: ABNORMAL
YEAST, WET PREP: PRESENT

## 2023-05-10 PROCEDURE — 99213 OFFICE O/P EST LOW 20 MIN: CPT | Mod: 25 | Performed by: FAMILY MEDICINE

## 2023-05-10 PROCEDURE — 84443 ASSAY THYROID STIM HORMONE: CPT | Performed by: FAMILY MEDICINE

## 2023-05-10 PROCEDURE — 99397 PER PM REEVAL EST PAT 65+ YR: CPT | Mod: 25 | Performed by: FAMILY MEDICINE

## 2023-05-10 PROCEDURE — 82570 ASSAY OF URINE CREATININE: CPT | Performed by: FAMILY MEDICINE

## 2023-05-10 PROCEDURE — G0009 ADMIN PNEUMOCOCCAL VACCINE: HCPCS | Performed by: FAMILY MEDICINE

## 2023-05-10 PROCEDURE — 82043 UR ALBUMIN QUANTITATIVE: CPT | Performed by: FAMILY MEDICINE

## 2023-05-10 PROCEDURE — 87210 SMEAR WET MOUNT SALINE/INK: CPT | Performed by: FAMILY MEDICINE

## 2023-05-10 PROCEDURE — 90677 PCV20 VACCINE IM: CPT | Performed by: FAMILY MEDICINE

## 2023-05-10 PROCEDURE — 80061 LIPID PANEL: CPT | Performed by: FAMILY MEDICINE

## 2023-05-10 PROCEDURE — 36415 COLL VENOUS BLD VENIPUNCTURE: CPT | Performed by: FAMILY MEDICINE

## 2023-05-10 PROCEDURE — 80053 COMPREHEN METABOLIC PANEL: CPT | Performed by: FAMILY MEDICINE

## 2023-05-10 PROCEDURE — 87624 HPV HI-RISK TYP POOLED RSLT: CPT | Performed by: FAMILY MEDICINE

## 2023-05-10 PROCEDURE — G0145 SCR C/V CYTO,THINLAYER,RESCR: HCPCS | Performed by: FAMILY MEDICINE

## 2023-05-10 RX ORDER — LEVOTHYROXINE SODIUM 100 UG/1
100 TABLET ORAL DAILY
Qty: 90 TABLET | Refills: 3 | Status: SHIPPED | OUTPATIENT
Start: 2023-05-10 | End: 2024-02-09

## 2023-05-10 RX ORDER — METOPROLOL SUCCINATE 50 MG/1
TABLET, EXTENDED RELEASE ORAL
Qty: 90 TABLET | Refills: 3 | Status: SHIPPED | OUTPATIENT
Start: 2023-05-10 | End: 2024-04-24

## 2023-05-10 RX ORDER — VALACYCLOVIR HYDROCHLORIDE 500 MG/1
TABLET, FILM COATED ORAL
Qty: 12 TABLET | Refills: 5 | Status: SHIPPED | OUTPATIENT
Start: 2023-05-10 | End: 2024-06-19

## 2023-05-10 RX ORDER — METOPROLOL SUCCINATE 100 MG/1
TABLET, EXTENDED RELEASE ORAL
Qty: 90 TABLET | Refills: 3 | Status: SHIPPED | OUTPATIENT
Start: 2023-05-10 | End: 2024-04-24

## 2023-05-10 RX ORDER — LISINOPRIL 20 MG/1
20 TABLET ORAL DAILY
Qty: 90 TABLET | Refills: 3 | Status: SHIPPED | OUTPATIENT
Start: 2023-05-10 | End: 2024-02-09

## 2023-05-10 RX ORDER — FLUOXETINE 10 MG/1
10 CAPSULE ORAL DAILY
Qty: 30 CAPSULE | Refills: 1 | Status: SHIPPED | OUTPATIENT
Start: 2023-05-10 | End: 2023-05-30

## 2023-05-10 RX ORDER — OXYBUTYNIN CHLORIDE 10 MG/1
10 TABLET, EXTENDED RELEASE ORAL DAILY
Qty: 90 TABLET | Refills: 3 | Status: SHIPPED | OUTPATIENT
Start: 2023-05-10 | End: 2024-02-09

## 2023-05-10 ASSESSMENT — PAIN SCALES - GENERAL: PAINLEVEL: NO PAIN (0)

## 2023-05-10 ASSESSMENT — PATIENT HEALTH QUESTIONNAIRE - PHQ9: SUM OF ALL RESPONSES TO PHQ QUESTIONS 1-9: 5

## 2023-05-10 NOTE — RESULT ENCOUNTER NOTE
Dear Sofi,   Wet prep does show some yeast - I suspect the cause of your symptoms.  You can use over the counter yeast cream or if you prefer I can send a prescription for Diflucan 150mg once to your local pharmacy.    Let me know,  -Noemy Olivera, DO

## 2023-05-10 NOTE — PATIENT INSTRUCTIONS
Patient Education   Personalized Prevention Plan  You are due for the preventive services outlined below.  Your care team is available to assist you in scheduling these services.  If you have already completed any of these items, please share that information with your care team to update in your medical record.  Health Maintenance Due   Topic Date Due     ANNUAL REVIEW OF HM ORDERS  Never done     Zoster (Shingles) Vaccine (2 of 3) 11/07/2014     PAP  10/30/2020     HPV Follow Up  10/30/2020     Pneumococcal Vaccine (2 - PCV) 10/30/2020     Thyroid Function Lab  02/01/2023     Mammogram  05/12/2023       Urinary Incontinence, Female (Adult)   Urinary incontinence means loss of bladder control. This problem affects many women, especially as they get older. If you have incontinence, you may be embarrassed to ask for help. But know that this problem can be treated.   Types of Incontinence  There are different types of incontinence. Two of the main types are described here. You can have more than one type.     Stress incontinence. With this type, urine leaks when pressure (stress) is put on the bladder. This may happen when you cough, sneeze, or laugh. Stress incontinence most often occurs because the pelvic floor muscles that support the bladder and urethra are weak. This can happen after pregnancy and vaginal childbirth or a hysterectomy. It can also be due to excess body weight or hormone changes.    Urge incontinence (also called overactive bladder). With this type, a sudden urge to urinate is felt often. This may happen even though there may not be much urine in the bladder. The need to urinate often during the night is common. Urge incontinence most often occurs because of bladder spasms. This may be due to bladder irritation or infection. Damage to bladder nerves or pelvic muscles, constipation, and certain medicines can also lead to urge incontinence.  Treatment depends on the cause. Further evaluation is  needed to find the type you have. This will likely include an exam and certain tests. Based on the results, you and your healthcare provider can then plan treatment. Until a diagnosis is made, the home care tips below can help ease symptoms.   Home care    Do pelvic floor muscle exercises, if they are prescribed. The pelvic floor muscles help support the bladder and urethra. Many women find that their symptoms improve when doing special exercises that strengthen these muscles. To do the exercises, contract the muscles you would use to stop your stream of urine. But do this when you re not urinating. Hold for 10 seconds, then relax. Repeat 10 to 20 times in a row, at least 3 times a day. Your healthcare provider may give you other instructions for how to do the exercises and how often.    Keep a bladder diary. This helps track how often and how much you urinate over a set period of time. Bring this diary with you to your next visit with the provider. The information can help your provider learn more about your bladder problem.    Lose weight, if advised to by your provider. Extra weight puts pressure on the bladder. Your provider can help you create a weight-loss plan that s right for you. This may include exercising more and making certain diet changes.    Don't have foods and drinks that may irritate the bladder. These can include alcohol and caffeinated drinks.    Quit smoking. Smoking and other tobacco use can lead to a long-term (chronic) cough that strains the pelvic floor muscles. Smoking may also damage the bladder and urethra. Talk with your provider about treatments or methods you can use to quit smoking.    If drinking large amounts of fluid makes you have symptoms, you may be advised to limit your fluid intake. You may also be advised to drink most of your fluids during the day and to limit fluids at night.    If you re worried about urine leakage or accidents, you may wear absorbent pads to catch  urine. Change the pads often. This helps reduce discomfort. It may also reduce the risk of skin or bladder infections.    Follow-up care  Follow up with your healthcare provider, or as directed. It may take some to find the right treatment for your problem. But healthy lifestyle changes can be made right away. These include such things as exercising on a regular basis, eating a healthy diet, losing weight (if needed), and quitting smoking. Your treatment plan may include special therapies or medicines. Certain procedures or surgery may also be options. Talk about any questions you have with your provider.   When to seek medical advice  Call the healthcare provider right away if any of these occur:    Fever of 100.4 F (38 C) or higher, or as directed by your provider    Bladder pain or fullness    Belly swelling    Nausea or vomiting    Back pain    Weakness, dizziness, or fainting  [employer] last reviewed this educational content on 1/1/2020 2000-2022 [employer]. All rights reserved. This information is not intended as a substitute for professional medical care. Always follow your healthcare professional's instructions.        Your Health Risk Assessment indicates you feel you are not in good emotional health.    Recreation   Recreation is not limited to sports and team events. It includes any activity that provides relaxation, interest, enjoyment, and exercise. Recreation provides an outlet for physical, mental, and social energy. It can give a sense of worth and achievement. It can help you stay healthy.    Mental Exercise and Social Involvement  Mental and emotional health is as important as physical health. Keep in touch with friends and family. Stay as active as possible. Continue to learn and challenge yourself.   Things you can do to stay mentally active are:    Learn something new, like a foreign language or musical instrument.     Play SCRABBLE or do crossword puzzles. If you cannot find  people to play these games with you at home, you can play them with others on your computer through the Internet.     Join a games club--anything from card games to chess or checkers or lawn bowling.     Start a new hobby.     Go back to school.     Volunteer.     Read.   Keep up with world events.

## 2023-05-12 LAB
BKR LAB AP GYN ADEQUACY: NORMAL
BKR LAB AP GYN INTERPRETATION: NORMAL
BKR LAB AP HPV REFLEX: NORMAL
BKR LAB AP PREVIOUS ABNL DX: NORMAL
BKR LAB AP PREVIOUS ABNORMAL: NORMAL
PATH REPORT.COMMENTS IMP SPEC: NORMAL
PATH REPORT.COMMENTS IMP SPEC: NORMAL
PATH REPORT.RELEVANT HX SPEC: NORMAL

## 2023-05-12 NOTE — RESULT ENCOUNTER NOTE
Dear Sofi,   Your test results are all back -   -LDL(bad) cholesterol level is elevated which can increase your heart disease risk.  A diet high in fat and simple carbohydrates, genetics and being overweight can contribute to this. ADVISE: exercising 150 minutes of aerobic exercise per week (30 minutes for 5 days per week or 50 minutes for 3 days per week are options) and eating a low saturated fat/low carbohydrate diet are helpful to improve this.  -Liver and gallbladder tests are normal (ALT,AST, Alk phos, bilirubin), kidney function is normal (Cr, GFR), sodium is normal, potassium is normal, calcium is normal, glucose is normal.  -TSH (thyroid stimulating hormone) level is normal which indicates normal thyroid function.  -Microalbumin (urine protein) test is normal.  ADVISE: rechecking this annually.  Let us know if you have any questions.  -Noemy Olivera, DO

## 2023-05-16 LAB
HUMAN PAPILLOMA VIRUS 16 DNA: NEGATIVE
HUMAN PAPILLOMA VIRUS 18 DNA: NEGATIVE
HUMAN PAPILLOMA VIRUS FINAL DIAGNOSIS: NORMAL
HUMAN PAPILLOMA VIRUS OTHER HR: NEGATIVE

## 2023-05-18 ENCOUNTER — E-VISIT (OUTPATIENT)
Dept: FAMILY MEDICINE | Facility: CLINIC | Age: 68
End: 2023-05-18
Payer: COMMERCIAL

## 2023-05-18 DIAGNOSIS — N30.00 ACUTE CYSTITIS WITHOUT HEMATURIA: Primary | ICD-10-CM

## 2023-05-18 PROCEDURE — 99421 OL DIG E/M SVC 5-10 MIN: CPT | Performed by: FAMILY MEDICINE

## 2023-05-19 ENCOUNTER — LAB (OUTPATIENT)
Dept: LAB | Facility: CLINIC | Age: 68
End: 2023-05-19
Payer: COMMERCIAL

## 2023-05-19 ENCOUNTER — DOCUMENTATION ONLY (OUTPATIENT)
Dept: LAB | Facility: CLINIC | Age: 68
End: 2023-05-19

## 2023-05-19 DIAGNOSIS — N94.9 FEMALE GENITAL SYMPTOMS: Primary | ICD-10-CM

## 2023-05-19 DIAGNOSIS — N94.9 FEMALE GENITAL SYMPTOMS: ICD-10-CM

## 2023-05-19 LAB
ALBUMIN UR-MCNC: NEGATIVE MG/DL
APPEARANCE UR: CLEAR
BACTERIA #/AREA URNS HPF: ABNORMAL /HPF
BILIRUB UR QL STRIP: NEGATIVE
CLUE CELLS: ABNORMAL
COLOR UR AUTO: YELLOW
GLUCOSE UR STRIP-MCNC: NEGATIVE MG/DL
HGB UR QL STRIP: NEGATIVE
KETONES UR STRIP-MCNC: NEGATIVE MG/DL
LEUKOCYTE ESTERASE UR QL STRIP: ABNORMAL
NITRATE UR QL: NEGATIVE
PH UR STRIP: 6.5 [PH] (ref 5–7)
RBC #/AREA URNS AUTO: ABNORMAL /HPF
SP GR UR STRIP: 1.02 (ref 1–1.03)
TRICHOMONAS, WET PREP: ABNORMAL
UROBILINOGEN UR STRIP-ACNC: 0.2 E.U./DL
WBC #/AREA URNS AUTO: ABNORMAL /HPF
WBC'S/HIGH POWER FIELD, WET PREP: ABNORMAL
YEAST, WET PREP: ABNORMAL

## 2023-05-19 PROCEDURE — 87088 URINE BACTERIA CULTURE: CPT

## 2023-05-19 PROCEDURE — 87086 URINE CULTURE/COLONY COUNT: CPT

## 2023-05-19 PROCEDURE — 87210 SMEAR WET MOUNT SALINE/INK: CPT

## 2023-05-19 PROCEDURE — 87186 SC STD MICRODIL/AGAR DIL: CPT

## 2023-05-19 PROCEDURE — 81001 URINALYSIS AUTO W/SCOPE: CPT

## 2023-05-19 RX ORDER — NITROFURANTOIN 25; 75 MG/1; MG/1
100 CAPSULE ORAL 2 TIMES DAILY
Qty: 10 CAPSULE | Refills: 0 | Status: SHIPPED | OUTPATIENT
Start: 2023-05-19 | End: 2023-05-24

## 2023-05-19 NOTE — PATIENT INSTRUCTIONS
Dear Sofi Degroot    After reviewing your responses, I've been able to diagnose you with a urinary tract infection, which is a common infection of the bladder with bacteria.  This is not a sexually transmitted infection, though urinating immediately after intercourse can help prevent infections.  Drinking lots of fluids is also helpful to clear your current infection and prevent the next one.      I have sent a prescription for antibiotics to your pharmacy to treat this infection.    It is important that you take all of your prescribed medication even if your symptoms are improving after a few doses.  Taking all of your medicine helps prevent the symptoms from returning.     If your symptoms worsen, you develop pain in your back or stomach, develop fevers, or are not improving in 5 days, please contact your primary care provider for an appointment or visit any of our convenient Walk-in or Urgent Care Centers to be seen, which can be found on our website here.    Thanks again for choosing us as your health care partner,    Noemy Olivera, DO    Understanding Urinary Tract Infections (UTIs)  Most UTIs are caused by bacteria. But they may also be caused by viruses or fungi. Bacteria from the bowel are the most common source of infection. The infection may start due to any of these:    Having sex. During sex, bacteria can go from the penis, vagina, or rectum into the urethra.     Bacteria outside the rectum getting into the urethra. Bacteria on the skin outside the rectum may go into the urethra. This is more common in people who were assigned female at birth. That's because for them, the rectum and urethra are closer to each other than in people who were assigned male at birth. Wiping from front to back after using the toilet can help prevent germs from getting to the urethra. So can keeping the area clean.    Blocked urine flow through the urinary tract. If urine sits too long, germs may start to grow out of  control.  Parts of the urinary tract  The infection can occur in any part of the urinary tract.      The kidneys. These organs filter blood. They remove wastes and extra water to make urine.    The ureters. These tubes carry urine from the kidneys to the bladder.    The bladder. This holds urine until you're ready to pee.    The urethra. This tube carries urine from the bladder out of the body. It's shorter in people who were assigned female at birth. So for them, bacteria can move through it more easily. The urethra is longer in people who were assigned male at birth. So a UTI is less likely to reach the bladder or kidneys for them.  Outcome Referrals last reviewed this educational content on 11/1/2021 2000-2022 [employer]. All rights reserved. This information is not intended as a substitute for professional medical care. Always follow your healthcare professional's instructions.

## 2023-05-21 LAB — BACTERIA UR CULT: ABNORMAL

## 2023-05-23 ENCOUNTER — ANCILLARY PROCEDURE (OUTPATIENT)
Dept: MAMMOGRAPHY | Facility: CLINIC | Age: 68
End: 2023-05-23
Attending: FAMILY MEDICINE
Payer: COMMERCIAL

## 2023-05-23 DIAGNOSIS — Z12.31 VISIT FOR SCREENING MAMMOGRAM: ICD-10-CM

## 2023-05-23 PROCEDURE — 77067 SCR MAMMO BI INCL CAD: CPT | Mod: 26 | Performed by: STUDENT IN AN ORGANIZED HEALTH CARE EDUCATION/TRAINING PROGRAM

## 2023-05-23 PROCEDURE — 77067 SCR MAMMO BI INCL CAD: CPT

## 2023-08-03 DIAGNOSIS — F34.1 DYSTHYMIA: ICD-10-CM

## 2023-08-03 RX ORDER — FLUOXETINE 10 MG/1
CAPSULE ORAL
Qty: 90 CAPSULE | Refills: 0 | Status: SHIPPED | OUTPATIENT
Start: 2023-08-03 | End: 2023-10-17

## 2023-10-04 DIAGNOSIS — F34.1 DYSTHYMIA: ICD-10-CM

## 2023-10-05 NOTE — TELEPHONE ENCOUNTER
PHQ9 sent to patient via [employer].  Last OV 5/10/23 - physical     Irene Franco RN

## 2023-10-17 RX ORDER — FLUOXETINE 10 MG/1
CAPSULE ORAL
Qty: 90 CAPSULE | Refills: 1 | Status: SHIPPED | OUTPATIENT
Start: 2023-10-17 | End: 2024-03-20

## 2024-02-09 DIAGNOSIS — N39.41 URGE INCONTINENCE OF URINE: ICD-10-CM

## 2024-02-09 DIAGNOSIS — I10 ESSENTIAL HYPERTENSION WITH GOAL BLOOD PRESSURE LESS THAN 140/90: ICD-10-CM

## 2024-02-09 DIAGNOSIS — E03.8 OTHER SPECIFIED HYPOTHYROIDISM: Chronic | ICD-10-CM

## 2024-02-09 RX ORDER — LISINOPRIL 20 MG/1
20 TABLET ORAL DAILY
Qty: 100 TABLET | Refills: 0 | Status: SHIPPED | OUTPATIENT
Start: 2024-02-09 | End: 2024-04-19

## 2024-02-09 RX ORDER — OXYBUTYNIN CHLORIDE 10 MG/1
10 TABLET, EXTENDED RELEASE ORAL DAILY
Qty: 100 TABLET | Refills: 0 | Status: SHIPPED | OUTPATIENT
Start: 2024-02-09 | End: 2024-04-19

## 2024-02-09 RX ORDER — LEVOTHYROXINE SODIUM 100 UG/1
100 TABLET ORAL DAILY
Qty: 100 TABLET | Refills: 0 | Status: SHIPPED | OUTPATIENT
Start: 2024-02-09 | End: 2024-04-19

## 2024-02-09 NOTE — TELEPHONE ENCOUNTER
Prescription approved per [facility] Refill Protocol.  Roxanne Pierre, RN  [facility] Triage Nurse

## 2024-03-03 ENCOUNTER — TRANSFERRED RECORDS (OUTPATIENT)
Dept: HEALTH INFORMATION MANAGEMENT | Facility: CLINIC | Age: 69
End: 2024-03-03
Payer: COMMERCIAL

## 2024-03-19 DIAGNOSIS — F34.1 DYSTHYMIA: ICD-10-CM

## 2024-03-20 RX ORDER — FLUOXETINE 10 MG/1
CAPSULE ORAL
Qty: 90 CAPSULE | Refills: 0 | Status: SHIPPED | OUTPATIENT
Start: 2024-03-20 | End: 2024-05-22

## 2024-04-10 ENCOUNTER — PATIENT OUTREACH (OUTPATIENT)
Dept: CARE COORDINATION | Facility: CLINIC | Age: 69
End: 2024-04-10
Payer: COMMERCIAL

## 2024-04-18 DIAGNOSIS — E03.8 OTHER SPECIFIED HYPOTHYROIDISM: Chronic | ICD-10-CM

## 2024-04-18 DIAGNOSIS — I10 ESSENTIAL HYPERTENSION WITH GOAL BLOOD PRESSURE LESS THAN 140/90: ICD-10-CM

## 2024-04-18 DIAGNOSIS — N39.41 URGE INCONTINENCE OF URINE: ICD-10-CM

## 2024-04-19 RX ORDER — LEVOTHYROXINE SODIUM 100 UG/1
100 TABLET ORAL DAILY
Qty: 100 TABLET | Refills: 0 | Status: SHIPPED | OUTPATIENT
Start: 2024-04-19 | End: 2024-06-21

## 2024-04-19 RX ORDER — OXYBUTYNIN CHLORIDE 10 MG/1
10 TABLET, EXTENDED RELEASE ORAL DAILY
Qty: 100 TABLET | Refills: 0 | Status: SHIPPED | OUTPATIENT
Start: 2024-04-19 | End: 2024-06-19

## 2024-04-19 RX ORDER — LISINOPRIL 20 MG/1
20 TABLET ORAL DAILY
Qty: 100 TABLET | Refills: 0 | Status: SHIPPED | OUTPATIENT
Start: 2024-04-19 | End: 2024-06-19

## 2024-04-23 ENCOUNTER — PATIENT OUTREACH (OUTPATIENT)
Dept: CARE COORDINATION | Facility: CLINIC | Age: 69
End: 2024-04-23
Payer: COMMERCIAL

## 2024-04-24 DIAGNOSIS — I10 ESSENTIAL HYPERTENSION WITH GOAL BLOOD PRESSURE LESS THAN 140/90: ICD-10-CM

## 2024-04-24 DIAGNOSIS — I48.0 PAF (PAROXYSMAL ATRIAL FIBRILLATION) (H): ICD-10-CM

## 2024-04-24 DIAGNOSIS — I48.91 ATRIAL FIBRILLATION WITH RVR (H): ICD-10-CM

## 2024-04-24 RX ORDER — METOPROLOL SUCCINATE 50 MG/1
TABLET, EXTENDED RELEASE ORAL
Qty: 100 TABLET | Refills: 0 | Status: SHIPPED | OUTPATIENT
Start: 2024-04-24 | End: 2024-06-19

## 2024-04-24 RX ORDER — METOPROLOL SUCCINATE 100 MG/1
TABLET, EXTENDED RELEASE ORAL
Qty: 100 TABLET | Refills: 0 | Status: SHIPPED | OUTPATIENT
Start: 2024-04-24 | End: 2024-06-19

## 2024-05-21 DIAGNOSIS — F34.1 DYSTHYMIA: ICD-10-CM

## 2024-05-22 RX ORDER — FLUOXETINE 10 MG/1
CAPSULE ORAL
Qty: 100 CAPSULE | Refills: 0 | Status: SHIPPED | OUTPATIENT
Start: 2024-05-22 | End: 2024-06-19

## 2024-05-29 ENCOUNTER — ANCILLARY PROCEDURE (OUTPATIENT)
Dept: MAMMOGRAPHY | Facility: CLINIC | Age: 69
End: 2024-05-29
Attending: FAMILY MEDICINE
Payer: COMMERCIAL

## 2024-05-29 DIAGNOSIS — Z12.31 VISIT FOR SCREENING MAMMOGRAM: ICD-10-CM

## 2024-05-29 PROCEDURE — 77063 BREAST TOMOSYNTHESIS BI: CPT | Mod: 26 | Performed by: RADIOLOGY

## 2024-05-29 PROCEDURE — 77063 BREAST TOMOSYNTHESIS BI: CPT

## 2024-05-29 PROCEDURE — 77067 SCR MAMMO BI INCL CAD: CPT | Mod: 26 | Performed by: RADIOLOGY

## 2024-06-04 ENCOUNTER — TRANSFERRED RECORDS (OUTPATIENT)
Dept: HEALTH INFORMATION MANAGEMENT | Facility: CLINIC | Age: 69
End: 2024-06-04
Payer: COMMERCIAL

## 2024-06-14 SDOH — HEALTH STABILITY: PHYSICAL HEALTH: ON AVERAGE, HOW MANY DAYS PER WEEK DO YOU ENGAGE IN MODERATE TO STRENUOUS EXERCISE (LIKE A BRISK WALK)?: 0 DAYS

## 2024-06-14 SDOH — HEALTH STABILITY: PHYSICAL HEALTH: ON AVERAGE, HOW MANY MINUTES DO YOU ENGAGE IN EXERCISE AT THIS LEVEL?: 0 MIN

## 2024-06-14 ASSESSMENT — ANXIETY QUESTIONNAIRES
GAD7 TOTAL SCORE: 0
1. FEELING NERVOUS, ANXIOUS, OR ON EDGE: NOT AT ALL
7. FEELING AFRAID AS IF SOMETHING AWFUL MIGHT HAPPEN: NOT AT ALL
7. FEELING AFRAID AS IF SOMETHING AWFUL MIGHT HAPPEN: NOT AT ALL
8. IF YOU CHECKED OFF ANY PROBLEMS, HOW DIFFICULT HAVE THESE MADE IT FOR YOU TO DO YOUR WORK, TAKE CARE OF THINGS AT HOME, OR GET ALONG WITH OTHER PEOPLE?: NOT DIFFICULT AT ALL
GAD7 TOTAL SCORE: 0
IF YOU CHECKED OFF ANY PROBLEMS ON THIS QUESTIONNAIRE, HOW DIFFICULT HAVE THESE PROBLEMS MADE IT FOR YOU TO DO YOUR WORK, TAKE CARE OF THINGS AT HOME, OR GET ALONG WITH OTHER PEOPLE: NOT DIFFICULT AT ALL
6. BECOMING EASILY ANNOYED OR IRRITABLE: NOT AT ALL
5. BEING SO RESTLESS THAT IT IS HARD TO SIT STILL: NOT AT ALL
GAD7 TOTAL SCORE: 0
3. WORRYING TOO MUCH ABOUT DIFFERENT THINGS: NOT AT ALL
2. NOT BEING ABLE TO STOP OR CONTROL WORRYING: NOT AT ALL
4. TROUBLE RELAXING: NOT AT ALL

## 2024-06-14 ASSESSMENT — PATIENT HEALTH QUESTIONNAIRE - PHQ9
SUM OF ALL RESPONSES TO PHQ QUESTIONS 1-9: 0
10. IF YOU CHECKED OFF ANY PROBLEMS, HOW DIFFICULT HAVE THESE PROBLEMS MADE IT FOR YOU TO DO YOUR WORK, TAKE CARE OF THINGS AT HOME, OR GET ALONG WITH OTHER PEOPLE: NOT DIFFICULT AT ALL
SUM OF ALL RESPONSES TO PHQ QUESTIONS 1-9: 0

## 2024-06-14 ASSESSMENT — SOCIAL DETERMINANTS OF HEALTH (SDOH): HOW OFTEN DO YOU GET TOGETHER WITH FRIENDS OR RELATIVES?: THREE TIMES A WEEK

## 2024-06-18 ENCOUNTER — TRANSFERRED RECORDS (OUTPATIENT)
Dept: HEALTH INFORMATION MANAGEMENT | Facility: CLINIC | Age: 69
End: 2024-06-18
Payer: COMMERCIAL

## 2024-06-18 NOTE — PROGRESS NOTES
Preventive Care Visit  [facility]  Noemy Olivera DO, Family Medicine  Jun 19, 2024      Assessment & Plan     Encounter for Medicare annual wellness exam     - PRIMARY CARE FOLLOW-UP SCHEDULING    Acquired hypothyroidism  TSH pending -   Will refill thryoid medication if in range  No new symptoms   - TSH WITH FREE T4 REFLEX; Future  - TSH WITH FREE T4 REFLEX    Dysthymia  Refilled - dose working well -   - FLUoxetine (PROZAC) 10 MG capsule; Take 1 capsule (10 mg) by mouth daily    Essential hypertension with goal blood pressure less than 140/90  BP controlled on current two medications  Refilled   - lisinopril (ZESTRIL) 20 MG tablet; Take 1 tablet (20 mg) by mouth daily  - Comprehensive metabolic panel (BMP + Alb, Alk Phos, ALT, AST, Total. Bili, TP); Future  - Albumin Random Urine Quantitative with Creat Ratio; Future  - metoprolol succinate ER (TOPROL XL) 100 MG 24 hr tablet; TAKE 1 TABLET BY MOUTH DAILY  WITH A 50 MG TABLET TO EQUAL 150 MG DAILY  - Comprehensive metabolic panel (BMP + Alb, Alk Phos, ALT, AST, Total. Bili, TP)  - Albumin Random Urine Quantitative with Creat Ratio    PAF (paroxysmal atrial fibrillation) (H)     - metoprolol succinate ER (TOPROL XL) 100 MG 24 hr tablet; TAKE 1 TABLET BY MOUTH DAILY  WITH A 50 MG TABLET TO EQUAL 150 MG DAILY    Atrial fibrillation with RVR (H)     - metoprolol succinate ER (TOPROL XL) 50 MG 24 hr tablet; TAKE 1 TABLET BY MOUTH DAILY  WITH 100 MG TABLET TO EQUAL 150  MG DAILY    Urge incontinence of urine  Urge incontinence controlled on ditropan   - oxyBUTYnin ER (DITROPAN XL) 10 MG 24 hr tablet; Take 1 tablet (10 mg) by mouth daily    Recurrent herpes simplex   Refilled   - valACYclovir (VALTREX) 500 MG tablet; TAKE ONE TABLET BY MOUTH TWICE A DAY FOR THREE DAYS. (REPEAT AS NEEDED)    Mixed hyperlipidemia  Tried crestor and lipitor in past but muscle cramps  Will try pravastatin to see if tolerates better -   Schedule lab only appt in 2 months  "if tolerating for lipid recheck   - Lipid panel reflex to direct LDL Fasting; Future  - pravastatin (PRAVACHOL) 40 MG tablet; Take 1 tablet (40 mg) by mouth daily  - Lipid panel reflex to direct LDL Fasting    Patient has been advised of split billing requirements and indicates understanding: Yes        BMI  Estimated body mass index is 28.55 kg/m  as calculated from the following:    Height as of this encounter: 1.73 m (5' 8.11\").    Weight as of this encounter: 85.5 kg (188 lb 6.4 oz).       Counseling  Appropriate preventive services were discussed with this patient, including applicable screening as appropriate for fall prevention, nutrition, physical activity, Tobacco-use cessation, weight loss and cognition.  Checklist reviewing preventive services available has been given to the patient.  Reviewed patient's diet, addressing concerns and/or questions.   Patient reported safety concerns were addressed today.Information on urinary incontinence and treatment options given to patient.           Lisa Farah is a 69 year old, presenting for the following:  Physical (AWV)          Health Care Directive  Patient has a Health Care Directive on file  Advance care planning document is on file but is outdated.  Patient encouraged to updated.    [facility]    Wellness Visit Notes:    -Last mammo done 5/2024, due 5/2025 (impression: Negative)  -Last DEXA done 2/2022, due 2/2025 (impression: Osteopenia)  -Last colon cancer screening done 3/2022, due  (impression: One 10 mm polyp in the ascending colon, removed.Clips were placed. Four 3 to 5 mm polyps in the sigmoid colon, in the ascending colon and in the cecum, removed.)    -Immunizations: Covid Booster - Today   Shingles, Hep B: Patient to receive at a pharmacy     -ACP: On file from 2005. HCAs are Blanca, Reviewed With Patient. Patient will update    -Depression Action Plan: Sent to patient via [employer]. Also printed out and reviewed with patient. "                 6/14/2024   General Health   How would you rate your overall physical health? Good   Feel stress (tense, anxious, or unable to sleep) Not at all         6/14/2024   Nutrition   Diet: Regular (no restrictions)         6/14/2024   Exercise   Days per week of moderate/strenous exercise 0 days   Average minutes spent exercising at this level 0 min   (!) EXERCISE CONCERN      6/14/2024   Social Factors   Frequency of gathering with friends or relatives Three times a week   Worry food won't last until get money to buy more No   Food not last or not have enough money for food? No   Do you have housing?  Yes   Are you worried about losing your housing? No   Lack of transportation? No   Unable to get utilities (heat,electricity)? No         6/19/2024   Fall Risk   Gait Speed Test (Document in seconds) 4   Gait Speed Test Interpretation Less than or equal to 5.00 seconds - PASS             6/14/2024   Activities of Daily Living- Home Safety   Needs help with the following daily activites None of the above   Safety concerns in the home No grab bars in the bathroom         6/14/2024   Dental   Dentist two times every year? Yes         6/14/2024   Hearing Screening   Hearing concerns? None of the above         6/14/2024   Driving Risk Screening   Patient/family members have concerns about driving No         6/14/2024   General Alertness/Fatigue Screening   Have you been more tired than usual lately? No         6/14/2024   Urinary Incontinence Screening   Bothered by leaking urine in past 6 months Yes         6/14/2024   TB Screening   Were you born outside of the [place]? No       Today's PHQ-9 Score:       6/14/2024    11:34 AM   PHQ-9 SCORE   PHQ-9 Total Score MyChart 0   PHQ-9 Total Score 0         6/14/2024   Substance Use   Alcohol more than 3/day or more than 7/wk No   Do you have a current opioid prescription? No   How severe/bad is pain from 1 to 10? 7/10   Do you use any other substances recreationally? (!)  CAN[employer] PRODUCTS     Social History     Tobacco Use    Smoking status: Former     Current packs/day: 0.00     Average packs/day: 0.5 packs/day for 5.0 years (2.5 ttl pk-yrs)     Types: Cigarettes     Start date: 1970     Quit date: 5/15/1975     Years since quittin.1    Smokeless tobacco: Never    Tobacco comments:     quit 30 years ago   Vaping Use    Vaping status: Never Used   Substance Use Topics    Alcohol use: Yes     Comment: 4-5 Drinks Per Week    Drug use: No           2024   LAST FHS-7 RESULTS   1st degree relative breast or ovarian cancer No   Any relative bilateral breast cancer No   Any male have breast cancer No   Any ONE woman have BOTH breast AND ovarian cancer No   Any woman with breast cancer before 50yrs No   2 or more relatives with breast AND/OR ovarian cancer No   2 or more relatives with breast AND/OR bowel cancer No        Mammogram Screening - Mammogram every 1-2 years updated in Health Maintenance based on mutual decision making      History of abnormal Pap smear: YES - reflected in Problem List and Health Maintenance accordingly        Latest Ref Rng & Units 5/10/2023     9:30 AM 10/30/2019    10:45 AM 10/30/2019    10:26 AM   PAP / HPV   PAP  Negative for Intraepithelial Lesion or Malignancy (NILM)      PAP (Historical)    NIL    HPV 16 DNA Negative Negative  Negative     HPV 18 DNA Negative Negative  Negative     Other HR HPV Negative Negative  Negative       ASCVD Risk   The 10-year ASCVD risk score (Anna VELÁSQUEZ, et al., 2019) is: 13.1%    Values used to calculate the score:      Age: 69 years      Sex: Female      Is Non- : No      Diabetic: No      Tobacco smoker: No      Systolic Blood Pressure: 132 mmHg      Is BP treated: Yes      HDL Cholesterol: 72 mg/dL      Total Cholesterol: 225 mg/dL  The 10-year ASCVD risk score (Anna VELÁSQUEZ, et al., 2019) is: 13.1%    Values used to calculate the score:      Age: 69 years      Sex: Female      Is  Non- : No      Diabetic: No      Tobacco smoker: No      Systolic Blood Pressure: 132 mmHg      Is BP treated: Yes      HDL Cholesterol: 72 mg/dL      Total Cholesterol: 225 mg/dL             Reviewed and updated as needed this visit by Provider   Tobacco  Allergies  Meds  Problems  Med Hx  Surg Hx  Fam Hx            Patient Active Problem List   Diagnosis    Hypothyroidism    Hepatitis C virus infection without hepatic coma    CARDIOVASCULAR SCREENING; LDL GOAL LESS THAN 100    Hypertension goal BP (blood pressure) < 140/90    Transient cerebral ischemia    PFO (patent foramen ovale)    Cervical high risk HPV (human papillomavirus) test positive    Bilateral arm weakness    Lateral epicondylitis of both elbows    Retinal infarct    [facility]    Atrial fibrillation (H)    Paroxysmal A-fib (H)     Past Surgical History:   Procedure Laterality Date    ANESTHESIA CARDIOVERSION N/A 10/26/2018    Procedure: ANESTHESIA CARDIOVERSION;  Surgeon: GENERIC ANESTHESIA PROVIDER;  Location: [facility] OR    COLONOSCOPY      COLONOSCOPY N/A 3/29/2022    Procedure: COLONOSCOPY, WITH HOT POLYPECTOMY AND CLIP;  Surgeon: Geoffrey Garcia MD;  Location: [facility] OR    GENITOURINARY SURGERY      tubal ligation    ORTHOPEDIC SURGERY      removal of bone spur right foot    SURGICAL HISTORY OF -  Right 2008    Right 1st MTPJ, bone spur resection    ZZC LIGATE FALLOPIAN TUBE  1986       Social History     Tobacco Use    Smoking status: Former     Current packs/day: 0.00     Average packs/day: 0.5 packs/day for 5.0 years (2.5 ttl pk-yrs)     Types: Cigarettes     Start date: 1970     Quit date: 5/15/1975     Years since quittin.1    Smokeless tobacco: Never    Tobacco comments:     quit 30 years ago   Substance Use Topics    Alcohol use: Yes     Comment: 4-5 Drinks Per Week     Family History   Problem Relation Age of Onset    Heart  Disease Maternal Grandfather     Breast Cancer Maternal Grandmother         Dx'd age 70    Arthritis Father     Lipids Mother         On meds    Cancer Mother         BCC    Heart Disease Brother          of dilated of cardiomyopathy at age 54    Alcohol/Drug Sister     Alcohol/Drug Brother     Depression Sister     Depression Sister          Current providers sharing in care for this patient include:  Patient Care Team:  Noemy Olivera DO as PCP - General  Noemy Olivera DO as Referring Physician (Family Practice)  Mahamed Block MD as Resident (House Physician)  Nasir Alvarez, KAN (Inactive) as Nurse Coordinator (Cardiology)  Xiomara Lopez MD as MD (Cardiology)  Alonzo Baldwin MD as MD (Clinical Cardiac Electrophysiology)  Noemy Olivera DO as Assigned PCP  Elza Hatfield RN as Specialty Care Coordinator (Cardiology)    The following health maintenance items are reviewed in Epic and correct as of today:  Health Maintenance   Topic Date Due    ZOSTER IMMUNIZATION (2 of 3) 2014    TSH W/FREE T4 REFLEX  05/10/2024    PHQ-9  2024    DEXA  2025    COLORECTAL CANCER SCREENING  2025    MAMMO SCREENING  2025    MEDICARE ANNUAL WELLNESS VISIT  2025    ANNUAL REVIEW OF HM ORDERS  2025    FALL RISK ASSESSMENT  2025    PAP FOLLOW-UP  05/10/2026    HPV FOLLOW-UP  05/10/2026    GLUCOSE  05/10/2026    LIPID  05/10/2028    DTAP/TDAP/TD IMMUNIZATION (3 - Td or Tdap) 10/17/2028    ADVANCE CARE PLANNING  2029    HEPATITIS C SCREENING  Completed    DEPRESSION ACTION PLAN  Completed    INFLUENZA VACCINE  Completed    Pneumococcal Vaccine: 65+ Years  Completed    RSV VACCINE (Pregnancy & 60+)  Completed    COVID-19 Vaccine  Completed    IPV IMMUNIZATION  Aged Out    HPV IMMUNIZATION  Aged Out    MENINGITIS IMMUNIZATION  Aged Out    RSV MONOCLONAL ANTIBODY  Aged Out    HEPATITIS B IMMUNIZATION  Discontinued         Review of Systems  Constitutional,  "HEENT, cardiovascular, pulmonary, GI, , musculoskeletal, neuro, skin, endocrine and psych systems are negative, except as otherwise noted.     Objective    Exam  /82   Pulse 64   Temp 97.1  F (36.2  C) (Temporal)   Resp 18   Ht 1.73 m (5' 8.11\")   Wt 85.5 kg (188 lb 6.4 oz)   LMP 10/27/2004 (Approximate)   SpO2 93%   BMI 28.55 kg/m     Estimated body mass index is 28.55 kg/m  as calculated from the following:    Height as of this encounter: 1.73 m (5' 8.11\").    Weight as of this encounter: 85.5 kg (188 lb 6.4 oz).    Physical Exam  GENERAL: alert and no distress  EYES: Eyes grossly normal to inspection, PERRL and conjunctivae and sclerae normal  HENT: ear canals and TM's normal, nose and mouth without ulcers or lesions  NECK: no adenopathy, no asymmetry, masses, or scars  RESP: lungs clear to auscultation - no rales, rhonchi or wheezes  BREAST: normal without masses, tenderness or nipple discharge and no palpable axillary masses or adenopathy  CV: regular rate and rhythm, normal S1 S2, no S3 or S4, no murmur, click or rub, no peripheral edema  ABDOMEN: soft, nontender, no hepatosplenomegaly, no masses and bowel sounds normal  MS: no gross musculoskeletal defects noted, no edema  SKIN: no suspicious lesions or rashes  NEURO: Normal strength and tone, mentation intact and speech normal  PSYCH: mentation appears normal, affect normal/bright         6/19/2024   Mini Cog   Clock Draw Score 2 Normal   3 Item Recall 3 objects recalled   Mini Cog Total Score 5                 Signed Electronically by: Noemy Olivera, DO    Answers submitted by the patient for this visit:  Patient Health Questionnaire (Submitted on 6/14/2024)  If you checked off any problems, how difficult have these problems made it for you to do your work, take care of things at home, or get along with other people?: Not difficult at all  PHQ9 TOTAL SCORE: 0  VICKIE-7 (Submitted on 6/14/2024)  VICKIE 7 TOTAL SCORE: 0    "

## 2024-06-19 ENCOUNTER — OFFICE VISIT (OUTPATIENT)
Dept: FAMILY MEDICINE | Facility: CLINIC | Age: 69
End: 2024-06-19
Attending: FAMILY MEDICINE
Payer: COMMERCIAL

## 2024-06-19 VITALS
DIASTOLIC BLOOD PRESSURE: 82 MMHG | WEIGHT: 188.4 LBS | RESPIRATION RATE: 18 BRPM | HEART RATE: 64 BPM | BODY MASS INDEX: 28.55 KG/M2 | TEMPERATURE: 97.1 F | HEIGHT: 68 IN | SYSTOLIC BLOOD PRESSURE: 132 MMHG | OXYGEN SATURATION: 93 %

## 2024-06-19 DIAGNOSIS — F34.1 DYSTHYMIA: ICD-10-CM

## 2024-06-19 DIAGNOSIS — E78.2 MIXED HYPERLIPIDEMIA: ICD-10-CM

## 2024-06-19 DIAGNOSIS — B00.9 RECURRENT HERPES SIMPLEX: ICD-10-CM

## 2024-06-19 DIAGNOSIS — E03.8 OTHER SPECIFIED HYPOTHYROIDISM: Chronic | ICD-10-CM

## 2024-06-19 DIAGNOSIS — I48.91 ATRIAL FIBRILLATION WITH RVR (H): ICD-10-CM

## 2024-06-19 DIAGNOSIS — I10 ESSENTIAL HYPERTENSION WITH GOAL BLOOD PRESSURE LESS THAN 140/90: ICD-10-CM

## 2024-06-19 DIAGNOSIS — Z00.00 ENCOUNTER FOR MEDICARE ANNUAL WELLNESS EXAM: ICD-10-CM

## 2024-06-19 DIAGNOSIS — I48.0 PAF (PAROXYSMAL ATRIAL FIBRILLATION) (H): ICD-10-CM

## 2024-06-19 DIAGNOSIS — E03.9 ACQUIRED HYPOTHYROIDISM: Primary | ICD-10-CM

## 2024-06-19 DIAGNOSIS — N39.41 URGE INCONTINENCE OF URINE: ICD-10-CM

## 2024-06-19 LAB
ALBUMIN SERPL BCG-MCNC: 4.1 G/DL (ref 3.5–5.2)
ALP SERPL-CCNC: 59 U/L (ref 40–150)
ALT SERPL W P-5'-P-CCNC: 21 U/L (ref 0–50)
ANION GAP SERPL CALCULATED.3IONS-SCNC: 10 MMOL/L (ref 7–15)
AST SERPL W P-5'-P-CCNC: 24 U/L (ref 0–45)
BILIRUB SERPL-MCNC: 0.4 MG/DL
BUN SERPL-MCNC: 11.7 MG/DL (ref 8–23)
CALCIUM SERPL-MCNC: 9.8 MG/DL (ref 8.8–10.2)
CHLORIDE SERPL-SCNC: 104 MMOL/L (ref 98–107)
CHOLEST SERPL-MCNC: 239 MG/DL
CREAT SERPL-MCNC: 0.72 MG/DL (ref 0.51–0.95)
CREAT UR-MCNC: 96.2 MG/DL
DEPRECATED HCO3 PLAS-SCNC: 25 MMOL/L (ref 22–29)
EGFRCR SERPLBLD CKD-EPI 2021: 89 ML/MIN/1.73M2
FASTING STATUS PATIENT QL REPORTED: YES
FASTING STATUS PATIENT QL REPORTED: YES
GLUCOSE SERPL-MCNC: 113 MG/DL (ref 70–99)
HDLC SERPL-MCNC: 83 MG/DL
LDLC SERPL CALC-MCNC: 140 MG/DL
MICROALBUMIN UR-MCNC: <12 MG/L
MICROALBUMIN/CREAT UR: NORMAL MG/G{CREAT}
NONHDLC SERPL-MCNC: 156 MG/DL
POTASSIUM SERPL-SCNC: 4.7 MMOL/L (ref 3.4–5.3)
PROT SERPL-MCNC: 6.8 G/DL (ref 6.4–8.3)
SODIUM SERPL-SCNC: 139 MMOL/L (ref 135–145)
TRIGL SERPL-MCNC: 78 MG/DL
TSH SERPL DL<=0.005 MIU/L-ACNC: 0.73 UIU/ML (ref 0.3–4.2)

## 2024-06-19 PROCEDURE — 82043 UR ALBUMIN QUANTITATIVE: CPT | Performed by: FAMILY MEDICINE

## 2024-06-19 PROCEDURE — 84443 ASSAY THYROID STIM HORMONE: CPT | Performed by: FAMILY MEDICINE

## 2024-06-19 PROCEDURE — 80053 COMPREHEN METABOLIC PANEL: CPT | Performed by: FAMILY MEDICINE

## 2024-06-19 PROCEDURE — 82570 ASSAY OF URINE CREATININE: CPT | Performed by: FAMILY MEDICINE

## 2024-06-19 PROCEDURE — 90480 ADMN SARSCOV2 VAC 1/ONLY CMP: CPT | Performed by: FAMILY MEDICINE

## 2024-06-19 PROCEDURE — 91320 SARSCV2 VAC 30MCG TRS-SUC IM: CPT | Performed by: FAMILY MEDICINE

## 2024-06-19 PROCEDURE — 80061 LIPID PANEL: CPT | Performed by: FAMILY MEDICINE

## 2024-06-19 PROCEDURE — G0438 PPPS, INITIAL VISIT: HCPCS | Performed by: FAMILY MEDICINE

## 2024-06-19 PROCEDURE — 99214 OFFICE O/P EST MOD 30 MIN: CPT | Mod: 25 | Performed by: FAMILY MEDICINE

## 2024-06-19 PROCEDURE — 36415 COLL VENOUS BLD VENIPUNCTURE: CPT | Performed by: FAMILY MEDICINE

## 2024-06-19 RX ORDER — METOPROLOL SUCCINATE 100 MG/1
TABLET, EXTENDED RELEASE ORAL
Qty: 100 TABLET | Refills: 3 | Status: SHIPPED | OUTPATIENT
Start: 2024-06-19

## 2024-06-19 RX ORDER — FLUOXETINE 10 MG/1
10 CAPSULE ORAL DAILY
Qty: 100 CAPSULE | Refills: 3 | Status: SHIPPED | OUTPATIENT
Start: 2024-06-19

## 2024-06-19 RX ORDER — LISINOPRIL 20 MG/1
20 TABLET ORAL DAILY
Qty: 100 TABLET | Refills: 3 | Status: SHIPPED | OUTPATIENT
Start: 2024-06-19

## 2024-06-19 RX ORDER — OXYBUTYNIN CHLORIDE 10 MG/1
10 TABLET, EXTENDED RELEASE ORAL DAILY
Qty: 100 TABLET | Refills: 3 | Status: SHIPPED | OUTPATIENT
Start: 2024-06-19

## 2024-06-19 RX ORDER — VALACYCLOVIR HYDROCHLORIDE 500 MG/1
TABLET, FILM COATED ORAL
Qty: 12 TABLET | Refills: 5 | Status: SHIPPED | OUTPATIENT
Start: 2024-06-19

## 2024-06-19 RX ORDER — METOPROLOL SUCCINATE 50 MG/1
TABLET, EXTENDED RELEASE ORAL
Qty: 100 TABLET | Refills: 3 | Status: SHIPPED | OUTPATIENT
Start: 2024-06-19

## 2024-06-19 RX ORDER — PRAVASTATIN SODIUM 40 MG
40 TABLET ORAL DAILY
Qty: 100 TABLET | Refills: 0 | Status: SHIPPED | OUTPATIENT
Start: 2024-06-19

## 2024-06-19 ASSESSMENT — PAIN SCALES - GENERAL: PAINLEVEL: NO PAIN (0)

## 2024-06-19 NOTE — LETTER
My Depression Action Plan  Name: Sofi Degroot   Date of Birth 1955  Date: 6/19/2024    My doctor: Noemy Olivera   My clinic: [facility]  [address] [facility] [address]  [facility] [address] [phone]            GREEN    ZONE   Good Control    What it looks like:   Things are going generally well. You have normal ups and downs. You may even feel depressed from time to time, but bad moods usually last less than a day.   What you need to do:  Continue to care for yourself (see self care plan)  Check your depression survival kit and update it as needed  Follow your physician s recommendations including any medication.  Do not stop taking medication unless you consult with your physician first.             YELLOW         ZONE Getting Worse    What it looks like:   Depression is starting to interfere with your life.   It may be hard to get out of bed; you may be starting to isolate yourself from others.  Symptoms of depression are starting to last most all day and this has happened for several days.   You may have suicidal thoughts but they are not constant.   What you need to do:     Call your care team. Your response to treatment will improve if you keep your care team informed of your progress. Yellow periods are signs an adjustment may need to be made.     Continue your self-care.  Just get dressed and ready for the day.  Don't give yourself time to talk yourself out of it.    Talk to someone in your support network.    Open up your Depression Self-Care Plan/Wellness Kit.             RED    ZONE Medical Alert - Get Help    What it looks like:   Depression is seriously interfering with your life.   You may experience these or other symptoms: You can t get out of bed most days, can t work or engage in other necessary activities, you have trouble taking care of basic hygiene, or basic responsibilities, thoughts of suicide or death that will not go away,  self-injurious behavior.     What you need to do:  Call your care team and request a same-day appointment. If they are not available (weekends or after hours) call your local crisis line, emergency room or 911.          Depression Self-Care Plan / Wellness Kit    Many people find that medication and therapy are helpful treatments for managing depression. In addition, making small changes to your everyday life can help to boost your mood and improve your wellbeing. Below are some tips for you to consider. Be sure to talk with your medical provider and/or behavioral health consultant if your symptoms are worsening or not improving.     Sleep   Sleep hygiene  means all of the habits that support good, restful sleep. It includes maintaining a consistent bedtime and wake time, using your bedroom only for sleeping or sex, and keeping the bedroom dark and free of distractions like a computer, smartphone, or television.     Develop a Healthy Routine  Maintain good hygiene. Get out of bed in the morning, make your bed, brush your teeth, take a shower, and get dressed. Don t spend too much time viewing media that makes you feel stressed. Find time to relax each day.    Exercise  Get some form of exercise every day. This will help reduce pain and release endorphins, the  feel good  chemicals in your brain. It can be as simple as just going for a walk or doing some gardening, anything that will get you moving.      Diet  Strive to eat healthy foods, including fruits and vegetables. Drink plenty of water. Avoid excessive sugar, caffeine, alcohol, and other mood-altering substances.     Stay Connected with Others  Stay in touch with friends and family members.    Manage Your Mood  Try deep breathing, massage therapy, biofeedback, or meditation. Take part in fun activities when you can. Try to find something to smile about each day.     Psychotherapy  Be open to working with a therapist if your provider recommends it.      Medication  Be sure to take your medication as prescribed. Most anti-depressants need to be taken every day. It usually takes several weeks for medications to work. Not all medicines work for all people. It is important to follow-up with your provider to make sure you have a treatment plan that is working for you. Do not stop your medication abruptly without first discussing it with your provider.    Crisis Resources   These hotlines are for both adults and children. They and are open 24 hours a day, 7 days a week unless noted otherwise.    National Suicide Prevention Lifeline   988 or 7-488-686-NFQM (9205)    Crisis Text Line    www.crisistextline.org  Text HOME to 649527 from anywhere in the [place], anytime, about any type of crisis. A live, trained crisis counselor will receive the text and respond quickly.    [employer] Lifeline for LGBTQ Youth  A national crisis intervention and suicide lifeline for LGBTQ youth under 25. Provides a safe place to talk without judgement. Call 1-441.604.2644; text START to 730086 or visit www.thetrevorproject.org to talk to a trained counselor.    For [facility] crisis numbers, visit the [facility] website at:  https://[place].gov/dhs/people-we-serve/adults/health-care/mental-health/resources/crisis-contacts.jsp

## 2024-06-19 NOTE — PATIENT INSTRUCTIONS
"Patient Education   Preventive Care Advice   This is general advice we often give to help people stay healthy. Your care team may have specific advice just for you. Please talk to your care team about your own preventive care needs.  Lifestyle  Exercise at least 150 minutes each week (30 minutes a day, 5 days a week).  Do muscle strengthening activities 2 days a week. These help control your weight and prevent disease.  No smoking.  Wear sunscreen to prevent skin cancer.  Have your home tested for radon every 2 to 5 years. Radon is a colorless, odorless gas that can harm your lungs. To learn more, go to www.health.[facility].mn.us and search for \"Radon in Homes.\"  Keep guns unloaded and locked up in a safe place like a safe or gun vault, or, use a gun lock and hide the keys. Always lock away bullets separately. To learn more, visit [employer].mn.gov and search for \"safe gun storage.\"  Nutrition  Eat 5 or more servings of fruits and vegetables each day.  Try wheat bread, brown rice and whole grain pasta (instead of white bread, rice, and pasta).  Get enough calcium and vitamin D. Check the label on foods and aim for 100% of the RDA (recommended daily allowance).  Regular exams  Have a dental exam and cleaning every 6 months.  See your health care team every year to talk about:  Any changes in your health.  Any medicines your care team has prescribed.  Preventive care, family planning, and ways to prevent chronic diseases.  Shots (vaccines)   HPV shots (up to age 26), if you've never had them before.  Hepatitis B shots (up to age 59), if you've never had them before.  COVID-19 shot: Get this shot when it's due.  Flu shot: Get a flu shot every year.  Tetanus shot: Get a tetanus shot every 10 years.  Pneumococcal, hepatitis A, and RSV shots: Ask your care team if you need these based on your risk.  Shingles shot (for age 50 and up).  General health tests  Diabetes screening:  Starting at age 35, Get screened for diabetes at least " every 3 years.  If you are younger than age 35, ask your care team if you should be screened for diabetes.  Cholesterol test: At age 39, start having a cholesterol test every 5 years, or more often if advised.  Bone density scan (DEXA): At age 50, ask your care team if you should have this scan for osteoporosis (brittle bones).  Hepatitis C: Get tested at least once in your life.  Abdominal aortic aneurysm screening: Talk to your doctor about having this screening if you:  Have ever smoked; and  Are biologically male; and  Are between the ages of 65 and 75.  STIs (sexually transmitted infections)  Before age 24: Ask your care team if you should be screened for STIs.  After age 24: Get screened for STIs if you're at risk. You are at risk for STIs (including HIV) if:  You are sexually active with more than one person.  You don't use condoms every time.  You or a partner was diagnosed with a sexually transmitted infection.  If you are at risk for HIV, ask about PrEP medicine to prevent HIV.  Get tested for HIV at least once in your life, whether you are at risk for HIV or not.  Cancer screening tests  Cervical cancer screening: If you have a cervix, begin getting regular cervical cancer screening tests at age 21. Most people who have regular screenings with normal results can stop after age 65. Talk about this with your provider.  Breast cancer scan (mammogram): If you've ever had breasts, begin having regular mammograms starting at age 40. This is a scan to check for breast cancer.  Colon cancer screening: It is important to start screening for colon cancer at age 45.  Have a colonoscopy test every 10 years (or more often if you're at risk) Or, ask your provider about stool tests like a FIT test every year or Cologuard test every 3 years.  To learn more about your testing options, visit: www.[employer]/860188.pdf.  For help making a decision, visit: gayathri/vj65594.  Prostate cancer screening test: If you have a  prostate and are age 55 to 69, ask your provider if you would benefit from a yearly prostate cancer screening test.  Lung cancer screening: If you are a current or former smoker age 50 to 80, ask your care team if ongoing lung cancer screenings are right for you.  For informational purposes only. Not to replace the advice of your health care provider. Copyright   2023 [facility]. All rights reserved. Clinically reviewed by the [facility]. [employer] 588335 - REV 04/24.  Learning About Activities of Daily Living  What are activities of daily living?     Activities of daily living (ADLs) are the basic self-care tasks you do every day. These include eating, bathing, dressing, and moving around.  As you age, and if you have health problems, you may find that it's harder to do some of these tasks. If so, your doctor can suggest ideas that may help.  To measure what kind of help you may need, your doctor will ask how well you are able to do ADLs. Let your doctor know if there are any tasks that you are having trouble doing. This is an important first step to getting help. And when you have the help you need, you can stay as independent as possible.  How will a doctor assess your ADLs?  Asking about ADLs is part of a routine health checkup your doctor will likely do as you age. Your health check might be done in a doctor's office, in your home, or at a hospital. The goal is to find out if you are having any problems that could make it hard to care for yourself or that make it unsafe for you to be on your own.  To measure your ADLs, your doctor will ask how hard it is for you to do routine tasks. Your doctor may also want to know if you have changed the way you do a task because of a health problem. Your doctor may watch how you:  Walk back and forth.  Keep your balance while you stand or walk.  Move from sitting to standing or from a bed to a chair.  Button or unbutton a shirt or  sweater.  Remove and put on your shoes.  It's common to feel a little worried or anxious if you find you can't do all the things you used to be able to do. Talking with your doctor about ADLs is a way to make sure you're as safe as possible and able to care for yourself as well as you can. You may want to bring a caregiver, friend, or family member to your checkup. They can help you talk to your doctor.  Follow-up care is a key part of your treatment and safety. Be sure to make and go to all appointments, and call your doctor if you are having problems. It's also a good idea to know your test results and keep a list of the medicines you take.  Current as of: October 24, 2023               Content Version: 14.0    0997-3072 [employer].   Care instructions adapted under license by your healthcare professional. If you have questions about a medical condition or this instruction, always ask your healthcare professional. [employer] disclaims any warranty or liability for your use of this information.      Preventing Falls: Care Instructions  Injuries and health problems such as trouble walking or poor eyesight can increase your risk of falling. So can some medicines. But there are things you can do to help prevent falls. You can exercise to get stronger. You can also arrange your home to make it safer.    Talk to your doctor about the medicines you take. Ask if any of them increase the risk of falls and whether they can be changed or stopped.   Try to exercise regularly. It can help improve your strength and balance. This can help lower your risk of falling.     Practice fall safety and prevention.    Wear low-heeled shoes that fit well and give your feet good support. Talk to your doctor if you have foot problems that make this hard.  Carry a cellphone or wear a medical alert device that you can use to call for help.  Use stepladders instead of chairs to reach high objects. Don't climb if  "you're at risk for falls. Ask for help, if needed.  Wear the correct eyeglasses, if you need them.    Make your home safer.    Remove rugs, cords, clutter, and furniture from walkways.  Keep your house well lit. Use night-lights in hallways and bathrooms.  Install and use sturdy handrails on stairways.  Wear nonskid footwear, even inside. Don't walk barefoot or in socks without shoes.    Be safe outside.    Use handrails, curb cuts, and ramps whenever possible.  Keep your hands free by using a shoulder bag or backpack.  Try to walk in well-lit areas. Watch out for uneven ground, changes in pavement, and debris.  Be careful in the winter. Walk on the grass or gravel when sidewalks are slippery. Use de-icer on steps and walkways. Add non-slip devices to shoes.    Put grab bars and nonskid mats in your shower or tub and near the toilet. Try to use a shower chair or bath bench when bathing.   Get into a tub or shower by putting in your weaker leg first. Get out with your strong side first. Have a phone or medical alert device in the bathroom with you.   Where can you learn more?  Go to https://www.[employer].net/patiented  Enter G117 in the search box to learn more about \"Preventing Falls: Care Instructions.\"  Current as of: July 17, 2023               Content Version: 14.0    8988-6745 [employer].   Care instructions adapted under license by your healthcare professional. If you have questions about a medical condition or this instruction, always ask your healthcare professional. [employer] disclaims any warranty or liability for your use of this information.      Bladder Training: Care Instructions  Your Care Instructions     Bladder training is used to treat urge incontinence and stress incontinence. Urge incontinence means that the need to urinate comes on so fast that you can't get to a toilet in time. Stress incontinence means that you leak urine because of pressure on your bladder. For " example, it may happen when you laugh, cough, or lift something heavy.  Bladder training can increase how long you can wait before you have to urinate. It can also help your bladder hold more urine. And it can give you better control over the urge to urinate.  It is important to remember that bladder training takes a few weeks to a few months to make a difference. You may not see results right away, but don't give up.  Follow-up care is a key part of your treatment and safety. Be sure to make and go to all appointments, and call your doctor if you are having problems. It's also a good idea to know your test results and keep a list of the medicines you take.  How can you care for yourself at home?  Work with your doctor to come up with a bladder training program that is right for you. You may use one or more of the following methods.  Delayed urination  In the beginning, try to keep from urinating for 5 minutes after you first feel the need to go.  While you wait, take deep, slow breaths to relax. Kegel exercises can also help you delay the need to go to the bathroom.  After some practice, when you can easily wait 5 minutes to urinate, try to wait 10 minutes before you urinate.  Slowly increase the waiting period until you are able to control when you have to urinate.  Scheduled urination  Empty your bladder when you first wake up in the morning.  Schedule times throughout the day when you will urinate.  Start by going to the bathroom every hour, even if you don't need to go.  Slowly increase the time between trips to the bathroom.  When you have found a schedule that works well for you, keep doing it.  If you wake up during the night and have to urinate, do it. Apply your schedule to waking hours only.  Kegel exercises  These tighten and strengthen pelvic muscles, which can help you control the flow of urine. (If doing these exercises causes pain, stop doing them and talk with your doctor.) To do Kegel  "exercises:  Squeeze your muscles as if you were trying not to pass gas. Or squeeze your muscles as if you were stopping the flow of urine. Your belly, legs, and buttocks shouldn't move.  Hold the squeeze for 3 seconds, then relax for 5 to 10 seconds.  Start with 3 seconds, then add 1 second each week until you are able to squeeze for 10 seconds.  Repeat the exercise 10 times a session. Do 3 to 8 sessions a day.  When should you call for help?  Watch closely for changes in your health, and be sure to contact your doctor if:    Your incontinence is getting worse.     You do not get better as expected.   Where can you learn more?  Go to https://www.[employer].net/patiented  Enter V684 in the search box to learn more about \"Bladder Training: Care Instructions.\"  Current as of: November 15, 2023               Content Version: 14.0    3134-8115 [employer].   Care instructions adapted under license by your healthcare professional. If you have questions about a medical condition or this instruction, always ask your healthcare professional. [employer] disclaims any warranty or liability for your use of this information.      Substance Use Disorder: Care Instructions  Overview     You can improve your life and health by stopping your use of alcohol or drugs. When you don't drink or use drugs, you may feel and sleep better. You may get along better with your family, friends, and coworkers. There are medicines and programs that can help with substance use disorder.  How can you care for yourself at home?  Here are some ways to help you stay sober and prevent relapse.  If you have been given medicine to help keep you sober or reduce your cravings, be sure to take it exactly as prescribed.  Talk to your doctor about programs that can help you stop using drugs or drinking alcohol.  Do not keep alcohol or drugs in your home.  Plan ahead. Think about what you'll say if other people ask you to drink or use " drugs. Try not to spend time with people who drink or use drugs.  Use the time and money spent on drinking or drugs to do something that's important to you.  Preventing a relapse  Have a plan to deal with relapse. Learn to recognize changes in your thinking that lead you to drink or use drugs. Get help before you start to drink or use drugs again.  Try to stay away from situations, friends, or places that may lead you to drink or use drugs.  If you feel the need to drink alcohol or use drugs again, seek help right away. Call a trusted friend or family member. Some people get support from organizations such as Narcotics Anonymous or [facility] or from treatment facilities.  If you relapse, get help as soon as you can. Some people make a plan with another person that outlines what they want that person to do for them if they relapse. The plan usually includes how to handle the relapse and who to notify in case of relapse.  Don't give up. Remember that a relapse doesn't mean that you have failed. Use the experience to learn the triggers that lead you to drink or use drugs. Then quit again. Recovery is a lifelong process. Many people have several relapses before they are able to quit for good.  Follow-up care is a key part of your treatment and safety. Be sure to make and go to all appointments, and call your doctor if you are having problems. It's also a good idea to know your test results and keep a list of the medicines you take.  When should you call for help?   Call 911  anytime you think you may need emergency care. For example, call if you or someone else:    Has overdosed or has withdrawal signs. Be sure to tell the emergency workers that you are or someone else is using or trying to quit using drugs. Overdose or withdrawal signs may include:  Losing consciousness.  Seizure.  Seeing or hearing things that aren't there (hallucinations).     Is thinking or talking about suicide or harming others.   Where to  "get help 24 hours a day, 7 days a week   If you or someone you know talks about suicide, self-harm, a mental health crisis, a substance use crisis, or any other kind of emotional distress, get help right away. You can:    Call the Suicide and Crisis Lifeline at 988.     Call 1-725-851-TALK (1-164.658.2574).     Text HOME to 382077 to access the Crisis Text Line.   Consider saving these numbers in your phone.  Go to [employer] for more information or to chat online.  Call your doctor now or seek immediate medical care if:    You are having withdrawal symptoms. These may include nausea or vomiting, sweating, shakiness, and anxiety.   Watch closely for changes in your health, and be sure to contact your doctor if:    You have a relapse.     You need more help or support to stop.   Where can you learn more?  Go to https://www.[employer].net/patiented  Enter H573 in the search box to learn more about \"Substance Use Disorder: Care Instructions.\"  Current as of: November 15, 2023               Content Version: 14.0    4459-7132 [employer].   Care instructions adapted under license by your healthcare professional. If you have questions about a medical condition or this instruction, always ask your healthcare professional. [employer] disclaims any warranty or liability for your use of this information.         "

## 2024-06-21 RX ORDER — LEVOTHYROXINE SODIUM 100 UG/1
100 TABLET ORAL DAILY
Qty: 100 TABLET | Refills: 3 | Status: SHIPPED | OUTPATIENT
Start: 2024-06-21

## 2024-06-21 NOTE — RESULT ENCOUNTER NOTE
Sobeida Farah,   Your test results are all back -   -Cholesterol levels (LDL,HDL, Triglycerides) are elevated -  ADVISE: lets have you try the pravastatin  The 10-year ASCVD risk score (Anna VELÁSQUEZ, et al., 2019) is: 13%    Values used to calculate the score:      Age: 69 years      Sex: Female      Is Non- : No      Diabetic: No      Tobacco smoker: No      Systolic Blood Pressure: 132 mmHg      Is BP treated: Yes      HDL Cholesterol: 83 mg/dL      Total Cholesterol: 239 mg/dL   -Liver and gallbladder tests (ALT,AST, Alk phos,bilirubin) are normal.  -Kidney function (GFR) is normal.  -Sodium is normal.  -Potassium is normal.  -Calcium is normal.  -Glucose is slight elevated and may be a sign of early diabetes (prediabetes). ADVISE:: eating a low carbohydrate diet, exercising, trying to lose weight (if necessary) and rechecking your glucose level in 12 months.  -TSH (thyroid stimulating hormone) level is normal which indicates normal thyroid function.  -Microalbumin (urine protein) test is normal.  ADVISE: rechecking this annually.  Let us know if you have any questions.  -Noemy Olivera, DO

## 2024-09-26 ENCOUNTER — OFFICE VISIT (OUTPATIENT)
Dept: URGENT CARE | Facility: URGENT CARE | Age: 69
End: 2024-09-26
Payer: COMMERCIAL

## 2024-09-26 VITALS
TEMPERATURE: 98.2 F | BODY MASS INDEX: 30.18 KG/M2 | OXYGEN SATURATION: 95 % | WEIGHT: 199.1 LBS | SYSTOLIC BLOOD PRESSURE: 153 MMHG | DIASTOLIC BLOOD PRESSURE: 90 MMHG | HEART RATE: 71 BPM | RESPIRATION RATE: 16 BRPM

## 2024-09-26 DIAGNOSIS — M79.89 LEFT LEG SWELLING: Primary | ICD-10-CM

## 2024-09-26 PROCEDURE — 99214 OFFICE O/P EST MOD 30 MIN: CPT | Performed by: PHYSICIAN ASSISTANT

## 2024-09-26 ASSESSMENT — ENCOUNTER SYMPTOMS
SORE THROAT: 0
EYES NEGATIVE: 1
SHORTNESS OF BREATH: 0
RHINORRHEA: 0
ARTHRALGIAS: 0
NECK PAIN: 0
ALLERGIC/IMMUNOLOGIC NEGATIVE: 1
DIZZINESS: 0
NECK STIFFNESS: 0
COUGH: 0
WOUND: 0
CHILLS: 0
FEVER: 0
DIARRHEA: 0
PALPITATIONS: 0
HEADACHES: 0
ENDOCRINE NEGATIVE: 1
VOMITING: 0
WEAKNESS: 0
RESPIRATORY NEGATIVE: 1
BACK PAIN: 0
LIGHT-HEADEDNESS: 0
MYALGIAS: 1
JOINT SWELLING: 0
NAUSEA: 0

## 2024-09-26 NOTE — PROGRESS NOTES
Chief Complaint:    Chief Complaint   Patient presents with    Leg Pain     End of August was on a road trip an left leg became swollen and painful, briefly got better but this morning it became swollen and painful again, took 2 aspirins this AM, some numbness in bottom of foot and left arm (arm numbness has gone away but not the foot numbness)      Medical Decision Making:    Vital signs reviewed by Yoav Hanson PA-C  BP (!) 153/90 (BP Location: Left arm, Patient Position: Sitting, Cuff Size: Adult Regular)   Pulse 71   Temp 98.2  F (36.8  C) (Tympanic)   Resp 16   Wt 90.3 kg (199 lb 1.6 oz)   LMP 10/27/2004 (Approximate)   SpO2 95%   BMI 30.18 kg/m      Differential Diagnosis:  DVT, PE, CHF      ASSESSMENT:     1. Left leg swelling           PLAN:    Patient is in no acute distress.  She is afebrile with stable vital signs.  L lower leg larger than R.  Cannot rule out DVT or PE based on presentation and history.  Patient instructed to go to ED now for further evaluation, labs, imaging, and management.   Patient instructed to follow up with PCP in the next week for reevaluation.  Patient verbalized understanding and agreed with this plan.    Labs:     No results found for any visits on 09/26/24.    Current Meds:    Current Outpatient Medications:     Acetaminophen (TYLENOL PO), Take 1,000 mg by mouth as needed for mild pain or fever, Disp: , Rfl:     cholecalciferol 2000 units tablet, Take 2,000 Units by mouth 2 times daily , Disp: , Rfl:     famotidine (PEPCID) 10 MG tablet, Take 10 mg by mouth 2 times daily, Disp: , Rfl:     fish oil-omega-3 fatty acids 1000 MG capsule, Take 1 g by mouth 2 times daily, Disp: , Rfl:     FLUoxetine (PROZAC) 10 MG capsule, Take 1 capsule (10 mg) by mouth daily, Disp: 100 capsule, Rfl: 3    levothyroxine (SYNTHROID/LEVOTHROID) 100 MCG tablet, Take 1 tablet (100 mcg) by mouth daily, Disp: 100 tablet, Rfl: 3    lisinopril (ZESTRIL) 20 MG tablet, Take 1 tablet (20 mg) by  mouth daily, Disp: 100 tablet, Rfl: 3    loratadine (CLARITIN) 10 MG tablet, Take 10 mg by mouth daily, Disp: , Rfl:     magnesium 250 MG tablet, Take 250 mg by mouth daily , Disp: , Rfl:     metoprolol succinate ER (TOPROL XL) 100 MG 24 hr tablet, TAKE 1 TABLET BY MOUTH DAILY  WITH A 50 MG TABLET TO EQUAL 150 MG DAILY, Disp: 100 tablet, Rfl: 3    metoprolol succinate ER (TOPROL XL) 50 MG 24 hr tablet, TAKE 1 TABLET BY MOUTH DAILY  WITH 100 MG TABLET TO EQUAL 150  MG DAILY, Disp: 100 tablet, Rfl: 3    Multiple Vitamins-Minerals (MULTIVITAMIN ADULTS) TABS, Take 1 tablet by mouth daily, Disp: , Rfl:     oxyBUTYnin ER (DITROPAN XL) 10 MG 24 hr tablet, Take 1 tablet (10 mg) by mouth daily, Disp: 100 tablet, Rfl: 3    pravastatin (PRAVACHOL) 40 MG tablet, Take 1 tablet (40 mg) by mouth daily, Disp: 100 tablet, Rfl: 0    valACYclovir (VALTREX) 500 MG tablet, TAKE ONE TABLET BY MOUTH TWICE A DAY FOR THREE DAYS. (REPEAT AS NEEDED), Disp: 12 tablet, Rfl: 5    Allergies:  Allergies   Allergen Reactions    Atorvastatin Cramps    Flagyl [Metronidazole] Nausea    Latex Swelling     Dental visit--burning sensation in lips and throat, lips felt puffy, eye watery from latex gloves    Pineapple Other (See Comments)       SUBJECTIVE    HPI: Sofi Degroot is an 69 year old female who presents for evaluation and treatment of possible blood clot in the L leg.  Patient has had L leg pain and swelling since a long car trip roughly 3-4 weeks ago.  She reports she has been taking aspirin and feels like this has been helping with the pain. However, she reports waking up this morning with significant left calf pain and swelling. She has a history of a TIA and a-fib not on anticoagulation. She reports being short of breath but states that is her baseline. She denies chest pain or recent illness.     ROS:      Review of Systems   Constitutional:  Negative for chills and fever.   HENT:  Negative for congestion, ear pain, rhinorrhea and sore  throat.    Eyes: Negative.    Respiratory: Negative.  Negative for cough and shortness of breath.    Cardiovascular:  Positive for leg swelling. Negative for chest pain and palpitations.   Gastrointestinal:  Negative for diarrhea, nausea and vomiting.   Endocrine: Negative.    Genitourinary: Negative.    Musculoskeletal:  Positive for myalgias. Negative for arthralgias, back pain, joint swelling, neck pain and neck stiffness.   Skin: Negative.  Negative for rash and wound.   Allergic/Immunologic: Negative.  Negative for immunocompromised state.   Neurological:  Negative for dizziness, weakness, light-headedness and headaches.        Family History   Family History   Problem Relation Age of Onset    Heart Disease Maternal Grandfather     Breast Cancer Maternal Grandmother         Dx'd age 70    Arthritis Father     Lipids Mother         On meds    Cancer Mother         BCC    Heart Disease Brother          of dilated of cardiomyopathy at age 54    Alcohol/Drug Sister     Alcohol/Drug Brother     Depression Sister     Depression Sister        Social History  Social History     Socioeconomic History    Marital status: Single     Spouse name: Not on file    Number of children: 1    Years of education: BSN    Highest education level: Not on file   Occupational History    Occupation: RN     Employer: [employer]   Tobacco Use    Smoking status: Former     Current packs/day: 0.00     Average packs/day: 0.5 packs/day for 5.0 years (2.5 ttl pk-yrs)     Types: Cigarettes     Start date: 1970     Quit date: 5/15/1975     Years since quittin.4    Smokeless tobacco: Never    Tobacco comments:     quit 30 years ago   Vaping Use    Vaping status: Never Used   Substance and Sexual Activity    Alcohol use: Yes     Comment: 4-5 Drinks Per Week    Drug use: No    Sexual activity: Yes     Partners: Male     Birth control/protection: Condom   Other Topics Concern    Parent/sibling w/ CABG, MI or angioplasty  before 65F 55M? No   Social History Narrative    Social Documentation:        Balanced Diet: YES    Calcium intake: 2-3 per day    Caffeine: 3 per day    Exercise:  type of activity work is physical and walking;  5 times per week    Sunscreen: Yes    Seatbelts:  Yes    Self Breast Exam:  Yes    Self Testicular Exam: na    Physical/Emotional/Sexual Abuse: No    Do you feel safe in your environment? Yes        Cholesterol screen up to date:     CHOL      199   7/21/2009    HDL       82   7/21/2009    LDL      100   7/21/2009    TRIG       83   7/21/2009    CHOLHDLRATIO      2.4   7/21/2009    Eye Exam up to date: Yes    Dental Exam up to date: Yes    Pap smear up to date: do today. PAP      NIL   7/21/2009    Mammogram up to date:  Due in September    Dexa Scan up to date: has not had one     Colonoscopy up to date: Yes, 04/08    Immunizations up to date: Yes    Glucose screen if over 40:  cna do today                                                             Social Determinants of Health     Financial Resource Strain: Low Risk  (6/14/2024)    Financial Resource Strain     Within the past 12 months, have you or your family members you live with been unable to get utilities (heat, electricity) when it was really needed?: No   Food Insecurity: Low Risk  (6/14/2024)    Food Insecurity     Within the past 12 months, did you worry that your food would run out before you got money to buy more?: No     Within the past 12 months, did the food you bought just not last and you didn t have money to get more?: No   Transportation Needs: Low Risk  (6/14/2024)    Transportation Needs     Within the past 12 months, has lack of transportation kept you from medical appointments, getting your medicines, non-medical meetings or appointments, work, or from getting things that you need?: No   Physical Activity: Inactive (6/14/2024)    Exercise Vital Sign     Days of Exercise per Week: 0 days     Minutes of Exercise per Session: 0 min    Stress: No Stress Concern Present (6/14/2024)    [employer] - Occupational Stress Questionnaire     Feeling of Stress : Not at all   Social Connections: Unknown (6/14/2024)    Social Connection and Isolation Panel [NHANES]     Frequency of Communication with Friends and Family: Not on file     Frequency of Social Gatherings with Friends and Family: Three times a week     Attends Quaker Services: Not on file     Active Member of Clubs or Organizations: Not on file     Attends Club or Organization Meetings: Not on file     Marital Status: Not on file   Interpersonal Safety: Low Risk  (6/19/2024)    Interpersonal Safety     Do you feel physically and emotionally safe where you currently live?: Yes     Within the past 12 months, have you been hit, slapped, kicked or otherwise physically hurt by someone?: No     Within the past 12 months, have you been humiliated or emotionally abused in other ways by your partner or ex-partner?: No   Housing Stability: Low Risk  (6/14/2024)    Housing Stability     Do you have housing? : Yes     Are you worried about losing your housing?: No        Surgical History:  Past Surgical History:   Procedure Laterality Date    ANESTHESIA CARDIOVERSION N/A 10/26/2018    Procedure: ANESTHESIA CARDIOVERSION;  Surgeon: GENERIC ANESTHESIA PROVIDER;  Location: [facility] OR    COLONOSCOPY  2008    COLONOSCOPY N/A 3/29/2022    Procedure: COLONOSCOPY, WITH HOT POLYPECTOMY AND CLIP;  Surgeon: Geoffrey Garcia MD;  Location: [facility] OR    GENITOURINARY SURGERY  1986    tubal ligation    ORTHOPEDIC SURGERY  2008    removal of bone spur right foot    SURGICAL HISTORY OF -  Right 2008    Right 1st MTPJ, bone spur resection    ZZC LIGATE FALLOPIAN TUBE  1986        Problem List:  Patient Active Problem List   Diagnosis    Hypothyroidism    Hepatitis C virus infection without hepatic coma    CARDIOVASCULAR SCREENING; LDL GOAL LESS THAN 100    Hypertension goal BP (blood pressure) <  140/90    Transient cerebral ischemia    PFO (patent foramen ovale)    Cervical high risk HPV (human papillomavirus) test positive    Bilateral arm weakness    Lateral epicondylitis of both elbows    Retinal infarct    [facility]    Atrial fibrillation (H)    Paroxysmal A-fib (H)      OBJECTIVE:     Vital signs noted and reviewed by Yoav Hanson PA-C  BP (!) 153/90 (BP Location: Left arm, Patient Position: Sitting, Cuff Size: Adult Regular)   Pulse 71   Temp 98.2  F (36.8  C) (Tympanic)   Resp 16   Wt 90.3 kg (199 lb 1.6 oz)   LMP 10/27/2004 (Approximate)   SpO2 95%   BMI 30.18 kg/m       PEFR:    Physical Exam  Vitals and nursing note reviewed.   Constitutional:       General: She is not in acute distress.     Appearance: She is well-developed. She is not ill-appearing, toxic-appearing or diaphoretic.   HENT:      Head: Normocephalic and atraumatic.      Right Ear: Tympanic membrane and external ear normal. No drainage, swelling or tenderness. Tympanic membrane is not perforated, erythematous, retracted or bulging.      Left Ear: Tympanic membrane and external ear normal. No drainage, swelling or tenderness. Tympanic membrane is not perforated, erythematous, retracted or bulging.      Nose: No mucosal edema, congestion or rhinorrhea.      Right Sinus: No maxillary sinus tenderness or frontal sinus tenderness.      Left Sinus: No maxillary sinus tenderness or frontal sinus tenderness.      Mouth/Throat:      Pharynx: No pharyngeal swelling, oropharyngeal exudate, posterior oropharyngeal erythema or uvula swelling.      Tonsils: No tonsillar abscesses.   Eyes:      Pupils: Pupils are equal, round, and reactive to light.   Neck:      Trachea: Trachea normal.   Cardiovascular:      Rate and Rhythm: Normal rate and regular rhythm.      Heart sounds: Normal heart sounds, S1 normal and S2 normal. No murmur heard.     No friction rub. No gallop.    Pulmonary:      Effort: Pulmonary effort is normal. No respiratory distress.      Breath sounds: Normal breath sounds. No decreased breath sounds, wheezing, rhonchi or rales.      Comments: Patient easily winded when communicating  Abdominal:      General: Bowel sounds are normal. There is no distension.      Palpations: Abdomen is soft. Abdomen is not rigid. There is no mass.      Tenderness: There is no abdominal tenderness. There is no guarding or rebound.   Musculoskeletal:      Cervical back: Full passive range of motion without pain, normal range of motion and neck supple.      Left lower leg: Edema present.      Comments: No calf pain to palpation   Lymphadenopathy:      Cervical: No cervical adenopathy.   Skin:     General: Skin is warm and dry.   Neurological:      Mental Status: She is alert and oriented to person, place, and time.      Cranial Nerves: No cranial nerve deficit.      Deep Tendon Reflexes: Reflexes are normal and symmetric.   Psychiatric:         Behavior: Behavior normal. Behavior is cooperative.         Thought Content: Thought content normal.         Judgment: Judgment normal.             Yoav Hanson PA-C  9/26/2024, 2:48 PM

## 2024-09-27 ENCOUNTER — APPOINTMENT (OUTPATIENT)
Dept: ULTRASOUND IMAGING | Facility: CLINIC | Age: 69
End: 2024-09-27
Attending: FAMILY MEDICINE
Payer: COMMERCIAL

## 2024-09-27 ENCOUNTER — HOSPITAL ENCOUNTER (EMERGENCY)
Facility: CLINIC | Age: 69
Discharge: HOME | End: 2024-09-27
Attending: FAMILY MEDICINE | Admitting: FAMILY MEDICINE
Payer: COMMERCIAL

## 2024-09-27 VITALS
HEIGHT: 68 IN | WEIGHT: 199 LBS | TEMPERATURE: 98.1 F | BODY MASS INDEX: 30.16 KG/M2 | HEART RATE: 59 BPM | DIASTOLIC BLOOD PRESSURE: 82 MMHG | RESPIRATION RATE: 14 BRPM | SYSTOLIC BLOOD PRESSURE: 152 MMHG | OXYGEN SATURATION: 97 %

## 2024-09-27 DIAGNOSIS — M79.89 LEFT LEG SWELLING: ICD-10-CM

## 2024-09-27 PROCEDURE — 99284 EMERGENCY DEPT VISIT MOD MDM: CPT | Mod: 25 | Performed by: FAMILY MEDICINE

## 2024-09-27 PROCEDURE — 93971 EXTREMITY STUDY: CPT | Mod: LT

## 2024-09-27 PROCEDURE — 93971 EXTREMITY STUDY: CPT | Mod: 26 | Performed by: RADIOLOGY

## 2024-09-27 PROCEDURE — 99283 EMERGENCY DEPT VISIT LOW MDM: CPT | Performed by: FAMILY MEDICINE

## 2024-09-27 ASSESSMENT — COLUMBIA-SUICIDE SEVERITY RATING SCALE - C-SSRS
1. IN THE PAST MONTH, HAVE YOU WISHED YOU WERE DEAD OR WISHED YOU COULD GO TO SLEEP AND NOT WAKE UP?: NO
2. HAVE YOU ACTUALLY HAD ANY THOUGHTS OF KILLING YOURSELF IN THE PAST MONTH?: NO
6. HAVE YOU EVER DONE ANYTHING, STARTED TO DO ANYTHING, OR PREPARED TO DO ANYTHING TO END YOUR LIFE?: NO

## 2024-09-27 ASSESSMENT — ACTIVITIES OF DAILY LIVING (ADL)
ADLS_ACUITY_SCORE: 38
ADLS_ACUITY_SCORE: 38
ADLS_ACUITY_SCORE: 36

## 2024-09-27 NOTE — ED PROVIDER NOTES
ED Provider Note  [facility]      History     Chief Complaint   Patient presents with    Leg Swelling     HPI  Sofi Degroot is a 69 year old female with a history of atrial fibrillation, TIA, retinal infarct, hepatitis C, and HTN who presents to the emergency department for leg swelling.  Patient is a retired nurse.  She been traveling in [place] for couple months.  She been driving a lot.  Patient had been recently noted prior to returning back home that she had swelling of the left leg she has been taking 2 aspirin daily for this.  She has some numbness in the fifth toe primarily also there but no motor function no other skin changes she notes the swelling is better but is still persisting now presents valuation she just wants to rule out a blood clot she has some chronic shortness of breath she does not want addressed this at this point no chest pain no history of PEs past history of TIA in the past.  But otherwise no history of venous thromboembolic syndrome.  No fevers chills etc.    Past Medical History  Past Medical History:   Diagnosis Date    Cerebral infarction (H) 2/15/15    TIA    Cervical high risk HPV (human papillomavirus) test positive 09/16/2016, 10/11/17, 10/17/18    (not 16 or 18). 10/11/17: LSIL pap with + HR HPV (not 16 or 18) result.10/17/18: See problem list.     Hypertension goal BP (blood pressure) < 140/90     Lateral epicondylitis of both elbows     Low risk HPV infection 2009, 2010    NIL pap, + HPV 53    Papanicolaou smear of cervix with low grade squamous intraepithelial lesion (LGSIL) 10/11/2017    10/11/17: LSIL pap with + HR HPV (not 16 or 18) result.    Unspecified hypothyroidism 1999    Unspecified viral hepatitis C without hepatic coma 1998    Tx'd w/ Interferon x 1 year-virus free at 5 years, blood transfusion  related     Past Surgical History:   Procedure Laterality Date    ANESTHESIA CARDIOVERSION N/A 10/26/2018    Procedure: ANESTHESIA  CARDIOVERSION;  Surgeon: GENERIC ANESTHESIA PROVIDER;  Location: [facility] OR    COLONOSCOPY      COLONOSCOPY N/A 3/29/2022    Procedure: COLONOSCOPY, WITH HOT POLYPECTOMY AND CLIP;  Surgeon: Geoffrey Garcia MD;  Location: [facility] OR    GENITOURINARY SURGERY      tubal ligation    ORTHOPEDIC SURGERY      removal of bone spur right foot    SURGICAL HISTORY OF -  Right 2008    Right 1st MTPJ, bone spur resection    ZZC LIGATE FALLOPIAN TUBE       Acetaminophen (TYLENOL PO)  cholecalciferol 2000 units tablet  famotidine (PEPCID) 10 MG tablet  fish oil-omega-3 fatty acids 1000 MG capsule  FLUoxetine (PROZAC) 10 MG capsule  levothyroxine (SYNTHROID/LEVOTHROID) 100 MCG tablet  lisinopril (ZESTRIL) 20 MG tablet  loratadine (CLARITIN) 10 MG tablet  magnesium 250 MG tablet  metoprolol succinate ER (TOPROL XL) 100 MG 24 hr tablet  metoprolol succinate ER (TOPROL XL) 50 MG 24 hr tablet  Multiple Vitamins-Minerals (MULTIVITAMIN ADULTS) TABS  oxyBUTYnin ER (DITROPAN XL) 10 MG 24 hr tablet  pravastatin (PRAVACHOL) 40 MG tablet  valACYclovir (VALTREX) 500 MG tablet      Allergies   Allergen Reactions    Atorvastatin Cramps    Flagyl [Metronidazole] Nausea    Latex Swelling     Dental visit--burning sensation in lips and throat, lips felt puffy, eye watery from latex gloves    Pineapple Other (See Comments)     Family History  Family History   Problem Relation Age of Onset    Heart Disease Maternal Grandfather     Breast Cancer Maternal Grandmother         Dx'd age 70    Arthritis Father     Lipids Mother         On meds    Cancer Mother         BCC    Heart Disease Brother          of dilated of cardiomyopathy at age 54    Alcohol/Drug Sister     Alcohol/Drug Brother     Depression Sister     Depression Sister      Social History   Social History     Tobacco Use    Smoking status: Former     Current packs/day: 0.00     Average packs/day: 0.5 packs/day for 5.0 years (2.5 ttl pk-yrs)     Types: Cigarettes     Start date:  "1970     Quit date: 5/15/1975     Years since quittin.4    Smokeless tobacco: Never    Tobacco comments:     quit 30 years ago   Vaping Use    Vaping status: Never Used   Substance Use Topics    Alcohol use: Yes     Comment: 4-5 Drinks Per Week    Drug use: No      A medically appropriate review of systems was performed with pertinent positives and negatives noted in the HPI, and all other systems negative.    Physical Exam   BP: (!) 171/103  Pulse: 79  Temp: 98.1  F (36.7  C)  Resp: 18  Height: 172.7 cm (5' 8\")  Weight: 90.3 kg (199 lb)  SpO2: 97 %  Physical Exam  Vitals and nursing note reviewed.   Constitutional:       General: She is not in acute distress.     Appearance: Normal appearance. She is well-developed. She is not ill-appearing.   HENT:      Head: Normocephalic and atraumatic.   Eyes:      General: No scleral icterus.     Extraocular Movements: Extraocular movements intact.      Conjunctiva/sclera: Conjunctivae normal.      Pupils: Pupils are equal, round, and reactive to light.   Cardiovascular:      Rate and Rhythm: Normal rate and regular rhythm.   Pulmonary:      Effort: Pulmonary effort is normal. No respiratory distress.      Breath sounds: No stridor.   Abdominal:      General: Abdomen is flat.      Tenderness: There is no guarding.   Musculoskeletal:         General: Swelling present.      Cervical back: Normal range of motion and neck supple.      Left lower leg: Edema present.   Skin:     General: Skin is warm and dry.      Capillary Refill: Capillary refill takes less than 2 seconds.      Coloration: Skin is not jaundiced or pale.      Findings: No erythema or rash.   Neurological:      Mental Status: She is alert and oriented to person, place, and time.      Comments: Patient describes just a little bit of some numbness paresthesias of the fifth toe on the left foot primarily but no vascular compromise to the toe no sign of Smith's neuroma no motor weakness otherwise noted " extremity complaints etc.  No back pain reported.   Psychiatric:      Comments: Appropriate           ED Course, Procedures, & Data        Records are in epic.  Refer to HPI also.  Patient been seen for routine medical follow-up also.  History of A-fib history of TIAs in the past.    In the ER patient otherwise stable discussed with her at length at this point very stable she just wants to make sure is not a blood clot in her left leg will order an ultrasound if that is positive we will then further evaluate her shortness of breath which she states has been ongoing for a while and declines any workup currently at this point.    Patient here in the ER Doppler study of the left lower extremity was negative for any DVT etc.    Discussed with patient at this point she has some numbness she has had for quite a while in the fifth toe there is no other focal findings to suspect weakness or TIA etc. may be related to swelling at this point reassurance elevating legs following up MD for recheck and return if any concerns at all patient agrees with plan comfortable this plan will follow-up with primary physician regarding her shortness of breath issues with been chronic and she did not want to pursue any of these currently here today in the ER.    Procedures                 Results for orders placed or performed during the hospital encounter of 09/27/24   US Lower Extremity Venous Duplex Left     Status: None    Narrative    EXAMINATION: DOPPLER VENOUS ULTRASOUND OF THE LEFT LOWER EXTREMITY,  9/27/2024 12:28 PM     COMPARISON: Ultrasound 2/16/2015    HISTORY: swelling and pain hx of travel eval for dvt    TECHNIQUE:  Gray-scale evaluation with compression, spectral flow, and  color Doppler assessment of the deep venous system of the left leg  from groin to knee, and then at the ankle.    FINDINGS:  In the left lower extremity, the common femoral, duplicated femoral,  popliteal, peroneal, and posterior tibial veins demonstrate  normal  compressibility and blood flow.      Impression    IMPRESSION:  No evidence of left lower extremity deep venous thrombosis.    I have personally reviewed the examination and initial interpretation  and I agree with the findings.    CASPER PATTON DO         SYSTEM ID:  [id number]     Medications - No data to display  Labs Ordered and Resulted from Time of ED Arrival to Time of ED Departure - No data to display  US Lower Extremity Venous Duplex Left   Final Result   IMPRESSION:   No evidence of left lower extremity deep venous thrombosis.      I have personally reviewed the examination and initial interpretation   and I agree with the findings.      CASPER PATTON DO            SYSTEM ID:  [id number]             Critical care was not performed.     Medical Decision Making  The patient's presentation was of moderate complexity (an acute illness with systemic symptoms).    The patient's evaluation involved:  review of external note(s) from 2 sources (see separate area of note for details)  review of 2 test result(s) ordered prior to this encounter (see separate area of note for details)  ordering and/or review of 1 test(s) in this encounter (see separate area of note for details)    The patient's management necessitated moderate risk (leg swelling).    Assessment & Plan   69-year-old female presents with left leg swelling and some recent travel.  She was evaluated here in the ER Doppler studies were negative for any DVT.  Patient did not want to pursue any further workup at this point is comfortable with plan being discharged elevating leg.  Patient to follow-up with MD for further evaluation etc. return to any concerns at all at this point reassured that there is no sign of any clot.       I have reviewed the nursing notes. I have reviewed the findings, diagnosis, plan and need for follow up with the patient.    Discharge Medication List as of 9/27/2024  2:32 PM          Final diagnoses:   Left leg swelling        Bill Velasquez  [facility] EMERGENCY DEPARTMENT  9/27/2024    This note was created at least in part by the use of dragon voice dictation system. Inadvertent typographical errors may still exist.  Bill Velasquez MD.  Patient evaluated in the emergency department during the COVID-19 pandemic period. Careful attention to patients safety was addressed throughout the evaluation. Evaluation and treatment management was initiated with disposition made efficiently and appropriate as possible to minimize any risk of potential exposure to patient during this evaluation.       Bill Velasquez MD  09/28/24 0002

## 2024-09-27 NOTE — ED TRIAGE NOTES
Pt with hx of TIA and afib, not on thinners, presents with a 1 month history of intermittent left leg swelling, numbness and pain. Pt was on a long road trip a month ago, when symptoms started.  Pt did take 2 325mg ASA BID while traveling.       Triage Assessment (Adult)       Row Name 09/27/24 1107          Triage Assessment    Airway WDL WDL        Respiratory WDL    Respiratory WDL WDL        Skin Circulation/Temperature WDL    Skin Circulation/Temperature WDL WDL        Cardiac WDL    Cardiac WDL WDL        Peripheral/Neurovascular WDL    Peripheral Neurovascular WDL WDL        Cognitive/Neuro/Behavioral WDL    Cognitive/Neuro/Behavioral WDL WDL

## 2024-09-27 NOTE — DISCHARGE INSTRUCTIONS
Home.  Your ultrasound appears normal without sign of blood clot.  Make sure to elevate legs to minimize swelling.  Follow up with Primary MD as recommended.  Return if any concerns at all.      Results for orders placed or performed during the hospital encounter of 09/27/24   US Lower Extremity Venous Duplex Left     Status: None    Narrative    EXAMINATION: DOPPLER VENOUS ULTRASOUND OF THE LEFT LOWER EXTREMITY,  9/27/2024 12:28 PM     COMPARISON: Ultrasound 2/16/2015    HISTORY: swelling and pain hx of travel eval for dvt    TECHNIQUE:  Gray-scale evaluation with compression, spectral flow, and  color Doppler assessment of the deep venous system of the left leg  from groin to knee, and then at the ankle.    FINDINGS:  In the left lower extremity, the common femoral, duplicated femoral,  popliteal, peroneal, and posterior tibial veins demonstrate normal  compressibility and blood flow.      Impression    IMPRESSION:  No evidence of left lower extremity deep venous thrombosis.    I have personally reviewed the examination and initial interpretation  and I agree with the findings.    CASPER PATTON DO         SYSTEM ID:  [id number]

## 2024-09-30 ENCOUNTER — PATIENT OUTREACH (OUTPATIENT)
Dept: FAMILY MEDICINE | Facility: CLINIC | Age: 69
End: 2024-09-30
Payer: COMMERCIAL

## 2024-09-30 NOTE — TELEPHONE ENCOUNTER
ED / Discharge Outreach Protocol    Patient Contact    Attempt # 1    Was call answered?  No.  Left message on voicemail with information to call me back.    Katya COOK RN

## 2024-10-01 NOTE — TELEPHONE ENCOUNTER
ED / Discharge Outreach Protocol    Patient Contact    Attempt # 2    Was call answered?  No.  Left message on voicemail with information to call me back.    Katya COOK RN

## 2024-10-03 NOTE — TELEPHONE ENCOUNTER
Patient called back was assisting in scheduling a ER/Hospital Followup and she said she had to go will Call back  [facility]  [facility] Unit Coordinator

## 2024-10-16 NOTE — TELEPHONE ENCOUNTER
Called patient no answer, left voicemail for patient to return call to Jaquan Carrizales MD office at [phone].    Prescription pending for patient        Updated in tracking.   Holly Pardo RN-Pap Tracking

## 2024-11-13 NOTE — PATIENT INSTRUCTIONS
Before Your Surgery      Call your surgeon if there is any change in your health. This includes signs of a cold or flu (such as a sore throat, runny nose, cough, rash or fever).    Do not smoke, drink alcohol or take over the counter medicine (unless your surgeon or primary care doctor tells you to) for the 24 hours before and after surgery.    If you take prescribed drugs: Follow your doctor s orders about which medicines to take and which to stop until after surgery.    Eating and drinking prior to surgery: follow the instructions from your surgeon    Take a shower or bath the night before surgery. Use the soap your surgeon gave you to gently clean your skin. If you do not have soap from your surgeon, use your regular soap. Do not shave or scrub the surgery site.  Wear clean pajamas and have clean sheets on your bed.    Statement Selected

## 2025-01-05 ENCOUNTER — HOSPITAL ENCOUNTER (EMERGENCY)
Facility: CLINIC | Age: 70
Discharge: HOME | End: 2025-01-05
Attending: EMERGENCY MEDICINE | Admitting: EMERGENCY MEDICINE
Payer: COMMERCIAL

## 2025-01-05 ENCOUNTER — APPOINTMENT (OUTPATIENT)
Dept: GENERAL RADIOLOGY | Facility: CLINIC | Age: 70
End: 2025-01-05
Attending: EMERGENCY MEDICINE
Payer: COMMERCIAL

## 2025-01-05 VITALS
WEIGHT: 195 LBS | DIASTOLIC BLOOD PRESSURE: 96 MMHG | TEMPERATURE: 97.4 F | BODY MASS INDEX: 28.88 KG/M2 | RESPIRATION RATE: 18 BRPM | SYSTOLIC BLOOD PRESSURE: 144 MMHG | HEART RATE: 84 BPM | HEIGHT: 69 IN | OXYGEN SATURATION: 98 %

## 2025-01-05 DIAGNOSIS — N30.01 ACUTE CYSTITIS WITH HEMATURIA: ICD-10-CM

## 2025-01-05 DIAGNOSIS — I48.0 PAROXYSMAL ATRIAL FIBRILLATION (H): ICD-10-CM

## 2025-01-05 LAB
ALBUMIN UR-MCNC: 70 MG/DL
ANION GAP SERPL CALCULATED.3IONS-SCNC: 16 MMOL/L (ref 7–15)
APPEARANCE UR: ABNORMAL
ATRIAL RATE - MUSE: 74 BPM
ATRIAL RATE - MUSE: 82 BPM
BACTERIA #/AREA URNS HPF: ABNORMAL /HPF
BASOPHILS # BLD AUTO: 0.1 10E3/UL (ref 0–0.2)
BASOPHILS NFR BLD AUTO: 1 %
BILIRUB UR QL STRIP: NEGATIVE
BUN SERPL-MCNC: 12.5 MG/DL (ref 8–23)
CALCIUM SERPL-MCNC: 10.3 MG/DL (ref 8.8–10.4)
CHLORIDE SERPL-SCNC: 103 MMOL/L (ref 98–107)
COLOR UR AUTO: ABNORMAL
CREAT SERPL-MCNC: 0.92 MG/DL (ref 0.51–0.95)
DIASTOLIC BLOOD PRESSURE - MUSE: NORMAL MMHG
DIASTOLIC BLOOD PRESSURE - MUSE: NORMAL MMHG
EGFRCR SERPLBLD CKD-EPI 2021: 67 ML/MIN/1.73M2
EOSINOPHIL # BLD AUTO: 0.4 10E3/UL (ref 0–0.7)
EOSINOPHIL NFR BLD AUTO: 4 %
ERYTHROCYTE [DISTWIDTH] IN BLOOD BY AUTOMATED COUNT: 13.1 % (ref 10–15)
FLUAV RNA SPEC QL NAA+PROBE: NEGATIVE
FLUBV RNA RESP QL NAA+PROBE: NEGATIVE
GLUCOSE SERPL-MCNC: 115 MG/DL (ref 70–99)
GLUCOSE UR STRIP-MCNC: NEGATIVE MG/DL
HCO3 SERPL-SCNC: 20 MMOL/L (ref 22–29)
HCT VFR BLD AUTO: 48.4 % (ref 35–47)
HGB BLD-MCNC: 17 G/DL (ref 11.7–15.7)
HGB UR QL STRIP: ABNORMAL
IMM GRANULOCYTES # BLD: 0.1 10E3/UL
IMM GRANULOCYTES NFR BLD: 1 %
INR PPP: 0.9 (ref 0.85–1.15)
INTERPRETATION ECG - MUSE: NORMAL
INTERPRETATION ECG - MUSE: NORMAL
KETONES UR STRIP-MCNC: NEGATIVE MG/DL
LEUKOCYTE ESTERASE UR QL STRIP: ABNORMAL
LYMPHOCYTES # BLD AUTO: 3 10E3/UL (ref 0.8–5.3)
LYMPHOCYTES NFR BLD AUTO: 26 %
MAGNESIUM SERPL-MCNC: 1.9 MG/DL (ref 1.7–2.3)
MCH RBC QN AUTO: 33.1 PG (ref 26.5–33)
MCHC RBC AUTO-ENTMCNC: 35.1 G/DL (ref 31.5–36.5)
MCV RBC AUTO: 94 FL (ref 78–100)
MONOCYTES # BLD AUTO: 1.2 10E3/UL (ref 0–1.3)
MONOCYTES NFR BLD AUTO: 10 %
NEUTROPHILS # BLD AUTO: 7.1 10E3/UL (ref 1.6–8.3)
NEUTROPHILS NFR BLD AUTO: 60 %
NITRATE UR QL: POSITIVE
NRBC # BLD AUTO: 0 10E3/UL
NRBC BLD AUTO-RTO: 0 /100
P AXIS - MUSE: 22 DEGREES
P AXIS - MUSE: 42 DEGREES
PH UR STRIP: 6 [PH] (ref 5–7)
PLATELET # BLD AUTO: 151 10E3/UL (ref 150–450)
POTASSIUM SERPL-SCNC: 4.5 MMOL/L (ref 3.4–5.3)
PR INTERVAL - MUSE: 174 MS
PR INTERVAL - MUSE: 180 MS
QRS DURATION - MUSE: 126 MS
QRS DURATION - MUSE: 132 MS
QT - MUSE: 394 MS
QT - MUSE: 422 MS
QTC - MUSE: 460 MS
QTC - MUSE: 468 MS
R AXIS - MUSE: -24 DEGREES
R AXIS - MUSE: -43 DEGREES
RBC # BLD AUTO: 5.14 10E6/UL (ref 3.8–5.2)
RBC URINE: 36 /HPF
RSV RNA SPEC NAA+PROBE: NEGATIVE
SARS-COV-2 RNA RESP QL NAA+PROBE: NEGATIVE
SODIUM SERPL-SCNC: 139 MMOL/L (ref 135–145)
SP GR UR STRIP: 1.02 (ref 1–1.03)
SQUAMOUS EPITHELIAL: 6 /HPF
SYSTOLIC BLOOD PRESSURE - MUSE: NORMAL MMHG
SYSTOLIC BLOOD PRESSURE - MUSE: NORMAL MMHG
T AXIS - MUSE: 77 DEGREES
T AXIS - MUSE: 80 DEGREES
TRANSITIONAL EPI: 4 /HPF
TSH SERPL DL<=0.005 MIU/L-ACNC: 1.86 UIU/ML (ref 0.3–4.2)
UROBILINOGEN UR STRIP-MCNC: NORMAL MG/DL
VENTRICULAR RATE- MUSE: 74 BPM
VENTRICULAR RATE- MUSE: 82 BPM
WBC # BLD AUTO: 11.8 10E3/UL (ref 4–11)
WBC CLUMPS #/AREA URNS HPF: PRESENT /HPF
WBC URINE: >182 /HPF

## 2025-01-05 PROCEDURE — 87637 SARSCOV2&INF A&B&RSV AMP PRB: CPT | Mod: 59 | Performed by: EMERGENCY MEDICINE

## 2025-01-05 PROCEDURE — 87186 SC STD MICRODIL/AGAR DIL: CPT | Performed by: EMERGENCY MEDICINE

## 2025-01-05 PROCEDURE — 84443 ASSAY THYROID STIM HORMONE: CPT | Performed by: EMERGENCY MEDICINE

## 2025-01-05 PROCEDURE — 82310 ASSAY OF CALCIUM: CPT | Performed by: EMERGENCY MEDICINE

## 2025-01-05 PROCEDURE — 93005 ELECTROCARDIOGRAM TRACING: CPT | Mod: 76 | Performed by: EMERGENCY MEDICINE

## 2025-01-05 PROCEDURE — 85025 COMPLETE CBC W/AUTO DIFF WBC: CPT | Performed by: EMERGENCY MEDICINE

## 2025-01-05 PROCEDURE — 93010 ELECTROCARDIOGRAM REPORT: CPT | Performed by: EMERGENCY MEDICINE

## 2025-01-05 PROCEDURE — 99285 EMERGENCY DEPT VISIT HI MDM: CPT | Mod: 25 | Performed by: EMERGENCY MEDICINE

## 2025-01-05 PROCEDURE — 250N000011 HC RX IP 250 OP 636: Performed by: EMERGENCY MEDICINE

## 2025-01-05 PROCEDURE — 71046 X-RAY EXAM CHEST 2 VIEWS: CPT | Mod: 26 | Performed by: RADIOLOGY

## 2025-01-05 PROCEDURE — 93005 ELECTROCARDIOGRAM TRACING: CPT | Performed by: EMERGENCY MEDICINE

## 2025-01-05 PROCEDURE — 83735 ASSAY OF MAGNESIUM: CPT | Performed by: EMERGENCY MEDICINE

## 2025-01-05 PROCEDURE — 93010 ELECTROCARDIOGRAM REPORT: CPT | Mod: 76 | Performed by: EMERGENCY MEDICINE

## 2025-01-05 PROCEDURE — 36415 COLL VENOUS BLD VENIPUNCTURE: CPT | Performed by: EMERGENCY MEDICINE

## 2025-01-05 PROCEDURE — 81003 URINALYSIS AUTO W/O SCOPE: CPT | Performed by: EMERGENCY MEDICINE

## 2025-01-05 PROCEDURE — 80048 BASIC METABOLIC PNL TOTAL CA: CPT | Performed by: EMERGENCY MEDICINE

## 2025-01-05 PROCEDURE — 99285 EMERGENCY DEPT VISIT HI MDM: CPT | Performed by: EMERGENCY MEDICINE

## 2025-01-05 PROCEDURE — 96374 THER/PROPH/DIAG INJ IV PUSH: CPT | Performed by: EMERGENCY MEDICINE

## 2025-01-05 PROCEDURE — 85610 PROTHROMBIN TIME: CPT | Performed by: EMERGENCY MEDICINE

## 2025-01-05 PROCEDURE — 71046 X-RAY EXAM CHEST 2 VIEWS: CPT

## 2025-01-05 RX ORDER — NITROFURANTOIN 25; 75 MG/1; MG/1
100 CAPSULE ORAL 2 TIMES DAILY
Qty: 10 CAPSULE | Refills: 0 | Status: SHIPPED | OUTPATIENT
Start: 2025-01-05 | End: 2025-01-07

## 2025-01-05 RX ORDER — CEFTRIAXONE 1 G/1
1 INJECTION, POWDER, FOR SOLUTION INTRAMUSCULAR; INTRAVENOUS ONCE
Status: COMPLETED | OUTPATIENT
Start: 2025-01-05 | End: 2025-01-05

## 2025-01-05 RX ADMIN — CEFTRIAXONE SODIUM 1 G: 1 INJECTION, POWDER, FOR SOLUTION INTRAMUSCULAR; INTRAVENOUS at 14:23

## 2025-01-05 ASSESSMENT — ACTIVITIES OF DAILY LIVING (ADL)
ADLS_ACUITY_SCORE: 42
ADLS_ACUITY_SCORE: 42

## 2025-01-05 ASSESSMENT — COLUMBIA-SUICIDE SEVERITY RATING SCALE - C-SSRS
1. IN THE PAST MONTH, HAVE YOU WISHED YOU WERE DEAD OR WISHED YOU COULD GO TO SLEEP AND NOT WAKE UP?: NO
6. HAVE YOU EVER DONE ANYTHING, STARTED TO DO ANYTHING, OR PREPARED TO DO ANYTHING TO END YOUR LIFE?: NO
2. HAVE YOU ACTUALLY HAD ANY THOUGHTS OF KILLING YOURSELF IN THE PAST MONTH?: NO

## 2025-01-05 NOTE — ED PROVIDER NOTES
[place] EMERGENCY DEPARTMENT ([facility])    1/05/25       ED PROVIDER NOTE      History     Chief Complaint   Patient presents with    Palpitations     The history is provided by the patient and medical records.     Sofi Degroot is a 69 year old female with a past medical history significant for PAF, TIA (2015), PFO s/p percutaneous closure (10/12/2018) c/b postprocedural atrial fibrillation requiring cardioversion, atrial fibrillation with RVR (requiring admission 10/25-10/26/18), renal artery occlusion, hepatitis C, and HTN who presents to the ED for evaluation of palpitations.  Patient states that she has been experiencing these palpitations since waking up around 9:30 AM.  She believes that she is in atrial fibrillation, as this feels very similar to her prior episodes of atrial fibrillation in 2018.  Notes that she had been feeling well last evening.      In addition, patient reports a nonproductive cough as well as urinary burning and urgency. States that she is concerned for a possible UTI. She denies use of anticoagulants.  No recent fevers, runny nose, or chest pain.    Past Medical History  Past Medical History:   Diagnosis Date    Cerebral infarction (H) 2/15/15    TIA    Cervical high risk HPV (human papillomavirus) test positive 09/16/2016, 10/11/17, 10/17/18    (not 16 or 18). 10/11/17: LSIL pap with + HR HPV (not 16 or 18) result.10/17/18: See problem list.     Hypertension goal BP (blood pressure) < 140/90     Lateral epicondylitis of both elbows     Low risk HPV infection 2009, 2010    NIL pap, + HPV 53    Papanicolaou smear of cervix with low grade squamous intraepithelial lesion (LGSIL) 10/11/2017    10/11/17: LSIL pap with + HR HPV (not 16 or 18) result.    Unspecified hypothyroidism 1999    Unspecified viral hepatitis C without hepatic coma 1998    Tx'd w/ Interferon x 1 year-virus free at 5 years, blood transfusion  related     Past Surgical History:   Procedure Laterality  Date    ANESTHESIA CARDIOVERSION N/A 10/26/2018    Procedure: ANESTHESIA CARDIOVERSION;  Surgeon: GENERIC ANESTHESIA PROVIDER;  Location: [facility] OR    COLONOSCOPY      COLONOSCOPY N/A 3/29/2022    Procedure: COLONOSCOPY, WITH HOT POLYPECTOMY AND CLIP;  Surgeon: Geoffrey Garcia MD;  Location: [facility] OR    GENITOURINARY SURGERY      tubal ligation    ORTHOPEDIC SURGERY      removal of bone spur right foot    SURGICAL HISTORY OF -  Right 2008    Right 1st MTPJ, bone spur resection    ZZC LIGATE FALLOPIAN TUBE       nitroFURantoin macrocrystal-monohydrate (MACROBID) 100 MG capsule  Acetaminophen (TYLENOL PO)  cholecalciferol 2000 units tablet  famotidine (PEPCID) 10 MG tablet  fish oil-omega-3 fatty acids 1000 MG capsule  FLUoxetine (PROZAC) 10 MG capsule  levothyroxine (SYNTHROID/LEVOTHROID) 100 MCG tablet  lisinopril (ZESTRIL) 20 MG tablet  loratadine (CLARITIN) 10 MG tablet  magnesium 250 MG tablet  metoprolol succinate ER (TOPROL XL) 100 MG 24 hr tablet  metoprolol succinate ER (TOPROL XL) 50 MG 24 hr tablet  Multiple Vitamins-Minerals (MULTIVITAMIN ADULTS) TABS  oxyBUTYnin ER (DITROPAN XL) 10 MG 24 hr tablet  pravastatin (PRAVACHOL) 40 MG tablet  valACYclovir (VALTREX) 500 MG tablet      Allergies   Allergen Reactions    Atorvastatin Cramps    Flagyl [Metronidazole] Nausea    Latex Swelling     Dental visit--burning sensation in lips and throat, lips felt puffy, eye watery from latex gloves    Pineapple Other (See Comments)     Family History  Family History   Problem Relation Age of Onset    Heart Disease Maternal Grandfather     Breast Cancer Maternal Grandmother         Dx'd age 70    Arthritis Father     Lipids Mother         On meds    Cancer Mother         BCC    Heart Disease Brother          of dilated of cardiomyopathy at age 54    Alcohol/Drug Sister     Alcohol/Drug Brother     Depression Sister     Depression Sister      Social History   Social History     Tobacco Use    Smoking status:  "Former     Current packs/day: 0.00     Average packs/day: 0.5 packs/day for 5.0 years (2.5 ttl pk-yrs)     Types: Cigarettes     Start date: 1970     Quit date: 5/15/1975     Years since quittin.6    Smokeless tobacco: Never    Tobacco comments:     quit 30 years ago   Vaping Use    Vaping status: Never Used   Substance Use Topics    Alcohol use: Yes     Comment: 4-5 Drinks Per Week    Drug use: No      Past medical history, past surgical history, medications, allergies, family history, and social history were reviewed with the patient. No additional pertinent items.   A medically appropriate review of systems was performed with pertinent positives and negatives noted in the HPI, and all other systems negative.    Physical Exam   BP: (!) 144/96  Pulse: 82  Temp: 97.4  F (36.3  C)  Resp: 18  Height: 175.3 cm (5' 9\")  Weight: 88.5 kg (195 lb)  SpO2: 98 %  Physical Exam  Vitals and nursing note reviewed.   Constitutional:       General: She is not in acute distress.     Appearance: Normal appearance. She is not ill-appearing, toxic-appearing or diaphoretic.   HENT:      Head: Normocephalic and atraumatic.      Mouth/Throat:      Mouth: Mucous membranes are moist.      Pharynx: Oropharynx is clear.   Eyes:      Extraocular Movements: Extraocular movements intact.      Conjunctiva/sclera: Conjunctivae normal.   Cardiovascular:      Rate and Rhythm: Normal rate.      Heart sounds: Normal heart sounds.   Pulmonary:      Effort: Pulmonary effort is normal. No respiratory distress.      Breath sounds: Normal breath sounds. No wheezing.   Abdominal:      General: Abdomen is flat.      Palpations: Abdomen is soft.      Tenderness: There is no abdominal tenderness.   Musculoskeletal:         General: No tenderness. Normal range of motion.      Cervical back: Normal range of motion and neck supple.   Skin:     General: Skin is warm.      Findings: No rash.   Neurological:      General: No focal deficit present.      " Mental Status: She is alert and oriented to person, place, and time.   Psychiatric:         Mood and Affect: Mood normal.         Behavior: Behavior normal.         Thought Content: Thought content normal.         Judgment: Judgment normal.           ED Course, Procedures, & Data    12:33 PM  The patient was seen and examined by Irene Herrera MD   in [facility]     Procedures            EKG Interpretation:      Interpreted by Irene Herrera MD  Time reviewed: 1:01 PM  Symptoms at time of EKG: Palpitations  Rhythm: normal sinus   Rate: normal  Axis: LAD  Ectopy: none  Conduction: LBBB  ST Segments/ T Waves: No ST-T wave changes  Q Waves: none  Comparison to prior: Different in comparison to the old EKG done on June 25, 2019    Clinical Impression: NSR, LAD, LBBB, no acute ischemia    IV Antibiotics given and/or elevated Lactate of 0 and no sepsis note found - Delete this reminder and enter the sepsis note or '.edcms' before signing chart.>>>       EKG Interpretation:      Interpreted by Irene Herrera MD  Time reviewed: 2:18 PM  Symptoms at time of EKG: Palpitations  Rhythm: normal sinus   Rate: normal  Axis: LAD  Ectopy: none  Conduction: LBBB  ST Segments/ T Waves: No ST-T wave changes  Q Waves: none  Comparison to prior: Unchanged from EKG done earlier today  Clinical Impression: NSR, LAD, LBBB, no acute ischemia     Results for orders placed or performed during the hospital encounter of 01/05/25   XR Chest 2 Views     Status: None    Narrative    Exam: XR CHEST 2 VIEWS, 1/5/2025 1:07 PM    Comparison: Chest x-ray 10/22/2018    History: cough    Findings:  2 views of the chest . Trachea is midline. Cardiomediastinal  silhouette is within normal limits. PFO closure device projecting over  the right heart. Minimal left lower lobe streaky opacities. No  pneumothorax or pleural effusion. The visualized upper abdomen is  unremarkable. No acute osseous abnormalities.      Impression    Impression: Minimal left lower  lobe streaky opacities, favored to  represent atelectasis.    I have personally reviewed the examination and initial interpretation  and I agree with the findings.    PASCUAL ALMAGUER MD         SYSTEM ID:  [id number]   Basic metabolic panel     Status: Abnormal   Result Value Ref Range    Sodium 139 135 - 145 mmol/L    Potassium 4.5 3.4 - 5.3 mmol/L    Chloride 103 98 - 107 mmol/L    Carbon Dioxide (CO2) 20 (L) 22 - 29 mmol/L    Anion Gap 16 (H) 7 - 15 mmol/L    Urea Nitrogen 12.5 8.0 - 23.0 mg/dL    Creatinine 0.92 0.51 - 0.95 mg/dL    GFR Estimate 67 >60 mL/min/1.73m2    Calcium 10.3 8.8 - 10.4 mg/dL    Glucose 115 (H) 70 - 99 mg/dL   TSH with free T4 reflex     Status: Normal   Result Value Ref Range    TSH 1.86 0.30 - 4.20 uIU/mL   Magnesium     Status: Normal   Result Value Ref Range    Magnesium 1.9 1.7 - 2.3 mg/dL   INR     Status: Normal   Result Value Ref Range    INR 0.90 0.85 - 1.15   UA with Microscopic reflex to Culture     Status: Abnormal    Specimen: Urine, Midstream   Result Value Ref Range    Color Urine Orange (A) Colorless, Straw, Light Yellow, Yellow    Appearance Urine Cloudy (A) Clear    Glucose Urine Negative Negative mg/dL    Bilirubin Urine Negative Negative    Ketones Urine Negative Negative mg/dL    Specific Gravity Urine 1.017 1.003 - 1.035    Blood Urine Moderate (A) Negative    pH Urine 6.0 5.0 - 7.0    Protein Albumin Urine 70 (A) Negative mg/dL    Urobilinogen Urine Normal Normal, 2.0 mg/dL    Nitrite Urine Positive (A) Negative    Leukocyte Esterase Urine Large (A) Negative    Bacteria Urine Few (A) None Seen /HPF    WBC Clumps Urine Present (A) None Seen /HPF    RBC Urine 36 (H) <=2 /HPF    WBC Urine >182 (H) <=5 /HPF    Squamous Epithelials Urine 6 (H) <=1 /HPF    Transitional Epithelials Urine 4 (H) <=1 /HPF    Narrative    Urine Culture ordered based on laboratory criteria   Influenza A/B, RSV and SARS-CoV2 PCR (COVID-19) Nasopharyngeal     Status: Normal    Specimen:  Nasopharyngeal; Swab   Result Value Ref Range    Influenza A PCR Negative Negative    Influenza B PCR Negative Negative    RSV PCR Negative Negative    SARS CoV2 PCR Negative Negative    Narrative    Testing was performed using the Xpert Xpress CoV2/Flu/RSV Assay on the Cepheid GeneXpert Instrument. This test should be ordered for the detection of SARS-CoV2, influenza, and RSV viruses in individuals with signs and symptoms of respiratory tract infection. This test is for in vitro diagnostic use under the [place] FDA for laboratories certified under CLIA to perform high or moderate complexity testing. This test has been [place] FDA cleared. A negative result does not rule out the presence of PCR inhibitors in the specimen or target RNA in concentration below the limit of detection for the assay. If only one viral target is positive but coinfection with multiple targets is suspected, the sample should be re-tested with another FDA cleared, approved, or authorized test, if coninfection would change clinical management. This test was validated by the [facility] [employer]. These laboratories are certified under the Clinical Laboratory Improvement Amendments of 1988 (CLIA-88) as qualified to perfom high complexity laboratory testing.   CBC with platelets and differential     Status: Abnormal   Result Value Ref Range    WBC Count 11.8 (H) 4.0 - 11.0 10e3/uL    RBC Count 5.14 3.80 - 5.20 10e6/uL    Hemoglobin 17.0 (H) 11.7 - 15.7 g/dL    Hematocrit 48.4 (H) 35.0 - 47.0 %    MCV 94 78 - 100 fL    MCH 33.1 (H) 26.5 - 33.0 pg    MCHC 35.1 31.5 - 36.5 g/dL    RDW 13.1 10.0 - 15.0 %    Platelet Count 151 150 - 450 10e3/uL    % Neutrophils 60 %    % Lymphocytes 26 %    % Monocytes 10 %    % Eosinophils 4 %    % Basophils 1 %    % Immature Granulocytes 1 %    NRBCs per 100 WBC 0 <1 /100    Absolute Neutrophils 7.1 1.6 - 8.3 10e3/uL    Absolute Lymphocytes 3.0 0.8 - 5.3 10e3/uL    Absolute Monocytes 1.2 0.0 - 1.3 10e3/uL     Absolute Eosinophils 0.4 0.0 - 0.7 10e3/uL    Absolute Basophils 0.1 0.0 - 0.2 10e3/uL    Absolute Immature Granulocytes 0.1 <=0.4 10e3/uL    Absolute NRBCs 0.0 10e3/uL   EKG 12-lead, tracing only     Status: None   Result Value Ref Range    Systolic Blood Pressure  mmHg    Diastolic Blood Pressure  mmHg    Ventricular Rate 82 BPM    Atrial Rate 82 BPM    SD Interval 174 ms    QRS Duration 126 ms     ms    QTc 460 ms    P Axis 22 degrees    R AXIS -43 degrees    T Axis 77 degrees    Interpretation ECG       Sinus rhythm  Left axis deviation  Left bundle branch block  Abnormal ECG    Unconfirmed report - interpretation of this ECG is computer generated - see medical record for final interpretation  Confirmed by - EMERGENCY ROOM, PHYSICIAN ([id number]),  Seda Merlos ([id number]) on 1/5/2025 2:51:02 PM     EKG 12-lead, tracing only     Status: None   Result Value Ref Range    Systolic Blood Pressure  mmHg    Diastolic Blood Pressure  mmHg    Ventricular Rate 74 BPM    Atrial Rate 74 BPM    SD Interval 180 ms    QRS Duration 132 ms     ms    QTc 468 ms    P Axis 42 degrees    R AXIS -24 degrees    T Axis 80 degrees    Interpretation ECG       Sinus rhythm  Left bundle branch block  Abnormal ECG    Unconfirmed report - interpretation of this ECG is computer generated - see medical record for final interpretation  Confirmed by - EMERGENCY ROOM, PHYSICIAN ([id number]),  Seda Merlos ([id number]) on 1/5/2025 2:51:13 PM     CBC with platelets differential     Status: Abnormal    Narrative    The following orders were created for panel order CBC with platelets differential.  Procedure                               Abnormality         Status                     ---------                               -----------         ------                     CBC with platelets and d...[[id number]]  Abnormal            Final result                 Please view results for these tests on the individual orders.     Medications    cefTRIAXone (ROCEPHIN) 1 g vial to attach to  mL bag for ADULTS or NS 50 mL bag for PEDS (0 g Intravenous Stopped 1/5/25 1438)     Labs Ordered and Resulted from Time of ED Arrival to Time of ED Departure   BASIC METABOLIC PANEL - Abnormal       Result Value    Sodium 139      Potassium 4.5      Chloride 103      Carbon Dioxide (CO2) 20 (*)     Anion Gap 16 (*)     Urea Nitrogen 12.5      Creatinine 0.92      GFR Estimate 67      Calcium 10.3      Glucose 115 (*)    ROUTINE UA WITH MICROSCOPIC REFLEX TO CULTURE - Abnormal    Color Urine Orange (*)     Appearance Urine Cloudy (*)     Glucose Urine Negative      Bilirubin Urine Negative      Ketones Urine Negative      Specific Gravity Urine 1.017      Blood Urine Moderate (*)     pH Urine 6.0      Protein Albumin Urine 70 (*)     Urobilinogen Urine Normal      Nitrite Urine Positive (*)     Leukocyte Esterase Urine Large (*)     Bacteria Urine Few (*)     WBC Clumps Urine Present (*)     RBC Urine 36 (*)     WBC Urine >182 (*)     Squamous Epithelials Urine 6 (*)     Transitional Epithelials Urine 4 (*)    CBC WITH PLATELETS AND DIFFERENTIAL - Abnormal    WBC Count 11.8 (*)     RBC Count 5.14      Hemoglobin 17.0 (*)     Hematocrit 48.4 (*)     MCV 94      MCH 33.1 (*)     MCHC 35.1      RDW 13.1      Platelet Count 151      % Neutrophils 60      % Lymphocytes 26      % Monocytes 10      % Eosinophils 4      % Basophils 1      % Immature Granulocytes 1      NRBCs per 100 WBC 0      Absolute Neutrophils 7.1      Absolute Lymphocytes 3.0      Absolute Monocytes 1.2      Absolute Eosinophils 0.4      Absolute Basophils 0.1      Absolute Immature Granulocytes 0.1      Absolute NRBCs 0.0     TSH WITH FREE T4 REFLEX - Normal    TSH 1.86     MAGNESIUM - Normal    Magnesium 1.9     INR - Normal    INR 0.90     INFLUENZA A/B, RSV AND SARS-COV2 PCR - Normal    Influenza A PCR Negative      Influenza B PCR Negative      RSV PCR Negative      SARS CoV2 PCR Negative      URINE CULTURE     XR Chest 2 Views   Final Result   Impression: Minimal left lower lobe streaky opacities, favored to   represent atelectasis.      I have personally reviewed the examination and initial interpretation   and I agree with the findings.      PASCUAL ALMAGUER MD            SYSTEM ID:  [id number]             Critical care was not performed.     Medical Decision Making  The patient's presentation was of high complexity (an acute health issue posing potential threat to life or bodily function).    The patient's evaluation involved:  review of external note(s) from 1 sources (see separate area of note for details)  review of 3+ test result(s) ordered prior to this encounter (see separate area of note for details)  ordering and/or review of 3+ test(s) in this encounter (see separate area of note for details)  independent interpretation of testing performed by another health professional (see separate area of note for details)    The patient's management necessitated high risk (a decision regarding hospitalization).    Assessment & Plan    This is a 69 year old female presenting with palpitations since this morning.      Differential diagnosis: Atrial fibrillation fibrillation, atrial flutter, thyroid disorder, electrolyte abnormalities.    After thorough history and physical exam, patient appears to be in no acute distress.  Will obtain EKG and laboratory studies for further diagnostic evaluation.  Patient also did report some urinary symptoms for which I will obtain urinalysis.  She has also had some cough for which I will obtain COVID-19/influenza studies along with a chest x-ray.  She agrees with the plan.     Laboratory studies returned with leukocytosis of 11,800.  There is elevated hemoglobin at 17.0.  Electrolytes show normal creatinine at 0.92, normal potassium at 4.5, and normal magnesium at 1.9.  TSH is normal at 1.86.  COVID-19 and influenza tests are negative.  Urinalysis showed evidence of  urinary tract infection and patient was started on IV ceftriaxone in the emergency department.  I reviewed her chest x-ray and the radiology report; there are minimal left lower base radiopacities favored to be atelectasis.  Patient does not have a productive cough and no fever, and therefore I do not suspect this is acute bacterial pneumonia.  She has spontaneously converted in the emergency department to normal sinus rhythm. She does not need any further emergency department intervention.  She will follow-up with her primary care and cardiology clinics.  She will be discharged with prescription for nitrofurantoin.  She will return to the Emergency Department if her symptoms recur.  She is stable for discharge.     This part of the medical record was transcribed by Nataly Mao Medical Scribe, from a dictation done by Irene Herrera MD.    I have reviewed the nursing notes. I have reviewed the findings, diagnosis, plan and need for follow up with the patient.    Discharge Medication List as of 1/5/2025  2:38 PM        START taking these medications    Details   nitroFURantoin macrocrystal-monohydrate (MACROBID) 100 MG capsule Take 1 capsule (100 mg) by mouth 2 times daily for 5 days., Disp-10 capsule, R-0, E-Prescribe             Final diagnoses:   Paroxysmal atrial fibrillation (H)   Acute cystitis with hematuria   I, Nataly Mao, am serving as a trained medical scribe to document services personally performed by Irene Herrera MD, based on the provider's statements to me.      IIrene MD, was physically present and have reviewed and verified the accuracy of this note documented by Nataly Mao.       Irene Herrera MD  [facility] EMERGENCY DEPARTMENT  1/5/2025     Irene Herrera MD  01/05/25 1941

## 2025-01-05 NOTE — DISCHARGE INSTRUCTIONS
Please make an appointment to follow up with Your Primary Care Provider and Cardiology Clinic (phone: [phone]) in 3-5 days if you have any concerns.  Please take the prescribed antibiotic as instructed.  If your symptoms worsen please come back to the emergency department.

## 2025-01-05 NOTE — ED TRIAGE NOTES
"Triage Assessment & Note:    BP (!) 144/96   Pulse 82   Temp 97.4  F (36.3  C) (Oral)   Resp 18   Ht 1.753 m (5' 9\")   Wt 88.5 kg (195 lb)   LMP 10/27/2004 (Approximate)   SpO2 98%   BMI 28.80 kg/m      Patient presents with: C/o A-fib and possible UTI. PT repots she feels like she is in A-fib and is having slight chest discomfort and slight nausea. PT also reports s/s of a UTI.     Home Treatments/Remedies: None    Febrile / Afebrile? Afebrile     Duration of C/o:  6 hours    Gabriel Koo RN  January 5, 2025       Triage Assessment (Adult)       Row Name 01/05/25 1230          Triage Assessment    Airway WDL WDL        Respiratory WDL    Respiratory WDL WDL        Cardiac WDL    Cardiac Rhythm Atrial fibrillation                     "

## 2025-01-06 ENCOUNTER — TELEPHONE (OUTPATIENT)
Dept: CARDIOLOGY | Facility: CLINIC | Age: 70
End: 2025-01-06
Payer: COMMERCIAL

## 2025-01-06 DIAGNOSIS — Q21.12 PFO (PATENT FORAMEN OVALE): Primary | Chronic | ICD-10-CM

## 2025-01-06 DIAGNOSIS — I48.91 ATRIAL FIBRILLATION, UNSPECIFIED TYPE (H): ICD-10-CM

## 2025-01-06 NOTE — TELEPHONE ENCOUNTER
[facility]    Phone Message    May a detailed message be left on voicemail: yes     Reason for Call: Other: Per call from patient, requested a follow up with Dr. Baldwin. Patient saw provider back in 2019 for s/p PFO. Please review and reach out to patient to schedule.      Action Taken: Message routed to:  [facility] Cardio    Travel Screening: Not Applicable     Date of Service:

## 2025-01-07 ENCOUNTER — TELEPHONE (OUTPATIENT)
Dept: NURSING | Facility: CLINIC | Age: 70
End: 2025-01-07
Payer: COMMERCIAL

## 2025-01-07 DIAGNOSIS — N30.01 ACUTE CYSTITIS WITH HEMATURIA: ICD-10-CM

## 2025-01-07 LAB
BACTERIA UR CULT: ABNORMAL
BACTERIA UR CULT: ABNORMAL

## 2025-01-07 RX ORDER — CEFPODOXIME PROXETIL 100 MG/1
100 TABLET, FILM COATED ORAL 2 TIMES DAILY
Qty: 10 TABLET | Refills: 0 | Status: SHIPPED | OUTPATIENT
Start: 2025-01-07 | End: 2025-01-12

## 2025-01-07 NOTE — TELEPHONE ENCOUNTER
[facility] ([place])    Reason for call: Lab Result Notification     Lab Result (including Rx patient on, if applicable).  If culture, copy of lab report at bottom.  Lab Result: Final Urine Culture Report on 1/7/25  [facility] Emergency Dept discharge antibiotic prescribed: Nitrofurantoin Macrocrystal-Monohydrate (Macrobid) 100 mg PO capsule, 1 capsule (100 mg) by mouth 2 times daily for 5 days   Bacterial Growth: >100,000 CFU/ML Klebsiella pneumoniae [Susceptible to Macrobid] AND 10,000 - 50,000 CFU/mL Streptococcus agalactiae (Group B Streptococcus) - Not tested to Macrobid          Allergies   Allergen Reactions    Atorvastatin Cramps    Flagyl [Metronidazole] Nausea    Latex Swelling     Dental visit--burning sensation in lips and throat, lips felt puffy, eye watery from latex gloves    Pineapple Other (See Comments)       Creatinine Level (mg/dl)   Creatinine   Date Value Ref Range Status   01/05/2025 0.92 0.51 - 0.95 mg/dL Final   11/12/2020 0.86 0.52 - 1.04 mg/dL Final    Creatinine clearance (ml/min), if applicable    Serum creatinine: 0.92 mg/dL 01/05/25 1244  Estimated creatinine clearance: 67.5 mL/min     Coumadin: No    Patient's current Symptoms:   Sx's are improving (burning and frequency with urination)    RN Recommendations/Instructions per [place] ED lab result protocol:   [facility] ED lab result protocol utilized: urine culture  Advised to discontinue the Macrobid and switch to Cefpodoxime (Vantin) 100 MG tablet, 1 tablet (100 mg) by mouth 2 times daily for 5 days     Patient/care giver notified to contact your PCP clinic or return to the Emergency department if your:  Symptoms do not resolve after completing antibiotic.  Symptoms worsen or other concerning symptoms.        Howard Quesada RN

## 2025-01-07 NOTE — LETTER
January 7, 2025        Sofi Degroot  [address]   [place] [address]          Dear Sofi Degroot:    You were seen in the [facility] Emergency Department at [facility] on 1/5/2025.  We are unable to reach you by phone, so we are sending you this letter.     It is important that you call [facility] Emergency Department lab result nurse at [phone], as we have information to relay to you AND/OR we MAY have to make some changes in your treatment.    Best time to call back is between 9AM and 5:30PM, 7 days a week.      Sincerely,     [facility] Emergency Department Lab Result RN  [phone]

## 2025-01-07 NOTE — TELEPHONE ENCOUNTER
[facility] ([place])    Reason for call: Lab Result Notification     Lab Result (including Rx patient on, if applicable).  If culture, copy of lab report at bottom.  Lab Result: Urine culture - see below     ED Rx: nitroFURantoin macrocrystal-monohydrate (MACROBID) 100 MG capsule - Take 1 capsule (100 mg) by mouth 2 times daily for 5 days.   >100,000 CFU/mL Klebsiella pneumoniae   - Susceptible  10,000-50,000 CFU/mL Streptococcus agalactiae  - not tested    Creatinine Level (mg/dl)   Creatinine   Date Value Ref Range Status   01/05/2025 0.92 0.51 - 0.95 mg/dL Final   11/12/2020 0.86 0.52 - 1.04 mg/dL Final    Creatinine clearance (ml/min), if applicable    Serum creatinine: 0.92 mg/dL 01/05/25 1244  Estimated creatinine clearance: 67.5 mL/min     ED Symptoms:  Patient presented to [facility] ED on 1/5/2025 for evaluation of heart palpitations. Pt also reports cough and urinary burning and urgency.    RN Recommendations/Instructions per [place] ED lab result protocol:   [facility] ED lab result protocol utilized: Urine culture   Recommend changing antibiotic to cefpodoxime pending pt assessment.      Unable to reach patient/caregiver.     Left voicemail message requesting a call back to [phone] between 9 a.m. and 5:30 p.m. for patient's ED/ lab results.      Letter pended to be sent via daysoft.    Marilynn Camarillo RN

## 2025-01-07 NOTE — TELEPHONE ENCOUNTER
Date: 1/7/2025    Time of Call: 10:30 AM     Diagnosis:  PFO, palpitations     [ TORB ] Ordering provider: Dr. Xiao Lopez  Order: Re-establish care with Dr. Lopez with ECHO and 1 week ZioPatch prior.      Order received by: Radha GARCIA RN      Follow-up/additional notes: ZioPatch to be placed at appointment.   Last seen by Dr. Baldwin in 2019 for afib, follow-up as needed. Last seen by Dr. Lopez in 2019 for PFO, lost to follow-up (due for echocardiogram)     Patient states that she has been experiencing these palpitations since waking up around 9:30 AM.  She believes that she is in atrial fibrillation, as this feels very similar to her prior episodes of atrial fibrillation in 2018.  Notes that she had been feeling well last evening.      Both EKGs taken in the ER on 1/5/25 showed NSR.

## 2025-01-08 NOTE — TELEPHONE ENCOUNTER
LVM inviting patient to callback to schedule. Scheduling reminder sent to Sherry ROCA CMA for follow-up.

## 2025-01-23 ENCOUNTER — OFFICE VISIT (OUTPATIENT)
Dept: FAMILY MEDICINE | Facility: CLINIC | Age: 70
End: 2025-01-23
Payer: COMMERCIAL

## 2025-01-23 VITALS
RESPIRATION RATE: 16 BRPM | WEIGHT: 193.5 LBS | TEMPERATURE: 97 F | HEIGHT: 69 IN | SYSTOLIC BLOOD PRESSURE: 136 MMHG | DIASTOLIC BLOOD PRESSURE: 87 MMHG | BODY MASS INDEX: 28.66 KG/M2 | OXYGEN SATURATION: 97 % | HEART RATE: 62 BPM

## 2025-01-23 DIAGNOSIS — E78.2 MIXED HYPERLIPIDEMIA: ICD-10-CM

## 2025-01-23 DIAGNOSIS — I10 ESSENTIAL HYPERTENSION WITH GOAL BLOOD PRESSURE LESS THAN 140/90: ICD-10-CM

## 2025-01-23 DIAGNOSIS — I48.0 PAROXYSMAL A-FIB (H): Primary | ICD-10-CM

## 2025-01-23 DIAGNOSIS — F34.1 DYSTHYMIA: ICD-10-CM

## 2025-01-23 DIAGNOSIS — E03.8 OTHER SPECIFIED HYPOTHYROIDISM: Chronic | ICD-10-CM

## 2025-01-23 PROBLEM — I48.91 ATRIAL FIBRILLATION WITH RVR (H): Status: ACTIVE | Noted: 2018-10-25

## 2025-01-23 LAB
ERYTHROCYTE [DISTWIDTH] IN BLOOD BY AUTOMATED COUNT: 13.2 % (ref 10–15)
HCT VFR BLD AUTO: 45.6 % (ref 35–47)
HGB BLD-MCNC: 15.2 G/DL (ref 11.7–15.7)
MCH RBC QN AUTO: 32 PG (ref 26.5–33)
MCHC RBC AUTO-ENTMCNC: 33.3 G/DL (ref 31.5–36.5)
MCV RBC AUTO: 96 FL (ref 78–100)
PLATELET # BLD AUTO: 163 10E3/UL (ref 150–450)
RBC # BLD AUTO: 4.75 10E6/UL (ref 3.8–5.2)
WBC # BLD AUTO: 8.8 10E3/UL (ref 4–11)

## 2025-01-23 ASSESSMENT — PAIN SCALES - GENERAL: PAINLEVEL_OUTOF10: NO PAIN (0)

## 2025-01-23 NOTE — PROGRESS NOTES
Assessment & Plan   69 year old female -history of  PAF, TIA (2015),   Repair of PFO  (10/12/2018) - postprocedural atrial fibrillation requiring cardioversion, atrial fibrillation with RVR (requiring admission 10/25-10/26/18 -cardioverted to sinus rhythm.Seen by Dr Baldwin- 6/2019 and she declined  anticoagulation.  Most recent spontaneous episode on 1/5/25- woke her up symptomatic in am and it resolved within 30 mins on own.    Paroxysmal A-fib (H)  Plan:- Adult E-Consult to Cardiology (Outpt Provider to Specialist Written Question & Response)  - CBC with platelets; Future  - CBC with platelets    I discussed with patient as far as anticoagulation is concern- there seems to be clear indication for that-and she has declined it in past and wants to be assessed by the cardiologist.  She was offered e-cardiology consult and she agreed to proceed with it- I will let her know what response .    Meanwhile- its reassuring that her HR is well controlled on beta blocker.  I have advised her to avoid caffeine, and dehydration  No further medications changes were made today    Cbc repeated because of hgb at 17- possibly due to hemoconcentration        Hyperlipidemia  Plan: she has stopped pravastatin- and advised to resume and if concerns with side effects to follow up on video as needed   Recent Labs   Lab Test 06/19/24  0826 05/10/23  0941   CHOL 239* 225*   HDL 83 72   * 132*   TRIG 78 104      Hypothyroidism- no medications changes   TSH   Date Value Ref Range Status   01/05/2025 1.86 0.30 - 4.20 uIU/mL Final   02/01/2022 1.08 0.40 - 4.00 mU/L Final   11/12/2020 1.16 0.40 - 4.00 mU/L Final        Dysthymia  Stable on prozac      10/5/2023     3:36 PM 6/14/2024    11:34 AM 1/23/2025     9:59 AM   PHQ   PHQ-9 Total Score 0 0 0    Q9: Thoughts of better off dead/self-harm past 2 weeks Not at all Not at all Not at all       Patient-reported        MED REC REQUIRED  Post Medication Reconciliation Status:    "  BMI  Estimated body mass index is 28.57 kg/m  as calculated from the following:    Height as of this encounter: 1.753 m (5' 9\").    Weight as of this encounter: 87.8 kg (193 lb 8 oz).         I have advised her to follow up with primary care physicain chronic toe concerns   Work on weight loss  Regular exercise    Subjective   Sofi is a 69 year old, presenting for the following health issues:  ED Follow Up and Numbness (Left foot)        1/23/2025     1:40 PM   Additional Questions   Roomed by KAN Yanes   Accompanied by N/A     HPI       ED/UC Followup:  Facility:  [facility]  Date of visit: 1/5/2025  Reason for visit: palpitations, paroxysmal atrial fibrillation, acute cystitis with hematuria  Current Status: appointment with cardiology made, jilopatch in place (comes off on Saturday) - UTI resolved, denies episode of similar atrial fibrillation since ED visit -  She woke up on 1/5/25 with  shortness of breath,  palpitations and noted on her phone appointment that she had multiple episodes of atrial fibrillation- rate between 160-140. It resolved on own- by the time she was in emergency department.  Since then possibly missed beats. Have been  wearing 7 day event monitor       I have reviewed her relevant cardiology consultation from  2018 and history of PAF, TIA (2015), PFO s/p percutaneous closure (10/12/2018) c/b postprocedural atrial fibrillation requiring cardioversion, atrial fibrillation with RVR (requiring admission 10/25-10/26/18 -cardioverted to sinus rhythm.Seen by Dr Baldwin- 6/2019 and she declined  anticoagulation. Now wondering about it but does not have cardiology consultation till 3.2025         Review of Systems- toes numb- has up coming trip to [facility]   Constitutional, HEENT, cardiovascular, pulmonary, GI, , musculoskeletal, neuro, skin, endocrine and psych systems are negative, except as otherwise noted.      Objective    /87 (BP Location: Left arm, Patient Position: " "Sitting, Cuff Size: Adult Regular)   Pulse 62   Temp 97  F (36.1  C) (Temporal)   Resp 16   Ht 1.753 m (5' 9\")   Wt 87.8 kg (193 lb 8 oz)   LMP 10/27/2004 (Approximate)   SpO2 97%   BMI 28.57 kg/m    Body mass index is 28.57 kg/m .  Physical Exam   GENERAL: alert and no distress  NECK: no adenopathy, no asymmetry, masses, or scars  RESP: lungs clear to auscultation - no rales, rhonchi or wheezes  CV: regular rate and rhythm, normal S1 S2, no S3 or S4, no murmur, click or rub, no peripheral edema  ABDOMEN: soft, nontender, no hepatosplenomegaly, no masses and bowel sounds normal  MS: no gross musculoskeletal defects noted, no edema            Signed Electronically by: Ana Laura Spencer MD    Answers submitted by the patient for this visit:  Patient Health Questionnaire (Submitted on 1/23/2025)  If you checked off any problems, how difficult have these problems made it for you to do your work, take care of things at home, or get along with other people?: Not difficult at all  PHQ9 TOTAL SCORE: 0    I spent a total of 43 minutes on the day of the visit.   Time spent by me today doing chart review, history and exam, documentation and further activities per the note  "

## 2025-02-06 ENCOUNTER — DOCUMENTATION ONLY (OUTPATIENT)
Dept: OTHER | Facility: CLINIC | Age: 70
End: 2025-02-06
Payer: COMMERCIAL

## 2025-02-27 ENCOUNTER — PATIENT OUTREACH (OUTPATIENT)
Dept: CARE COORDINATION | Facility: CLINIC | Age: 70
End: 2025-02-27
Payer: COMMERCIAL

## 2025-03-13 NOTE — PATIENT INSTRUCTIONS
"Thank you for visiting the Adult Congenital and Cardiovascular [facility]etics Clinic at the [facility].    Cardiology Providers you saw during your visit:  RIMA Lopez MD    Diagnosis:  PFO    Results:  RIMA Lopez MD reviewed the results of your ECHO and EKG testing today in clinic.    If you have questions or concerns, please call us at [phone] or contact us through [employer].  ______________________________________________________________________________    Recommendations from your Cardiology Provider TODAY:    STOP metoprolol  START carvedilol 25 mg twice a day  START lisinopril 40 mg daily  START hydrochlorothiazide 12.5 mg daily  Have labs drawn in 1 week after medication changes. You can make an appointment at any [facility] location for labs and we will watch for results. If you would like labs drawn at an outside facility, we can fax the orders.   Monitor your blood pressures at home, if they are consistently greater than 120/80  If your palpitations go away, you can stop Xarelto end of summer    ____________________________________________________________________________________________________    Follow-up Plan:  Follow up with Dr Lopez in 5 years with an echocardiogram prior    ____________________________________________________________________________________________________    If you have questions or concerns, please call us at [phone] or contact us through [employer].    Radha Baumann RN, BSN    Sherry Almaguer (Scheduling)  Nurse Care Coordinator     Clinic   Adult Congenital and CV Genetics  Adult Congenital   [facility]  [facility]  (P) [phone]     (P) [phone]  (F) [phone]     (F) [phone]        For after hours urgent needs, call [phone] and ask to speak to the \"On-Call Cardiologist.\"    For emergencies call " "911.    ____________________________________________________________________________________________________    Additional Important Information for Your Heart Health      General Cardiac Recommendations:  Continue to eat a heart healthy, low salt diet.  Continue to get 20-30 minutes of aerobic activity, 4-5 days per week.  Examples of aerobic activity include walking, running, swimming, cycling, etc.  Continue to observe good oral hygiene, with regular dental visits.        SBE prophylaxis (\"Do you need antibiotics before dentist appointments?\"):   Yes____  No__X__    SBE prophylaxis applies to patients with certain heart conditions who are recommended to take antibiotics before dental appointments and other specific procedures. These antibiotics are to help prevent an infection of the heart (endocarditis) that certain patients are at higher risk of developing. The guidelines used come from the American Heart Association and are periodically updated.        FASTING CHOLESTEROL was checked in the last 5 years YES__X__  NO____ (2024)  If no, please follow up with your primary care physician. You should have a cholesterol screening every 5 years at minimum, and every year if taking a medication for your cholesterol levels.     "

## 2025-03-14 ENCOUNTER — PRE VISIT (OUTPATIENT)
Dept: CARDIOLOGY | Facility: CLINIC | Age: 70
End: 2025-03-14

## 2025-03-14 ENCOUNTER — OFFICE VISIT (OUTPATIENT)
Dept: CARDIOLOGY | Facility: CLINIC | Age: 70
End: 2025-03-14
Attending: INTERNAL MEDICINE
Payer: COMMERCIAL

## 2025-03-14 VITALS
HEART RATE: 66 BPM | BODY MASS INDEX: 29.58 KG/M2 | DIASTOLIC BLOOD PRESSURE: 86 MMHG | SYSTOLIC BLOOD PRESSURE: 167 MMHG | WEIGHT: 200.3 LBS | OXYGEN SATURATION: 98 %

## 2025-03-14 DIAGNOSIS — Q21.12 PFO (PATENT FORAMEN OVALE): Chronic | ICD-10-CM

## 2025-03-14 DIAGNOSIS — I48.91 ATRIAL FIBRILLATION, UNSPECIFIED TYPE (H): ICD-10-CM

## 2025-03-14 DIAGNOSIS — I11.9 HYPERTENSIVE HEART DISEASE WITHOUT HEART FAILURE: ICD-10-CM

## 2025-03-14 LAB — LVEF ECHO: NORMAL

## 2025-03-14 PROCEDURE — 1126F AMNT PAIN NOTED NONE PRSNT: CPT | Performed by: INTERNAL MEDICINE

## 2025-03-14 PROCEDURE — 99204 OFFICE O/P NEW MOD 45 MIN: CPT | Mod: 25 | Performed by: INTERNAL MEDICINE

## 2025-03-14 PROCEDURE — 93306 TTE W/DOPPLER COMPLETE: CPT | Performed by: INTERNAL MEDICINE

## 2025-03-14 PROCEDURE — G0463 HOSPITAL OUTPT CLINIC VISIT: HCPCS | Mod: 25 | Performed by: INTERNAL MEDICINE

## 2025-03-14 PROCEDURE — G2211 COMPLEX E/M VISIT ADD ON: HCPCS | Performed by: INTERNAL MEDICINE

## 2025-03-14 PROCEDURE — 3079F DIAST BP 80-89 MM HG: CPT | Performed by: INTERNAL MEDICINE

## 2025-03-14 PROCEDURE — 93005 ELECTROCARDIOGRAM TRACING: CPT

## 2025-03-14 PROCEDURE — 3077F SYST BP >= 140 MM HG: CPT | Performed by: INTERNAL MEDICINE

## 2025-03-14 RX ORDER — LISINOPRIL 40 MG/1
40 TABLET ORAL DAILY
Qty: 90 TABLET | Refills: 3 | Status: SHIPPED | OUTPATIENT
Start: 2025-03-14

## 2025-03-14 RX ORDER — HYDROCHLOROTHIAZIDE 12.5 MG/1
12.5 TABLET ORAL DAILY
Qty: 90 TABLET | Refills: 3 | Status: SHIPPED | OUTPATIENT
Start: 2025-03-14

## 2025-03-14 RX ORDER — CARVEDILOL 25 MG/1
25 TABLET ORAL 2 TIMES DAILY WITH MEALS
Qty: 180 TABLET | Refills: 3 | Status: SHIPPED | OUTPATIENT
Start: 2025-03-14

## 2025-03-14 ASSESSMENT — PAIN SCALES - GENERAL: PAINLEVEL_OUTOF10: NO PAIN (0)

## 2025-03-14 NOTE — LETTER
3/14/2025      RE: Sofi Degroot  [address]  [facility] [id number]       Dear Colleague,    Thank you for the opportunity to participate in the care of your patient, Sofi Degroot, at the [facility] [place] [facility]. Please see a copy of my visit note below.    CARDIOLOGY CONSULTATION:    Ms. Degroot is a delightful 69 year-old woman who I met in the fall of 2018.  She underwent PFO closure on 10/12/2018 with a 30 mm [place] device.  She subsequently had AFib 10 days later and she was seen by Dr. Baldwin. Her Toprol was increased from 75 to 150 and she was started on amiodarone and xarelto.  After 3 months amiodarone was stopped.  She then had a monitor and there was no recurrence of afib so xarelto was stopped.  She had no recurrence of arrhythmias until January of 2025.  She had been having symptoms of a UTI but had not yet been seen.  She woke and felt her heart racing. She has a watch that indicated her heart rate was 140-150 beats per minute and could be afib. She felt light headed so went to the ED. By the time she got there, however, she had converted to NSR. She was found to have a UTI, which was treated. She was also given xarelto. She was given a monitor that showed just a short run of SVT.  She has not had any further symptoms.      Her echo done as part of her evaluation today looks good with good device position and no residual shunt. She has no significant valve disease. She is hypertensive and has some LVH.  She notes that her BP has been at best in the 140s.  She is concerned about her weight.      PAST MEDICAL HISTORY:  Past Medical History:   Diagnosis Date     Cerebral infarction (H) 2/15/15    TIA     Cervical high risk HPV (human papillomavirus) test positive 09/16/2016, 10/11/17, 10/17/18    (not 16 or 18). 10/11/17: LSIL pap with + HR HPV (not 16 or 18) result.10/17/18: See problem list.      Dysthymia  1/23/2025     Hypertension goal BP (blood pressure) < 140/90      Lateral epicondylitis of both elbows      Low risk HPV infection 2009, 2010    NIL pap, + HPV 53     Papanicolaou smear of cervix with low grade squamous intraepithelial lesion (LGSIL) 10/11/2017    10/11/17: LSIL pap with + HR HPV (not 16 or 18) result.     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 1998    Tx'd w/ Interferon x 1 year-virus free at 5 years, blood transfusion  related       CURRENT MEDICATIONS:  Current Outpatient Medications   Medication Sig Dispense Refill     Acetaminophen (TYLENOL PO) Take 1,000 mg by mouth as needed for mild pain or fever       cholecalciferol 2000 units tablet Take 2,000 Units by mouth 2 times daily        famotidine (PEPCID) 10 MG tablet Take 10 mg by mouth 2 times daily       fish oil-omega-3 fatty acids 1000 MG capsule Take 1 g by mouth 2 times daily       FLUoxetine (PROZAC) 10 MG capsule Take 1 capsule (10 mg) by mouth daily 100 capsule 3     levothyroxine (SYNTHROID/LEVOTHROID) 100 MCG tablet Take 1 tablet (100 mcg) by mouth daily 100 tablet 3     lisinopril (ZESTRIL) 20 MG tablet Take 1 tablet (20 mg) by mouth daily 100 tablet 3     loratadine (CLARITIN) 10 MG tablet Take 10 mg by mouth daily       magnesium 250 MG tablet Take 250 mg by mouth daily        metoprolol succinate ER (TOPROL XL) 100 MG 24 hr tablet TAKE 1 TABLET BY MOUTH DAILY  WITH A 50 MG TABLET TO EQUAL 150 MG DAILY 100 tablet 3     metoprolol succinate ER (TOPROL XL) 50 MG 24 hr tablet TAKE 1 TABLET BY MOUTH DAILY  WITH 100 MG TABLET TO EQUAL 150  MG DAILY 100 tablet 3     Multiple Vitamins-Minerals (MULTIVITAMIN ADULTS) TABS Take 1 tablet by mouth daily       oxyBUTYnin ER (DITROPAN XL) 10 MG 24 hr tablet Take 1 tablet (10 mg) by mouth daily 100 tablet 3     pravastatin (PRAVACHOL) 40 MG tablet Take 1 tablet (40 mg) by mouth daily 100 tablet 0     rivaroxaban ANTICOAGULANT (XARELTO) 20 MG TABS  tablet Take 1 tablet (20 mg) by mouth daily (with dinner). 30 tablet 3     valACYclovir (VALTREX) 500 MG tablet TAKE ONE TABLET BY MOUTH TWICE A DAY FOR THREE DAYS. (REPEAT AS NEEDED) 12 tablet 5       PAST SURGICAL HISTORY:  Past Surgical History:   Procedure Laterality Date     ANESTHESIA CARDIOVERSION N/A 10/26/2018    Procedure: ANESTHESIA CARDIOVERSION;  Surgeon: GENERIC ANESTHESIA PROVIDER;  Location: [facility] OR     COLONOSCOPY       COLONOSCOPY N/A 3/29/2022    Procedure: COLONOSCOPY, WITH HOT POLYPECTOMY AND CLIP;  Surgeon: Geoffrey Garcia MD;  Location: [facility] OR     GENITOURINARY SURGERY      tubal ligation     ORTHOPEDIC SURGERY      removal of bone spur right foot     SURGICAL HISTORY OF -  Right 2008    Right 1st MTPJ, bone spur resection     ZZC LIGATE FALLOPIAN TUBE         ALLERGIES  Atorvastatin, Flagyl [metronidazole], Latex, and Pineapple    FAMILY HX:  Family History   Problem Relation Age of Onset     Heart Disease Maternal Grandfather      Breast Cancer Maternal Grandmother         Dx'd age 70     Arthritis Father      Lipids Mother         On meds     Cancer Mother         BCC     Heart Disease Brother          of dilated of cardiomyopathy at age 54     Alcohol/Drug Sister      Alcohol/Drug Brother      Depression Sister      Depression Sister        SOCIAL HX:  Social History     Socioeconomic History     Marital status: Single     Spouse name: None     Number of children: 1     Years of education: BSN     Highest education level: None   Occupational History     Occupation: RN     Employer: [employer]   Tobacco Use     Smoking status: Former     Current packs/day: 0.00     Average packs/day: 0.5 packs/day for 5.0 years (2.5 ttl pk-yrs)     Types: Cigarettes     Start date: 1970     Quit date: 5/15/1975     Years since quittin.8     Smokeless tobacco: Never     Tobacco comments:     quit 30 years ago   Vaping Use     Vaping status: Never Used    Substance and Sexual Activity     Alcohol use: Yes     Comment: 4-5 Drinks Per Week     Drug use: No     Sexual activity: Yes     Partners: Male     Birth control/protection: Condom   Other Topics Concern     Parent/sibling w/ CABG, MI or angioplasty before 65F 55M? No   Social History Narrative    Social Documentation:        Balanced Diet: YES    Calcium intake: 2-3 per day    Caffeine: 3 per day    Exercise:  type of activity work is physical and walking;  5 times per week    Sunscreen: Yes    Seatbelts:  Yes    Self Breast Exam:  Yes    Self Testicular Exam: na    Physical/Emotional/Sexual Abuse: No    Do you feel safe in your environment? Yes        Cholesterol screen up to date:     CHOL      199   7/21/2009    HDL       82   7/21/2009    LDL      100   7/21/2009    TRIG       83   7/21/2009    CHOLHDLRATIO      2.4   7/21/2009    Eye Exam up to date: Yes    Dental Exam up to date: Yes    Pap smear up to date: do today. PAP      NIL   7/21/2009    Mammogram up to date:  Due in September    Dexa Scan up to date: has not had one     Colonoscopy up to date: Yes, 04/08    Immunizations up to date: Yes    Glucose screen if over 40:  cna do today                                                             Social Drivers of Health     Financial Resource Strain: Low Risk  (6/14/2024)    Financial Resource Strain      Within the past 12 months, have you or your family members you live with been unable to get utilities (heat, electricity) when it was really needed?: No   Food Insecurity: Low Risk  (6/14/2024)    Food Insecurity      Within the past 12 months, did you worry that your food would run out before you got money to buy more?: No      Within the past 12 months, did the food you bought just not last and you didn t have money to get more?: No   Transportation Needs: Low Risk  (6/14/2024)    Transportation Needs      Within the past 12 months, has lack of transportation kept you from medical appointments,  getting your medicines, non-medical meetings or appointments, work, or from getting things that you need?: No   Physical Activity: Inactive (6/14/2024)    Exercise Vital Sign      Days of Exercise per Week: 0 days      Minutes of Exercise per Session: 0 min   Stress: No Stress Concern Present (6/14/2024)    [place] [place] [employer] - Occupational Stress Questionnaire      Feeling of Stress : Not at all   Social Connections: Unknown (6/14/2024)    Social Connection and Isolation Panel [NHANES]      Frequency of Social Gatherings with Friends and Family: Three times a week   Interpersonal Safety: Low Risk  (1/23/2025)    Interpersonal Safety      Do you feel physically and emotionally safe where you currently live?: Yes      Within the past 12 months, have you been hit, slapped, kicked or otherwise physically hurt by someone?: No      Within the past 12 months, have you been humiliated or emotionally abused in other ways by your partner or ex-partner?: No   Housing Stability: Low Risk  (6/14/2024)    Housing Stability      Do you have housing? : Yes      Are you worried about losing your housing?: No       ROS:  Constitutional: No fever, chills, or sweats. No weight gain/loss.   ENT: No visual disturbance, ear ache, epistaxis, sore throat.   Allergies/Immunologic: Negative.   Respiratory: No cough, hemoptysis.   Cardiovascular: As per HPI.   GI: No nausea, vomiting, hematemesis, melena, or hematochezia.   : No urinary frequency, dysuria, or hematuria.   Integument: Negative.   Psychiatric: Negative.   Neuro: Negative.   Endocrinology: Negative.   Musculoskeletal: No myalgia.    VITAL SIGNS:  BP (!) 167/86 (BP Location: Right arm, Patient Position: Chair, Cuff Size: Adult Regular)   Pulse 66   Wt 90.9 kg (200 lb 4.8 oz)   LMP 10/27/2004 (Approximate)   SpO2 98%   BMI 29.58 kg/m    Body mass index is 29.58 kg/m .  Wt Readings from Last 2 Encounters:   03/14/25 90.9 kg (200 lb 4.8 oz)   01/23/25  "87.8 kg (193 lb 8 oz)       PHYSICAL EXAM  Sofi Degroot IS A 69 year old female.in no acute distress.  HEENT: Unremarkable.  Neck: JVP normal.   Lungs: CTA.  Cor: RRR. Normal S1 and S2.  No murmur  Abd: Soft Extremities: No C/C/E.   Neuro: Grossly intact.    LABS    Lab Results   Component Value Date    WBC 8.8 01/23/2025    WBC 10.5 10/25/2018     Lab Results   Component Value Date    RBC 4.75 01/23/2025    RBC 4.84 10/25/2018     Lab Results   Component Value Date    HGB 15.2 01/23/2025    HGB 15.7 10/25/2018     Lab Results   Component Value Date    HCT 45.6 01/23/2025    HCT 48.1 10/25/2018     No components found for: \"MCT\"  Lab Results   Component Value Date    MCV 96 01/23/2025    MCV 99 10/25/2018     Lab Results   Component Value Date    MCH 32.0 01/23/2025    MCH 32.4 10/25/2018     Lab Results   Component Value Date    MCHC 33.3 01/23/2025    MCHC 32.6 10/25/2018     Lab Results   Component Value Date    RDW 13.2 01/23/2025    RDW 12.9 10/25/2018     Lab Results   Component Value Date     01/23/2025     10/25/2018      Recent Labs   Lab Test 01/05/25  1244 06/19/24  0826    139   POTASSIUM 4.5 4.7   CHLORIDE 103 104   CO2 20* 25   ANIONGAP 16* 10   * 113*   BUN 12.5 11.7   CR 0.92 0.72   ALICIA 10.3 9.8     Recent Labs   Lab Test 06/19/24  0826 05/10/23  0941   CHOL 239* 225*   HDL 83 72   * 132*   TRIG 78 104   NHDL 156* 153*      IMPRESSION, REPORT, PLAN:   1.  History of TIA with a PFO, status post closure with a 30 mm [place] device 10/12/2018.   2.  Atrial fibrillation 10 days after device closure in 2018 with no recurrence on recheck.   3.  Palpitations with HR 140s in the setting on UTI 1/2025, SVT versus afib.  4.  Hypertension  5.  BMI 30     It was a pleasure to be involved in the care of Ms. Degroot. I reviewed her interval history and testing in detail.  I reviewed her ECG from the ER and vitals, as well as her zio patch results and the results from her watch " reading at home.  We discussed that it is unclear what the event was at home (SVT versus afib) and it was gone by the time she got to the ED.  Likely the stress from the UTI played a role in the rapid heart beat and fortunately that has been treated.  There was no evidence of afib on the zio patch and she has not had any further symptoms.  We discussed that if she does not have any further rapid heart beats over the next few months that she can stop the xarelto.      Her BP is not controlled and we discussed today stopping toprol and starting coreg 25 BID.  We will also start low dose hydrochlorothiazide at 12.5 daily and increase lisinopril to 40 mg daily. Will check labs in a week. She will monitor her BP and let us know if not at goal at of less than 120 systolic.      She is concerned about her weight and would like some help.  We placed an order for Weight management clinic.     Plan follow up if BP is not controlled, if afib returns, or in 5 years with an echo.     She understands and is in agreement with the plan. It was a pleasure to see her today. Please do not hesitate to contact me with questions.      RIMA Lopez MD   45 minutes face-face, documentation and review of records on day of visit     The longitudinal plan of care for the diagnosis(es)/condition(s) as documented were addressed during this visit. Due to the added complexity in care, I will continue to support Sofi in the subsequent management and with ongoing continuity of care.     Please do not hesitate to contact me if you have any questions/concerns.     Sincerely,     Xiomara Lopez MD

## 2025-03-14 NOTE — PROGRESS NOTES
CARDIOLOGY CONSULTATION:    Ms. Degroot is a delightful 69 year-old woman who I met in the fall of 2018.  She underwent PFO closure on 10/12/2018 with a 30 mm [place] device.  She subsequently had AFib 10 days later and she was seen by Dr. Baldwin. Her Toprol was increased from 75 to 150 and she was started on amiodarone and xarelto.  After 3 months amiodarone was stopped.  She then had a monitor and there was no recurrence of afib so xarelto was stopped.  She had no recurrence of arrhythmias until January of 2025.  She had been having symptoms of a UTI but had not yet been seen.  She woke and felt her heart racing. She has a watch that indicated her heart rate was 140-150 beats per minute and could be afib. She felt light headed so went to the ED. By the time she got there, however, she had converted to NSR. She was found to have a UTI, which was treated. She was also given xarelto. She was given a monitor that showed just a short run of SVT.  She has not had any further symptoms.      Her echo done as part of her evaluation today looks good with good device position and no residual shunt. She has no significant valve disease. She is hypertensive and has some LVH.  She notes that her BP has been at best in the 140s.  She is concerned about her weight.      PAST MEDICAL HISTORY:  Past Medical History:   Diagnosis Date    Cerebral infarction (H) 2/15/15    TIA    Cervical high risk HPV (human papillomavirus) test positive 09/16/2016, 10/11/17, 10/17/18    (not 16 or 18). 10/11/17: LSIL pap with + HR HPV (not 16 or 18) result.10/17/18: See problem list.     Dysthymia 1/23/2025    Hypertension goal BP (blood pressure) < 140/90     Lateral epicondylitis of both elbows     Low risk HPV infection 2009, 2010    NIL pap, + HPV 53    Papanicolaou smear of cervix with low grade squamous intraepithelial lesion (LGSIL) 10/11/2017    10/11/17: LSIL pap with + HR HPV (not 16 or 18) result.    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 1998    Tx'd w/ Interferon x 1 year-virus free at 5 years, blood transfusion  related       CURRENT MEDICATIONS:  Current Outpatient Medications   Medication Sig Dispense Refill    Acetaminophen (TYLENOL PO) Take 1,000 mg by mouth as needed for mild pain or fever      cholecalciferol 2000 units tablet Take 2,000 Units by mouth 2 times daily       famotidine (PEPCID) 10 MG tablet Take 10 mg by mouth 2 times daily      fish oil-omega-3 fatty acids 1000 MG capsule Take 1 g by mouth 2 times daily      FLUoxetine (PROZAC) 10 MG capsule Take 1 capsule (10 mg) by mouth daily 100 capsule 3    levothyroxine (SYNTHROID/LEVOTHROID) 100 MCG tablet Take 1 tablet (100 mcg) by mouth daily 100 tablet 3    lisinopril (ZESTRIL) 20 MG tablet Take 1 tablet (20 mg) by mouth daily 100 tablet 3    loratadine (CLARITIN) 10 MG tablet Take 10 mg by mouth daily      magnesium 250 MG tablet Take 250 mg by mouth daily       metoprolol succinate ER (TOPROL XL) 100 MG 24 hr tablet TAKE 1 TABLET BY MOUTH DAILY  WITH A 50 MG TABLET TO EQUAL 150 MG DAILY 100 tablet 3    metoprolol succinate ER (TOPROL XL) 50 MG 24 hr tablet TAKE 1 TABLET BY MOUTH DAILY  WITH 100 MG TABLET TO EQUAL 150  MG DAILY 100 tablet 3    Multiple Vitamins-Minerals (MULTIVITAMIN ADULTS) TABS Take 1 tablet by mouth daily      oxyBUTYnin ER (DITROPAN XL) 10 MG 24 hr tablet Take 1 tablet (10 mg) by mouth daily 100 tablet 3    pravastatin (PRAVACHOL) 40 MG tablet Take 1 tablet (40 mg) by mouth daily 100 tablet 0    rivaroxaban ANTICOAGULANT (XARELTO) 20 MG TABS tablet Take 1 tablet (20 mg) by mouth daily (with dinner). 30 tablet 3    valACYclovir (VALTREX) 500 MG tablet TAKE ONE TABLET BY MOUTH TWICE A DAY FOR THREE DAYS. (REPEAT AS NEEDED) 12 tablet 5       PAST SURGICAL HISTORY:  Past Surgical History:   Procedure Laterality Date    ANESTHESIA CARDIOVERSION N/A 10/26/2018    Procedure: ANESTHESIA CARDIOVERSION;  Surgeon: GENERIC  ANESTHESIA PROVIDER;  Location: [facility] OR    COLONOSCOPY      COLONOSCOPY N/A 3/29/2022    Procedure: COLONOSCOPY, WITH HOT POLYPECTOMY AND CLIP;  Surgeon: Geoffrey Garcia MD;  Location: [facility] OR    GENITOURINARY SURGERY      tubal ligation    ORTHOPEDIC SURGERY      removal of bone spur right foot    SURGICAL HISTORY OF -  Right 2008    Right 1st MTPJ, bone spur resection    ZZC LIGATE FALLOPIAN TUBE         ALLERGIES  Atorvastatin, Flagyl [metronidazole], Latex, and Pineapple    FAMILY HX:  Family History   Problem Relation Age of Onset    Heart Disease Maternal Grandfather     Breast Cancer Maternal Grandmother         Dx'd age 70    Arthritis Father     Lipids Mother         On meds    Cancer Mother         BCC    Heart Disease Brother          of dilated of cardiomyopathy at age 54    Alcohol/Drug Sister     Alcohol/Drug Brother     Depression Sister     Depression Sister        SOCIAL HX:  Social History     Socioeconomic History    Marital status: Single     Spouse name: None    Number of children: 1    Years of education: BSN    Highest education level: None   Occupational History    Occupation: RN     Employer: [employer]   Tobacco Use    Smoking status: Former     Current packs/day: 0.00     Average packs/day: 0.5 packs/day for 5.0 years (2.5 ttl pk-yrs)     Types: Cigarettes     Start date: 1970     Quit date: 5/15/1975     Years since quittin.8    Smokeless tobacco: Never    Tobacco comments:     quit 30 years ago   Vaping Use    Vaping status: Never Used   Substance and Sexual Activity    Alcohol use: Yes     Comment: 4-5 Drinks Per Week    Drug use: No    Sexual activity: Yes     Partners: Male     Birth control/protection: Condom   Other Topics Concern    Parent/sibling w/ CABG, MI or angioplasty before 65F 55M? No   Social History Narrative    Social Documentation:        Balanced Diet: YES    Calcium intake: 2-3 per day    Caffeine: 3 per day    Exercise:   type of activity work is physical and walking;  5 times per week    Sunscreen: Yes    Seatbelts:  Yes    Self Breast Exam:  Yes    Self Testicular Exam: na    Physical/Emotional/Sexual Abuse: No    Do you feel safe in your environment? Yes        Cholesterol screen up to date:     CHOL      199   7/21/2009    HDL       82   7/21/2009    LDL      100   7/21/2009    TRIG       83   7/21/2009    CHOLHDLRATIO      2.4   7/21/2009    Eye Exam up to date: Yes    Dental Exam up to date: Yes    Pap smear up to date: do today. PAP      NIL   7/21/2009    Mammogram up to date:  Due in September    Dexa Scan up to date: has not had one     Colonoscopy up to date: Yes, 04/08    Immunizations up to date: Yes    Glucose screen if over 40:  cna do today                                                             Social Drivers of Health     Financial Resource Strain: Low Risk  (6/14/2024)    Financial Resource Strain     Within the past 12 months, have you or your family members you live with been unable to get utilities (heat, electricity) when it was really needed?: No   Food Insecurity: Low Risk  (6/14/2024)    Food Insecurity     Within the past 12 months, did you worry that your food would run out before you got money to buy more?: No     Within the past 12 months, did the food you bought just not last and you didn t have money to get more?: No   Transportation Needs: Low Risk  (6/14/2024)    Transportation Needs     Within the past 12 months, has lack of transportation kept you from medical appointments, getting your medicines, non-medical meetings or appointments, work, or from getting things that you need?: No   Physical Activity: Inactive (6/14/2024)    Exercise Vital Sign     Days of Exercise per Week: 0 days     Minutes of Exercise per Session: 0 min   Stress: No Stress Concern Present (6/14/2024)    [employer] [place] [employer] - Occupational Stress Questionnaire     Feeling of Stress : Not at all   Social  Connections: Unknown (6/14/2024)    Social Connection and Isolation Panel [NHANES]     Frequency of Social Gatherings with Friends and Family: Three times a week   Interpersonal Safety: Low Risk  (1/23/2025)    Interpersonal Safety     Do you feel physically and emotionally safe where you currently live?: Yes     Within the past 12 months, have you been hit, slapped, kicked or otherwise physically hurt by someone?: No     Within the past 12 months, have you been humiliated or emotionally abused in other ways by your partner or ex-partner?: No   Housing Stability: Low Risk  (6/14/2024)    Housing Stability     Do you have housing? : Yes     Are you worried about losing your housing?: No       ROS:  Constitutional: No fever, chills, or sweats. No weight gain/loss.   ENT: No visual disturbance, ear ache, epistaxis, sore throat.   Allergies/Immunologic: Negative.   Respiratory: No cough, hemoptysis.   Cardiovascular: As per HPI.   GI: No nausea, vomiting, hematemesis, melena, or hematochezia.   : No urinary frequency, dysuria, or hematuria.   Integument: Negative.   Psychiatric: Negative.   Neuro: Negative.   Endocrinology: Negative.   Musculoskeletal: No myalgia.    VITAL SIGNS:  BP (!) 167/86 (BP Location: Right arm, Patient Position: Chair, Cuff Size: Adult Regular)   Pulse 66   Wt 90.9 kg (200 lb 4.8 oz)   LMP 10/27/2004 (Approximate)   SpO2 98%   BMI 29.58 kg/m    Body mass index is 29.58 kg/m .  Wt Readings from Last 2 Encounters:   03/14/25 90.9 kg (200 lb 4.8 oz)   01/23/25 87.8 kg (193 lb 8 oz)       PHYSICAL EXAM  Sofi Degroot IS A 69 year old female.in no acute distress.  HEENT: Unremarkable.  Neck: JVP normal.   Lungs: CTA.  Cor: RRR. Normal S1 and S2.  No murmur  Abd: Soft Extremities: No C/C/E.   Neuro: Grossly intact.    LABS    Lab Results   Component Value Date    WBC 8.8 01/23/2025    WBC 10.5 10/25/2018     Lab Results   Component Value Date    RBC 4.75 01/23/2025    RBC 4.84 10/25/2018  "    Lab Results   Component Value Date    HGB 15.2 01/23/2025    HGB 15.7 10/25/2018     Lab Results   Component Value Date    HCT 45.6 01/23/2025    HCT 48.1 10/25/2018     No components found for: \"MCT\"  Lab Results   Component Value Date    MCV 96 01/23/2025    MCV 99 10/25/2018     Lab Results   Component Value Date    MCH 32.0 01/23/2025    MCH 32.4 10/25/2018     Lab Results   Component Value Date    MCHC 33.3 01/23/2025    MCHC 32.6 10/25/2018     Lab Results   Component Value Date    RDW 13.2 01/23/2025    RDW 12.9 10/25/2018     Lab Results   Component Value Date     01/23/2025     10/25/2018      Recent Labs   Lab Test 01/05/25  1244 06/19/24  0826    139   POTASSIUM 4.5 4.7   CHLORIDE 103 104   CO2 20* 25   ANIONGAP 16* 10   * 113*   BUN 12.5 11.7   CR 0.92 0.72   ALICIA 10.3 9.8     Recent Labs   Lab Test 06/19/24  0826 05/10/23  0941   CHOL 239* 225*   HDL 83 72   * 132*   TRIG 78 104   NHDL 156* 153*      IMPRESSION, REPORT, PLAN:   1.  History of TIA with a PFO, status post closure with a 30 mm [place] device 10/12/2018.   2.  Atrial fibrillation 10 days after device closure in 2018 with no recurrence on recheck.   3.  Palpitations with HR 140s in the setting on UTI 1/2025, SVT versus afib.  4.  Hypertension  5.  BMI 30     It was a pleasure to be involved in the care of Ms. Degroot. I reviewed her interval history and testing in detail.  I reviewed her ECG from the ER and vitals, as well as her zio patch results and the results from her watch reading at home.  We discussed that it is unclear what the event was at home (SVT versus afib) and it was gone by the time she got to the ED.  Likely the stress from the UTI played a role in the rapid heart beat and fortunately that has been treated.  There was no evidence of afib on the zio patch and she has not had any further symptoms.  We discussed that if she does not have any further rapid heart beats over the next few months " that she can stop the xarelto.      Her BP is not controlled and we discussed today stopping toprol and starting coreg 25 BID.  We will also start low dose hydrochlorothiazide at 12.5 daily and increase lisinopril to 40 mg daily. Will check labs in a week. She will monitor her BP and let us know if not at goal at of less than 120 systolic.      She is concerned about her weight and would like some help.  We placed an order for Weight management clinic.     Plan follow up if BP is not controlled, if afib returns, or in 5 years with an echo.     She understands and is in agreement with the plan. It was a pleasure to see her today. Please do not hesitate to contact me with questions.      RIMA Lopez MD   45 minutes face-face, documentation and review of records on day of visit     The longitudinal plan of care for the diagnosis(es)/condition(s) as documented were addressed during this visit. Due to the added complexity in care, I will continue to support Sofi in the subsequent management and with ongoing continuity of care.

## 2025-03-14 NOTE — NURSING NOTE
Chief Complaint   Patient presents with    New Patient     69 year old female with history of PFO s/p closure in 2018, afib presenting for evaluation.       Vitals were take, medications reconciled and EKG performed.    Brady Gimenez, Clinic Assistant     2:09 PM

## 2025-03-14 NOTE — NURSING NOTE
Cardiac Testing: Patient given instructions regarding  echocardiogram . Discussed purpose, preparation, procedure and when to expect results reported back to the patient. Patient demonstrated understanding of this information and agreed to call with further questions or concerns.    Med Reconcile: Reviewed and verified all current medications with the patient. The updated medication list was printed and given to the patient.    New Medication: Patient was educated regarding newly prescribed medication, including discussion of  the indication, administration, side effects, and when to report to MD or RN. Patient demonstrated understanding of this information and agreed to call with further questions or concerns.    Return Appointment: Patient given instructions regarding scheduling next clinic visit. Patient demonstrated understanding of this information and agreed to call with further questions or concerns.    Patient stated she understood all health information given and agreed to call with further questions or concerns.

## 2025-03-16 LAB
ATRIAL RATE - MUSE: 64 BPM
DIASTOLIC BLOOD PRESSURE - MUSE: NORMAL MMHG
INTERPRETATION ECG - MUSE: NORMAL
P AXIS - MUSE: 19 DEGREES
PR INTERVAL - MUSE: 192 MS
QRS DURATION - MUSE: 130 MS
QT - MUSE: 422 MS
QTC - MUSE: 435 MS
R AXIS - MUSE: -27 DEGREES
SYSTOLIC BLOOD PRESSURE - MUSE: NORMAL MMHG
T AXIS - MUSE: 94 DEGREES
VENTRICULAR RATE- MUSE: 64 BPM

## 2025-04-08 ENCOUNTER — LAB (OUTPATIENT)
Dept: LAB | Facility: CLINIC | Age: 70
End: 2025-04-08
Payer: COMMERCIAL

## 2025-04-08 DIAGNOSIS — I11.9 HYPERTENSIVE HEART DISEASE WITHOUT HEART FAILURE: ICD-10-CM

## 2025-04-08 LAB
ALBUMIN SERPL BCG-MCNC: 4.1 G/DL (ref 3.5–5.2)
ALP SERPL-CCNC: 65 U/L (ref 40–150)
ALT SERPL W P-5'-P-CCNC: 19 U/L (ref 0–50)
ANION GAP SERPL CALCULATED.3IONS-SCNC: 12 MMOL/L (ref 7–15)
AST SERPL W P-5'-P-CCNC: 26 U/L (ref 0–45)
BILIRUB SERPL-MCNC: 0.5 MG/DL
BUN SERPL-MCNC: 14.7 MG/DL (ref 8–23)
CALCIUM SERPL-MCNC: 10.1 MG/DL (ref 8.8–10.4)
CHLORIDE SERPL-SCNC: 102 MMOL/L (ref 98–107)
CREAT SERPL-MCNC: 0.85 MG/DL (ref 0.51–0.95)
EGFRCR SERPLBLD CKD-EPI 2021: 73 ML/MIN/1.73M2
GLUCOSE SERPL-MCNC: 96 MG/DL (ref 70–99)
HCO3 SERPL-SCNC: 24 MMOL/L (ref 22–29)
POTASSIUM SERPL-SCNC: 4.6 MMOL/L (ref 3.4–5.3)
PROT SERPL-MCNC: 7.1 G/DL (ref 6.4–8.3)
SODIUM SERPL-SCNC: 138 MMOL/L (ref 135–145)

## 2025-04-08 PROCEDURE — 36415 COLL VENOUS BLD VENIPUNCTURE: CPT

## 2025-04-08 PROCEDURE — 80053 COMPREHEN METABOLIC PANEL: CPT

## 2025-04-20 DIAGNOSIS — I48.0 PAROXYSMAL A-FIB (H): ICD-10-CM

## 2025-04-22 RX ORDER — RIVAROXABAN 20 MG/1
20 TABLET, FILM COATED ORAL
Qty: 90 TABLET | Refills: 3 | OUTPATIENT
Start: 2025-04-22

## 2025-04-22 NOTE — TELEPHONE ENCOUNTER
Left message for patient to call [facility] back  When patient calls back please transfer to an RN  Thanks,  Concetta SCHMIDT RN

## 2025-04-22 NOTE — TELEPHONE ENCOUNTER
Per cardiology - visit date 3/14/2025:     It was a pleasure to be involved in the care of Ms. Degroot. I reviewed her interval history and testing in detail.  I reviewed her ECG from the ER and vitals, as well as her zio patch results and the results from her watch reading at home.  We discussed that it is unclear what the event was at home (SVT versus afib) and it was gone by the time she got to the ED.  Likely the stress from the UTI played a role in the rapid heart beat and fortunately that has been treated.  There was no evidence of afib on the zio patch and she has not had any further symptoms.  We discussed that if she does not have any further rapid heart beats over the next few months that she can stop the xarelto.        Triage please call patient and ask her if she had discussed this with cardiology   Has she had any rapid heart beats?  May be able to stop med if not but unsure of details.    -Noemy Olivera, DO

## 2025-04-22 NOTE — TELEPHONE ENCOUNTER
Pt called back.   Pt says her cardiologist wants this stopped, and pt wants to stop it as well.   Cancelled per pt request.     Katya COOK RN

## 2025-04-29 ENCOUNTER — PATIENT OUTREACH (OUTPATIENT)
Dept: CARE COORDINATION | Facility: CLINIC | Age: 70
End: 2025-04-29
Payer: COMMERCIAL

## 2025-05-01 ENCOUNTER — PATIENT OUTREACH (OUTPATIENT)
Dept: CARE COORDINATION | Facility: CLINIC | Age: 70
End: 2025-05-01
Payer: COMMERCIAL

## 2025-06-03 ENCOUNTER — ANCILLARY PROCEDURE (OUTPATIENT)
Dept: MAMMOGRAPHY | Facility: CLINIC | Age: 70
End: 2025-06-03
Attending: FAMILY MEDICINE
Payer: COMMERCIAL

## 2025-06-03 DIAGNOSIS — Z12.31 VISIT FOR SCREENING MAMMOGRAM: ICD-10-CM

## 2025-06-03 PROCEDURE — 77067 SCR MAMMO BI INCL CAD: CPT | Mod: 26 | Performed by: STUDENT IN AN ORGANIZED HEALTH CARE EDUCATION/TRAINING PROGRAM

## 2025-06-03 PROCEDURE — 77067 SCR MAMMO BI INCL CAD: CPT

## 2025-06-03 PROCEDURE — 77063 BREAST TOMOSYNTHESIS BI: CPT | Mod: 26 | Performed by: STUDENT IN AN ORGANIZED HEALTH CARE EDUCATION/TRAINING PROGRAM

## 2025-08-23 DIAGNOSIS — N39.41 URGE INCONTINENCE OF URINE: ICD-10-CM

## 2025-08-23 DIAGNOSIS — E03.8 OTHER SPECIFIED HYPOTHYROIDISM: Chronic | ICD-10-CM

## 2025-08-25 RX ORDER — OXYBUTYNIN CHLORIDE 10 MG/1
10 TABLET, EXTENDED RELEASE ORAL DAILY
Qty: 100 TABLET | Refills: 2 | OUTPATIENT
Start: 2025-08-25

## 2025-08-25 RX ORDER — LEVOTHYROXINE SODIUM 100 UG/1
100 TABLET ORAL DAILY
Qty: 100 TABLET | Refills: 2 | OUTPATIENT
Start: 2025-08-25

## (undated) DEVICE — ENDO SNARE POLYPECTOMY OVAL 10MM LOOP SD-240U-10

## (undated) DEVICE — KIT ENDO TURNOVER/PROCEDURE CARRY-ON 101822

## (undated) DEVICE — SPECIMEN CONTAINER 3OZ W/FORMALIN 59901

## (undated) DEVICE — ENDO SNARE POLYPECTOMY SPIRAL 20MM LOOP SD-230U-20

## (undated) DEVICE — GOWN IMPERVIOUS 2XL BLUE

## (undated) DEVICE — SYR ORISE GEL 10ML M00519201

## (undated) DEVICE — Device

## (undated) DEVICE — KIT ENDO FIRST STEP DISINFECTANT 200ML W/POUCH EP-4

## (undated) DEVICE — ENDO TRAP POLYP E-TRAP 00711099

## (undated) DEVICE — SOL WATER IRRIG 500ML BOTTLE 2F7113

## (undated) DEVICE — NDL SCLEROTHERAPY 25GA CARR-LOCK  00711811

## (undated) DEVICE — TUBING SUCTION 12"X1/4" N612

## (undated) DEVICE — SUCTION MANIFOLD NEPTUNE 2 SYS 1 PORT 702-025-000

## (undated) RX ORDER — HEPARIN SODIUM 1000 [USP'U]/ML
INJECTION, SOLUTION INTRAVENOUS; SUBCUTANEOUS
Status: DISPENSED
Start: 2018-10-12

## (undated) RX ORDER — ASPIRIN 81 MG/1
TABLET, CHEWABLE ORAL
Status: DISPENSED
Start: 2018-10-12

## (undated) RX ORDER — FENTANYL CITRATE 50 UG/ML
INJECTION, SOLUTION INTRAMUSCULAR; INTRAVENOUS
Status: DISPENSED
Start: 2018-05-14

## (undated) RX ORDER — FENTANYL CITRATE 50 UG/ML
INJECTION, SOLUTION INTRAMUSCULAR; INTRAVENOUS
Status: DISPENSED
Start: 2018-10-12

## (undated) RX ORDER — ONDANSETRON 2 MG/ML
INJECTION INTRAMUSCULAR; INTRAVENOUS
Status: DISPENSED
Start: 2018-10-12

## (undated) RX ORDER — ONDANSETRON 2 MG/ML
INJECTION INTRAMUSCULAR; INTRAVENOUS
Status: DISPENSED
Start: 2022-03-29

## (undated) RX ORDER — LIDOCAINE HYDROCHLORIDE 10 MG/ML
INJECTION, SOLUTION EPIDURAL; INFILTRATION; INTRACAUDAL; PERINEURAL
Status: DISPENSED
Start: 2018-10-12

## (undated) RX ORDER — FENTANYL CITRATE 50 UG/ML
INJECTION, SOLUTION INTRAMUSCULAR; INTRAVENOUS
Status: DISPENSED
Start: 2018-10-26

## (undated) RX ORDER — FENTANYL CITRATE 50 UG/ML
INJECTION, SOLUTION INTRAMUSCULAR; INTRAVENOUS
Status: DISPENSED
Start: 2022-03-29

## (undated) RX ORDER — CEFAZOLIN SODIUM 2 G/100ML
INJECTION, SOLUTION INTRAVENOUS
Status: DISPENSED
Start: 2018-10-12

## (undated) RX ORDER — SODIUM CHLORIDE 9 MG/ML
INJECTION, SOLUTION INTRAVENOUS
Status: DISPENSED
Start: 2018-10-12